# Patient Record
Sex: MALE | Race: OTHER | Employment: UNEMPLOYED | URBAN - METROPOLITAN AREA
[De-identification: names, ages, dates, MRNs, and addresses within clinical notes are randomized per-mention and may not be internally consistent; named-entity substitution may affect disease eponyms.]

---

## 2018-06-12 ENCOUNTER — HOSPITAL ENCOUNTER (INPATIENT)
Age: 43
LOS: 4 days | Discharge: HOME | DRG: 753 | End: 2018-06-16
Attending: PSYCHIATRY & NEUROLOGY | Admitting: PSYCHIATRY & NEUROLOGY
Payer: COMMERCIAL

## 2018-06-12 DIAGNOSIS — F31.4 BIPOLAR 1 DISORDER, DEPRESSED, SEVERE (HCC): Primary | ICD-10-CM

## 2018-06-12 LAB
ACETAMINOPHEN LEVEL: <5 UG/ML (ref 10–30)
ALBUMIN SERPL-MCNC: 4.1 G/DL (ref 3.9–4.9)
ALP BLD-CCNC: 62 U/L (ref 35–104)
ALT SERPL-CCNC: 24 U/L (ref 0–41)
AMPHETAMINE SCREEN, URINE: ABNORMAL
ANION GAP SERPL CALCULATED.3IONS-SCNC: 13 MEQ/L (ref 7–13)
AST SERPL-CCNC: 23 U/L (ref 0–40)
BARBITURATE SCREEN URINE: ABNORMAL
BASOPHILS ABSOLUTE: 0.1 K/UL (ref 0–0.2)
BASOPHILS RELATIVE PERCENT: 1.2 %
BENZODIAZEPINE SCREEN, URINE: ABNORMAL
BILIRUB SERPL-MCNC: 0.6 MG/DL (ref 0–1.2)
BILIRUBIN URINE: NEGATIVE
BLOOD, URINE: NEGATIVE
BUN BLDV-MCNC: 13 MG/DL (ref 6–20)
CALCIUM SERPL-MCNC: 9.4 MG/DL (ref 8.6–10.2)
CANNABINOID SCREEN URINE: ABNORMAL
CHLORIDE BLD-SCNC: 96 MEQ/L (ref 98–107)
CLARITY: CLEAR
CO2: 24 MEQ/L (ref 22–29)
COCAINE METABOLITE SCREEN URINE: POSITIVE
COLOR: YELLOW
CREAT SERPL-MCNC: 0.85 MG/DL (ref 0.7–1.2)
EOSINOPHILS ABSOLUTE: 0.1 K/UL (ref 0–0.7)
EOSINOPHILS RELATIVE PERCENT: 1.3 %
ETHANOL PERCENT: 0.1 G/DL
ETHANOL: 111 MG/DL (ref 0–0.08)
GFR AFRICAN AMERICAN: >60
GFR NON-AFRICAN AMERICAN: >60
GLOBULIN: 2.7 G/DL (ref 2.3–3.5)
GLUCOSE BLD-MCNC: 119 MG/DL (ref 74–109)
GLUCOSE URINE: NEGATIVE MG/DL
HCT VFR BLD CALC: 40.7 % (ref 42–52)
HEMOGLOBIN: 10.8 G/DL (ref 14–18)
KETONES, URINE: NEGATIVE MG/DL
LEUKOCYTE ESTERASE, URINE: NEGATIVE
LYMPHOCYTES ABSOLUTE: 2 K/UL (ref 1–4.8)
LYMPHOCYTES RELATIVE PERCENT: 29.8 %
Lab: ABNORMAL
MCH RBC QN AUTO: 23.3 PG (ref 27–31.3)
MCHC RBC AUTO-ENTMCNC: 26.7 % (ref 33–37)
MCV RBC AUTO: 87.6 FL (ref 80–100)
MONOCYTES ABSOLUTE: 0.5 K/UL (ref 0.2–0.8)
MONOCYTES RELATIVE PERCENT: 7.4 %
NEUTROPHILS ABSOLUTE: 4.2 K/UL (ref 1.4–6.5)
NEUTROPHILS RELATIVE PERCENT: 60.3 %
NITRITE, URINE: NEGATIVE
OPIATE SCREEN URINE: ABNORMAL
PDW BLD-RTO: 13.1 % (ref 11.5–14.5)
PH UA: 6.5 (ref 5–9)
PHENCYCLIDINE SCREEN URINE: ABNORMAL
PLATELET # BLD: 372 K/UL (ref 130–400)
POTASSIUM SERPL-SCNC: 3.8 MEQ/L (ref 3.5–5.1)
PROTEIN UA: NEGATIVE MG/DL
RBC # BLD: 4.65 M/UL (ref 4.7–6.1)
SALICYLATE, SERUM: <0.3 MG/DL (ref 15–30)
SLIDE REVIEW: ABNORMAL
SODIUM BLD-SCNC: 133 MEQ/L (ref 132–144)
SPECIFIC GRAVITY UA: 1 (ref 1–1.03)
TOTAL CK: 187 U/L (ref 0–190)
TOTAL PROTEIN: 6.8 G/DL (ref 6.4–8.1)
TSH SERPL DL<=0.05 MIU/L-ACNC: 3.25 UIU/ML (ref 0.27–4.2)
URINE REFLEX TO CULTURE: NORMAL
UROBILINOGEN, URINE: 0.2 E.U./DL
WBC # BLD: 6.9 K/UL (ref 4.8–10.8)

## 2018-06-12 PROCEDURE — G0480 DRUG TEST DEF 1-7 CLASSES: HCPCS

## 2018-06-12 PROCEDURE — 84443 ASSAY THYROID STIM HORMONE: CPT

## 2018-06-12 PROCEDURE — 85025 COMPLETE CBC W/AUTO DIFF WBC: CPT

## 2018-06-12 PROCEDURE — 1240000000 HC EMOTIONAL WELLNESS R&B

## 2018-06-12 PROCEDURE — 81003 URINALYSIS AUTO W/O SCOPE: CPT

## 2018-06-12 PROCEDURE — 36415 COLL VENOUS BLD VENIPUNCTURE: CPT

## 2018-06-12 PROCEDURE — 6370000000 HC RX 637 (ALT 250 FOR IP): Performed by: CLINICAL NURSE SPECIALIST

## 2018-06-12 PROCEDURE — 80307 DRUG TEST PRSMV CHEM ANLYZR: CPT

## 2018-06-12 PROCEDURE — 99285 EMERGENCY DEPT VISIT HI MDM: CPT

## 2018-06-12 PROCEDURE — 80053 COMPREHEN METABOLIC PANEL: CPT

## 2018-06-12 PROCEDURE — 82550 ASSAY OF CK (CPK): CPT

## 2018-06-12 RX ORDER — MAGNESIUM HYDROXIDE/ALUMINUM HYDROXICE/SIMETHICONE 120; 1200; 1200 MG/30ML; MG/30ML; MG/30ML
30 SUSPENSION ORAL PRN
Status: DISCONTINUED | OUTPATIENT
Start: 2018-06-12 | End: 2018-06-16 | Stop reason: HOSPADM

## 2018-06-12 RX ORDER — HYDROXYZINE PAMOATE 50 MG/1
50 CAPSULE ORAL EVERY 6 HOURS PRN
Status: DISCONTINUED | OUTPATIENT
Start: 2018-06-12 | End: 2018-06-16 | Stop reason: HOSPADM

## 2018-06-12 RX ORDER — ACETAMINOPHEN 325 MG/1
650 TABLET ORAL EVERY 4 HOURS PRN
Status: DISCONTINUED | OUTPATIENT
Start: 2018-06-12 | End: 2018-06-14

## 2018-06-12 RX ORDER — CLONAZEPAM 1 MG/1
1 TABLET ORAL 2 TIMES DAILY PRN
Status: ON HOLD | COMMUNITY
End: 2018-06-16 | Stop reason: HOSPADM

## 2018-06-12 RX ORDER — TRAZODONE HYDROCHLORIDE 50 MG/1
50 TABLET ORAL NIGHTLY PRN
Status: DISCONTINUED | OUTPATIENT
Start: 2018-06-12 | End: 2018-06-14

## 2018-06-12 RX ORDER — MELOXICAM 15 MG/1
15 TABLET ORAL DAILY
Status: ON HOLD | COMMUNITY
End: 2018-06-14 | Stop reason: HOSPADM

## 2018-06-12 RX ORDER — QUETIAPINE FUMARATE 200 MG/1
200 TABLET, FILM COATED ORAL NIGHTLY
COMMUNITY
End: 2018-06-12 | Stop reason: ALTCHOICE

## 2018-06-12 RX ORDER — OMEPRAZOLE 20 MG/1
20 CAPSULE, DELAYED RELEASE ORAL DAILY
COMMUNITY

## 2018-06-12 RX ORDER — ESCITALOPRAM OXALATE 20 MG/1
20 TABLET ORAL DAILY
Status: DISCONTINUED | OUTPATIENT
Start: 2018-06-12 | End: 2018-06-16 | Stop reason: HOSPADM

## 2018-06-12 RX ORDER — TIZANIDINE 4 MG/1
4 TABLET ORAL EVERY 8 HOURS PRN
Status: ON HOLD | COMMUNITY
Start: 2018-04-30 | End: 2018-06-14 | Stop reason: HOSPADM

## 2018-06-12 RX ORDER — SIMVASTATIN 20 MG
TABLET ORAL NIGHTLY
COMMUNITY

## 2018-06-12 RX ORDER — HALOPERIDOL 5 MG/ML
5 INJECTION INTRAMUSCULAR EVERY 4 HOURS PRN
Status: DISCONTINUED | OUTPATIENT
Start: 2018-06-12 | End: 2018-06-16 | Stop reason: HOSPADM

## 2018-06-12 RX ORDER — ESCITALOPRAM OXALATE 20 MG/1
20 TABLET ORAL DAILY
COMMUNITY

## 2018-06-12 RX ORDER — BENZTROPINE MESYLATE 1 MG/ML
2 INJECTION INTRAMUSCULAR; INTRAVENOUS 2 TIMES DAILY PRN
Status: DISCONTINUED | OUTPATIENT
Start: 2018-06-12 | End: 2018-06-16 | Stop reason: HOSPADM

## 2018-06-12 RX ADMIN — LURASIDONE HYDROCHLORIDE 20 MG: 20 TABLET, FILM COATED ORAL at 17:49

## 2018-06-12 RX ADMIN — ESCITALOPRAM OXALATE 20 MG: 20 TABLET ORAL at 21:39

## 2018-06-12 RX ADMIN — TRAZODONE HYDROCHLORIDE 50 MG: 50 TABLET ORAL at 21:39

## 2018-06-12 ASSESSMENT — ENCOUNTER SYMPTOMS
COUGH: 0
RHINORRHEA: 0
BLOOD IN STOOL: 0
SHORTNESS OF BREATH: 0
DIARRHEA: 0
NAUSEA: 0
ABDOMINAL PAIN: 0
COLOR CHANGE: 0
VOMITING: 0
SORE THROAT: 0

## 2018-06-12 ASSESSMENT — SLEEP AND FATIGUE QUESTIONNAIRES
DIFFICULTY FALLING ASLEEP: YES
DIFFICULTY ARISING: YES
AVERAGE NUMBER OF SLEEP HOURS: 4
DIFFICULTY STAYING ASLEEP: YES
RESTFUL SLEEP: NO
DO YOU HAVE DIFFICULTY SLEEPING: YES
DO YOU USE A SLEEP AID: NO

## 2018-06-12 ASSESSMENT — PATIENT HEALTH QUESTIONNAIRE - PHQ9
SUM OF ALL RESPONSES TO PHQ QUESTIONS 1-9: 24
SUM OF ALL RESPONSES TO PHQ QUESTIONS 1-9: 27

## 2018-06-13 PROCEDURE — 93005 ELECTROCARDIOGRAM TRACING: CPT

## 2018-06-13 PROCEDURE — 1240000000 HC EMOTIONAL WELLNESS R&B

## 2018-06-13 PROCEDURE — 6370000000 HC RX 637 (ALT 250 FOR IP): Performed by: CLINICAL NURSE SPECIALIST

## 2018-06-13 PROCEDURE — 93010 ELECTROCARDIOGRAM REPORT: CPT | Performed by: INTERNAL MEDICINE

## 2018-06-13 PROCEDURE — 99223 1ST HOSP IP/OBS HIGH 75: CPT | Performed by: PSYCHIATRY & NEUROLOGY

## 2018-06-13 RX ADMIN — TRAZODONE HYDROCHLORIDE 50 MG: 50 TABLET ORAL at 22:00

## 2018-06-13 RX ADMIN — ESCITALOPRAM OXALATE 20 MG: 20 TABLET ORAL at 09:42

## 2018-06-13 RX ADMIN — LURASIDONE HYDROCHLORIDE 20 MG: 20 TABLET, FILM COATED ORAL at 09:42

## 2018-06-13 ASSESSMENT — LIFESTYLE VARIABLES: HISTORY_ALCOHOL_USE: NO

## 2018-06-13 ASSESSMENT — ENCOUNTER SYMPTOMS: SHORTNESS OF BREATH: 0

## 2018-06-14 PROBLEM — F14.10 COCAINE ABUSE (HCC): Status: ACTIVE | Noted: 2018-06-14

## 2018-06-14 PROBLEM — F10.14 ALCOHOL ABUSE WITH ALCOHOL-INDUCED MOOD DISORDER (HCC): Status: ACTIVE | Noted: 2018-06-14

## 2018-06-14 PROBLEM — M79.10 MUSCULAR PAIN: Status: ACTIVE | Noted: 2018-06-14

## 2018-06-14 PROBLEM — M47.812 CERVICAL SPONDYLOSIS WITHOUT MYELOPATHY: Status: ACTIVE | Noted: 2018-06-14

## 2018-06-14 PROBLEM — M51.36 DEGENERATION, INTERVERTEBRAL DISC, LUMBAR: Status: ACTIVE | Noted: 2018-06-14

## 2018-06-14 PROBLEM — G89.4 CHRONIC PAIN ASSOCIATED WITH SIGNIFICANT PSYCHOSOCIAL DYSFUNCTION: Status: ACTIVE | Noted: 2018-06-14

## 2018-06-14 PROCEDURE — 6370000000 HC RX 637 (ALT 250 FOR IP): Performed by: PSYCHIATRY & NEUROLOGY

## 2018-06-14 PROCEDURE — 1240000000 HC EMOTIONAL WELLNESS R&B

## 2018-06-14 PROCEDURE — 6370000000 HC RX 637 (ALT 250 FOR IP): Performed by: ANESTHESIOLOGY

## 2018-06-14 PROCEDURE — 99232 SBSQ HOSP IP/OBS MODERATE 35: CPT | Performed by: PSYCHIATRY & NEUROLOGY

## 2018-06-14 PROCEDURE — 6370000000 HC RX 637 (ALT 250 FOR IP): Performed by: CLINICAL NURSE SPECIALIST

## 2018-06-14 RX ORDER — METHOCARBAMOL 500 MG/1
500 TABLET, FILM COATED ORAL EVERY 6 HOURS PRN
Qty: 45 TABLET | Refills: 0 | Status: SHIPPED | OUTPATIENT
Start: 2018-06-14 | End: 2018-06-29

## 2018-06-14 RX ORDER — LIDOCAINE 50 MG/G
3 PATCH TOPICAL DAILY
Status: DISCONTINUED | OUTPATIENT
Start: 2018-06-14 | End: 2018-06-16 | Stop reason: HOSPADM

## 2018-06-14 RX ORDER — LIDOCAINE 50 MG/G
3 PATCH TOPICAL DAILY
Qty: 60 PATCH | Refills: 0 | Status: SHIPPED | OUTPATIENT
Start: 2018-06-14

## 2018-06-14 RX ORDER — ACETAMINOPHEN 325 MG/1
650 TABLET ORAL EVERY 4 HOURS PRN
Status: DISCONTINUED | OUTPATIENT
Start: 2018-06-14 | End: 2018-06-16 | Stop reason: HOSPADM

## 2018-06-14 RX ORDER — TRAZODONE HYDROCHLORIDE 100 MG/1
100 TABLET ORAL NIGHTLY PRN
Status: DISCONTINUED | OUTPATIENT
Start: 2018-06-14 | End: 2018-06-16 | Stop reason: HOSPADM

## 2018-06-14 RX ORDER — MELOXICAM 7.5 MG/1
7.5 TABLET ORAL 2 TIMES DAILY WITH MEALS
Qty: 60 TABLET | Refills: 0 | Status: SHIPPED | OUTPATIENT
Start: 2018-06-14

## 2018-06-14 RX ORDER — METHOCARBAMOL 500 MG/1
500 TABLET, FILM COATED ORAL EVERY 6 HOURS PRN
Status: DISCONTINUED | OUTPATIENT
Start: 2018-06-14 | End: 2018-06-16 | Stop reason: HOSPADM

## 2018-06-14 RX ORDER — MELOXICAM 7.5 MG/1
7.5 TABLET ORAL 2 TIMES DAILY WITH MEALS
Status: DISCONTINUED | OUTPATIENT
Start: 2018-06-14 | End: 2018-06-16 | Stop reason: HOSPADM

## 2018-06-14 RX ORDER — ACETAMINOPHEN 325 MG/1
650 TABLET ORAL EVERY 4 HOURS PRN
Qty: 60 TABLET | Refills: 3 | Status: SHIPPED | OUTPATIENT
Start: 2018-06-14

## 2018-06-14 RX ADMIN — MELOXICAM 7.5 MG: 7.5 TABLET ORAL at 19:23

## 2018-06-14 RX ADMIN — LURASIDONE HYDROCHLORIDE 40 MG: 40 TABLET, FILM COATED ORAL at 09:40

## 2018-06-14 RX ADMIN — TRAZODONE HYDROCHLORIDE 100 MG: 100 TABLET ORAL at 22:20

## 2018-06-14 RX ADMIN — ESCITALOPRAM OXALATE 20 MG: 20 TABLET ORAL at 09:40

## 2018-06-14 ASSESSMENT — PAIN SCALES - GENERAL: PAINLEVEL_OUTOF10: 7

## 2018-06-15 LAB
EKG ATRIAL RATE: 101 BPM
EKG ATRIAL RATE: 57 BPM
EKG P AXIS: 17 DEGREES
EKG P AXIS: 36 DEGREES
EKG P-R INTERVAL: 130 MS
EKG P-R INTERVAL: 144 MS
EKG Q-T INTERVAL: 354 MS
EKG Q-T INTERVAL: 438 MS
EKG QRS DURATION: 94 MS
EKG QRS DURATION: 98 MS
EKG QTC CALCULATION (BAZETT): 426 MS
EKG QTC CALCULATION (BAZETT): 459 MS
EKG R AXIS: -2 DEGREES
EKG R AXIS: -6 DEGREES
EKG T AXIS: 14 DEGREES
EKG T AXIS: 4 DEGREES
EKG VENTRICULAR RATE: 101 BPM
EKG VENTRICULAR RATE: 57 BPM

## 2018-06-15 PROCEDURE — 99231 SBSQ HOSP IP/OBS SF/LOW 25: CPT | Performed by: PSYCHIATRY & NEUROLOGY

## 2018-06-15 PROCEDURE — 6370000000 HC RX 637 (ALT 250 FOR IP): Performed by: PSYCHIATRY & NEUROLOGY

## 2018-06-15 PROCEDURE — 6370000000 HC RX 637 (ALT 250 FOR IP): Performed by: ANESTHESIOLOGY

## 2018-06-15 PROCEDURE — 1240000000 HC EMOTIONAL WELLNESS R&B

## 2018-06-15 PROCEDURE — 6370000000 HC RX 637 (ALT 250 FOR IP): Performed by: CLINICAL NURSE SPECIALIST

## 2018-06-15 PROCEDURE — 90833 PSYTX W PT W E/M 30 MIN: CPT | Performed by: PSYCHIATRY & NEUROLOGY

## 2018-06-15 RX ADMIN — ESCITALOPRAM OXALATE 20 MG: 20 TABLET ORAL at 10:18

## 2018-06-15 RX ADMIN — LURASIDONE HYDROCHLORIDE 40 MG: 40 TABLET, FILM COATED ORAL at 10:18

## 2018-06-15 RX ADMIN — METHOCARBAMOL 500 MG: 500 TABLET ORAL at 10:18

## 2018-06-15 RX ADMIN — MELOXICAM 7.5 MG: 7.5 TABLET ORAL at 10:17

## 2018-06-15 RX ADMIN — TRAZODONE HYDROCHLORIDE 100 MG: 100 TABLET ORAL at 21:20

## 2018-06-15 RX ADMIN — METHOCARBAMOL 500 MG: 500 TABLET ORAL at 21:20

## 2018-06-15 RX ADMIN — MELOXICAM 7.5 MG: 7.5 TABLET ORAL at 17:19

## 2018-06-15 ASSESSMENT — PAIN SCALES - GENERAL
PAINLEVEL_OUTOF10: 4
PAINLEVEL_OUTOF10: 6

## 2018-06-16 VITALS
WEIGHT: 193 LBS | HEIGHT: 67 IN | DIASTOLIC BLOOD PRESSURE: 73 MMHG | RESPIRATION RATE: 20 BRPM | BODY MASS INDEX: 30.29 KG/M2 | SYSTOLIC BLOOD PRESSURE: 110 MMHG | HEART RATE: 103 BPM | OXYGEN SATURATION: 97 % | TEMPERATURE: 97 F

## 2018-06-16 PROCEDURE — 6370000000 HC RX 637 (ALT 250 FOR IP): Performed by: ANESTHESIOLOGY

## 2018-06-16 PROCEDURE — 6370000000 HC RX 637 (ALT 250 FOR IP): Performed by: CLINICAL NURSE SPECIALIST

## 2018-06-16 PROCEDURE — 6370000000 HC RX 637 (ALT 250 FOR IP): Performed by: PSYCHIATRY & NEUROLOGY

## 2018-06-16 RX ORDER — TRAZODONE HYDROCHLORIDE 100 MG/1
100 TABLET ORAL NIGHTLY PRN
Qty: 15 TABLET | Refills: 2 | Status: SHIPPED | OUTPATIENT
Start: 2018-06-16

## 2018-06-16 RX ADMIN — LURASIDONE HYDROCHLORIDE 40 MG: 40 TABLET, FILM COATED ORAL at 08:47

## 2018-06-16 RX ADMIN — ESCITALOPRAM OXALATE 20 MG: 20 TABLET ORAL at 08:47

## 2018-06-16 RX ADMIN — MELOXICAM 7.5 MG: 7.5 TABLET ORAL at 08:47

## 2018-06-16 ASSESSMENT — PAIN SCALES - GENERAL: PAINLEVEL_OUTOF10: 8

## 2018-06-19 PROCEDURE — 93010 ELECTROCARDIOGRAM REPORT: CPT | Performed by: INTERNAL MEDICINE

## 2018-07-25 ENCOUNTER — HOSPITAL ENCOUNTER (EMERGENCY)
Age: 43
Discharge: HOME | End: 2018-07-25
Attending: EMERGENCY MEDICINE
Payer: COMMERCIAL

## 2018-07-25 VITALS
SYSTOLIC BLOOD PRESSURE: 110 MMHG | OXYGEN SATURATION: 93 % | TEMPERATURE: 98.2 F | HEART RATE: 82 BPM | RESPIRATION RATE: 16 BRPM | DIASTOLIC BLOOD PRESSURE: 82 MMHG

## 2018-07-25 DIAGNOSIS — G89.29 ACUTE EXACERBATION OF CHRONIC LOW BACK PAIN: Primary | ICD-10-CM

## 2018-07-25 DIAGNOSIS — M54.50 ACUTE EXACERBATION OF CHRONIC LOW BACK PAIN: Primary | ICD-10-CM

## 2018-07-25 PROCEDURE — 6360000002 HC RX W HCPCS: Performed by: EMERGENCY MEDICINE

## 2018-07-25 PROCEDURE — 6360000002 HC RX W HCPCS: Performed by: NURSE PRACTITIONER

## 2018-07-25 PROCEDURE — 96372 THER/PROPH/DIAG INJ SC/IM: CPT

## 2018-07-25 PROCEDURE — 99283 EMERGENCY DEPT VISIT LOW MDM: CPT

## 2018-07-25 RX ORDER — FENTANYL CITRATE 50 UG/ML
50 INJECTION, SOLUTION INTRAMUSCULAR; INTRAVENOUS ONCE
Status: COMPLETED | OUTPATIENT
Start: 2018-07-25 | End: 2018-07-25

## 2018-07-25 RX ORDER — OXYCODONE HYDROCHLORIDE AND ACETAMINOPHEN 5; 325 MG/1; MG/1
1 TABLET ORAL EVERY 6 HOURS PRN
Qty: 12 TABLET | Refills: 0 | Status: SHIPPED | OUTPATIENT
Start: 2018-07-25 | End: 2018-07-28

## 2018-07-25 RX ORDER — KETOROLAC TROMETHAMINE 30 MG/ML
60 INJECTION, SOLUTION INTRAMUSCULAR; INTRAVENOUS ONCE
Status: COMPLETED | OUTPATIENT
Start: 2018-07-25 | End: 2018-07-25

## 2018-07-25 RX ORDER — ORPHENADRINE CITRATE 30 MG/ML
60 INJECTION INTRAMUSCULAR; INTRAVENOUS ONCE
Status: COMPLETED | OUTPATIENT
Start: 2018-07-25 | End: 2018-07-25

## 2018-07-25 RX ADMIN — FENTANYL CITRATE 50 MCG: 50 INJECTION, SOLUTION INTRAMUSCULAR; INTRAVENOUS at 20:02

## 2018-07-25 RX ADMIN — ORPHENADRINE CITRATE 60 MG: 30 INJECTION INTRAMUSCULAR; INTRAVENOUS at 18:40

## 2018-07-25 RX ADMIN — FENTANYL CITRATE 50 MCG: 50 INJECTION, SOLUTION INTRAMUSCULAR; INTRAVENOUS at 20:48

## 2018-07-25 RX ADMIN — KETOROLAC TROMETHAMINE 60 MG: 30 INJECTION, SOLUTION INTRAMUSCULAR at 18:39

## 2018-07-25 ASSESSMENT — PAIN DESCRIPTION - DESCRIPTORS
DESCRIPTORS: CRUSHING

## 2018-07-25 ASSESSMENT — PAIN DESCRIPTION - PAIN TYPE
TYPE: ACUTE PAIN
TYPE: ACUTE PAIN
TYPE: CHRONIC PAIN;ACUTE PAIN
TYPE: ACUTE PAIN

## 2018-07-25 ASSESSMENT — PAIN DESCRIPTION - ORIENTATION
ORIENTATION: LOWER

## 2018-07-25 ASSESSMENT — PAIN DESCRIPTION - FREQUENCY
FREQUENCY: CONTINUOUS

## 2018-07-25 ASSESSMENT — ENCOUNTER SYMPTOMS
ABDOMINAL PAIN: 0
COLOR CHANGE: 0
VOMITING: 0
FACIAL SWELLING: 0
EYE DISCHARGE: 0
BACK PAIN: 1
SHORTNESS OF BREATH: 0
RHINORRHEA: 0

## 2018-07-25 ASSESSMENT — PAIN SCALES - GENERAL
PAINLEVEL_OUTOF10: 8
PAINLEVEL_OUTOF10: 8
PAINLEVEL_OUTOF10: 10
PAINLEVEL_OUTOF10: 10
PAINLEVEL_OUTOF10: 7
PAINLEVEL_OUTOF10: 10
PAINLEVEL_OUTOF10: 10

## 2018-07-25 ASSESSMENT — PAIN DESCRIPTION - LOCATION
LOCATION: BACK

## 2018-07-25 ASSESSMENT — PAIN DESCRIPTION - PROGRESSION
CLINICAL_PROGRESSION: GRADUALLY IMPROVING
CLINICAL_PROGRESSION: GRADUALLY IMPROVING
CLINICAL_PROGRESSION: NOT CHANGED

## 2018-07-25 NOTE — ED NOTES
Bed: 18  Expected date:   Expected time:   Means of arrival:   Comments:  Back pain      Gualberto Loja RN  07/25/18 0640

## 2018-07-26 NOTE — ED NOTES
Patient requesting pain medication RX to go home with. Dr. Kinga Dueks didn't write any RX for patient. Adjondi NP at bedside.       Shawn Velasquez RN  07/25/18 4234

## 2023-10-10 ENCOUNTER — ANESTHESIA EVENT (OUTPATIENT)
Dept: OPERATING ROOM | Facility: HOSPITAL | Age: 48
End: 2023-10-10
Payer: COMMERCIAL

## 2023-10-10 ENCOUNTER — APPOINTMENT (OUTPATIENT)
Dept: RADIOLOGY | Facility: HOSPITAL | Age: 48
End: 2023-10-10
Payer: COMMERCIAL

## 2023-10-10 ENCOUNTER — APPOINTMENT (OUTPATIENT)
Dept: CARDIOLOGY | Facility: HOSPITAL | Age: 48
End: 2023-10-10
Payer: COMMERCIAL

## 2023-10-10 ENCOUNTER — ANESTHESIA (OUTPATIENT)
Dept: OPERATING ROOM | Facility: HOSPITAL | Age: 48
End: 2023-10-10
Payer: COMMERCIAL

## 2023-10-10 ENCOUNTER — HOSPITAL ENCOUNTER (INPATIENT)
Facility: HOSPITAL | Age: 48
LOS: 22 days | Discharge: NURSING, REHAB OR LONG-TERM CARE | End: 2023-11-02
Attending: SURGERY | Admitting: INTERNAL MEDICINE
Payer: COMMERCIAL

## 2023-10-10 DIAGNOSIS — K35.30 ACUTE APPENDICITIS WITH LOCALIZED PERITONITIS, UNSPECIFIED WHETHER ABSCESS PRESENT, UNSPECIFIED WHETHER GANGRENE PRESENT, UNSPECIFIED WHETHER PERFORATION PRESENT: ICD-10-CM

## 2023-10-10 DIAGNOSIS — K56.609 SMALL BOWEL OBSTRUCTION (MULTI): ICD-10-CM

## 2023-10-10 DIAGNOSIS — Z79.4 TYPE 2 DIABETES MELLITUS WITHOUT COMPLICATION, WITH LONG-TERM CURRENT USE OF INSULIN (MULTI): ICD-10-CM

## 2023-10-10 DIAGNOSIS — F33.9 EPISODE OF RECURRENT MAJOR DEPRESSIVE DISORDER, UNSPECIFIED DEPRESSION EPISODE SEVERITY (CMS-HCC): ICD-10-CM

## 2023-10-10 DIAGNOSIS — N40.1 BENIGN PROSTATIC HYPERPLASIA WITH LOWER URINARY TRACT SYMPTOMS, SYMPTOM DETAILS UNSPECIFIED: ICD-10-CM

## 2023-10-10 DIAGNOSIS — K35.30 ACUTE APPENDICITIS WITH LOCALIZED PERITONITIS, WITHOUT PERFORATION, ABSCESS, OR GANGRENE: ICD-10-CM

## 2023-10-10 DIAGNOSIS — K35.80 ACUTE APPENDICITIS, UNSPECIFIED ACUTE APPENDICITIS TYPE: Primary | ICD-10-CM

## 2023-10-10 DIAGNOSIS — E11.9 TYPE 2 DIABETES MELLITUS WITHOUT COMPLICATION, WITH LONG-TERM CURRENT USE OF INSULIN (MULTI): ICD-10-CM

## 2023-10-10 PROBLEM — E78.5 HYPERLIPIDEMIA: Status: ACTIVE | Noted: 2023-10-10

## 2023-10-10 PROBLEM — F32.A DEPRESSION: Status: ACTIVE | Noted: 2023-10-10

## 2023-10-10 PROBLEM — K37 APPENDICITIS: Status: ACTIVE | Noted: 2023-10-10

## 2023-10-10 PROBLEM — F41.9 ANXIETY: Status: ACTIVE | Noted: 2023-10-10

## 2023-10-10 LAB
ALBUMIN SERPL BCP-MCNC: 4.2 G/DL (ref 3.4–5)
ALP SERPL-CCNC: 69 U/L (ref 33–120)
ALT SERPL W P-5'-P-CCNC: 17 U/L (ref 7–52)
ANION GAP SERPL CALC-SCNC: 13 MMOL/L (ref 10–20)
AST SERPL W P-5'-P-CCNC: 12 U/L (ref 9–39)
BASOPHILS # BLD AUTO: 0.08 X10*3/UL (ref 0–0.1)
BASOPHILS NFR BLD AUTO: 0.5 %
BILIRUB SERPL-MCNC: 0.3 MG/DL (ref 0–1.2)
BUN SERPL-MCNC: 14 MG/DL (ref 6–23)
CALCIUM SERPL-MCNC: 9.4 MG/DL (ref 8.6–10.3)
CHLORIDE SERPL-SCNC: 103 MMOL/L (ref 98–107)
CO2 SERPL-SCNC: 24 MMOL/L (ref 21–32)
CREAT SERPL-MCNC: 0.94 MG/DL (ref 0.5–1.3)
EOSINOPHIL # BLD AUTO: 0.72 X10*3/UL (ref 0–0.7)
EOSINOPHIL NFR BLD AUTO: 4.8 %
ERYTHROCYTE [DISTWIDTH] IN BLOOD BY AUTOMATED COUNT: 12.2 % (ref 11.5–14.5)
GFR SERPL CREATININE-BSD FRML MDRD: 75 ML/MIN/1.73M*2
GLUCOSE BLD MANUAL STRIP-MCNC: 153 MG/DL (ref 74–99)
GLUCOSE SERPL-MCNC: 174 MG/DL (ref 74–99)
HCT VFR BLD AUTO: 40.7 % (ref 36–52)
HGB BLD-MCNC: 13.9 G/DL (ref 12–17.5)
IMM GRANULOCYTES # BLD AUTO: 0.04 X10*3/UL (ref 0–0.7)
IMM GRANULOCYTES NFR BLD AUTO: 0.3 % (ref 0–0.9)
LACTATE SERPL-SCNC: 1.4 MMOL/L (ref 0.4–2)
LIPASE SERPL-CCNC: 15 U/L (ref 9–82)
LYMPHOCYTES # BLD AUTO: 2.02 X10*3/UL (ref 1.2–4.8)
LYMPHOCYTES NFR BLD AUTO: 13.4 %
MCH RBC QN AUTO: 29.1 PG (ref 26–34)
MCHC RBC AUTO-ENTMCNC: 34.2 G/DL (ref 32–36)
MCV RBC AUTO: 85 FL (ref 80–100)
MONOCYTES # BLD AUTO: 0.81 X10*3/UL (ref 0.1–1)
MONOCYTES NFR BLD AUTO: 5.4 %
NEUTROPHILS # BLD AUTO: 11.35 X10*3/UL (ref 1.2–7.7)
NEUTROPHILS NFR BLD AUTO: 75.6 %
NRBC BLD-RTO: 0 /100 WBCS (ref 0–0)
PLATELET # BLD AUTO: 375 X10*3/UL (ref 150–450)
PMV BLD AUTO: 10.1 FL (ref 7.5–11.5)
POTASSIUM SERPL-SCNC: 3.6 MMOL/L (ref 3.5–5.3)
PROT SERPL-MCNC: 7.3 G/DL (ref 6.4–8.2)
RBC # BLD AUTO: 4.77 X10*6/UL (ref 4–5.9)
SARS-COV-2 RNA RESP QL NAA+PROBE: NOT DETECTED
SODIUM SERPL-SCNC: 136 MMOL/L (ref 136–145)
WBC # BLD AUTO: 15 X10*3/UL (ref 4.4–11.3)

## 2023-10-10 PROCEDURE — 2500000001 HC RX 250 WO HCPCS SELF ADMINISTERED DRUGS (ALT 637 FOR MEDICARE OP): Performed by: SURGERY

## 2023-10-10 PROCEDURE — 74177 CT ABD & PELVIS W/CONTRAST: CPT | Mod: FOREIGN READ | Performed by: RADIOLOGY

## 2023-10-10 PROCEDURE — 88304 TISSUE EXAM BY PATHOLOGIST: CPT | Mod: TC | Performed by: SURGERY

## 2023-10-10 PROCEDURE — 83690 ASSAY OF LIPASE: CPT | Performed by: PHYSICIAN ASSISTANT

## 2023-10-10 PROCEDURE — 96365 THER/PROPH/DIAG IV INF INIT: CPT | Mod: 59

## 2023-10-10 PROCEDURE — 99285 EMERGENCY DEPT VISIT HI MDM: CPT

## 2023-10-10 PROCEDURE — 2500000004 HC RX 250 GENERAL PHARMACY W/ HCPCS (ALT 636 FOR OP/ED): Performed by: STUDENT IN AN ORGANIZED HEALTH CARE EDUCATION/TRAINING PROGRAM

## 2023-10-10 PROCEDURE — 2500000005 HC RX 250 GENERAL PHARMACY W/O HCPCS: Performed by: STUDENT IN AN ORGANIZED HEALTH CARE EDUCATION/TRAINING PROGRAM

## 2023-10-10 PROCEDURE — 93005 ELECTROCARDIOGRAM TRACING: CPT

## 2023-10-10 PROCEDURE — 2500000005 HC RX 250 GENERAL PHARMACY W/O HCPCS: Performed by: NURSE ANESTHETIST, CERTIFIED REGISTERED

## 2023-10-10 PROCEDURE — 2500000001 HC RX 250 WO HCPCS SELF ADMINISTERED DRUGS (ALT 637 FOR MEDICARE OP): Performed by: STUDENT IN AN ORGANIZED HEALTH CARE EDUCATION/TRAINING PROGRAM

## 2023-10-10 PROCEDURE — 2500000004 HC RX 250 GENERAL PHARMACY W/ HCPCS (ALT 636 FOR OP/ED): Performed by: SURGERY

## 2023-10-10 PROCEDURE — 2720000007 HC OR 272 NO HCPCS: Performed by: SURGERY

## 2023-10-10 PROCEDURE — 36415 COLL VENOUS BLD VENIPUNCTURE: CPT | Performed by: PHYSICIAN ASSISTANT

## 2023-10-10 PROCEDURE — 74177 CT ABD & PELVIS W/CONTRAST: CPT | Mod: FR

## 2023-10-10 PROCEDURE — 96366 THER/PROPH/DIAG IV INF ADDON: CPT

## 2023-10-10 PROCEDURE — 96372 THER/PROPH/DIAG INJ SC/IM: CPT | Performed by: SURGERY

## 2023-10-10 PROCEDURE — 2550000001 HC RX 255 CONTRASTS: Performed by: PHYSICIAN ASSISTANT

## 2023-10-10 PROCEDURE — 99221 1ST HOSP IP/OBS SF/LOW 40: CPT | Performed by: SURGERY

## 2023-10-10 PROCEDURE — 2580000001 HC RX 258 IV SOLUTIONS: Performed by: PHYSICIAN ASSISTANT

## 2023-10-10 PROCEDURE — 3700000002 HC GENERAL ANESTHESIA TIME - EACH INCREMENTAL 1 MINUTE: Performed by: SURGERY

## 2023-10-10 PROCEDURE — 96367 TX/PROPH/DG ADDL SEQ IV INF: CPT

## 2023-10-10 PROCEDURE — 96375 TX/PRO/DX INJ NEW DRUG ADDON: CPT

## 2023-10-10 PROCEDURE — 96376 TX/PRO/DX INJ SAME DRUG ADON: CPT

## 2023-10-10 PROCEDURE — 2500000004 HC RX 250 GENERAL PHARMACY W/ HCPCS (ALT 636 FOR OP/ED): Performed by: NURSE ANESTHETIST, CERTIFIED REGISTERED

## 2023-10-10 PROCEDURE — 80053 COMPREHEN METABOLIC PANEL: CPT | Performed by: PHYSICIAN ASSISTANT

## 2023-10-10 PROCEDURE — 83605 ASSAY OF LACTIC ACID: CPT | Performed by: PHYSICIAN ASSISTANT

## 2023-10-10 PROCEDURE — 2780000003 HC OR 278 NO HCPCS: Performed by: SURGERY

## 2023-10-10 PROCEDURE — 2580000001 HC RX 258 IV SOLUTIONS: Performed by: SURGERY

## 2023-10-10 PROCEDURE — 7100000002 HC RECOVERY ROOM TIME - EACH INCREMENTAL 1 MINUTE: Performed by: SURGERY

## 2023-10-10 PROCEDURE — 0WQF0ZZ REPAIR ABDOMINAL WALL, OPEN APPROACH: ICD-10-PCS | Performed by: SURGERY

## 2023-10-10 PROCEDURE — 7100000001 HC RECOVERY ROOM TIME - INITIAL BASE CHARGE: Performed by: SURGERY

## 2023-10-10 PROCEDURE — 2500000004 HC RX 250 GENERAL PHARMACY W/ HCPCS (ALT 636 FOR OP/ED): Performed by: PHYSICIAN ASSISTANT

## 2023-10-10 PROCEDURE — 85025 COMPLETE CBC W/AUTO DIFF WBC: CPT | Performed by: PHYSICIAN ASSISTANT

## 2023-10-10 PROCEDURE — 3600000009 HC OR TIME - EACH INCREMENTAL 1 MINUTE - PROCEDURE LEVEL FOUR: Performed by: SURGERY

## 2023-10-10 PROCEDURE — 96361 HYDRATE IV INFUSION ADD-ON: CPT

## 2023-10-10 PROCEDURE — P9040 RBC LEUKOREDUCED IRRADIATED: HCPCS

## 2023-10-10 PROCEDURE — 44970 LAPAROSCOPY APPENDECTOMY: CPT | Performed by: SURGERY

## 2023-10-10 PROCEDURE — 96372 THER/PROPH/DIAG INJ SC/IM: CPT | Mod: 59

## 2023-10-10 PROCEDURE — 82947 ASSAY GLUCOSE BLOOD QUANT: CPT

## 2023-10-10 PROCEDURE — G0378 HOSPITAL OBSERVATION PER HR: HCPCS

## 2023-10-10 PROCEDURE — 3700000001 HC GENERAL ANESTHESIA TIME - INITIAL BASE CHARGE: Performed by: SURGERY

## 2023-10-10 PROCEDURE — 87635 SARS-COV-2 COVID-19 AMP PRB: CPT | Performed by: PHYSICIAN ASSISTANT

## 2023-10-10 PROCEDURE — 3600000004 HC OR TIME - INITIAL BASE CHARGE - PROCEDURE LEVEL FOUR: Performed by: SURGERY

## 2023-10-10 PROCEDURE — 88304 TISSUE EXAM BY PATHOLOGIST: CPT | Performed by: PATHOLOGY

## 2023-10-10 PROCEDURE — 2580000001 HC RX 258 IV SOLUTIONS: Performed by: NURSE ANESTHETIST, CERTIFIED REGISTERED

## 2023-10-10 PROCEDURE — A4217 STERILE WATER/SALINE, 500 ML: HCPCS | Performed by: SURGERY

## 2023-10-10 PROCEDURE — 2500000005 HC RX 250 GENERAL PHARMACY W/O HCPCS: Performed by: SURGERY

## 2023-10-10 PROCEDURE — 0DTJ4ZZ RESECTION OF APPENDIX, PERCUTANEOUS ENDOSCOPIC APPROACH: ICD-10-PCS | Performed by: SURGERY

## 2023-10-10 RX ORDER — MIDAZOLAM HYDROCHLORIDE 1 MG/ML
INJECTION, SOLUTION INTRAMUSCULAR; INTRAVENOUS AS NEEDED
Status: DISCONTINUED | OUTPATIENT
Start: 2023-10-10 | End: 2023-10-10

## 2023-10-10 RX ORDER — LURASIDONE HYDROCHLORIDE 40 MG/1
80 TABLET, FILM COATED ORAL DAILY
Status: DISCONTINUED | OUTPATIENT
Start: 2023-10-11 | End: 2023-10-27

## 2023-10-10 RX ORDER — MORPHINE SULFATE 4 MG/ML
4 INJECTION, SOLUTION INTRAMUSCULAR; INTRAVENOUS ONCE
Status: COMPLETED | OUTPATIENT
Start: 2023-10-10 | End: 2023-10-10

## 2023-10-10 RX ORDER — OXYCODONE HYDROCHLORIDE 5 MG/1
5 TABLET ORAL EVERY 6 HOURS PRN
Qty: 15 TABLET | Refills: 0 | Status: SHIPPED | OUTPATIENT
Start: 2023-10-10 | End: 2023-10-10 | Stop reason: HOSPADM

## 2023-10-10 RX ORDER — ROCURONIUM BROMIDE 10 MG/ML
INJECTION, SOLUTION INTRAVENOUS AS NEEDED
Status: DISCONTINUED | OUTPATIENT
Start: 2023-10-10 | End: 2023-10-10

## 2023-10-10 RX ORDER — ALBUTEROL SULFATE 0.83 MG/ML
2.5 SOLUTION RESPIRATORY (INHALATION) ONCE AS NEEDED
Status: DISCONTINUED | OUTPATIENT
Start: 2023-10-10 | End: 2023-10-14 | Stop reason: HOSPADM

## 2023-10-10 RX ORDER — VENLAFAXINE HYDROCHLORIDE 150 MG/1
150 CAPSULE, EXTENDED RELEASE ORAL DAILY
Status: DISCONTINUED | OUTPATIENT
Start: 2023-10-11 | End: 2023-11-02 | Stop reason: HOSPADM

## 2023-10-10 RX ORDER — ALBUTEROL SULFATE 0.83 MG/ML
2.5 SOLUTION RESPIRATORY (INHALATION) ONCE AS NEEDED
Status: DISCONTINUED | OUTPATIENT
Start: 2023-10-10 | End: 2023-10-10 | Stop reason: HOSPADM

## 2023-10-10 RX ORDER — OMEPRAZOLE 20 MG/1
20 CAPSULE, DELAYED RELEASE ORAL
COMMUNITY

## 2023-10-10 RX ORDER — SODIUM CHLORIDE, SODIUM LACTATE, POTASSIUM CHLORIDE, CALCIUM CHLORIDE 600; 310; 30; 20 MG/100ML; MG/100ML; MG/100ML; MG/100ML
100 INJECTION, SOLUTION INTRAVENOUS CONTINUOUS
Status: DISCONTINUED | OUTPATIENT
Start: 2023-10-10 | End: 2023-10-10 | Stop reason: HOSPADM

## 2023-10-10 RX ORDER — SIMVASTATIN 40 MG/1
40 TABLET, FILM COATED ORAL NIGHTLY
COMMUNITY

## 2023-10-10 RX ORDER — ONDANSETRON HYDROCHLORIDE 2 MG/ML
4 INJECTION, SOLUTION INTRAVENOUS ONCE
Status: COMPLETED | OUTPATIENT
Start: 2023-10-10 | End: 2023-10-10

## 2023-10-10 RX ORDER — DOCUSATE SODIUM 100 MG/1
100 CAPSULE, LIQUID FILLED ORAL 2 TIMES DAILY
Status: DISCONTINUED | OUTPATIENT
Start: 2023-10-10 | End: 2023-10-28

## 2023-10-10 RX ORDER — PROPOFOL 10 MG/ML
INJECTION, EMULSION INTRAVENOUS AS NEEDED
Status: DISCONTINUED | OUTPATIENT
Start: 2023-10-10 | End: 2023-10-10

## 2023-10-10 RX ORDER — INSULIN GLARGINE 100 [IU]/ML
30 INJECTION, SOLUTION SUBCUTANEOUS NIGHTLY
COMMUNITY
End: 2023-11-02 | Stop reason: HOSPADM

## 2023-10-10 RX ORDER — MEPERIDINE HYDROCHLORIDE 25 MG/ML
12.5 INJECTION INTRAMUSCULAR; INTRAVENOUS; SUBCUTANEOUS EVERY 10 MIN PRN
Status: DISCONTINUED | OUTPATIENT
Start: 2023-10-10 | End: 2023-10-14 | Stop reason: HOSPADM

## 2023-10-10 RX ORDER — HYDRALAZINE HYDROCHLORIDE 20 MG/ML
5 INJECTION INTRAMUSCULAR; INTRAVENOUS EVERY 30 MIN PRN
Status: DISCONTINUED | OUTPATIENT
Start: 2023-10-10 | End: 2023-10-14 | Stop reason: HOSPADM

## 2023-10-10 RX ORDER — HYDRALAZINE HYDROCHLORIDE 20 MG/ML
5 INJECTION INTRAMUSCULAR; INTRAVENOUS EVERY 30 MIN PRN
Status: DISCONTINUED | OUTPATIENT
Start: 2023-10-10 | End: 2023-10-10 | Stop reason: HOSPADM

## 2023-10-10 RX ORDER — SODIUM CHLORIDE, SODIUM LACTATE, POTASSIUM CHLORIDE, CALCIUM CHLORIDE 600; 310; 30; 20 MG/100ML; MG/100ML; MG/100ML; MG/100ML
100 INJECTION, SOLUTION INTRAVENOUS CONTINUOUS
Status: DISCONTINUED | OUTPATIENT
Start: 2023-10-10 | End: 2023-10-12

## 2023-10-10 RX ORDER — OXYCODONE HYDROCHLORIDE 5 MG/1
5 TABLET ORAL EVERY 4 HOURS PRN
Status: DISCONTINUED | OUTPATIENT
Start: 2023-10-10 | End: 2023-10-14 | Stop reason: HOSPADM

## 2023-10-10 RX ORDER — BUSPIRONE HYDROCHLORIDE 5 MG/1
15 TABLET ORAL 2 TIMES DAILY
Status: DISCONTINUED | OUTPATIENT
Start: 2023-10-10 | End: 2023-11-02 | Stop reason: HOSPADM

## 2023-10-10 RX ORDER — LABETALOL HYDROCHLORIDE 5 MG/ML
5 INJECTION, SOLUTION INTRAVENOUS ONCE AS NEEDED
Status: DISCONTINUED | OUTPATIENT
Start: 2023-10-10 | End: 2023-10-10 | Stop reason: HOSPADM

## 2023-10-10 RX ORDER — SIMVASTATIN 40 MG/1
40 TABLET, FILM COATED ORAL NIGHTLY
Status: DISCONTINUED | OUTPATIENT
Start: 2023-10-10 | End: 2023-11-02 | Stop reason: HOSPADM

## 2023-10-10 RX ORDER — METOPROLOL TARTRATE 1 MG/ML
2.5 INJECTION, SOLUTION INTRAVENOUS ONCE
Status: COMPLETED | OUTPATIENT
Start: 2023-10-10 | End: 2023-10-10

## 2023-10-10 RX ORDER — DEXTROSE 50 % IN WATER (D50W) INTRAVENOUS SYRINGE
25
Status: DISCONTINUED | OUTPATIENT
Start: 2023-10-10 | End: 2023-11-02 | Stop reason: HOSPADM

## 2023-10-10 RX ORDER — SODIUM CHLORIDE 0.9 G/100ML
IRRIGANT IRRIGATION AS NEEDED
Status: DISCONTINUED | OUTPATIENT
Start: 2023-10-10 | End: 2023-10-10 | Stop reason: HOSPADM

## 2023-10-10 RX ORDER — FENTANYL CITRATE 50 UG/ML
INJECTION, SOLUTION INTRAMUSCULAR; INTRAVENOUS AS NEEDED
Status: DISCONTINUED | OUTPATIENT
Start: 2023-10-10 | End: 2023-10-10

## 2023-10-10 RX ORDER — OXYCODONE HYDROCHLORIDE 5 MG/1
10 TABLET ORAL EVERY 4 HOURS PRN
Status: DISCONTINUED | OUTPATIENT
Start: 2023-10-10 | End: 2023-10-14 | Stop reason: HOSPADM

## 2023-10-10 RX ORDER — MEPERIDINE HYDROCHLORIDE 25 MG/ML
12.5 INJECTION INTRAMUSCULAR; INTRAVENOUS; SUBCUTANEOUS EVERY 10 MIN PRN
Status: DISCONTINUED | OUTPATIENT
Start: 2023-10-10 | End: 2023-10-10 | Stop reason: HOSPADM

## 2023-10-10 RX ORDER — OXYCODONE HYDROCHLORIDE 5 MG/1
10 TABLET ORAL EVERY 4 HOURS PRN
Status: DISCONTINUED | OUTPATIENT
Start: 2023-10-10 | End: 2023-10-10 | Stop reason: HOSPADM

## 2023-10-10 RX ORDER — VENLAFAXINE HYDROCHLORIDE 150 MG/1
150 CAPSULE, EXTENDED RELEASE ORAL DAILY
COMMUNITY
End: 2023-11-02 | Stop reason: HOSPADM

## 2023-10-10 RX ORDER — ONDANSETRON HYDROCHLORIDE 2 MG/ML
INJECTION, SOLUTION INTRAVENOUS AS NEEDED
Status: DISCONTINUED | OUTPATIENT
Start: 2023-10-10 | End: 2023-10-10

## 2023-10-10 RX ORDER — OXYCODONE HYDROCHLORIDE 5 MG/1
5 TABLET ORAL EVERY 6 HOURS PRN
Status: DISCONTINUED | OUTPATIENT
Start: 2023-10-10 | End: 2023-10-15

## 2023-10-10 RX ORDER — TIZANIDINE HYDROCHLORIDE 4 MG/1
4 CAPSULE, GELATIN COATED ORAL 2 TIMES DAILY PRN
COMMUNITY
End: 2024-03-20 | Stop reason: HOSPADM

## 2023-10-10 RX ORDER — OXYCODONE HYDROCHLORIDE 5 MG/1
5 TABLET ORAL EVERY 4 HOURS PRN
Status: DISCONTINUED | OUTPATIENT
Start: 2023-10-10 | End: 2023-10-10 | Stop reason: HOSPADM

## 2023-10-10 RX ORDER — LURASIDONE HYDROCHLORIDE 80 MG/1
80 TABLET, FILM COATED ORAL DAILY
COMMUNITY
End: 2023-11-02 | Stop reason: HOSPADM

## 2023-10-10 RX ORDER — TRAZODONE HYDROCHLORIDE 150 MG/1
300 TABLET ORAL NIGHTLY
Status: DISCONTINUED | OUTPATIENT
Start: 2023-10-10 | End: 2023-10-30

## 2023-10-10 RX ORDER — ONDANSETRON HYDROCHLORIDE 2 MG/ML
4 INJECTION, SOLUTION INTRAVENOUS ONCE AS NEEDED
Status: DISCONTINUED | OUTPATIENT
Start: 2023-10-10 | End: 2023-10-10 | Stop reason: HOSPADM

## 2023-10-10 RX ORDER — TRAZODONE HYDROCHLORIDE 300 MG/1
300 TABLET ORAL NIGHTLY
COMMUNITY
End: 2023-11-02 | Stop reason: HOSPADM

## 2023-10-10 RX ORDER — HEPARIN SODIUM 5000 [USP'U]/ML
5000 INJECTION, SOLUTION INTRAVENOUS; SUBCUTANEOUS ONCE
Status: COMPLETED | OUTPATIENT
Start: 2023-10-10 | End: 2023-10-10

## 2023-10-10 RX ORDER — PANTOPRAZOLE SODIUM 40 MG/1
40 TABLET, DELAYED RELEASE ORAL
Status: DISCONTINUED | OUTPATIENT
Start: 2023-10-11 | End: 2023-10-30

## 2023-10-10 RX ORDER — MORPHINE SULFATE 4 MG/ML
4 INJECTION, SOLUTION INTRAMUSCULAR; INTRAVENOUS ONCE
Status: COMPLETED | OUTPATIENT
Start: 2023-10-10 | End: 2023-10-12

## 2023-10-10 RX ORDER — ONDANSETRON HYDROCHLORIDE 2 MG/ML
4 INJECTION, SOLUTION INTRAVENOUS ONCE AS NEEDED
Status: COMPLETED | OUTPATIENT
Start: 2023-10-10 | End: 2023-10-12

## 2023-10-10 RX ORDER — ALBUTEROL SULFATE 2.5 MG/.5ML
SOLUTION RESPIRATORY (INHALATION)
Status: DISPENSED
Start: 2023-10-10 | End: 2023-10-11

## 2023-10-10 RX ORDER — INSULIN LISPRO 100 [IU]/ML
8 INJECTION, SOLUTION INTRAVENOUS; SUBCUTANEOUS
COMMUNITY
End: 2023-11-02 | Stop reason: HOSPADM

## 2023-10-10 RX ORDER — FENTANYL CITRATE 50 UG/ML
25 INJECTION, SOLUTION INTRAMUSCULAR; INTRAVENOUS EVERY 5 MIN PRN
Status: DISCONTINUED | OUTPATIENT
Start: 2023-10-10 | End: 2023-10-14 | Stop reason: HOSPADM

## 2023-10-10 RX ORDER — LIDOCAINE HYDROCHLORIDE 20 MG/ML
INJECTION, SOLUTION INFILTRATION; PERINEURAL AS NEEDED
Status: DISCONTINUED | OUTPATIENT
Start: 2023-10-10 | End: 2023-10-10

## 2023-10-10 RX ORDER — BUSPIRONE HYDROCHLORIDE 30 MG/1
TABLET ORAL 2 TIMES DAILY
COMMUNITY
End: 2023-11-02 | Stop reason: HOSPADM

## 2023-10-10 RX ORDER — LABETALOL HYDROCHLORIDE 5 MG/ML
5 INJECTION, SOLUTION INTRAVENOUS ONCE AS NEEDED
Status: COMPLETED | OUTPATIENT
Start: 2023-10-10 | End: 2023-10-10

## 2023-10-10 RX ORDER — IPRATROPIUM BROMIDE AND ALBUTEROL SULFATE 2.5; .5 MG/3ML; MG/3ML
SOLUTION RESPIRATORY (INHALATION)
Status: DISPENSED
Start: 2023-10-10 | End: 2023-10-11

## 2023-10-10 RX ORDER — ACETAMINOPHEN 325 MG/1
650 TABLET ORAL EVERY 4 HOURS PRN
Status: DISCONTINUED | OUTPATIENT
Start: 2023-10-10 | End: 2023-10-15

## 2023-10-10 RX ORDER — DIPHENHYDRAMINE HYDROCHLORIDE 50 MG/ML
12.5 INJECTION INTRAMUSCULAR; INTRAVENOUS ONCE AS NEEDED
Status: DISCONTINUED | OUTPATIENT
Start: 2023-10-10 | End: 2023-10-14 | Stop reason: HOSPADM

## 2023-10-10 RX ORDER — FENTANYL CITRATE 50 UG/ML
25 INJECTION, SOLUTION INTRAMUSCULAR; INTRAVENOUS EVERY 5 MIN PRN
Status: DISCONTINUED | OUTPATIENT
Start: 2023-10-10 | End: 2023-10-10 | Stop reason: HOSPADM

## 2023-10-10 RX ORDER — DEXTROSE MONOHYDRATE 100 MG/ML
0.3 INJECTION, SOLUTION INTRAVENOUS ONCE AS NEEDED
Status: DISCONTINUED | OUTPATIENT
Start: 2023-10-10 | End: 2023-11-02 | Stop reason: HOSPADM

## 2023-10-10 RX ORDER — BUPIVACAINE HCL/EPINEPHRINE 0.25-.0005
VIAL (ML) INJECTION AS NEEDED
Status: DISCONTINUED | OUTPATIENT
Start: 2023-10-10 | End: 2023-10-10 | Stop reason: HOSPADM

## 2023-10-10 RX ORDER — IBUPROFEN 400 MG/1
400 TABLET ORAL EVERY 8 HOURS PRN
Status: DISCONTINUED | OUTPATIENT
Start: 2023-10-10 | End: 2023-10-11

## 2023-10-10 RX ORDER — SODIUM CHLORIDE, SODIUM LACTATE, POTASSIUM CHLORIDE, CALCIUM CHLORIDE 600; 310; 30; 20 MG/100ML; MG/100ML; MG/100ML; MG/100ML
100 INJECTION, SOLUTION INTRAVENOUS CONTINUOUS
Status: CANCELLED | OUTPATIENT
Start: 2023-10-10

## 2023-10-10 RX ORDER — QUETIAPINE FUMARATE 300 MG/1
300 TABLET, FILM COATED ORAL 2 TIMES DAILY
COMMUNITY
End: 2023-11-02 | Stop reason: HOSPADM

## 2023-10-10 RX ORDER — DIPHENHYDRAMINE HYDROCHLORIDE 50 MG/ML
12.5 INJECTION INTRAMUSCULAR; INTRAVENOUS ONCE AS NEEDED
Status: DISCONTINUED | OUTPATIENT
Start: 2023-10-10 | End: 2023-10-10 | Stop reason: HOSPADM

## 2023-10-10 RX ORDER — TIZANIDINE 4 MG/1
4 TABLET ORAL EVERY 8 HOURS PRN
Status: DISCONTINUED | OUTPATIENT
Start: 2023-10-10 | End: 2023-10-15

## 2023-10-10 RX ORDER — QUETIAPINE FUMARATE 300 MG/1
300 TABLET, FILM COATED ORAL 2 TIMES DAILY
Status: DISCONTINUED | OUTPATIENT
Start: 2023-10-10 | End: 2023-10-26

## 2023-10-10 RX ORDER — NALOXONE HYDROCHLORIDE 1 MG/ML
0.2 INJECTION INTRAMUSCULAR; INTRAVENOUS; SUBCUTANEOUS EVERY 5 MIN PRN
Status: DISCONTINUED | OUTPATIENT
Start: 2023-10-10 | End: 2023-10-30

## 2023-10-10 RX ADMIN — ONDANSETRON 4 MG: 2 INJECTION INTRAMUSCULAR; INTRAVENOUS at 14:55

## 2023-10-10 RX ADMIN — OXYCODONE HYDROCHLORIDE 5 MG: 5 TABLET ORAL at 19:04

## 2023-10-10 RX ADMIN — SODIUM CHLORIDE, POTASSIUM CHLORIDE, SODIUM LACTATE AND CALCIUM CHLORIDE 100 ML/HR: 600; 310; 30; 20 INJECTION, SOLUTION INTRAVENOUS at 20:26

## 2023-10-10 RX ADMIN — HEPARIN SODIUM 5000 UNITS: 5000 INJECTION INTRAVENOUS; SUBCUTANEOUS at 14:28

## 2023-10-10 RX ADMIN — FENTANYL CITRATE 50 MCG: 50 INJECTION, SOLUTION INTRAMUSCULAR; INTRAVENOUS at 15:56

## 2023-10-10 RX ADMIN — TRAZODONE HYDROCHLORIDE 300 MG: 150 TABLET ORAL at 22:37

## 2023-10-10 RX ADMIN — FENTANYL CITRATE 50 MCG: 50 INJECTION, SOLUTION INTRAMUSCULAR; INTRAVENOUS at 16:08

## 2023-10-10 RX ADMIN — BUSPIRONE HYDROCHLORIDE 15 MG: 5 TABLET ORAL at 22:37

## 2023-10-10 RX ADMIN — ROCURONIUM BROMIDE 60 MG: 10 SOLUTION INTRAVENOUS at 14:42

## 2023-10-10 RX ADMIN — FENTANYL CITRATE 50 MCG: 50 INJECTION, SOLUTION INTRAMUSCULAR; INTRAVENOUS at 14:42

## 2023-10-10 RX ADMIN — DOCUSATE SODIUM 100 MG: 100 CAPSULE, LIQUID FILLED ORAL at 22:36

## 2023-10-10 RX ADMIN — MIDAZOLAM 2 MG: 1 INJECTION INTRAMUSCULAR; INTRAVENOUS at 14:38

## 2023-10-10 RX ADMIN — QUETIAPINE FUMARATE 300 MG: 100 TABLET, FILM COATED ORAL at 22:38

## 2023-10-10 RX ADMIN — PIPERACILLIN SODIUM AND TAZOBACTAM SODIUM 3.38 G: 3; .375 INJECTION, SOLUTION INTRAVENOUS at 13:30

## 2023-10-10 RX ADMIN — DEXAMETHASONE SODIUM PHOSPHATE 4 MG: 4 INJECTION, SOLUTION INTRAMUSCULAR; INTRAVENOUS at 14:55

## 2023-10-10 RX ADMIN — HYDROMORPHONE HYDROCHLORIDE 0.5 MG: 1 INJECTION, SOLUTION INTRAMUSCULAR; INTRAVENOUS; SUBCUTANEOUS at 18:36

## 2023-10-10 RX ADMIN — ROCURONIUM BROMIDE 10 MG: 10 SOLUTION INTRAVENOUS at 15:07

## 2023-10-10 RX ADMIN — HYDROMORPHONE HYDROCHLORIDE 0.5 MG: 1 INJECTION, SOLUTION INTRAMUSCULAR; INTRAVENOUS; SUBCUTANEOUS at 18:31

## 2023-10-10 RX ADMIN — IOHEXOL 75 ML: 350 INJECTION, SOLUTION INTRAVENOUS at 11:07

## 2023-10-10 RX ADMIN — PROPOFOL 200 MG: 10 INJECTION, EMULSION INTRAVENOUS at 14:42

## 2023-10-10 RX ADMIN — ROCURONIUM BROMIDE 30 MG: 10 SOLUTION INTRAVENOUS at 15:22

## 2023-10-10 RX ADMIN — MORPHINE SULFATE 4 MG: 4 INJECTION, SOLUTION INTRAMUSCULAR; INTRAVENOUS at 12:05

## 2023-10-10 RX ADMIN — FENTANYL CITRATE 50 MCG: 50 INJECTION, SOLUTION INTRAMUSCULAR; INTRAVENOUS at 15:02

## 2023-10-10 RX ADMIN — SIMVASTATIN 40 MG: 40 TABLET, FILM COATED ORAL at 22:37

## 2023-10-10 RX ADMIN — HYDROMORPHONE HYDROCHLORIDE 0.5 MG: 1 INJECTION, SOLUTION INTRAMUSCULAR; INTRAVENOUS; SUBCUTANEOUS at 17:34

## 2023-10-10 RX ADMIN — MORPHINE SULFATE 4 MG: 4 INJECTION, SOLUTION INTRAMUSCULAR; INTRAVENOUS at 09:44

## 2023-10-10 RX ADMIN — SODIUM CHLORIDE, SODIUM LACTATE, POTASSIUM CHLORIDE, AND CALCIUM CHLORIDE: .6; .31; .03; .02 INJECTION, SOLUTION INTRAVENOUS at 16:45

## 2023-10-10 RX ADMIN — METOPROLOL TARTRATE 2.5 MG: 5 INJECTION INTRAVENOUS at 19:29

## 2023-10-10 RX ADMIN — ONDANSETRON 4 MG: 2 INJECTION INTRAMUSCULAR; INTRAVENOUS at 09:44

## 2023-10-10 RX ADMIN — HYDROMORPHONE HYDROCHLORIDE 0.4 MG: 1 INJECTION, SOLUTION INTRAMUSCULAR; INTRAVENOUS; SUBCUTANEOUS at 22:26

## 2023-10-10 RX ADMIN — ROCURONIUM BROMIDE 10 MG: 10 SOLUTION INTRAVENOUS at 16:27

## 2023-10-10 RX ADMIN — HYDROMORPHONE HYDROCHLORIDE 0.5 MG: 1 INJECTION, SOLUTION INTRAMUSCULAR; INTRAVENOUS; SUBCUTANEOUS at 18:43

## 2023-10-10 RX ADMIN — SODIUM CHLORIDE, POTASSIUM CHLORIDE, SODIUM LACTATE AND CALCIUM CHLORIDE 1000 ML: 600; 310; 30; 20 INJECTION, SOLUTION INTRAVENOUS at 09:44

## 2023-10-10 RX ADMIN — HYDROMORPHONE HYDROCHLORIDE 0.5 MG: 1 INJECTION, SOLUTION INTRAMUSCULAR; INTRAVENOUS; SUBCUTANEOUS at 18:24

## 2023-10-10 RX ADMIN — HYDROMORPHONE HYDROCHLORIDE 0.4 MG: 1 INJECTION, SOLUTION INTRAMUSCULAR; INTRAVENOUS; SUBCUTANEOUS at 20:26

## 2023-10-10 RX ADMIN — LABETALOL HYDROCHLORIDE 5 MG: 5 INJECTION, SOLUTION INTRAVENOUS at 18:51

## 2023-10-10 RX ADMIN — SODIUM CHLORIDE, SODIUM LACTATE, POTASSIUM CHLORIDE, AND CALCIUM CHLORIDE: .6; .31; .03; .02 INJECTION, SOLUTION INTRAVENOUS at 14:38

## 2023-10-10 RX ADMIN — FENTANYL CITRATE 50 MCG: 50 INJECTION, SOLUTION INTRAMUSCULAR; INTRAVENOUS at 15:22

## 2023-10-10 RX ADMIN — ROCURONIUM BROMIDE 10 MG: 10 SOLUTION INTRAVENOUS at 16:57

## 2023-10-10 RX ADMIN — LIDOCAINE HYDROCHLORIDE 4 ML: 20 INJECTION, SOLUTION INFILTRATION; PERINEURAL at 14:42

## 2023-10-10 RX ADMIN — SUGAMMADEX 200 MG: 100 INJECTION, SOLUTION INTRAVENOUS at 17:48

## 2023-10-10 SDOH — SOCIAL STABILITY: SOCIAL INSECURITY: DO YOU FEEL ANYONE HAS EXPLOITED OR TAKEN ADVANTAGE OF YOU FINANCIALLY OR OF YOUR PERSONAL PROPERTY?: NO

## 2023-10-10 SDOH — SOCIAL STABILITY: SOCIAL INSECURITY: HAVE YOU HAD THOUGHTS OF HARMING ANYONE ELSE?: NO

## 2023-10-10 SDOH — SOCIAL STABILITY: SOCIAL INSECURITY: ARE YOU OR HAVE YOU BEEN THREATENED OR ABUSED PHYSICALLY, EMOTIONALLY, OR SEXUALLY BY ANYONE?: NO

## 2023-10-10 SDOH — SOCIAL STABILITY: SOCIAL INSECURITY: HAS ANYONE EVER THREATENED TO HURT YOUR FAMILY OR YOUR PETS?: NO

## 2023-10-10 SDOH — SOCIAL STABILITY: SOCIAL INSECURITY: ARE THERE ANY APPARENT SIGNS OF INJURIES/BEHAVIORS THAT COULD BE RELATED TO ABUSE/NEGLECT?: NO

## 2023-10-10 SDOH — HEALTH STABILITY: MENTAL HEALTH: CURRENT SMOKER: 0

## 2023-10-10 SDOH — SOCIAL STABILITY: SOCIAL INSECURITY: DO YOU FEEL UNSAFE GOING BACK TO THE PLACE WHERE YOU ARE LIVING?: NO

## 2023-10-10 SDOH — SOCIAL STABILITY: SOCIAL INSECURITY: WERE YOU ABLE TO COMPLETE ALL THE BEHAVIORAL HEALTH SCREENINGS?: YES

## 2023-10-10 SDOH — SOCIAL STABILITY: SOCIAL INSECURITY: DOES ANYONE TRY TO KEEP YOU FROM HAVING/CONTACTING OTHER FRIENDS OR DOING THINGS OUTSIDE YOUR HOME?: NO

## 2023-10-10 SDOH — SOCIAL STABILITY: SOCIAL INSECURITY: ABUSE: ADULT

## 2023-10-10 ASSESSMENT — ACTIVITIES OF DAILY LIVING (ADL)
ADEQUATE_TO_COMPLETE_ADL: YES
PATIENT'S MEMORY ADEQUATE TO SAFELY COMPLETE DAILY ACTIVITIES?: YES
HEARING - RIGHT EAR: FUNCTIONAL
FEEDING YOURSELF: INDEPENDENT
JUDGMENT_ADEQUATE_SAFELY_COMPLETE_DAILY_ACTIVITIES: YES
BATHING: INDEPENDENT
GROOMING: INDEPENDENT
LACK_OF_TRANSPORTATION: NO
DRESSING YOURSELF: INDEPENDENT
TOILETING: INDEPENDENT
WALKS IN HOME: INDEPENDENT
HEARING - LEFT EAR: FUNCTIONAL

## 2023-10-10 ASSESSMENT — PAIN SCALES - GENERAL
PAINLEVEL_OUTOF10: 10 - WORST POSSIBLE PAIN
PAINLEVEL_OUTOF10: 8
PAINLEVEL_OUTOF10: 6
PAINLEVEL_OUTOF10: 8
PAINLEVEL_OUTOF10: 8
PAINLEVEL_OUTOF10: 10 - WORST POSSIBLE PAIN
PAINLEVEL_OUTOF10: 7
PAINLEVEL_OUTOF10: 9
PAINLEVEL_OUTOF10: 8
PAINLEVEL_OUTOF10: 8
PAINLEVEL_OUTOF10: 9
PAIN_LEVEL: 0

## 2023-10-10 ASSESSMENT — PAIN SCALES - PAIN ASSESSMENT IN ADVANCED DEMENTIA (PAINAD)
BODYLANGUAGE: RELAXED
BREATHING: NORMAL
CONSOLABILITY: NO NEED TO CONSOLE
BREATHING: NORMAL
TOTALSCORE: 0
FACIALEXPRESSION: SMILING OR INEXPRESSIVE

## 2023-10-10 ASSESSMENT — LIFESTYLE VARIABLES
PRESCIPTION_ABUSE_PAST_12_MONTHS: NO
SUBSTANCE_ABUSE_PAST_12_MONTHS: NO
SKIP TO QUESTIONS 9-10: 1
HOW OFTEN DO YOU HAVE 6 OR MORE DRINKS ON ONE OCCASION: NEVER
HAVE PEOPLE ANNOYED YOU BY CRITICIZING YOUR DRINKING: NO
HAVE YOU EVER FELT YOU SHOULD CUT DOWN ON YOUR DRINKING: NO
EVER HAD A DRINK FIRST THING IN THE MORNING TO STEADY YOUR NERVES TO GET RID OF A HANGOVER: NO
EVER FELT BAD OR GUILTY ABOUT YOUR DRINKING: NO
AUDIT-C TOTAL SCORE: 0
HOW OFTEN DO YOU HAVE A DRINK CONTAINING ALCOHOL: NEVER
HOW MANY STANDARD DRINKS CONTAINING ALCOHOL DO YOU HAVE ON A TYPICAL DAY: PATIENT DOES NOT DRINK
AUDIT-C TOTAL SCORE: 0

## 2023-10-10 ASSESSMENT — COGNITIVE AND FUNCTIONAL STATUS - GENERAL
PATIENT BASELINE BEDBOUND: NO
DAILY ACTIVITIY SCORE: 24
MOBILITY SCORE: 24

## 2023-10-10 ASSESSMENT — PAIN DESCRIPTION - LOCATION: LOCATION: ABDOMEN

## 2023-10-10 ASSESSMENT — COLUMBIA-SUICIDE SEVERITY RATING SCALE - C-SSRS
2. HAVE YOU ACTUALLY HAD ANY THOUGHTS OF KILLING YOURSELF?: NO
6. HAVE YOU EVER DONE ANYTHING, STARTED TO DO ANYTHING, OR PREPARED TO DO ANYTHING TO END YOUR LIFE?: NO
1. IN THE PAST MONTH, HAVE YOU WISHED YOU WERE DEAD OR WISHED YOU COULD GO TO SLEEP AND NOT WAKE UP?: NO

## 2023-10-10 ASSESSMENT — PAIN - FUNCTIONAL ASSESSMENT
PAIN_FUNCTIONAL_ASSESSMENT: 0-10

## 2023-10-10 ASSESSMENT — PAIN SCALES - WONG BAKER: WONGBAKER_NUMERICALRESPONSE: HURTS LITTLE MORE

## 2023-10-10 ASSESSMENT — PATIENT HEALTH QUESTIONNAIRE - PHQ9
SUM OF ALL RESPONSES TO PHQ9 QUESTIONS 1 & 2: 0
2. FEELING DOWN, DEPRESSED OR HOPELESS: NOT AT ALL
1. LITTLE INTEREST OR PLEASURE IN DOING THINGS: NOT AT ALL

## 2023-10-10 ASSESSMENT — PAIN DESCRIPTION - DESCRIPTORS: DESCRIPTORS: SHARP

## 2023-10-10 NOTE — ED PROVIDER NOTES
"HPI   Chief Complaint   Patient presents with    Abdominal Pain     \"Here for stomach pain that has been going on for 3-4 hours. \"        A 48-year-old male patient comes in the emergency department today with complaints of abdominal pain that started yesterday.  He describes it as an extremely sharp pain with associated nausea vomiting diarrhea.  Rates pain at 9 out of 10 on the pain scale this present time.  Denies any prior abdominal surgeries, sick contacts, fevers or chills that he is aware of.  Patient states he feels very uncomfortable and hardly got any sleep yesterday evening.  Patient denies any drug allergies or medical conditions.  For this purpose he comes in the emergency department today for further evaluation.                          Rayne Coma Scale Score: 15                  Patient History   Past Medical History:   Diagnosis Date    Retention of urine, unspecified     Inability to urinate     Past Surgical History:   Procedure Laterality Date    OTHER SURGICAL HISTORY  03/27/2014    Hand Excision Of Tendon Cyst     No family history on file.  Social History     Tobacco Use    Smoking status: Never    Smokeless tobacco: Never   Vaping Use    Vaping Use: Never used   Substance Use Topics    Alcohol use: Never    Drug use: Never       Physical Exam   ED Triage Vitals [10/10/23 0831]   Temp Heart Rate Resp BP   36.5 °C (97.7 °F) 106 20 (!) 155/104      SpO2 Temp Source Heart Rate Source Patient Position   -- Tympanic Monitor Sitting      BP Location FiO2 (%)     Right arm --       Physical Exam    ED Course & MDM   Diagnoses as of 10/10/23 1418   Acute appendicitis, unspecified acute appendicitis type       Medical Decision Making  A 48-year-old male patient comes in the emergency department today with complaints of abdominal pain that started yesterday.  He describes it as an extremely sharp pain with associated nausea vomiting diarrhea.  Rates pain at 9 out of 10 on the pain scale this present " time.  Denies any prior abdominal surgeries, sick contacts, fevers or chills that he is aware of.  Patient states he feels very uncomfortable and hardly got any sleep yesterday evening.  Patient denies any drug allergies or medical conditions.  For this purpose he comes in the emergency department today for further evaluation.    CT of the abdomen pelvis ordered as well as laboratory studies.  Rule out acute intra-abdominal surgical need including appendicitis, pancreatitis, colitis, diverticulitis as well as leukocytosis, acute renal injury, electrolyte abnormality.  Patient given IV morphine, IV Zofran, IV saline.    Patient requesting more pain medications IV morphine ordered.    Patient's lipase is 15 with a normal CMP.  Patient's lactate is 1.4 with a CBC white count of 15 and a neutrophil count of 11.35.  Patient CT study findings are consistent with acute appendicitis.  The case was discussed with surgeon who agrees admit the patient under his service.  Requesting to start IV Zosyn      Labs Reviewed   CBC WITH AUTO DIFFERENTIAL - Abnormal       Result Value    WBC 15.0 (*)     nRBC 0.0      RBC 4.77      Hemoglobin 13.9      Hematocrit 40.7      MCV 85      MCH 29.1      MCHC 34.2      RDW 12.2      Platelets 375      MPV 10.1      Neutrophils % 75.6      Immature Granulocytes %, Automated 0.3      Lymphocytes % 13.4      Monocytes % 5.4      Eosinophils % 4.8      Basophils % 0.5      Neutrophils Absolute 11.35 (*)     Immature Granulocytes Absolute, Automated 0.04      Lymphocytes Absolute 2.02      Monocytes Absolute 0.81      Eosinophils Absolute 0.72 (*)     Basophils Absolute 0.08     COMPREHENSIVE METABOLIC PANEL - Abnormal    Glucose 174 (*)     Sodium 136      Potassium 3.6      Chloride 103      Bicarbonate 24      Anion Gap 13      Urea Nitrogen 14      Creatinine 0.94      eGFR 75      Calcium 9.4      Albumin 4.2      Alkaline Phosphatase 69      Total Protein 7.3      AST 12      Bilirubin,  Total 0.3      ALT 17     LIPASE - Normal    Lipase 15      Narrative:     Venipuncture immediately after or during the administration of Metamizole may lead to falsely low results. Testing should be performed immediately prior to Metamizole dosing.   LACTATE - Normal    Lactate 1.4      Narrative:     Venipuncture immediately after or during the administration of Metamizole may lead to falsely low results. Testing should be performed immediately  prior to Metamizole dosing.   SARS-COV-2 PCR, SYMPTOMATIC - Normal    Coronavirus 2019, PCR Not Detected      Narrative:     This assay has received FDA Emergency Use Authorization (EUA) and is only authorized for the duration of time that circumstances exist to justify the authorization of the emergency use of in vitro diagnostic tests for the detection of SARS-CoV-2 virus and/or diagnosis of COVID-19 infection under section 564(b)(1) of the Act, 21 U.S.C. 360bbb-3(b)(1). This assay is an in vitro diagnostic nucleic acid amplification test for the qualitative detection of SARS-CoV-2 from nasopharyngeal specimens and has been validated for use at [facility]. Negative results do not preclude COVID-19 infections and should not be used as the sole basis for diagnosis, treatment, or other management decisions.     URINALYSIS WITH REFLEX MICROSCOPIC AND CULTURE    Narrative:     The following orders were created for panel order Urinalysis with Reflex Microscopic and Culture.  Procedure                               Abnormality         Status                     ---------                               -----------         ------                     Urinalysis with Reflex M...[[id number]]                                                 Extra Urine Gray Tube[[id number]]                                                         Please view results for these tests on the individual orders.   URINALYSIS WITH REFLEX MICROSCOPIC AND CULTURE   EXTRA URINE GRAY TUBE         CT abdomen pelvis w IV contrast   Final Result   1. Findings compatible with acute uncomplicated appendicitis. No   extraluminal gas or abscess. Recommend surgical consultation.   2. Mild hepatic steatosis.   3. Colonic diverticulosis without findings of diverticulitis.   Findings discussed with and acknowledged by Ayden Ramires at 11:53 AM   Signed by Faustino Hebert MD            Procedure  ECG 12 lead    Performed by: Ayden Ramires PA-C  Authorized by: Ayden Ramires PA-C    Interpretation:     Interpretation: normal      Details:  My EKG interpretation  Rate:     ECG rate:  81    ECG rate assessment: normal    Rhythm:     Rhythm: sinus rhythm    ST segments:     ST segments:  Normal  T waves:     T waves: normal         Ayden Ramires PA-C  10/10/23 1316       Ayden Ramires PA-C  10/10/23 1412

## 2023-10-10 NOTE — OP NOTE
APPENDECTOMY LAPAROSCOPY (R) Operative Note     Date: 10/10/2023  OR Location: [facility]    Name: Victorino Lara, : 1975, Age: 48 y.o., MRN: [id number], Sex: adult    Diagnosis  Pre-op Diagnosis     * Acute appendicitis with localized peritonitis, unspecified whether abscess present, unspecified whether gangrene present, unspecified whether perforation present [K35.30] Post-op Diagnosis     * Acute appendicitis with localized peritonitis, unspecified whether abscess present, unspecified whether gangrene present, unspecified whether perforation present [K35.30]     Procedures  APPENDECTOMY LAPAROSCOPY  46987 - [place] LAPAROSCOPIC APPENDECTOMY      Surgeons      * Norman Calloway - Primary    Resident/Fellow/Other Assistant:  No surgical staff documented.    Procedure Summary  Anesthesia: General  ASA: II  Anesthesia Staff: Anesthesiologist: Ed Javed DO  CRNA: NIKITA Mcgill-CRNA  Estimated Blood Loss: 50mL  Intra-op Medications:   Medication Name Total Dose   heparin (porcine) injection 5,000 Units 5,000 Units              Anesthesia Record               Intraprocedure I/O Totals          Intake    LR 1000.00 mL    Total Intake 1000 mL          Specimen:   ID Type Source Tests Collected by Time   1 : APPENDIX TISSUE Tissue APPENDIX SURGICAL PATHOLOGY EXAM Norman Calloway MD 10/10/2023 1652        Staff:   Circulator: Shauna Harmon RN; Linsey Burch RN  Relief Scrub: Claudia Magana  Scrub Person: Veronica Cotter         Drains and/or Catheters: * None in log *    Tourniquet Times:         Implants:     Findings: 2cm umbilical hernia, repaired primarily, non-perforated appendicitis, removed in its entirety    Indications: Victorino Lara is an 48 y.o. adult who is having surgery for Acute appendicitis with localized peritonitis, unspecified whether abscess present, unspecified whether gangrene present, unspecified whether perforation present [K35.30].   In brief, this is an otherwise healthy gentleman who  presented with a one day history of RLQ pain.  Patient presented to [place] ED afebrile, VSS, WBC15K.  CT scan demonstrated acute appendicitis.  The patient was explained the risks/benefits/alternatives and he consented to the procedure above.  Because he had an umbilical hernia, it was determined to fix that at the same time.     The patient was seen in the preoperative area. The risks, benefits, complications, treatment options, non-operative alternatives, expected recovery and outcomes were discussed with the patient. The possibilities of reaction to medication, pulmonary aspiration, injury to surrounding structures, bleeding, recurrent infection, the need for additional procedures, failure to diagnose a condition, and creating a complication requiring transfusion or operation were discussed with the patient. The patient concurred with the proposed plan, giving informed consent.  The site of surgery was properly noted/marked if necessary per policy. The patient has been actively warmed in preoperative area. Preoperative antibiotics have been ordered and given within 1 hours of incision. Venous thrombosis prophylaxis have been ordered including bilateral sequential compression devices and chemical prophylaxis    Procedure Details:       After informed consent was obtained, the patient was brought into the operating room and placed in supine position.  Huddle and time-out were performed.  General anesthesia was obtained without difficulty. The left arm was tucked and padded.  The hair on the abdomen was clipped. The abdomen was prepped and draped in a normal sterile fashion using ChloraPrep.  He got .  A stab incision was made in the supraumbilical region.  A 2cm umbical hernia was identified and carefully taken down with a combination of sharp and blunt dissection.  The peritoneum was entered and a Laura trochar was placed in the abdomen. Pneumoperitoneum was obtained with CO2 at 15 mmHg.  A 5 mm trocar was placed  in the LLQ.  There was no evidence of iatrogenic injury.  Due to pelvic adhesions, a second 5mm port was placed in the LUQ.  The patient was placed in the Trendelenburg position with the right side up. Adhesions were carefully taken down sharply in the pelvis and in the RLQ.  The appendix was distended, inflamed, edematous with very large appendicolith. Adhesions were taken down sharply initially, however bleeding was encountered with the mesoappendix, so a ligasure was used to control it. A window was created in the mesoappendix and then a 60mm blue load endoscopic stapler was used to transect the appendix.  The mesoappendix was next transected using a 60mm blue load on an endoscopic stappler.  The appendix was placed in an endocatch bag.  The bowel staple line oozed, so some 5mm staples were placed on it for hemostasis.  The abdomen was copiously irrigated and 5mm trochars were removed under direct visualization.  The fascia was closed with several 0-vicryl sutures due to the size of the defect and the fascia being tenuos.  Skin was reapproximated with 4-0 Monocryl in a subcuticular fashion.  LiquiBand was placed. The instruments, sponge and needle counts were correct.  The patient was extubated.  The patient tolerated the procedure well.     Complications:  None; patient tolerated the procedure well.    Disposition: PACU - hemodynamically stable.  Condition: stable         Additional Details: None    Attending Attestation: I was present and scrubbed for the entire procedure.    Norman Calloway  Phone Number: [phone]

## 2023-10-10 NOTE — BRIEF OP NOTE
Date: 10/10/2023  OR Location: [facility]    Name: Victorino Lara : 1975, Age: 48 y.o., MRN: [id number], Sex: adult    Diagnosis  Pre-op Diagnosis     * Acute appendicitis with localized peritonitis, unspecified whether abscess present, unspecified whether gangrene present, unspecified whether perforation present [K35.30] Post-op Diagnosis     * Acute appendicitis with localized peritonitis, unspecified whether abscess present, unspecified whether gangrene present, unspecified whether perforation present [K35.30]     Procedures  APPENDECTOMY LAPAROSCOPY  60057 - MS LAPAROSCOPIC APPENDECTOMY      Surgeons      * Norman Calloway - Primary    Resident/Fellow/Other Assistant:  No surgical staff documented.    Procedure Summary  Anesthesia: General  ASA: II  Anesthesia Staff: Anesthesiologist: Ed Javed DO  CRNA: NIKITA Mcgill-CRNA  Estimated Blood Loss: 50mL  Intra-op Medications:   Medication Name Total Dose   heparin (porcine) injection 5,000 Units 5,000 Units              Anesthesia Record               Intraprocedure I/O Totals          Intake    LR 1000.00 mL    Total Intake 1000 mL          Specimen:   ID Type Source Tests Collected by Time   1 : APPENDIX TISSUE Tissue APPENDIX SURGICAL PATHOLOGY EXAM Norman Calloway MD 10/10/2023 1652        Staff:   Circulator: Shauna Harmon RN; Linsey Burch RN  Relief Scrub: Claudia Magana  Scrub Person: Veronica Cotter          Findings: 2cm umbilical hernia, repaired primarily, non-perforated appendix removed in its entirety    Complications:  None; patient tolerated the procedure well.     Disposition: PACU - hemodynamically stable.  Condition: stable  Specimens Collected:   ID Type Source Tests Collected by Time   1 : APPENDIX TISSUE Tissue APPENDIX SURGICAL PATHOLOGY EXAM Norman Calloway MD 10/10/2023 1652     Attending Attestation: I was present and scrubbed for the entire procedure.    Norman Calloway  Phone Number: [phone]

## 2023-10-10 NOTE — ANESTHESIA PROCEDURE NOTES
Airway  Date/Time: 10/10/2023 2:44 PM  Urgency: elective    Airway not difficult    Staffing  Performed: CRNA   Authorized by: Ed Javed DO    Performed by: NIKITA Mcgill-KALANI  Patient location during procedure: OR    Indications and Patient Condition  Indications for airway management: anesthesia  Spontaneous Ventilation: absent  Sedation level: deep  Preoxygenated: yes  Patient position: sniffing  Mask difficulty assessment: 1 - vent by mask  Planned trial extubation    Final Airway Details  Final airway type: endotracheal airway      Successful airway: ETT  Cuffed: yes   Successful intubation technique: direct laryngoscopy  Facilitating devices/methods: intubating stylet  Endotracheal tube insertion site: oral  Blade size: #3  ETT size (mm): 7.5  Cormack-Lehane Classification: grade I - full view of glottis  Placement verified by: chest auscultation, capnometry and palpation of cuff   Measured from: lips  Number of attempts at approach: 1  Number of other approaches attempted: 0

## 2023-10-10 NOTE — CONSULTS
"Reason For Consult  Appendicitis    History Of Present Illness  Victorino Lara is a 48 y.o. adult for whom I am consulted for appendicitis.  In brief this is an otherwise healthy gentleman with no significant past medical history who complains of a 1 day history of worsening right lower quadrant pain.  Complains of nausea and vomiting.  Denies obstipation, denies fever.  Presented to the emergency room afebrile, vitals are stable, white cell count was 15,000.  CT abdomen pelvis demonstrated acute, nonperforated appendicitis.  General surgery was consulted for further work-up and management.     Past Medical History  He has a past medical history of Retention of urine, unspecified.    Surgical History  He has a past surgical history that includes Other surgical history (03/27/2014).  No abdominal surgeries     Social History  He reports that he has never smoked. He has never used smokeless tobacco. He reports that he does not drink alcohol and does not use drugs.    Family History  No family history on file.     Allergies  Patient has no known allergies.    Review of Systems  See  HPI     Physical Exam  Gen: In pain but non toxic, Aax3  Heart: RRR  Lungs: CTAB  Abd; Soft, ND, TTP in RLQ with focal peritonitis, small 1-2cm umbilical hernia     Last Recorded Vitals  Blood pressure (!) 179/92, pulse 90, temperature 36.5 °C (97.7 °F), temperature source Tympanic, resp. rate 18, height 1.702 m (5' 7\"), weight 94.5 kg (208 lb 5.4 oz), SpO2 99 %.    Relevant Results  Component      Latest Ref Rn 10/10/2023   WBC      4.4 - 11.3 x10*3/uL 15.0 (H)    nRBC      0.0 - 0.0 /100 WBCs 0.0    RBC      4.00 - 5.90 x10*6/uL 4.77    HEMOGLOBIN      12.0 - 17.5 g/dL 13.9    HEMATOCRIT      36.0 - 52.0 % 40.7    MCV      80 - 100 fL 85    MCH      26.0 - 34.0 pg 29.1    MCHC      32.0 - 36.0 g/dL 34.2    RED CELL DISTRIBUTION WIDTH      11.5 - 14.5 % 12.2    Platelets      150 - 450 x10*3/uL 375    MEAN PLATELET VOLUME      7.5 - 11.5 fL " 10.1    Neutrophils %      40.0 - 80.0 % 75.6    Immature Granulocytes %, Automated      0.0 - 0.9 % 0.3    Lymphocytes %      13.0 - 44.0 % 13.4    Monocytes %      2.0 - 10.0 % 5.4    Eosinophils %      0.0 - 6.0 % 4.8    Basophils %      0.0 - 2.0 % 0.5    Neutrophils Absolute      1.20 - 7.70 x10*3/uL 11.35 (H)    Immature Granulocytes Absolute, Automated      0.00 - 0.70 x10*3/uL 0.04    Lymphocytes Absolute      1.20 - 4.80 x10*3/uL 2.02    Monocytes Absolute      0.10 - 1.00 x10*3/uL 0.81    Eosinophils Absolute      0.00 - 0.70 x10*3/uL 0.72 (H)    Basophils Absolute      0.00 - 0.10 x10*3/uL 0.08    GLUCOSE      74 - 99 mg/dL 174 (H)    SODIUM      136 - 145 mmol/L 136    POTASSIUM      3.5 - 5.3 mmol/L 3.6    CHLORIDE      98 - 107 mmol/L 103    Bicarbonate      21 - 32 mmol/L 24    Anion Gap      10 - 20 mmol/L 13    Blood Urea Nitrogen      6 - 23 mg/dL 14    Creatinine      0.50 - 1.30 mg/dL 0.94    EGFR      >60 mL/min/1.73m*2 75    Calcium      8.6 - 10.3 mg/dL 9.4    Albumin      3.4 - 5.0 g/dL 4.2    Alkaline Phosphatase      33 - 120 U/L 69    Total Protein      6.4 - 8.2 g/dL 7.3    AST      9 - 39 U/L 12    Bilirubin Total      0.0 - 1.2 mg/dL 0.3    ALT      7 - 52 U/L 17    LIPASE      9 - 82 U/L 15    Lactate      0.4 - 2.0 mmol/L 1.4       Legend:  (H) High       CT abd/pe - BOWEL:  Appendix is fluid-filled and dilated measuring up to 1.6 cm with  multiple intraluminal appendicoliths and mild periappendiceal  inflammatory change. No extraluminal gas or abscess. Colonic  diverticulosis without findings of diverticulitis    Labs and imaging personally reviewed by myself  Assessment/Plan     48-year-old male who is otherwise healthy presents with a 1 day history of acute appendicitis.  Patient risk, benefits, alternatives and agreed to a laparoscopic appendectomy.  This patient has an umbilical hernia and one of the port sites needs to go through the umbilicus, I may repair that at the same  time.  The patient is amenable to this.  N.p.o.  IV fluids  IV antibiotics  Plan for laparoscopic appendectomy with possible umbilical herniorrhaphy    I spent 35 minutes in the professional and overall care of this patient.      Norman Calloway MD  10/10/23

## 2023-10-10 NOTE — ANESTHESIA POSTPROCEDURE EVALUATION
Patient: Victorino Lara    Procedure Summary       Date: 10/10/23 Room / Location: [facility] OR 12 / [facility]    Anesthesia Start: 1438 Anesthesia Stop: 1802    Procedure: APPENDECTOMY LAPAROSCOPY (Right) Diagnosis:       Acute appendicitis with localized peritonitis, unspecified whether abscess present, unspecified whether gangrene present, unspecified whether perforation present      (Acute appendicitis with localized peritonitis, unspecified whether abscess present, unspecified whether gangrene present, unspecified whether perforation present [K35.30])    Surgeons: Norman Calloway MD Responsible Provider: Ed Javed DO    Anesthesia Type: general ASA Status: 2 - Emergent            Anesthesia Type: general    Vitals Value Taken Time   /102 10/10/23 1801   Temp 36.2 10/10/23 1803   Pulse 116 10/10/23 1801   Resp 31 10/10/23 1801   SpO2 91 % 10/10/23 1801   Vitals shown include unvalidated device data.    Anesthesia Post Evaluation    Patient location during evaluation: PACU  Patient participation: complete - patient participated  Level of consciousness: sleepy but conscious  Pain score: 0  Pain management: adequate  There was medical reason for not using a multimodal analgesia pain management approach.Airway patency: patent  Two or more strategies used to mitigate risk of obstructive sleep apnea  Cardiovascular status: hypertensive  Respiratory status: acceptable, face mask and spontaneous ventilation  Hydration status: euvolemic        No notable events documented.

## 2023-10-10 NOTE — ANESTHESIA PREPROCEDURE EVALUATION
Victorino Lara is a 48 y.o. adult here for:    APPENDECTOMY LAPAROSCOPY  With Norman Calloway MD  Pre-Op Diagnosis Codes:     * Acute appendicitis with localized peritonitis, unspecified whether abscess present, unspecified whether gangrene present, unspecified whether perforation present [K35.30]    Relevant Problems   Cardiovascular  Hx Syncope, echo performed in 2020: EF 60%, no valvular abnormalities   (+) Hyperlipidemia      Endocrine   (+) Diabetes mellitus, type 2 (CMS/HCC)      Neuro/Psych   (+) Anxiety   (+) Depression       Lab Results   Component Value Date    HGB 13.9 10/10/2023    HCT 40.7 10/10/2023    WBC 15.0 (H) 10/10/2023     10/10/2023     10/10/2023    K 3.6 10/10/2023     10/10/2023    CREATININE 0.94 10/10/2023    BUN 14 10/10/2023       Social History     Tobacco Use   Smoking Status Never   Smokeless Tobacco Never       No Known Allergies    No current outpatient medications    Past Surgical History:   Procedure Laterality Date    OTHER SURGICAL HISTORY  03/27/2014    Hand Excision Of Tendon Cyst       No family history on file.    NPO Details:  No data recorded    Physical Exam    Anesthesia Plan    ASA 2 - emergent     general   (ETT)  The patient is not a current smoker.  Patient was not previously instructed to abstain from smoking on day of procedure.  Patient did not smoke on day of procedure.    intravenous induction   Postoperative administration of opioids is intended.  Trial extubation is planned.  Anesthetic plan and risks discussed with patient.    Plan discussed with CRNA.

## 2023-10-11 ENCOUNTER — APPOINTMENT (OUTPATIENT)
Dept: RADIOLOGY | Facility: HOSPITAL | Age: 48
End: 2023-10-11
Payer: COMMERCIAL

## 2023-10-11 ENCOUNTER — APPOINTMENT (OUTPATIENT)
Dept: CARDIOLOGY | Facility: HOSPITAL | Age: 48
End: 2023-10-11
Payer: COMMERCIAL

## 2023-10-11 PROBLEM — K35.80 ACUTE APPENDICITIS, UNSPECIFIED ACUTE APPENDICITIS TYPE: Status: ACTIVE | Noted: 2023-10-11

## 2023-10-11 LAB
ANION GAP SERPL CALC-SCNC: 13 MMOL/L (ref 10–20)
ATRIAL RATE: 136 BPM
BUN SERPL-MCNC: 12 MG/DL (ref 6–23)
CALCIUM SERPL-MCNC: 8.7 MG/DL (ref 8.6–10.3)
CHLORIDE SERPL-SCNC: 101 MMOL/L (ref 98–107)
CO2 SERPL-SCNC: 26 MMOL/L (ref 21–32)
CREAT SERPL-MCNC: 0.99 MG/DL (ref 0.5–1.3)
ERYTHROCYTE [DISTWIDTH] IN BLOOD BY AUTOMATED COUNT: 12.6 % (ref 11.5–14.5)
GFR SERPL CREATININE-BSD FRML MDRD: 70 ML/MIN/1.73M*2
GLUCOSE BLD MANUAL STRIP-MCNC: 137 MG/DL (ref 74–99)
GLUCOSE BLD MANUAL STRIP-MCNC: 141 MG/DL (ref 74–99)
GLUCOSE BLD MANUAL STRIP-MCNC: 149 MG/DL (ref 74–99)
GLUCOSE BLD MANUAL STRIP-MCNC: 155 MG/DL (ref 74–99)
GLUCOSE SERPL-MCNC: 141 MG/DL (ref 74–99)
HCT VFR BLD AUTO: 38.8 % (ref 36–52)
HGB BLD-MCNC: 13.2 G/DL (ref 12–17.5)
HOLD SPECIMEN: NORMAL
HOLD SPECIMEN: NORMAL
LACTATE SERPL-SCNC: 1.4 MMOL/L (ref 0.4–2)
MAGNESIUM SERPL-MCNC: 1.07 MG/DL (ref 1.6–2.4)
MAGNESIUM SERPL-MCNC: 1.09 MG/DL (ref 1.6–2.4)
MCH RBC QN AUTO: 28.6 PG (ref 26–34)
MCHC RBC AUTO-ENTMCNC: 34 G/DL (ref 32–36)
MCV RBC AUTO: 84 FL (ref 80–100)
NRBC BLD-RTO: 0 /100 WBCS (ref 0–0)
P AXIS: 13 DEGREES
P OFFSET: 198 MS
P ONSET: 151 MS
PHOSPHATE SERPL-MCNC: 3.2 MG/DL (ref 2.5–4.9)
PLATELET # BLD AUTO: 355 X10*3/UL (ref 150–450)
PMV BLD AUTO: 10.3 FL (ref 7.5–11.5)
POTASSIUM SERPL-SCNC: 3.4 MMOL/L (ref 3.5–5.3)
PR INTERVAL: 122 MS
Q ONSET: 212 MS
QRS COUNT: 22 BEATS
QRS DURATION: 92 MS
QT INTERVAL: 292 MS
QTC CALCULATION(BAZETT): 439 MS
QTC FREDERICIA: 383 MS
R AXIS: -1 DEGREES
RBC # BLD AUTO: 4.61 X10*6/UL (ref 4–5.9)
SODIUM SERPL-SCNC: 137 MMOL/L (ref 136–145)
T AXIS: 6 DEGREES
T OFFSET: 358 MS
VENTRICULAR RATE: 136 BPM
WBC # BLD AUTO: 14.7 X10*3/UL (ref 4.4–11.3)

## 2023-10-11 PROCEDURE — 2500000001 HC RX 250 WO HCPCS SELF ADMINISTERED DRUGS (ALT 637 FOR MEDICARE OP): Performed by: STUDENT IN AN ORGANIZED HEALTH CARE EDUCATION/TRAINING PROGRAM

## 2023-10-11 PROCEDURE — 2500000001 HC RX 250 WO HCPCS SELF ADMINISTERED DRUGS (ALT 637 FOR MEDICARE OP): Performed by: SURGERY

## 2023-10-11 PROCEDURE — 84100 ASSAY OF PHOSPHORUS: CPT | Performed by: SURGERY

## 2023-10-11 PROCEDURE — 2500000004 HC RX 250 GENERAL PHARMACY W/ HCPCS (ALT 636 FOR OP/ED): Performed by: SURGERY

## 2023-10-11 PROCEDURE — 36415 COLL VENOUS BLD VENIPUNCTURE: CPT | Performed by: SURGERY

## 2023-10-11 PROCEDURE — 96372 THER/PROPH/DIAG INJ SC/IM: CPT

## 2023-10-11 PROCEDURE — 80048 BASIC METABOLIC PNL TOTAL CA: CPT | Performed by: SURGERY

## 2023-10-11 PROCEDURE — 93005 ELECTROCARDIOGRAM TRACING: CPT

## 2023-10-11 PROCEDURE — 83735 ASSAY OF MAGNESIUM: CPT | Performed by: SURGERY

## 2023-10-11 PROCEDURE — 82947 ASSAY GLUCOSE BLOOD QUANT: CPT

## 2023-10-11 PROCEDURE — 2580000001 HC RX 258 IV SOLUTIONS: Performed by: SURGERY

## 2023-10-11 PROCEDURE — 71045 X-RAY EXAM CHEST 1 VIEW: CPT

## 2023-10-11 PROCEDURE — 2500000004 HC RX 250 GENERAL PHARMACY W/ HCPCS (ALT 636 FOR OP/ED): Performed by: STUDENT IN AN ORGANIZED HEALTH CARE EDUCATION/TRAINING PROGRAM

## 2023-10-11 PROCEDURE — 2500000004 HC RX 250 GENERAL PHARMACY W/ HCPCS (ALT 636 FOR OP/ED)

## 2023-10-11 PROCEDURE — 71045 X-RAY EXAM CHEST 1 VIEW: CPT | Performed by: RADIOLOGY

## 2023-10-11 PROCEDURE — 83735 ASSAY OF MAGNESIUM: CPT

## 2023-10-11 PROCEDURE — 85027 COMPLETE CBC AUTOMATED: CPT | Performed by: SURGERY

## 2023-10-11 PROCEDURE — 93010 ELECTROCARDIOGRAM REPORT: CPT | Performed by: INTERNAL MEDICINE

## 2023-10-11 PROCEDURE — 99024 POSTOP FOLLOW-UP VISIT: CPT | Performed by: SURGERY

## 2023-10-11 PROCEDURE — 1200000002 HC GENERAL ROOM WITH TELEMETRY DAILY

## 2023-10-11 PROCEDURE — 36415 COLL VENOUS BLD VENIPUNCTURE: CPT

## 2023-10-11 PROCEDURE — 83605 ASSAY OF LACTIC ACID: CPT

## 2023-10-11 PROCEDURE — 2580000001 HC RX 258 IV SOLUTIONS

## 2023-10-11 RX ORDER — ENOXAPARIN SODIUM 100 MG/ML
40 INJECTION SUBCUTANEOUS DAILY
Status: DISCONTINUED | OUTPATIENT
Start: 2023-10-11 | End: 2023-10-15

## 2023-10-11 RX ORDER — POTASSIUM CHLORIDE 20 MEQ/1
40 TABLET, EXTENDED RELEASE ORAL ONCE
Status: COMPLETED | OUTPATIENT
Start: 2023-10-11 | End: 2023-10-11

## 2023-10-11 RX ORDER — MAGNESIUM SULFATE HEPTAHYDRATE 40 MG/ML
2 INJECTION, SOLUTION INTRAVENOUS ONCE
Status: COMPLETED | OUTPATIENT
Start: 2023-10-11 | End: 2023-10-11

## 2023-10-11 RX ORDER — KETOROLAC TROMETHAMINE 30 MG/ML
15 INJECTION, SOLUTION INTRAMUSCULAR; INTRAVENOUS EVERY 6 HOURS
Status: COMPLETED | OUTPATIENT
Start: 2023-10-11 | End: 2023-10-14

## 2023-10-11 RX ORDER — KETOROLAC TROMETHAMINE 30 MG/ML
15 INJECTION, SOLUTION INTRAMUSCULAR; INTRAVENOUS EVERY 6 HOURS
Status: DISCONTINUED | OUTPATIENT
Start: 2023-10-11 | End: 2023-10-11

## 2023-10-11 RX ORDER — KETOROLAC TROMETHAMINE 15 MG/ML
30 INJECTION, SOLUTION INTRAMUSCULAR; INTRAVENOUS EVERY 6 HOURS PRN
Status: DISCONTINUED | OUTPATIENT
Start: 2023-10-11 | End: 2023-10-11

## 2023-10-11 RX ORDER — CALCIUM CARBONATE 200(500)MG
1000 TABLET,CHEWABLE ORAL EVERY 4 HOURS PRN
Status: DISCONTINUED | OUTPATIENT
Start: 2023-10-11 | End: 2023-10-15

## 2023-10-11 RX ADMIN — OXYCODONE HYDROCHLORIDE 10 MG: 5 TABLET ORAL at 08:45

## 2023-10-11 RX ADMIN — SODIUM CHLORIDE, POTASSIUM CHLORIDE, SODIUM LACTATE AND CALCIUM CHLORIDE 500 ML: 600; 310; 30; 20 INJECTION, SOLUTION INTRAVENOUS at 13:18

## 2023-10-11 RX ADMIN — MAGNESIUM SULFATE HEPTAHYDRATE 2 G: 40 INJECTION, SOLUTION INTRAVENOUS at 12:18

## 2023-10-11 RX ADMIN — KETOROLAC TROMETHAMINE 15 MG: 30 INJECTION, SOLUTION INTRAMUSCULAR; INTRAVENOUS at 22:11

## 2023-10-11 RX ADMIN — KETOROLAC TROMETHAMINE 15 MG: 30 INJECTION, SOLUTION INTRAMUSCULAR; INTRAVENOUS at 11:12

## 2023-10-11 RX ADMIN — OXYCODONE HYDROCHLORIDE 10 MG: 5 TABLET ORAL at 00:15

## 2023-10-11 RX ADMIN — DOCUSATE SODIUM 100 MG: 100 CAPSULE, LIQUID FILLED ORAL at 08:17

## 2023-10-11 RX ADMIN — MAGNESIUM SULFATE HEPTAHYDRATE 2 G: 40 INJECTION, SOLUTION INTRAVENOUS at 15:21

## 2023-10-11 RX ADMIN — ENOXAPARIN SODIUM 40 MG: 40 INJECTION SUBCUTANEOUS at 12:27

## 2023-10-11 RX ADMIN — SODIUM CHLORIDE, POTASSIUM CHLORIDE, SODIUM LACTATE AND CALCIUM CHLORIDE 1000 ML: 600; 310; 30; 20 INJECTION, SOLUTION INTRAVENOUS at 18:15

## 2023-10-11 RX ADMIN — TRAZODONE HYDROCHLORIDE 300 MG: 150 TABLET ORAL at 22:09

## 2023-10-11 RX ADMIN — HYDROMORPHONE HYDROCHLORIDE 0.5 MG: 1 INJECTION, SOLUTION INTRAMUSCULAR; INTRAVENOUS; SUBCUTANEOUS at 07:54

## 2023-10-11 RX ADMIN — SIMVASTATIN 40 MG: 40 TABLET, FILM COATED ORAL at 22:15

## 2023-10-11 RX ADMIN — DOCUSATE SODIUM 100 MG: 100 CAPSULE, LIQUID FILLED ORAL at 22:09

## 2023-10-11 RX ADMIN — OXYCODONE HYDROCHLORIDE 10 MG: 5 TABLET ORAL at 20:26

## 2023-10-11 RX ADMIN — KETOROLAC TROMETHAMINE 15 MG: 30 INJECTION, SOLUTION INTRAMUSCULAR; INTRAVENOUS at 16:53

## 2023-10-11 RX ADMIN — QUETIAPINE FUMARATE 300 MG: 100 TABLET, FILM COATED ORAL at 08:17

## 2023-10-11 RX ADMIN — POTASSIUM CHLORIDE 40 MEQ: 1500 TABLET, EXTENDED RELEASE ORAL at 11:12

## 2023-10-11 RX ADMIN — HYDROMORPHONE HYDROCHLORIDE 0.5 MG: 1 INJECTION, SOLUTION INTRAMUSCULAR; INTRAVENOUS; SUBCUTANEOUS at 08:18

## 2023-10-11 RX ADMIN — PANTOPRAZOLE SODIUM 40 MG: 40 TABLET, DELAYED RELEASE ORAL at 06:43

## 2023-10-11 RX ADMIN — SODIUM CHLORIDE, POTASSIUM CHLORIDE, SODIUM LACTATE AND CALCIUM CHLORIDE 100 ML/HR: 600; 310; 30; 20 INJECTION, SOLUTION INTRAVENOUS at 05:53

## 2023-10-11 RX ADMIN — ANTACID TABLETS 1000 MG: 500 TABLET, CHEWABLE ORAL at 23:32

## 2023-10-11 RX ADMIN — BUSPIRONE HYDROCHLORIDE 15 MG: 5 TABLET ORAL at 22:09

## 2023-10-11 RX ADMIN — OXYCODONE HYDROCHLORIDE 10 MG: 5 TABLET ORAL at 04:17

## 2023-10-11 RX ADMIN — LURASIDONE HYDROCHLORIDE 80 MG: 40 TABLET, FILM COATED ORAL at 08:51

## 2023-10-11 RX ADMIN — Medication 2 L/MIN: at 08:30

## 2023-10-11 RX ADMIN — BUSPIRONE HYDROCHLORIDE 15 MG: 5 TABLET ORAL at 08:17

## 2023-10-11 RX ADMIN — VENLAFAXINE HYDROCHLORIDE 150 MG: 150 CAPSULE, EXTENDED RELEASE ORAL at 08:17

## 2023-10-11 RX ADMIN — SODIUM CHLORIDE, POTASSIUM CHLORIDE, SODIUM LACTATE AND CALCIUM CHLORIDE 500 ML: 600; 310; 30; 20 INJECTION, SOLUTION INTRAVENOUS at 12:18

## 2023-10-11 RX ADMIN — SODIUM CHLORIDE, POTASSIUM CHLORIDE, SODIUM LACTATE AND CALCIUM CHLORIDE 100 ML/HR: 600; 310; 30; 20 INJECTION, SOLUTION INTRAVENOUS at 20:27

## 2023-10-11 RX ADMIN — QUETIAPINE FUMARATE 300 MG: 100 TABLET, FILM COATED ORAL at 22:10

## 2023-10-11 RX ADMIN — HYDROMORPHONE HYDROCHLORIDE 0.4 MG: 1 INJECTION, SOLUTION INTRAMUSCULAR; INTRAVENOUS; SUBCUTANEOUS at 03:11

## 2023-10-11 ASSESSMENT — COGNITIVE AND FUNCTIONAL STATUS - GENERAL
STANDING UP FROM CHAIR USING ARMS: A LITTLE
MOBILITY SCORE: 17
MOBILITY SCORE: 24
CLIMB 3 TO 5 STEPS WITH RAILING: A LITTLE
TURNING FROM BACK TO SIDE WHILE IN FLAT BAD: A LOT
MOVING TO AND FROM BED TO CHAIR: A LITTLE
WALKING IN HOSPITAL ROOM: A LITTLE
MOVING FROM LYING ON BACK TO SITTING ON SIDE OF FLAT BED WITH BEDRAILS: A LITTLE
DAILY ACTIVITIY SCORE: 24

## 2023-10-11 ASSESSMENT — PAIN SCALES - GENERAL
PAINLEVEL_OUTOF10: 7
PAINLEVEL_OUTOF10: 8
PAINLEVEL_OUTOF10: 7
PAINLEVEL_OUTOF10: 8

## 2023-10-11 ASSESSMENT — PAIN - FUNCTIONAL ASSESSMENT
PAIN_FUNCTIONAL_ASSESSMENT: 0-10

## 2023-10-11 ASSESSMENT — PAIN DESCRIPTION - DESCRIPTORS
DESCRIPTORS: ACHING
DESCRIPTORS: SORE

## 2023-10-11 NOTE — NURSING NOTE
Dr Mcadams was notified of patient's medication record completed as patient was very anxious upon arrival to the medical surgical unit as he had not received his psychiatric medications in 24 hours due to his acute illness.  Dr Mcadams also aware of patient's heart rate in the 130s.  Dr Mcadams aware that vital signs generated a SIRS alert.  Dr Mcadams also aware that patient is IDDM.

## 2023-10-11 NOTE — CARE PLAN
The patient's goals for the shift include Pain management    The clinical goals for the shift include Pain management and IV fluids.    Over the shift, the patient did not make progress toward the following goals. Barriers to progression include first post op day. Recommendations to address these barriers include frequent reinforcement of floor routine and review of doctor's orders.

## 2023-10-11 NOTE — PROGRESS NOTES
"Victorino Lara is a 48 y.o. adult on day 0 of admission presenting with Acute appendicitis with localized peritonitis.     Subjective   Patient resting in bed.  Complains of abdominal pain.  Pain increases with movement and is tender to palpation.  Encouraged to take prescribed pain medication as needed.  Encouraged to ambulate today.  No acute events overnight.       Objective     Physical Exam  HENT:      Head: Normocephalic and atraumatic.      Mouth/Throat:      Mouth: Mucous membranes are dry.      Pharynx: Oropharynx is clear.   Eyes:      Extraocular Movements: Extraocular movements intact.      Pupils: Pupils are equal, round, and reactive to light.   Cardiovascular:      Rate and Rhythm: Normal rate and regular rhythm.      Pulses: Normal pulses.      Heart sounds: S1 normal and S2 normal.   Pulmonary:      Effort: Pulmonary effort is normal.      Breath sounds: Normal breath sounds.   Abdominal:      General: Abdomen is flat. Bowel sounds are normal. There is distension.      Palpations: Abdomen is soft.      Tenderness: There is generalized abdominal tenderness. There is no guarding.   Musculoskeletal:         General: No swelling or tenderness. Normal range of motion.      Cervical back: Normal range of motion.   Skin:     General: Skin is warm and dry.      Capillary Refill: Capillary refill takes less than 2 seconds.   Neurological:      General: No focal deficit present.      Mental Status: He is alert and oriented to person, place, and time.   Psychiatric:         Attention and Perception: Attention normal.         Mood and Affect: Mood normal.         Speech: Speech normal.         Behavior: Behavior normal. Behavior is cooperative.       Last Recorded Vitals  Blood pressure 104/65, pulse (!) 132, temperature 36.5 °C (97.7 °F), resp. rate 18, height 1.702 m (5' 7\"), weight 94.5 kg (208 lb 5.4 oz), SpO2 94 %.  Intake/Output last 3 Shifts:  I/O last 3 completed shifts:  In: 4043.3 (42.8 mL/kg) " [I.V.:3043.3 (32.2 mL/kg); IV Piggyback:1000]  Out: 1250 (13.2 mL/kg) [Urine:1200 (0.4 mL/kg/hr); Blood:50]  Weight: 94.5 kg     Relevant Results  Lab Results   Component Value Date    WBC 14.7 (H) 10/11/2023    HGB 13.2 10/11/2023    HCT 38.8 10/11/2023    MCV 84 10/11/2023     10/11/2023     Lab Results   Component Value Date    GLUCOSE 141 (H) 10/11/2023    CALCIUM 8.7 10/11/2023     10/11/2023    K 3.4 (L) 10/11/2023    CO2 26 10/11/2023     10/11/2023    BUN 12 10/11/2023    CREATININE 0.99 10/11/2023     Assessment/Plan   Subjective  Patient resting in bed.  Complains of abdominal pain that is worse with movement and tender to palpation.  He has been tachycardic since arriving to the floor after surgery.  In EKG was ordered and shows sinus tachycardia with a rate of 136.  After reviewing previous EKGs, this is similar to previous.  Patient is receiving IV fluids, white blood cell count has decreased, and has no fever.    Impression  POD1 appendectomy and hernia repair    PLAN  Pain control  Nausea control  IV fluids  Regular diet    Further recommendations per Dr. Calloway    Principal Problem:    Acute appendicitis with localized peritonitis  Active Problems:    Anxiety    Depression    Hyperlipidemia    Diabetes mellitus, type 2 (CMS/HCC)    Appendicitis, acute    Appendicitis        Makayla Orellana, APRN-CNP    Agree with note above except where noted below.    S: Had significant pain overnight and was significantly tachycardic up to 140s.  Afebrile with stable blood pressure.  White cell count was slightly lower to 14,000 with hemoglobin stable.    O:    Gen: In pain but non-toxic, Aax3  Abd: Soft, distended, TTP, trochar sites c/d/I    A/P: Postoperative day 1 from hernia repair and appendectomy.  Patient is having significantly more pain and is more tachycardic than would normally be expected.  Discussed with patient, he is on a significant amount of psych meds which was not disclosed  during his initial presentation.  -Home psych meds  -Will start toradol in addition to current pain regimen  -ADAT  -SSI  -SCDs, ISS, lovenox  -If persistently tachycardic after pain control and resumption of meds, will proceed with further workup    Norman Calloway MD  10/11/23

## 2023-10-12 ENCOUNTER — APPOINTMENT (OUTPATIENT)
Dept: RADIOLOGY | Facility: HOSPITAL | Age: 48
End: 2023-10-12
Payer: COMMERCIAL

## 2023-10-12 LAB
ANION GAP SERPL CALC-SCNC: 11 MMOL/L (ref 10–20)
BUN SERPL-MCNC: 17 MG/DL (ref 6–23)
CALCIUM SERPL-MCNC: 9 MG/DL (ref 8.6–10.3)
CHLORIDE SERPL-SCNC: 97 MMOL/L (ref 98–107)
CO2 SERPL-SCNC: 29 MMOL/L (ref 21–32)
CREAT SERPL-MCNC: 1.01 MG/DL (ref 0.5–1.3)
ERYTHROCYTE [DISTWIDTH] IN BLOOD BY AUTOMATED COUNT: 13.1 % (ref 11.5–14.5)
GFR SERPL CREATININE-BSD FRML MDRD: 68 ML/MIN/1.73M*2
GLUCOSE BLD MANUAL STRIP-MCNC: 144 MG/DL (ref 74–99)
GLUCOSE BLD MANUAL STRIP-MCNC: 155 MG/DL (ref 74–99)
GLUCOSE BLD MANUAL STRIP-MCNC: 155 MG/DL (ref 74–99)
GLUCOSE BLD MANUAL STRIP-MCNC: 158 MG/DL (ref 74–99)
GLUCOSE SERPL-MCNC: 175 MG/DL (ref 74–99)
HCT VFR BLD AUTO: 35.3 % (ref 36–52)
HGB BLD-MCNC: 11.9 G/DL (ref 12–17.5)
MAGNESIUM SERPL-MCNC: 2.24 MG/DL (ref 1.6–2.4)
MCH RBC QN AUTO: 29.2 PG (ref 26–34)
MCHC RBC AUTO-ENTMCNC: 33.7 G/DL (ref 32–36)
MCV RBC AUTO: 87 FL (ref 80–100)
NRBC BLD-RTO: 0 /100 WBCS (ref 0–0)
PLATELET # BLD AUTO: 300 X10*3/UL (ref 150–450)
PMV BLD AUTO: 10.7 FL (ref 7.5–11.5)
POTASSIUM SERPL-SCNC: 3.6 MMOL/L (ref 3.5–5.3)
RBC # BLD AUTO: 4.07 X10*6/UL (ref 4–5.9)
SODIUM SERPL-SCNC: 133 MMOL/L (ref 136–145)
WBC # BLD AUTO: 15.8 X10*3/UL (ref 4.4–11.3)

## 2023-10-12 PROCEDURE — 96372 THER/PROPH/DIAG INJ SC/IM: CPT

## 2023-10-12 PROCEDURE — 2580000001 HC RX 258 IV SOLUTIONS: Performed by: SURGERY

## 2023-10-12 PROCEDURE — 74018 RADEX ABDOMEN 1 VIEW: CPT | Performed by: RADIOLOGY

## 2023-10-12 PROCEDURE — 71275 CT ANGIOGRAPHY CHEST: CPT | Performed by: RADIOLOGY

## 2023-10-12 PROCEDURE — 2500000001 HC RX 250 WO HCPCS SELF ADMINISTERED DRUGS (ALT 637 FOR MEDICARE OP): Performed by: SURGERY

## 2023-10-12 PROCEDURE — 36415 COLL VENOUS BLD VENIPUNCTURE: CPT | Performed by: SURGERY

## 2023-10-12 PROCEDURE — 2500000004 HC RX 250 GENERAL PHARMACY W/ HCPCS (ALT 636 FOR OP/ED)

## 2023-10-12 PROCEDURE — 74018 RADEX ABDOMEN 1 VIEW: CPT

## 2023-10-12 PROCEDURE — 85027 COMPLETE CBC AUTOMATED: CPT | Performed by: SURGERY

## 2023-10-12 PROCEDURE — 82947 ASSAY GLUCOSE BLOOD QUANT: CPT

## 2023-10-12 PROCEDURE — 2500000004 HC RX 250 GENERAL PHARMACY W/ HCPCS (ALT 636 FOR OP/ED): Performed by: SURGERY

## 2023-10-12 PROCEDURE — 99024 POSTOP FOLLOW-UP VISIT: CPT | Performed by: SURGERY

## 2023-10-12 PROCEDURE — 2550000001 HC RX 255 CONTRASTS: Performed by: SURGERY

## 2023-10-12 PROCEDURE — 2500000001 HC RX 250 WO HCPCS SELF ADMINISTERED DRUGS (ALT 637 FOR MEDICARE OP): Performed by: STUDENT IN AN ORGANIZED HEALTH CARE EDUCATION/TRAINING PROGRAM

## 2023-10-12 PROCEDURE — 51701 INSERT BLADDER CATHETER: CPT

## 2023-10-12 PROCEDURE — 71275 CT ANGIOGRAPHY CHEST: CPT

## 2023-10-12 PROCEDURE — 2500000004 HC RX 250 GENERAL PHARMACY W/ HCPCS (ALT 636 FOR OP/ED): Performed by: PHYSICIAN ASSISTANT

## 2023-10-12 PROCEDURE — 83735 ASSAY OF MAGNESIUM: CPT | Performed by: SURGERY

## 2023-10-12 PROCEDURE — 1200000002 HC GENERAL ROOM WITH TELEMETRY DAILY

## 2023-10-12 PROCEDURE — 2500000004 HC RX 250 GENERAL PHARMACY W/ HCPCS (ALT 636 FOR OP/ED): Performed by: STUDENT IN AN ORGANIZED HEALTH CARE EDUCATION/TRAINING PROGRAM

## 2023-10-12 PROCEDURE — 80048 BASIC METABOLIC PNL TOTAL CA: CPT | Performed by: SURGERY

## 2023-10-12 PROCEDURE — 51702 INSERT TEMP BLADDER CATH: CPT

## 2023-10-12 RX ORDER — POTASSIUM CHLORIDE 14.9 MG/ML
20 INJECTION INTRAVENOUS
Status: COMPLETED | OUTPATIENT
Start: 2023-10-12 | End: 2023-10-12

## 2023-10-12 RX ORDER — SODIUM CHLORIDE, SODIUM LACTATE, POTASSIUM CHLORIDE, CALCIUM CHLORIDE 600; 310; 30; 20 MG/100ML; MG/100ML; MG/100ML; MG/100ML
125 INJECTION, SOLUTION INTRAVENOUS CONTINUOUS
Status: DISCONTINUED | OUTPATIENT
Start: 2023-10-12 | End: 2023-10-14

## 2023-10-12 RX ADMIN — HYDROMORPHONE HYDROCHLORIDE 0.4 MG: 1 INJECTION, SOLUTION INTRAMUSCULAR; INTRAVENOUS; SUBCUTANEOUS at 00:37

## 2023-10-12 RX ADMIN — ONDANSETRON 4 MG: 2 INJECTION INTRAMUSCULAR; INTRAVENOUS at 14:03

## 2023-10-12 RX ADMIN — OXYCODONE HYDROCHLORIDE 10 MG: 5 TABLET ORAL at 02:46

## 2023-10-12 RX ADMIN — DOCUSATE SODIUM 100 MG: 100 CAPSULE, LIQUID FILLED ORAL at 08:53

## 2023-10-12 RX ADMIN — HYDROMORPHONE HYDROCHLORIDE 0.4 MG: 1 INJECTION, SOLUTION INTRAMUSCULAR; INTRAVENOUS; SUBCUTANEOUS at 17:06

## 2023-10-12 RX ADMIN — POTASSIUM CHLORIDE 20 MEQ: 14.9 INJECTION, SOLUTION INTRAVENOUS at 14:03

## 2023-10-12 RX ADMIN — KETOROLAC TROMETHAMINE 15 MG: 30 INJECTION, SOLUTION INTRAMUSCULAR; INTRAVENOUS at 03:53

## 2023-10-12 RX ADMIN — POTASSIUM CHLORIDE 20 MEQ: 14.9 INJECTION, SOLUTION INTRAVENOUS at 15:14

## 2023-10-12 RX ADMIN — KETOROLAC TROMETHAMINE 15 MG: 30 INJECTION, SOLUTION INTRAMUSCULAR; INTRAVENOUS at 16:08

## 2023-10-12 RX ADMIN — IOHEXOL 75 ML: 350 INJECTION, SOLUTION INTRAVENOUS at 04:57

## 2023-10-12 RX ADMIN — LURASIDONE HYDROCHLORIDE 80 MG: 40 TABLET, FILM COATED ORAL at 09:00

## 2023-10-12 RX ADMIN — BUSPIRONE HYDROCHLORIDE 15 MG: 5 TABLET ORAL at 08:53

## 2023-10-12 RX ADMIN — BUSPIRONE HYDROCHLORIDE 15 MG: 5 TABLET ORAL at 21:09

## 2023-10-12 RX ADMIN — KETOROLAC TROMETHAMINE 15 MG: 30 INJECTION, SOLUTION INTRAMUSCULAR; INTRAVENOUS at 10:18

## 2023-10-12 RX ADMIN — MORPHINE SULFATE 4 MG: 4 INJECTION, SOLUTION INTRAMUSCULAR; INTRAVENOUS at 14:03

## 2023-10-12 RX ADMIN — SODIUM CHLORIDE, POTASSIUM CHLORIDE, SODIUM LACTATE AND CALCIUM CHLORIDE 100 ML/HR: 600; 310; 30; 20 INJECTION, SOLUTION INTRAVENOUS at 06:40

## 2023-10-12 RX ADMIN — SODIUM CHLORIDE, POTASSIUM CHLORIDE, SODIUM LACTATE AND CALCIUM CHLORIDE 1000 ML: 600; 310; 30; 20 INJECTION, SOLUTION INTRAVENOUS at 17:33

## 2023-10-12 RX ADMIN — VENLAFAXINE HYDROCHLORIDE 150 MG: 150 CAPSULE, EXTENDED RELEASE ORAL at 08:53

## 2023-10-12 RX ADMIN — DOCUSATE SODIUM 100 MG: 100 CAPSULE, LIQUID FILLED ORAL at 21:10

## 2023-10-12 RX ADMIN — KETOROLAC TROMETHAMINE 15 MG: 30 INJECTION, SOLUTION INTRAMUSCULAR; INTRAVENOUS at 22:37

## 2023-10-12 RX ADMIN — HYDROMORPHONE HYDROCHLORIDE 0.4 MG: 1 INJECTION, SOLUTION INTRAMUSCULAR; INTRAVENOUS; SUBCUTANEOUS at 21:03

## 2023-10-12 RX ADMIN — QUETIAPINE FUMARATE 300 MG: 100 TABLET, FILM COATED ORAL at 08:53

## 2023-10-12 RX ADMIN — ENOXAPARIN SODIUM 40 MG: 40 INJECTION SUBCUTANEOUS at 08:54

## 2023-10-12 RX ADMIN — HYDROMORPHONE HYDROCHLORIDE 0.4 MG: 1 INJECTION, SOLUTION INTRAMUSCULAR; INTRAVENOUS; SUBCUTANEOUS at 09:04

## 2023-10-12 RX ADMIN — SODIUM CHLORIDE, POTASSIUM CHLORIDE, SODIUM LACTATE AND CALCIUM CHLORIDE 100 ML/HR: 600; 310; 30; 20 INJECTION, SOLUTION INTRAVENOUS at 21:23

## 2023-10-12 RX ADMIN — SIMVASTATIN 40 MG: 40 TABLET, FILM COATED ORAL at 21:09

## 2023-10-12 RX ADMIN — PANTOPRAZOLE SODIUM 40 MG: 40 TABLET, DELAYED RELEASE ORAL at 06:40

## 2023-10-12 RX ADMIN — TRAZODONE HYDROCHLORIDE 300 MG: 150 TABLET ORAL at 21:10

## 2023-10-12 RX ADMIN — SODIUM CHLORIDE, POTASSIUM CHLORIDE, SODIUM LACTATE AND CALCIUM CHLORIDE 1000 ML: 600; 310; 30; 20 INJECTION, SOLUTION INTRAVENOUS at 15:13

## 2023-10-12 RX ADMIN — QUETIAPINE FUMARATE 300 MG: 100 TABLET, FILM COATED ORAL at 21:10

## 2023-10-12 ASSESSMENT — COGNITIVE AND FUNCTIONAL STATUS - GENERAL
MOVING TO AND FROM BED TO CHAIR: A LOT
WALKING IN HOSPITAL ROOM: A LOT
DRESSING REGULAR LOWER BODY CLOTHING: A LOT
MOBILITY SCORE: 12
TOILETING: A LITTLE
DAILY ACTIVITIY SCORE: 18
TURNING FROM BACK TO SIDE WHILE IN FLAT BAD: A LOT
DRESSING REGULAR UPPER BODY CLOTHING: A LITTLE
STANDING UP FROM CHAIR USING ARMS: A LOT
PERSONAL GROOMING: A LITTLE
HELP NEEDED FOR BATHING: A LITTLE
MOVING FROM LYING ON BACK TO SITTING ON SIDE OF FLAT BED WITH BEDRAILS: A LITTLE
CLIMB 3 TO 5 STEPS WITH RAILING: TOTAL

## 2023-10-12 ASSESSMENT — PAIN SCALES - GENERAL
PAINLEVEL_OUTOF10: 7
PAINLEVEL_OUTOF10: 3
PAINLEVEL_OUTOF10: 6
PAINLEVEL_OUTOF10: 6
PAINLEVEL_OUTOF10: 5 - MODERATE PAIN
PAINLEVEL_OUTOF10: 8
PAINLEVEL_OUTOF10: 9
PAINLEVEL_OUTOF10: 8
PAINLEVEL_OUTOF10: 9
PAINLEVEL_OUTOF10: 4

## 2023-10-12 ASSESSMENT — PAIN - FUNCTIONAL ASSESSMENT: PAIN_FUNCTIONAL_ASSESSMENT: 0-10

## 2023-10-12 ASSESSMENT — PAIN DESCRIPTION - DESCRIPTORS
DESCRIPTORS: ACHING;PRESSURE
DESCRIPTORS: SHARP

## 2023-10-12 NOTE — NURSING NOTE
Dr. Matias aware of patient unable to void and bladder scan of @ 400cc  order to straight cath x1 Dr. Matias also made aware of increase abd distention ,  hr 120-130's, and spo2 90-92% on 5 liters orders received.

## 2023-10-12 NOTE — PROGRESS NOTES
"Victorino Lara is a 48 y.o. adult on day 1 of admission presenting with Acute appendicitis with localized peritonitis.    Subjective   Patient continued to be tachycardic overnight in the 120s, hypoxemic but blood pressure was stable.  Due to this, CT PE was ordered which was negative for any embolus but did show some dilated loops of bowel without evidence of any pneumoperitoneum.  This morning patient states that he subjectively feels better although he has not passed gas.  WBC 15.7, which is up from yesterday of 14K.       Objective     Physical Exam  Gen:  Appears better resting in bed  Abd; Soft, distended, approrpiately TTP, trochar sites c/d/i  Last Recorded Vitals  Blood pressure 125/73, pulse (!) 127, temperature 36.1 °C (97 °F), resp. rate 20, height 1.702 m (5' 7\"), weight 94.5 kg (208 lb 5.4 oz), SpO2 93 %.  Intake/Output last 3 Shifts:  I/O last 3 completed shifts:  In: 5062 (53.6 mL/kg) [I.V.:3562 (37.7 mL/kg); IV Piggyback:1500]  Out: 1150 (12.2 mL/kg) [Urine:1150 (0.3 mL/kg/hr)]  Weight: 94.5 kg     Relevant Results           This patient currently has cardiac telemetry ordered; if you would like to modify or discontinue the telemetry order, click here to go to the orders activity to modify/discontinue the order.    Component      Latest Ref Rng 10/12/2023   WBC      4.4 - 11.3 x10*3/uL 15.8 (H)    nRBC      0.0 - 0.0 /100 WBCs 0.0    RBC      4.00 - 5.90 x10*6/uL 4.07    HEMOGLOBIN      12.0 - 17.5 g/dL 11.9 (L)    HEMATOCRIT      36.0 - 52.0 % 35.3 (L)    MCV      80 - 100 fL 87    MCH      26.0 - 34.0 pg 29.2    MCHC      32.0 - 36.0 g/dL 33.7    RED CELL DISTRIBUTION WIDTH      11.5 - 14.5 % 13.1    Platelets      150 - 450 x10*3/uL 300    MEAN PLATELET VOLUME      7.5 - 11.5 fL 10.7    GLUCOSE      74 - 99 mg/dL 175 (H)    SODIUM      136 - 145 mmol/L 133 (L)    POTASSIUM      3.5 - 5.3 mmol/L 3.6    CHLORIDE      98 - 107 mmol/L 97 (L)    Bicarbonate      21 - 32 mmol/L 29    Anion Gap      10 - " 20 mmol/L 11    Blood Urea Nitrogen      6 - 23 mg/dL 17    Creatinine      0.50 - 1.30 mg/dL 1.01    EGFR      >60 mL/min/1.73m*2 68    Calcium      8.6 - 10.3 mg/dL 9.0    MAGNESIUM      1.60 - 2.40 mg/dL 2.24    POCT Glucose      74 - 99 mg/dL 144 (H)       Legend:  (H) High  (L) Low    CT PE - IMPRESSION:  SMALL BOWEL DILATION WITH MULTIPLE SMALL BOWEL AIR-FLUID LEVELS IN  THE UPPER ABDOMEN, ALL NEW FROM CT TWO DAYS AGO      NO ACUTE DISEASE IN THE CHEST, ONLY SUBSTANTIAL BILATERAL LOWER LOBE  DEPENDENT ATELECTASIS WHICH IS NEW FROM CT TWO DAYS PRIOR      NO AIRSPACE CONSOLIDATION OR GROUND-GLASS AIRSPACE DISEASE      NO EDEMA/FAILURE      NO ACUTE PULMONARY EMBOLISM THROUGH THE SEGMENTAL BRANCH LEVEL      NO AORTIC DISSECTION OR OTHER ACUTE THORACIC AORTIC FINDINGS      NO PLEURAL OR PERICARDIAL EFFUSION      NO PNEUMOTHORAX         Assessment/Plan   Principal Problem:    Acute appendicitis with localized peritonitis  Active Problems:    Anxiety    Depression    Hyperlipidemia    Diabetes mellitus, type 2 (CMS/HCC)    Appendicitis, acute    Appendicitis    Acute appendicitis, unspecified acute appendicitis type    48-year-old male postop day 2 from laparoscopic appendectomy.  Had postoperative tachycardia of unclear etiology although this appears to be slowly improving.  Also has some hypoxemia which again is also appears to be improving.  Does not have a PE, although he may be developing an ileus.  We will make n.p.o. except for meds  IV fluids  Had some urinary retention last night, if he continues to retain we will replace the [place]  IV pain meds including Toradol  SCDs, incentive spirometer, Lovenox  Continue inpatient for now.       I spent 30 minutes in the professional and overall care of this patient.      Norman Calloway MD    10/12/23

## 2023-10-12 NOTE — CARE PLAN
The patient's goals for the shift include Pain management    The clinical goals for the shift include Pain management and IV fluids.    Over the shift, the patient did not make progress toward the following goals. Barriers to progression include ***. Recommendations to address these barriers include ***.

## 2023-10-12 NOTE — CONSULTS
Consults    Reason For Consult      History Of Present Illness  Victorino Lara is a 48 y.o. adult presenting with Abdominal Pain.  History Of Present Illness  Victorino Lara is a 48 y.o. adult for whom I am consulted for Hypoxia/Dyspnea/Atelectasis and other Pulmonary issues.  In brief this is an otherwise healthy gentleman with no significant past medical history who complains of a 1 day history of worsening right lower quadrant pain.  Complains of nausea and vomiting.  Denies obstipation, denies fever.  Presented to the emergency room afebrile, vitals are stable, white cell count was 15,000.  CT abdomen pelvis demonstrated acute, nonperforated appendicitis.  General surgery was consulted and patient went on to have laparoscopic appendectomy on 10/12/2023.  Last night patient became significantly hypoxic requiring nasal cannula.  Also had some abdominal distention at that point.  Went on to have a CT scan of the chest with PE protocol which showed no evidence of PE but did show significant bibasal atelectasis.  Patient denied any chest pain but has abdominal distention.  He is felt to have ileus by surgical service.  I was asked to evaluate and follow from a pulmonary perspective.  Patient denies any asthma or COPD.  Does have smoking history at least half pack a day for many years.  He is a rather vague historian.     Past Medical History  He has a past medical history of Retention of urine, unspecified.    Surgical History  He has a past surgical history that includes Other surgical history (03/27/2014).     Social History   He reports that he does smoke few cigarettes a day.     Family History  No family history on file.     Allergies  Patient has no known allergies.    Review of Systems  As in [facility] [employer]  [facility] [place]     Physical Exam  Gen: In pain but non toxic, Aax3  Heart: RRR  Lungs: CTAB  Abd; Soft, ND, TTP in RLQ with focal peritonitis, small 1-2cm umbilical hernia       Last Recorded Vitals  /73   Pulse (!) 127   Temp  36.1 °C (97 °F)   Resp 22   Wt 94.5 kg (208 lb 5.4 oz)   SpO2 94%     Relevant Results  Component      Latest Ref Rng 10/10/2023   WBC      4.4 - 11.3 x10*3/uL 15.0 (H)    nRBC      0.0 - 0.0 /100 WBCs 0.0    RBC      4.00 - 5.90 x10*6/uL 4.77    HEMOGLOBIN      12.0 - 17.5 g/dL 13.9    HEMATOCRIT      36.0 - 52.0 % 40.7    MCV      80 - 100 fL 85    MCH      26.0 - 34.0 pg 29.1    MCHC      32.0 - 36.0 g/dL 34.2    RED CELL DISTRIBUTION WIDTH      11.5 - 14.5 % 12.2    Platelets      150 - 450 x10*3/uL 375    MEAN PLATELET VOLUME      7.5 - 11.5 fL 10.1    Neutrophils %      40.0 - 80.0 % 75.6    Immature Granulocytes %, Automated      0.0 - 0.9 % 0.3    Lymphocytes %      13.0 - 44.0 % 13.4    Monocytes %      2.0 - 10.0 % 5.4    Eosinophils %      0.0 - 6.0 % 4.8    Basophils %      0.0 - 2.0 % 0.5    Neutrophils Absolute      1.20 - 7.70 x10*3/uL 11.35 (H)    Immature Granulocytes Absolute, Automated      0.00 - 0.70 x10*3/uL 0.04    Lymphocytes Absolute      1.20 - 4.80 x10*3/uL 2.02    Monocytes Absolute      0.10 - 1.00 x10*3/uL 0.81    Eosinophils Absolute      0.00 - 0.70 x10*3/uL 0.72 (H)    Basophils Absolute      0.00 - 0.10 x10*3/uL 0.08    GLUCOSE      74 - 99 mg/dL 174 (H)    SODIUM      136 - 145 mmol/L 136    POTASSIUM      3.5 - 5.3 mmol/L 3.6    CHLORIDE      98 - 107 mmol/L 103    Bicarbonate      21 - 32 mmol/L 24    Anion Gap      10 - 20 mmol/L 13    Blood Urea Nitrogen      6 - 23 mg/dL 14    Creatinine      0.50 - 1.30 mg/dL 0.94    EGFR      >60 mL/min/1.73m*2 75    Calcium      8.6 - 10.3 mg/dL 9.4    Albumin      3.4 - 5.0 g/dL 4.2    Alkaline Phosphatase      33 - 120 U/L 69    Total Protein      6.4 - 8.2 g/dL 7.3    AST      9 - 39 U/L 12    Bilirubin Total      0.0 - 1.2 mg/dL 0.3    ALT      7 - 52 U/L 17    LIPASE      9 - 82 U/L 15    Lactate      0.4 - 2.0 mmol/L 1.4       Legend:  (H) High        CT abd/pe - BOWEL:  Appendix is fluid-filled and dilated measuring up to 1.6 cm  with  multiple intraluminal appendicoliths and mild periappendiceal  inflammatory change. No extraluminal gas or abscess. Colonic  diverticulosis without findings of diverticulitis     Labs and imaging personally reviewed by myself   Assessment/Plan     48-year-old male who is otherwise healthy presents with a 1 day history of acute appendicitis.  Patient went on to have laparoscopic appendectomy.  Postop he developed ileus and also developed hypoxia which I feel is from atelectasis.  I also feel patient likely has COPD based on his smoking history.  At this time he will be continued on bronchodilator nebs, and advised to do coughing and deep breathing and incentive spirometry.  He will be started on DVT prophylaxis if not already done so.  He was also counseled to quit smoking cigarettes.  I have given him ATS pamphlets smoking cessation, COPD and inhaler use.  I shall continue to monitor this patient and I thank you for the referral.      Mann Rciks MD

## 2023-10-12 NOTE — CARE PLAN
The patient's goals for the shift include Pain management    The clinical goals for the shift include Pain management and IV fluids.    Over the shift, the patient did not make progress toward the following goals pain control and symptom management .Barriers to progression include post op bowel complications, ng tube placement and low urine output/hylton placement. Recommendations to address these barriers include bowel rest, ng, ivf, pain control.

## 2023-10-13 LAB
ANION GAP SERPL CALC-SCNC: 12 MMOL/L (ref 10–20)
BUN SERPL-MCNC: 26 MG/DL (ref 6–23)
CALCIUM SERPL-MCNC: 9.5 MG/DL (ref 8.6–10.3)
CHLORIDE SERPL-SCNC: 91 MMOL/L (ref 98–107)
CO2 SERPL-SCNC: 38 MMOL/L (ref 21–32)
CREAT SERPL-MCNC: 1.16 MG/DL (ref 0.5–1.3)
ERYTHROCYTE [DISTWIDTH] IN BLOOD BY AUTOMATED COUNT: 13 % (ref 11.5–14.5)
GFR SERPL CREATININE-BSD FRML MDRD: 58 ML/MIN/1.73M*2
GLUCOSE BLD MANUAL STRIP-MCNC: 142 MG/DL (ref 74–99)
GLUCOSE BLD MANUAL STRIP-MCNC: 143 MG/DL (ref 74–99)
GLUCOSE BLD MANUAL STRIP-MCNC: 159 MG/DL (ref 74–99)
GLUCOSE BLD MANUAL STRIP-MCNC: 173 MG/DL (ref 74–99)
GLUCOSE SERPL-MCNC: 151 MG/DL (ref 74–99)
HCT VFR BLD AUTO: 33.9 % (ref 36–52)
HGB BLD-MCNC: 11.3 G/DL (ref 12–17.5)
HOLD SPECIMEN: NORMAL
MAGNESIUM SERPL-MCNC: 2.18 MG/DL (ref 1.6–2.4)
MCH RBC QN AUTO: 28.6 PG (ref 26–34)
MCHC RBC AUTO-ENTMCNC: 33.3 G/DL (ref 32–36)
MCV RBC AUTO: 86 FL (ref 80–100)
NRBC BLD-RTO: 0 /100 WBCS (ref 0–0)
PLATELET # BLD AUTO: 351 X10*3/UL (ref 150–450)
PMV BLD AUTO: 10.5 FL (ref 7.5–11.5)
POTASSIUM SERPL-SCNC: 3.4 MMOL/L (ref 3.5–5.3)
RBC # BLD AUTO: 3.95 X10*6/UL (ref 4–5.9)
SODIUM SERPL-SCNC: 138 MMOL/L (ref 136–145)
WBC # BLD AUTO: 4.8 X10*3/UL (ref 4.4–11.3)

## 2023-10-13 PROCEDURE — 82947 ASSAY GLUCOSE BLOOD QUANT: CPT

## 2023-10-13 PROCEDURE — 83735 ASSAY OF MAGNESIUM: CPT | Performed by: SURGERY

## 2023-10-13 PROCEDURE — 80048 BASIC METABOLIC PNL TOTAL CA: CPT | Performed by: SURGERY

## 2023-10-13 PROCEDURE — 2500000001 HC RX 250 WO HCPCS SELF ADMINISTERED DRUGS (ALT 637 FOR MEDICARE OP): Performed by: SURGERY

## 2023-10-13 PROCEDURE — 36415 COLL VENOUS BLD VENIPUNCTURE: CPT | Performed by: SURGERY

## 2023-10-13 PROCEDURE — 96372 THER/PROPH/DIAG INJ SC/IM: CPT

## 2023-10-13 PROCEDURE — 2500000004 HC RX 250 GENERAL PHARMACY W/ HCPCS (ALT 636 FOR OP/ED): Performed by: SURGERY

## 2023-10-13 PROCEDURE — 85027 COMPLETE CBC AUTOMATED: CPT | Performed by: SURGERY

## 2023-10-13 PROCEDURE — 2580000001 HC RX 258 IV SOLUTIONS: Performed by: SURGERY

## 2023-10-13 PROCEDURE — 2500000004 HC RX 250 GENERAL PHARMACY W/ HCPCS (ALT 636 FOR OP/ED)

## 2023-10-13 PROCEDURE — 1200000002 HC GENERAL ROOM WITH TELEMETRY DAILY

## 2023-10-13 PROCEDURE — 99024 POSTOP FOLLOW-UP VISIT: CPT | Performed by: SURGERY

## 2023-10-13 RX ORDER — POTASSIUM CHLORIDE 14.9 MG/ML
20 INJECTION INTRAVENOUS
Status: COMPLETED | OUTPATIENT
Start: 2023-10-13 | End: 2023-10-13

## 2023-10-13 RX ADMIN — POTASSIUM CHLORIDE 20 MEQ: 14.9 INJECTION, SOLUTION INTRAVENOUS at 08:22

## 2023-10-13 RX ADMIN — SODIUM CHLORIDE, POTASSIUM CHLORIDE, SODIUM LACTATE AND CALCIUM CHLORIDE 1000 ML: 600; 310; 30; 20 INJECTION, SOLUTION INTRAVENOUS at 08:23

## 2023-10-13 RX ADMIN — HYDROMORPHONE HYDROCHLORIDE 0.4 MG: 1 INJECTION, SOLUTION INTRAMUSCULAR; INTRAVENOUS; SUBCUTANEOUS at 09:36

## 2023-10-13 RX ADMIN — KETOROLAC TROMETHAMINE 15 MG: 30 INJECTION, SOLUTION INTRAMUSCULAR; INTRAVENOUS at 04:24

## 2023-10-13 RX ADMIN — KETOROLAC TROMETHAMINE 15 MG: 30 INJECTION, SOLUTION INTRAMUSCULAR; INTRAVENOUS at 21:47

## 2023-10-13 RX ADMIN — SIMVASTATIN 40 MG: 40 TABLET, FILM COATED ORAL at 20:28

## 2023-10-13 RX ADMIN — HYDROMORPHONE HYDROCHLORIDE 0.4 MG: 1 INJECTION, SOLUTION INTRAMUSCULAR; INTRAVENOUS; SUBCUTANEOUS at 12:48

## 2023-10-13 RX ADMIN — TRAZODONE HYDROCHLORIDE 300 MG: 150 TABLET ORAL at 20:28

## 2023-10-13 RX ADMIN — POTASSIUM CHLORIDE 20 MEQ: 14.9 INJECTION, SOLUTION INTRAVENOUS at 10:37

## 2023-10-13 RX ADMIN — QUETIAPINE FUMARATE 300 MG: 100 TABLET, FILM COATED ORAL at 08:23

## 2023-10-13 RX ADMIN — BUSPIRONE HYDROCHLORIDE 15 MG: 5 TABLET ORAL at 08:23

## 2023-10-13 RX ADMIN — SODIUM CHLORIDE, POTASSIUM CHLORIDE, SODIUM LACTATE AND CALCIUM CHLORIDE 1000 ML: 600; 310; 30; 20 INJECTION, SOLUTION INTRAVENOUS at 19:46

## 2023-10-13 RX ADMIN — ENOXAPARIN SODIUM 40 MG: 40 INJECTION SUBCUTANEOUS at 08:23

## 2023-10-13 RX ADMIN — SODIUM CHLORIDE, POTASSIUM CHLORIDE, SODIUM LACTATE AND CALCIUM CHLORIDE 100 ML/HR: 600; 310; 30; 20 INJECTION, SOLUTION INTRAVENOUS at 10:37

## 2023-10-13 RX ADMIN — KETOROLAC TROMETHAMINE 15 MG: 30 INJECTION, SOLUTION INTRAMUSCULAR; INTRAVENOUS at 16:30

## 2023-10-13 RX ADMIN — DOCUSATE SODIUM 100 MG: 100 CAPSULE, LIQUID FILLED ORAL at 20:28

## 2023-10-13 RX ADMIN — KETOROLAC TROMETHAMINE 15 MG: 30 INJECTION, SOLUTION INTRAMUSCULAR; INTRAVENOUS at 09:32

## 2023-10-13 RX ADMIN — QUETIAPINE FUMARATE 300 MG: 100 TABLET, FILM COATED ORAL at 20:28

## 2023-10-13 RX ADMIN — SODIUM CHLORIDE, POTASSIUM CHLORIDE, SODIUM LACTATE AND CALCIUM CHLORIDE 100 ML/HR: 600; 310; 30; 20 INJECTION, SOLUTION INTRAVENOUS at 05:38

## 2023-10-13 RX ADMIN — BUSPIRONE HYDROCHLORIDE 15 MG: 5 TABLET ORAL at 20:28

## 2023-10-13 RX ADMIN — HYDROMORPHONE HYDROCHLORIDE 0.4 MG: 1 INJECTION, SOLUTION INTRAMUSCULAR; INTRAVENOUS; SUBCUTANEOUS at 20:29

## 2023-10-13 ASSESSMENT — COGNITIVE AND FUNCTIONAL STATUS - GENERAL
WALKING IN HOSPITAL ROOM: A LOT
STANDING UP FROM CHAIR USING ARMS: A LOT
TOILETING: A LITTLE
MOVING FROM LYING ON BACK TO SITTING ON SIDE OF FLAT BED WITH BEDRAILS: A LITTLE
MOVING TO AND FROM BED TO CHAIR: A LOT
CLIMB 3 TO 5 STEPS WITH RAILING: TOTAL
PERSONAL GROOMING: A LITTLE
DAILY ACTIVITIY SCORE: 18
MOBILITY SCORE: 12
TURNING FROM BACK TO SIDE WHILE IN FLAT BAD: A LOT
HELP NEEDED FOR BATHING: A LITTLE
DRESSING REGULAR LOWER BODY CLOTHING: A LOT
DRESSING REGULAR UPPER BODY CLOTHING: A LITTLE

## 2023-10-13 ASSESSMENT — PAIN SCALES - GENERAL
PAINLEVEL_OUTOF10: 8
PAINLEVEL_OUTOF10: 9
PAINLEVEL_OUTOF10: 3
PAINLEVEL_OUTOF10: 9
PAINLEVEL_OUTOF10: 5 - MODERATE PAIN

## 2023-10-13 NOTE — CARE PLAN
The patient's goals for the shift include Pain management    The clinical goals for the shift include pain management    Over the shift, the patient did not make progress toward the following goals. Barriers to progression include post op complications. Recommendations to address these barriers include continue NG, IVF, pain control.

## 2023-10-13 NOTE — NURSING NOTE
Upon receiving patient this shift, patient pulled NG out while sleeping. Dr Matias on call and notified. New NG placed 14F at 52cm. Xray ordered to verify placement.

## 2023-10-13 NOTE — CARE PLAN
The patient's goals for the shift include Pain management    The clinical goals for the shift include pain management    Over the shift, the patient did not make progress toward the following goals.     Problem: Pain  Goal: My pain/discomfort is manageable  Outcome: Not Progressing

## 2023-10-13 NOTE — PROGRESS NOTES
History Of Present Illness  Victorino Lara is a 48 y.o. adult presenting with Abdominal Pain.  History Of Present Illness  Victorino Lara is a 48 y.o. adult for whom I am consulted for Hypoxia/Dyspnea/Atelectasis and other Pulmonary issues.  In brief this is an otherwise healthy gentleman with no significant past medical history who complains of a 1 day history of worsening right lower quadrant pain.  Complains of nausea and vomiting.  Denies obstipation, denies fever.  Presented to the emergency room afebrile, vitals are stable, white cell count was 15,000.  CT abdomen pelvis demonstrated acute, nonperforated appendicitis.  General surgery was consulted and patient went on to have laparoscopic appendectomy on 10/12/2023.  Last night patient became significantly hypoxic requiring nasal cannula.  Also had some abdominal distention at that point.  Went on to have a CT scan of the chest with PE protocol which showed no evidence of PE but did show significant bibasal atelectasis.  Patient denied any chest pain but has abdominal distention.  He is felt to have ileus by surgical service.  I was asked to evaluate and follow from a pulmonary perspective.  Patient denies any asthma or COPD.  Does have smoking history at least half pack a day for many years.  He is a rather vague historian.  Recent events have been noted.  Patient now has ileus and has a NG tube inserted to suction.  This morning he has had over 1800 mL drained from the stomach.  He is feeling slightly better sitting up by the bedside.  His O2 requirements have gone up to 5 L nasal cannula.  He denies any chest pain or shortness of breath.  Past Medical History  He has a past medical history of Retention of urine, unspecified.     Surgical History  He has a past surgical history that includes Other surgical history (03/27/2014).     Social History   He reports that he does smoke few cigarettes a day.      Family History  Family History   No family history on file.         Allergies  Patient has no known allergies.     Review of Systems  As in \[facility]\""     Physical Exam  Gen: In pain but non toxic, Aax3  Heart: RRR  Lungs: CTAB  Abd; Soft, ND, TTP in RLQ with focal peritonitis, small 1-2cm umbilical hernia        Last Recorded Vitals  /73   Pulse (!) 127   Temp 36.1 °C (97 °F)   Resp 22   Wt 94.5 kg (208 lb 5.4 oz)   SpO2 94%      Relevant Results  lactated Ringer's, 100 mL/hr, Last Rate: 100 mL/hr (10/13/23 1037)     .rtnslab  Component      Latest Ref Rng 10/10/2023   WBC      4.4 - 11.3 x10*3/uL 15.0 (H)    nRBC      0.0 - 0.0 /100 WBCs 0.0    RBC      4.00 - 5.90 x10*6/uL 4.77    HEMOGLOBIN      12.0 - 17.5 g/dL 13.9    HEMATOCRIT      36.0 - 52.0 % 40.7    MCV      80 - 100 fL 85    MCH      26.0 - 34.0 pg 29.1    MCHC      32.0 - 36.0 g/dL 34.2    RED CELL DISTRIBUTION WIDTH      11.5 - 14.5 % 12.2    Platelets      150 - 450 x10*3/uL 375    MEAN PLATELET VOLUME      7.5 - 11.5 fL 10.1    Neutrophils %      40.0 - 80.0 % 75.6    Immature Granulocytes %, Automated      0.0 - 0.9 % 0.3    Lymphocytes %      13.0 - 44.0 % 13.4    Monocytes %      2.0 - 10.0 % 5.4    Eosinophils %      0.0 - 6.0 % 4.8    Basophils %      0.0 - 2.0 % 0.5    Neutrophils Absolute      1.20 - 7.70 x10*3/uL 11.35 (H)    Immature Granulocytes Absolute, Automated      0.00 - 0.70 x10*3/uL 0.04    Lymphocytes Absolute      1.20 - 4.80 x10*3/uL 2.02    Monocytes Absolute      0.10 - 1.00 x10*3/uL 0.81    Eosinophils Absolute      0.00 - 0.70 x10*3/uL 0.72 (H)    Basophils Absolute      0.00 - 0.10 x10*3/uL 0.08    GLUCOSE      74 - 99 mg/dL 174 (H)    SODIUM      136 - 145 mmol/L 136    POTASSIUM      3.5 - 5.3 mmol/L 3.6    CHLORIDE      98 - 107 mmol/L 103    Bicarbonate      21 - 32 mmol/L 24    Anion Gap      10 - 20 mmol/L 13    Blood Urea Nitrogen      6 - 23 mg/dL 14    Creatinine      0.50 - 1.30 mg/dL 0.94    EGFR      >60 mL/min/1.73m*2 75    Calcium      8.6 - 10.3 mg/dL 9.4     Albumin      3.4 - 5.0 g/dL 4.2    Alkaline Phosphatase      33 - 120 U/L 69    Total Protein      6.4 - 8.2 g/dL 7.3    AST      9 - 39 U/L 12    Bilirubin Total      0.0 - 1.2 mg/dL 0.3    ALT      7 - 52 U/L 17    LIPASE      9 - 82 U/L 15    Lactate      0.4 - 2.0 mmol/L 1.4       Legend:  (H) High        CT abd/pe - BOWEL:  Appendix is fluid-filled and dilated measuring up to 1.6 cm with  multiple intraluminal appendicoliths and mild periappendiceal  inflammatory change. No extraluminal gas or abscess. Colonic  diverticulosis without findings of diverticulitis     Labs and imaging personally reviewed by myself      Assessment/Plan   48-year-old male who is otherwise healthy presents with a 1 day history of acute appendicitis.  Patient went on to have laparoscopic appendectomy.  Postop he developed ileus and also developed hypoxia which I feel is from atelectasis.  I also feel patient likely has COPD based on his smoking history.   At this time patient has had significant ileus requiring NG tube placement with suctioning.  His hypoxia is mainly due to postop abdominal distention with significant atelectasis at lung bases with probable underlying COPD.  Encouraged ambulation when safe to do so.  Advised to do coughing, deep breathing and use incentive spirometry.

## 2023-10-13 NOTE — PROGRESS NOTES
"Victorino Lara is a 48 y.o. adult on day 2 of admission presenting with Acute appendicitis with localized peritonitis.    Subjective   Patient had NG tube placed yesterday with 2.8 L of of bilious material returning.  Received 1 L fluid bolus overnight.  Heart rate is down to the low 120s and high 1 teens.  White cell count has normalized.  Patient feels better.  No flatus yet.       Objective     Physical Exam  Gen: NAD, Aax3  Abd; Soft, less distended, appropriately TTP, trochar sites c/d/i  Last Recorded Vitals  Blood pressure 112/75, pulse (!) 124, temperature 36.4 °C (97.5 °F), temperature source Temporal, resp. rate 20, height 1.702 m (5' 7\"), weight 94.5 kg (208 lb 5.4 oz), SpO2 93 %.  Intake/Output last 3 Shifts:  I/O last 3 completed shifts:  In: 6152 (65.1 mL/kg) [I.V.:2952 (31.2 mL/kg); IV Piggyback:3200]  Out: 4925 (52.1 mL/kg) [Urine:925 (0.3 mL/kg/hr); Emesis/NG output:2800; Other:1200]  Weight: 94.5 kg     Relevant Results           This patient currently has cardiac telemetry ordered; if you would like to modify or discontinue the telemetry order, click here to go to the orders activity to modify/discontinue the order.    Component      Latest Ref Rng 10/13/2023   WBC      4.4 - 11.3 x10*3/uL 4.8    nRBC      0.0 - 0.0 /100 WBCs 0.0    RBC      4.00 - 5.90 x10*6/uL 3.95 (L)    HEMOGLOBIN      12.0 - 17.5 g/dL 11.3 (L)    HEMATOCRIT      36.0 - 52.0 % 33.9 (L)    MCV      80 - 100 fL 86    MCH      26.0 - 34.0 pg 28.6    MCHC      32.0 - 36.0 g/dL 33.3    RED CELL DISTRIBUTION WIDTH      11.5 - 14.5 % 13.0    Platelets      150 - 450 x10*3/uL 351    MEAN PLATELET VOLUME      7.5 - 11.5 fL 10.5    GLUCOSE      74 - 99 mg/dL 151 (H)    SODIUM      136 - 145 mmol/L 138    POTASSIUM      3.5 - 5.3 mmol/L 3.4 (L)    CHLORIDE      98 - 107 mmol/L 91 (L)    Bicarbonate      21 - 32 mmol/L 38 (H)    Anion Gap      10 - 20 mmol/L 12    Blood Urea Nitrogen      6 - 23 mg/dL 26 (H)    Creatinine      0.50 - 1.30 " mg/dL 1.16    EGFR      >60 mL/min/1.73m*2 58 (L)    Calcium      8.6 - 10.3 mg/dL 9.5    MAGNESIUM      1.60 - 2.40 mg/dL 2.18    POCT Glucose      74 - 99 mg/dL 143 (H)       Legend:  (L) Low  (H) High    KUB - gas in small bowel and colon concerning for ileus         Assessment/Plan   Principal Problem:    Acute appendicitis with localized peritonitis  Active Problems:    Anxiety    Depression    Hyperlipidemia    Diabetes mellitus, type 2 (CMS/HCC)    Appendicitis, acute    Appendicitis    Acute appendicitis, unspecified acute appendicitis type    POD3 from lap appy, now with ileus  -Cont. NGT  -Replace electrolytes  -NPO, IVF, may need more fluid boluses  -ISS, SCDs, lovenox  -OOB  -Cont. IP for now       I spent 30 minutes in the professional and overall care of this patient.      Norman Calloway MD  10/13/23

## 2023-10-14 ENCOUNTER — APPOINTMENT (OUTPATIENT)
Dept: RADIOLOGY | Facility: HOSPITAL | Age: 48
End: 2023-10-14
Payer: COMMERCIAL

## 2023-10-14 ENCOUNTER — PREP FOR PROCEDURE (OUTPATIENT)
Dept: SURGERY | Facility: HOSPITAL | Age: 48
End: 2023-10-14

## 2023-10-14 ENCOUNTER — ANESTHESIA (OUTPATIENT)
Dept: OPERATING ROOM | Facility: HOSPITAL | Age: 48
End: 2023-10-14
Payer: COMMERCIAL

## 2023-10-14 ENCOUNTER — ANESTHESIA EVENT (OUTPATIENT)
Dept: OPERATING ROOM | Facility: HOSPITAL | Age: 48
End: 2023-10-14
Payer: COMMERCIAL

## 2023-10-14 PROBLEM — K56.609 SMALL BOWEL OBSTRUCTION (MULTI): Status: ACTIVE | Noted: 2023-10-10

## 2023-10-14 LAB
ANION GAP SERPL CALC-SCNC: 11 MMOL/L (ref 10–20)
BASOPHILS # BLD MANUAL: 0.05 X10*3/UL (ref 0–0.1)
BASOPHILS # BLD MANUAL: 0.06 X10*3/UL (ref 0–0.1)
BASOPHILS NFR BLD MANUAL: 1 %
BASOPHILS NFR BLD MANUAL: 1 %
BUN SERPL-MCNC: 35 MG/DL (ref 6–23)
CALCIUM SERPL-MCNC: 9.1 MG/DL (ref 8.6–10.3)
CHLORIDE SERPL-SCNC: 89 MMOL/L (ref 98–107)
CO2 SERPL-SCNC: 43 MMOL/L (ref 21–32)
CREAT SERPL-MCNC: 1.4 MG/DL (ref 0.5–1.3)
EOSINOPHIL # BLD MANUAL: 0.15 X10*3/UL (ref 0–0.7)
EOSINOPHIL # BLD MANUAL: 0.32 X10*3/UL (ref 0–0.7)
EOSINOPHIL NFR BLD MANUAL: 3 %
EOSINOPHIL NFR BLD MANUAL: 5 %
ERYTHROCYTE [DISTWIDTH] IN BLOOD BY AUTOMATED COUNT: 13.2 % (ref 11.5–14.5)
ERYTHROCYTE [DISTWIDTH] IN BLOOD BY AUTOMATED COUNT: 13.5 % (ref 11.5–14.5)
GFR SERPL CREATININE-BSD FRML MDRD: 46 ML/MIN/1.73M*2
GLUCOSE BLD MANUAL STRIP-MCNC: 118 MG/DL (ref 74–99)
GLUCOSE BLD MANUAL STRIP-MCNC: 142 MG/DL (ref 74–99)
GLUCOSE BLD MANUAL STRIP-MCNC: 172 MG/DL (ref 74–99)
GLUCOSE SERPL-MCNC: 136 MG/DL (ref 74–99)
HCT VFR BLD AUTO: 32 % (ref 36–52)
HCT VFR BLD AUTO: 34.4 % (ref 36–52)
HGB BLD-MCNC: 10.6 G/DL (ref 12–17.5)
HGB BLD-MCNC: 12 G/DL (ref 12–17.5)
HOLD SPECIMEN: NORMAL
IMM GRANULOCYTES # BLD AUTO: 0.13 X10*3/UL (ref 0–0.7)
IMM GRANULOCYTES # BLD AUTO: 0.3 X10*3/UL (ref 0–0.7)
IMM GRANULOCYTES NFR BLD AUTO: 2.1 % (ref 0–0.9)
IMM GRANULOCYTES NFR BLD AUTO: 5.9 % (ref 0–0.9)
LACTATE SERPL-SCNC: 1 MMOL/L (ref 0.4–2)
LYMPHOCYTES # BLD MANUAL: 1.28 X10*3/UL (ref 1.2–4.8)
LYMPHOCYTES # BLD MANUAL: 1.51 X10*3/UL (ref 1.2–4.8)
LYMPHOCYTES NFR BLD MANUAL: 24 %
LYMPHOCYTES NFR BLD MANUAL: 25 %
MAGNESIUM SERPL-MCNC: 2.02 MG/DL (ref 1.6–2.4)
MAGNESIUM SERPL-MCNC: 2.19 MG/DL (ref 1.6–2.4)
MCH RBC QN AUTO: 28.7 PG (ref 26–34)
MCH RBC QN AUTO: 30.5 PG (ref 26–34)
MCHC RBC AUTO-ENTMCNC: 33.1 G/DL (ref 32–36)
MCHC RBC AUTO-ENTMCNC: 34.9 G/DL (ref 32–36)
MCV RBC AUTO: 87 FL (ref 80–100)
MCV RBC AUTO: 87 FL (ref 80–100)
MONOCYTES # BLD MANUAL: 0.2 X10*3/UL (ref 0.1–1)
MONOCYTES # BLD MANUAL: 0.63 X10*3/UL (ref 0.1–1)
MONOCYTES NFR BLD MANUAL: 10 %
MONOCYTES NFR BLD MANUAL: 4 %
NEUTROPHILS # BLD MANUAL: 3.32 X10*3/UL (ref 1.2–7.7)
NEUTROPHILS # BLD MANUAL: 3.78 X10*3/UL (ref 1.2–7.7)
NEUTS BAND # BLD MANUAL: 0.77 X10*3/UL (ref 0–0.7)
NEUTS BAND # BLD MANUAL: 0.88 X10*3/UL (ref 0–0.7)
NEUTS BAND NFR BLD MANUAL: 14 %
NEUTS BAND NFR BLD MANUAL: 15 %
NEUTS SEG # BLD MANUAL: 2.55 X10*3/UL (ref 1.2–7)
NEUTS SEG # BLD MANUAL: 2.9 X10*3/UL (ref 1.2–7)
NEUTS SEG NFR BLD MANUAL: 46 %
NEUTS SEG NFR BLD MANUAL: 50 %
NRBC BLD-RTO: 0 /100 WBCS (ref 0–0)
NRBC BLD-RTO: 0 /100 WBCS (ref 0–0)
PHOSPHATE SERPL-MCNC: 4.4 MG/DL (ref 2.5–4.9)
PLATELET # BLD AUTO: 356 X10*3/UL (ref 150–450)
PLATELET # BLD AUTO: 424 X10*3/UL (ref 150–450)
PLATELET CLUMP BLD QL SMEAR: PRESENT
PMV BLD AUTO: 10.1 FL (ref 7.5–11.5)
PMV BLD AUTO: 10.8 FL (ref 7.5–11.5)
POTASSIUM SERPL-SCNC: 3 MMOL/L (ref 3.5–5.3)
RBC # BLD AUTO: 3.69 X10*6/UL (ref 4–5.9)
RBC # BLD AUTO: 3.94 X10*6/UL (ref 4–5.9)
RBC MORPH BLD: ABNORMAL
RBC MORPH BLD: ABNORMAL
SODIUM SERPL-SCNC: 140 MMOL/L (ref 136–145)
TOTAL CELLS COUNTED BLD: 100
TOTAL CELLS COUNTED BLD: 100
VARIANT LYMPHS # BLD MANUAL: 0.1 X10*3/UL (ref 0–0.5)
VARIANT LYMPHS NFR BLD: 2 %
WBC # BLD AUTO: 5.1 X10*3/UL (ref 4.4–11.3)
WBC # BLD AUTO: 6.3 X10*3/UL (ref 4.4–11.3)

## 2023-10-14 PROCEDURE — 2500000002 HC RX 250 W HCPCS SELF ADMINISTERED DRUGS (ALT 637 FOR MEDICARE OP, ALT 636 FOR OP/ED): Performed by: SURGERY

## 2023-10-14 PROCEDURE — 36415 COLL VENOUS BLD VENIPUNCTURE: CPT | Performed by: SURGERY

## 2023-10-14 PROCEDURE — 84132 ASSAY OF SERUM POTASSIUM: CPT | Performed by: HOSPITALIST

## 2023-10-14 PROCEDURE — 2020000001 HC ICU ROOM DAILY

## 2023-10-14 PROCEDURE — 49000 EXPLORATION OF ABDOMEN: CPT | Performed by: SURGERY

## 2023-10-14 PROCEDURE — 2500000004 HC RX 250 GENERAL PHARMACY W/ HCPCS (ALT 636 FOR OP/ED): Performed by: SURGERY

## 2023-10-14 PROCEDURE — 3700000002 HC GENERAL ANESTHESIA TIME - EACH INCREMENTAL 1 MINUTE: Performed by: SURGERY

## 2023-10-14 PROCEDURE — 82947 ASSAY GLUCOSE BLOOD QUANT: CPT

## 2023-10-14 PROCEDURE — 2500000001 HC RX 250 WO HCPCS SELF ADMINISTERED DRUGS (ALT 637 FOR MEDICARE OP): Performed by: SURGERY

## 2023-10-14 PROCEDURE — 83735 ASSAY OF MAGNESIUM: CPT | Performed by: SURGERY

## 2023-10-14 PROCEDURE — 74176 CT ABD & PELVIS W/O CONTRAST: CPT | Performed by: RADIOLOGY

## 2023-10-14 PROCEDURE — 0JQ80ZZ REPAIR ABDOMEN SUBCUTANEOUS TISSUE AND FASCIA, OPEN APPROACH: ICD-10-PCS | Performed by: SURGERY

## 2023-10-14 PROCEDURE — 85027 COMPLETE CBC AUTOMATED: CPT | Performed by: HOSPITALIST

## 2023-10-14 PROCEDURE — 84100 ASSAY OF PHOSPHORUS: CPT | Performed by: HOSPITALIST

## 2023-10-14 PROCEDURE — 2500000004 HC RX 250 GENERAL PHARMACY W/ HCPCS (ALT 636 FOR OP/ED): Performed by: HOSPITALIST

## 2023-10-14 PROCEDURE — C9113 INJ PANTOPRAZOLE SODIUM, VIA: HCPCS | Performed by: SURGERY

## 2023-10-14 PROCEDURE — 85007 BL SMEAR W/DIFF WBC COUNT: CPT | Performed by: SURGERY

## 2023-10-14 PROCEDURE — 80048 BASIC METABOLIC PNL TOTAL CA: CPT | Performed by: SURGERY

## 2023-10-14 PROCEDURE — 3600000003 HC OR TIME - INITIAL BASE CHARGE - PROCEDURE LEVEL THREE: Performed by: SURGERY

## 2023-10-14 PROCEDURE — 2580000001 HC RX 258 IV SOLUTIONS: Performed by: STUDENT IN AN ORGANIZED HEALTH CARE EDUCATION/TRAINING PROGRAM

## 2023-10-14 PROCEDURE — 3600000008 HC OR TIME - EACH INCREMENTAL 1 MINUTE - PROCEDURE LEVEL THREE: Performed by: SURGERY

## 2023-10-14 PROCEDURE — 2500000004 HC RX 250 GENERAL PHARMACY W/ HCPCS (ALT 636 FOR OP/ED): Performed by: STUDENT IN AN ORGANIZED HEALTH CARE EDUCATION/TRAINING PROGRAM

## 2023-10-14 PROCEDURE — 99291 CRITICAL CARE FIRST HOUR: CPT | Performed by: HOSPITALIST

## 2023-10-14 PROCEDURE — 74176 CT ABD & PELVIS W/O CONTRAST: CPT

## 2023-10-14 PROCEDURE — 94002 VENT MGMT INPAT INIT DAY: CPT

## 2023-10-14 PROCEDURE — 85007 BL SMEAR W/DIFF WBC COUNT: CPT | Performed by: HOSPITALIST

## 2023-10-14 PROCEDURE — 74018 RADEX ABDOMEN 1 VIEW: CPT

## 2023-10-14 PROCEDURE — 96372 THER/PROPH/DIAG INJ SC/IM: CPT

## 2023-10-14 PROCEDURE — 84075 ASSAY ALKALINE PHOSPHATASE: CPT | Performed by: HOSPITALIST

## 2023-10-14 PROCEDURE — 3700000001 HC GENERAL ANESTHESIA TIME - INITIAL BASE CHARGE: Performed by: SURGERY

## 2023-10-14 PROCEDURE — 85027 COMPLETE CBC AUTOMATED: CPT | Performed by: SURGERY

## 2023-10-14 PROCEDURE — 2500000005 HC RX 250 GENERAL PHARMACY W/O HCPCS: Performed by: STUDENT IN AN ORGANIZED HEALTH CARE EDUCATION/TRAINING PROGRAM

## 2023-10-14 PROCEDURE — 0WQF0ZZ REPAIR ABDOMINAL WALL, OPEN APPROACH: ICD-10-PCS | Performed by: SURGERY

## 2023-10-14 PROCEDURE — 2500000004 HC RX 250 GENERAL PHARMACY W/ HCPCS (ALT 636 FOR OP/ED)

## 2023-10-14 PROCEDURE — 1504F PT HAS RESP INSUFFICIENCY: CPT | Performed by: HOSPITALIST

## 2023-10-14 PROCEDURE — 82803 BLOOD GASES ANY COMBINATION: CPT

## 2023-10-14 PROCEDURE — 96372 THER/PROPH/DIAG INJ SC/IM: CPT | Performed by: SURGERY

## 2023-10-14 PROCEDURE — 7100000011 HC EXTENDED STAY RECOVERY HOURLY - NURSING UNIT

## 2023-10-14 PROCEDURE — 49000 EXPLORATION OF ABDOMEN: CPT | Performed by: STUDENT IN AN ORGANIZED HEALTH CARE EDUCATION/TRAINING PROGRAM

## 2023-10-14 PROCEDURE — 74018 RADEX ABDOMEN 1 VIEW: CPT | Performed by: RADIOLOGY

## 2023-10-14 PROCEDURE — A4217 STERILE WATER/SALINE, 500 ML: HCPCS | Performed by: SURGERY

## 2023-10-14 PROCEDURE — 5A1955Z RESPIRATORY VENTILATION, GREATER THAN 96 CONSECUTIVE HOURS: ICD-10-PCS | Performed by: HOSPITALIST

## 2023-10-14 PROCEDURE — 2720000007 HC OR 272 NO HCPCS: Performed by: SURGERY

## 2023-10-14 PROCEDURE — 83605 ASSAY OF LACTIC ACID: CPT | Performed by: SURGERY

## 2023-10-14 PROCEDURE — 2580000001 HC RX 258 IV SOLUTIONS: Performed by: SURGERY

## 2023-10-14 PROCEDURE — 0DNB0ZZ RELEASE ILEUM, OPEN APPROACH: ICD-10-PCS | Performed by: SURGERY

## 2023-10-14 RX ORDER — DIPHENHYDRAMINE HYDROCHLORIDE 50 MG/ML
12.5 INJECTION INTRAMUSCULAR; INTRAVENOUS ONCE AS NEEDED
Status: DISCONTINUED | OUTPATIENT
Start: 2023-10-14 | End: 2023-10-14

## 2023-10-14 RX ORDER — SODIUM CHLORIDE, SODIUM LACTATE, POTASSIUM CHLORIDE, CALCIUM CHLORIDE 600; 310; 30; 20 MG/100ML; MG/100ML; MG/100ML; MG/100ML
100 INJECTION, SOLUTION INTRAVENOUS CONTINUOUS
Status: DISCONTINUED | OUTPATIENT
Start: 2023-10-14 | End: 2023-10-14

## 2023-10-14 RX ORDER — PROPOFOL 10 MG/ML
5-50 INJECTION, EMULSION INTRAVENOUS CONTINUOUS
Status: DISCONTINUED | OUTPATIENT
Start: 2023-10-14 | End: 2023-10-26

## 2023-10-14 RX ORDER — INSULIN LISPRO 100 [IU]/ML
0-10 INJECTION, SOLUTION INTRAVENOUS; SUBCUTANEOUS EVERY 4 HOURS
Status: DISCONTINUED | OUTPATIENT
Start: 2023-10-14 | End: 2023-10-30

## 2023-10-14 RX ORDER — ROCURONIUM BROMIDE 10 MG/ML
INJECTION, SOLUTION INTRAVENOUS AS NEEDED
Status: DISCONTINUED | OUTPATIENT
Start: 2023-10-14 | End: 2023-10-14

## 2023-10-14 RX ORDER — PROPOFOL 10 MG/ML
INJECTION, EMULSION INTRAVENOUS CONTINUOUS PRN
Status: DISCONTINUED | OUTPATIENT
Start: 2023-10-14 | End: 2023-10-14

## 2023-10-14 RX ORDER — PANTOPRAZOLE SODIUM 40 MG/10ML
40 INJECTION, POWDER, LYOPHILIZED, FOR SOLUTION INTRAVENOUS DAILY
Status: DISCONTINUED | OUTPATIENT
Start: 2023-10-14 | End: 2023-10-30

## 2023-10-14 RX ORDER — MIDAZOLAM HYDROCHLORIDE 1 MG/ML
INJECTION, SOLUTION INTRAMUSCULAR; INTRAVENOUS AS NEEDED
Status: DISCONTINUED | OUTPATIENT
Start: 2023-10-14 | End: 2023-10-14

## 2023-10-14 RX ORDER — LIDOCAINE HYDROCHLORIDE 10 MG/ML
0.1 INJECTION INFILTRATION; PERINEURAL ONCE
Status: DISCONTINUED | OUTPATIENT
Start: 2023-10-14 | End: 2023-10-15

## 2023-10-14 RX ORDER — ONDANSETRON HYDROCHLORIDE 2 MG/ML
INJECTION, SOLUTION INTRAVENOUS AS NEEDED
Status: DISCONTINUED | OUTPATIENT
Start: 2023-10-14 | End: 2023-10-14

## 2023-10-14 RX ORDER — SUCCINYLCHOLINE CHLORIDE 100 MG/5ML
SYRINGE (ML) INTRAVENOUS AS NEEDED
Status: DISCONTINUED | OUTPATIENT
Start: 2023-10-14 | End: 2023-10-14

## 2023-10-14 RX ORDER — POTASSIUM CHLORIDE 14.9 MG/ML
20 INJECTION INTRAVENOUS
Status: COMPLETED | OUTPATIENT
Start: 2023-10-14 | End: 2023-10-15

## 2023-10-14 RX ORDER — POTASSIUM CHLORIDE 14.9 MG/ML
20 INJECTION INTRAVENOUS
Status: COMPLETED | OUTPATIENT
Start: 2023-10-14 | End: 2023-10-14

## 2023-10-14 RX ORDER — SODIUM CHLORIDE 9 MG/ML
125 INJECTION, SOLUTION INTRAVENOUS CONTINUOUS
Status: DISCONTINUED | OUTPATIENT
Start: 2023-10-14 | End: 2023-10-15

## 2023-10-14 RX ORDER — LABETALOL HYDROCHLORIDE 5 MG/ML
INJECTION, SOLUTION INTRAVENOUS AS NEEDED
Status: DISCONTINUED | OUTPATIENT
Start: 2023-10-14 | End: 2023-10-14

## 2023-10-14 RX ORDER — FENTANYL CITRATE 50 UG/ML
INJECTION, SOLUTION INTRAMUSCULAR; INTRAVENOUS AS NEEDED
Status: DISCONTINUED | OUTPATIENT
Start: 2023-10-14 | End: 2023-10-14

## 2023-10-14 RX ORDER — LABETALOL HYDROCHLORIDE 5 MG/ML
5 INJECTION, SOLUTION INTRAVENOUS ONCE AS NEEDED
Status: DISCONTINUED | OUTPATIENT
Start: 2023-10-14 | End: 2023-10-14

## 2023-10-14 RX ORDER — PHENYLEPHRINE HCL IN 0.9% NACL 0.4MG/10ML
SYRINGE (ML) INTRAVENOUS AS NEEDED
Status: DISCONTINUED | OUTPATIENT
Start: 2023-10-14 | End: 2023-10-14

## 2023-10-14 RX ORDER — FENTANYL CITRATE 50 UG/ML
50 INJECTION, SOLUTION INTRAMUSCULAR; INTRAVENOUS EVERY 5 MIN PRN
Status: DISCONTINUED | OUTPATIENT
Start: 2023-10-14 | End: 2023-10-14

## 2023-10-14 RX ORDER — SODIUM CHLORIDE 0.9 G/100ML
IRRIGANT IRRIGATION AS NEEDED
Status: DISCONTINUED | OUTPATIENT
Start: 2023-10-14 | End: 2023-10-14 | Stop reason: HOSPADM

## 2023-10-14 RX ORDER — PROPOFOL 10 MG/ML
INJECTION, EMULSION INTRAVENOUS AS NEEDED
Status: DISCONTINUED | OUTPATIENT
Start: 2023-10-14 | End: 2023-10-14

## 2023-10-14 RX ORDER — LIDOCAINE HYDROCHLORIDE 20 MG/ML
INJECTION, SOLUTION INFILTRATION; PERINEURAL AS NEEDED
Status: DISCONTINUED | OUTPATIENT
Start: 2023-10-14 | End: 2023-10-14

## 2023-10-14 RX ADMIN — FENTANYL CITRATE 100 MCG: 50 INJECTION, SOLUTION INTRAMUSCULAR; INTRAVENOUS at 16:50

## 2023-10-14 RX ADMIN — POTASSIUM CHLORIDE 20 MEQ: 14.9 INJECTION, SOLUTION INTRAVENOUS at 10:18

## 2023-10-14 RX ADMIN — Medication 100 MCG: at 17:09

## 2023-10-14 RX ADMIN — BUSPIRONE HYDROCHLORIDE 15 MG: 5 TABLET ORAL at 08:42

## 2023-10-14 RX ADMIN — FENTANYL CITRATE 50 MCG: 50 INJECTION, SOLUTION INTRAMUSCULAR; INTRAVENOUS at 17:25

## 2023-10-14 RX ADMIN — SUGAMMADEX 200 MG: 100 INJECTION, SOLUTION INTRAVENOUS at 18:25

## 2023-10-14 RX ADMIN — PROPOFOL 70 MCG/KG/MIN: 10 INJECTION, EMULSION INTRAVENOUS at 19:29

## 2023-10-14 RX ADMIN — SODIUM CHLORIDE, POTASSIUM CHLORIDE, SODIUM LACTATE AND CALCIUM CHLORIDE 125 ML/HR: 600; 310; 30; 20 INJECTION, SOLUTION INTRAVENOUS at 10:55

## 2023-10-14 RX ADMIN — ENOXAPARIN SODIUM 40 MG: 40 INJECTION SUBCUTANEOUS at 08:42

## 2023-10-14 RX ADMIN — ROCURONIUM BROMIDE 60 MG: 10 SOLUTION INTRAVENOUS at 17:00

## 2023-10-14 RX ADMIN — PIPERACILLIN SODIUM AND TAZOBACTAM SODIUM 3.38 G: 3; .375 INJECTION, SOLUTION INTRAVENOUS at 16:57

## 2023-10-14 RX ADMIN — SODIUM CHLORIDE 1000 ML: 9 INJECTION, SOLUTION INTRAVENOUS at 08:41

## 2023-10-14 RX ADMIN — ONDANSETRON 4 MG: 2 INJECTION INTRAMUSCULAR; INTRAVENOUS at 17:36

## 2023-10-14 RX ADMIN — FENTANYL CITRATE 50 MCG: 50 INJECTION, SOLUTION INTRAMUSCULAR; INTRAVENOUS at 17:30

## 2023-10-14 RX ADMIN — VENLAFAXINE HYDROCHLORIDE 150 MG: 150 CAPSULE, EXTENDED RELEASE ORAL at 08:42

## 2023-10-14 RX ADMIN — QUETIAPINE FUMARATE 300 MG: 100 TABLET, FILM COATED ORAL at 08:42

## 2023-10-14 RX ADMIN — KETOROLAC TROMETHAMINE 15 MG: 30 INJECTION, SOLUTION INTRAMUSCULAR; INTRAVENOUS at 03:54

## 2023-10-14 RX ADMIN — POTASSIUM CHLORIDE 20 MEQ: 14.9 INJECTION, SOLUTION INTRAVENOUS at 08:42

## 2023-10-14 RX ADMIN — DEXAMETHASONE SODIUM PHOSPHATE 8 MG: 4 INJECTION, SOLUTION INTRAMUSCULAR; INTRAVENOUS at 17:26

## 2023-10-14 RX ADMIN — LIDOCAINE HYDROCHLORIDE 60 MG: 20 INJECTION, SOLUTION INFILTRATION; PERINEURAL at 16:50

## 2023-10-14 RX ADMIN — Medication 100 MCG: at 17:01

## 2023-10-14 RX ADMIN — HYDROMORPHONE HYDROCHLORIDE 0.4 MG: 1 INJECTION, SOLUTION INTRAMUSCULAR; INTRAVENOUS; SUBCUTANEOUS at 02:44

## 2023-10-14 RX ADMIN — Medication: at 21:20

## 2023-10-14 RX ADMIN — INSULIN HUMAN 1 UNITS: 100 INJECTION, SOLUTION PARENTERAL at 12:24

## 2023-10-14 RX ADMIN — LABETALOL HYDROCHLORIDE 10 MG: 5 INJECTION, SOLUTION INTRAVENOUS at 18:21

## 2023-10-14 RX ADMIN — ROCURONIUM BROMIDE 30 MG: 10 SOLUTION INTRAVENOUS at 19:03

## 2023-10-14 RX ADMIN — HYDROMORPHONE HYDROCHLORIDE 0.4 MG: 1 INJECTION, SOLUTION INTRAMUSCULAR; INTRAVENOUS; SUBCUTANEOUS at 10:55

## 2023-10-14 RX ADMIN — FENTANYL CITRATE 50 MCG: 50 INJECTION, SOLUTION INTRAMUSCULAR; INTRAVENOUS at 17:36

## 2023-10-14 RX ADMIN — Medication 100 MG: at 16:50

## 2023-10-14 RX ADMIN — ACETAMINOPHEN 650 MG: 325 TABLET ORAL at 15:48

## 2023-10-14 RX ADMIN — SODIUM CHLORIDE: 9 INJECTION, SOLUTION INTRAVENOUS at 16:47

## 2023-10-14 RX ADMIN — PROPOFOL 70 MCG/KG/MIN: 10 INJECTION, EMULSION INTRAVENOUS at 23:59

## 2023-10-14 RX ADMIN — DOCUSATE SODIUM 100 MG: 100 CAPSULE, LIQUID FILLED ORAL at 08:42

## 2023-10-14 RX ADMIN — SODIUM CHLORIDE 125 ML/HR: 9 INJECTION, SOLUTION INTRAVENOUS at 21:45

## 2023-10-14 RX ADMIN — MIDAZOLAM 2 MG: 1 INJECTION INTRAMUSCULAR; INTRAVENOUS at 16:50

## 2023-10-14 RX ADMIN — Medication 100 MCG: at 17:04

## 2023-10-14 RX ADMIN — PANTOPRAZOLE SODIUM 40 MG: 40 INJECTION, POWDER, FOR SOLUTION INTRAVENOUS at 21:29

## 2023-10-14 RX ADMIN — SODIUM CHLORIDE, POTASSIUM CHLORIDE, SODIUM LACTATE AND CALCIUM CHLORIDE 1000 ML: 600; 310; 30; 20 INJECTION, SOLUTION INTRAVENOUS at 15:53

## 2023-10-14 RX ADMIN — PROPOFOL 200 MG: 10 INJECTION, EMULSION INTRAVENOUS at 16:50

## 2023-10-14 RX ADMIN — SODIUM CHLORIDE, POTASSIUM CHLORIDE, SODIUM LACTATE AND CALCIUM CHLORIDE 125 ML/HR: 600; 310; 30; 20 INJECTION, SOLUTION INTRAVENOUS at 03:54

## 2023-10-14 RX ADMIN — PROPOFOL 70 MCG/KG/MIN: 10 INJECTION, EMULSION INTRAVENOUS at 20:10

## 2023-10-14 RX ADMIN — ROCURONIUM BROMIDE 40 MG: 10 SOLUTION INTRAVENOUS at 18:43

## 2023-10-14 ASSESSMENT — PAIN SCALES - GENERAL
PAINLEVEL_OUTOF10: 8
PAINLEVEL_OUTOF10: 4
PAINLEVEL_OUTOF10: 0 - NO PAIN
PAINLEVEL_OUTOF10: 8

## 2023-10-14 ASSESSMENT — PAIN DESCRIPTION - DESCRIPTORS: DESCRIPTORS: ACHING;DISCOMFORT;SHARP;PRESSURE

## 2023-10-14 ASSESSMENT — COGNITIVE AND FUNCTIONAL STATUS - GENERAL
HELP NEEDED FOR BATHING: A LITTLE
DRESSING REGULAR UPPER BODY CLOTHING: A LITTLE
MOVING FROM LYING ON BACK TO SITTING ON SIDE OF FLAT BED WITH BEDRAILS: A LITTLE
TURNING FROM BACK TO SIDE WHILE IN FLAT BAD: A LOT
MOVING TO AND FROM BED TO CHAIR: A LITTLE
PERSONAL GROOMING: A LITTLE
DRESSING REGULAR LOWER BODY CLOTHING: A LOT
MOBILITY SCORE: 15
CLIMB 3 TO 5 STEPS WITH RAILING: A LOT
STANDING UP FROM CHAIR USING ARMS: A LITTLE
TOILETING: A LITTLE
DAILY ACTIVITIY SCORE: 18
WALKING IN HOSPITAL ROOM: A LOT

## 2023-10-14 ASSESSMENT — ACTIVITIES OF DAILY LIVING (ADL)
LACK_OF_TRANSPORTATION: NO
LACK_OF_TRANSPORTATION: NO

## 2023-10-14 ASSESSMENT — PAIN - FUNCTIONAL ASSESSMENT
PAIN_FUNCTIONAL_ASSESSMENT: 0-10
PAIN_FUNCTIONAL_ASSESSMENT: 0-10

## 2023-10-14 ASSESSMENT — PAIN SCALES - WONG BAKER: WONGBAKER_NUMERICALRESPONSE: NO HURT

## 2023-10-14 NOTE — CARE PLAN
The patient's goals for the shift include Pain management    The clinical goals for the shift include pain management      Problem: Pain  Goal: My pain/discomfort is manageable  Outcome: Progressing     Problem: Discharge Barriers  Goal: My discharge needs are met  Outcome: Progressing     Problem: Respiratory  Goal: Clear secretions with interventions this shift  Outcome: Progressing  Goal: Minimize anxiety/maximize coping throughout shift  Outcome: Progressing  Goal: Minimal/no exertional discomfort or dyspnea this shift  Outcome: Progressing  Goal: No signs of respiratory distress (eg. Use of accessory muscles. Peds grunting)  Outcome: Progressing  Goal: Patent airway maintained this shift  Outcome: Progressing  Goal: Tolerate mechanical ventilation evidenced by VS/agitation level this shift  Outcome: Progressing  Goal: Tolerate pulmonary toileting this shift  Outcome: Progressing  Goal: Verbalize decreased shortness of breath this shift  Outcome: Progressing  Goal: Wean oxygen to maintain O2 saturation per order/standard this shift  Outcome: Progressing  Goal: Increase self care and/or family involvement in next 24 hours  Outcome: Progressing

## 2023-10-14 NOTE — NURSING NOTE
Upon assessment of patient, patient is ashen in color, distended abdomen, painful and tender abdomen with absent bowel sounds. Patient has NG to LIWS with black/green output. Patient is tachycardic in the 120-130's. Secure chat out to Dr Mcadams who is on call for this patient with update on patient presentation. Orders received for IV fluids, labs, and abd xray scheduled for 10/14/23 at 0800.

## 2023-10-14 NOTE — BRIEF OP NOTE
Date: 10/10/2023 - 10/14/2023  OR Location: [facility]    Name: Victorino Lara : 1975, Age: 48 y.o., MRN: [id number], Sex: adult    Diagnosis  Pre-op Diagnosis     * Small bowel obstruction (CMS/HCC) [K56.609] Post-op Diagnosis     * Small bowel obstruction (CMS/HCC) [K56.609]     Procedures  Exploration Laparotomy,Lysis of Adhesions, Repair of Incisional Hernia  06825 - [place] EXPLORATORY LAPAROTOMY CELIOTOMY W/WO BIOPSY SPX      Surgeons      * Norman Calloway - Primary    Resident/Fellow/Other Assistant:  No surgical staff documented.    Procedure Summary  Anesthesia: General  ASA: III  Anesthesia Staff: Anesthesiologist: Finn Bosch DO  Estimated Blood Loss: Minimal  Intra-op Medications:   Medication Name Total Dose   sodium chloride 0.9 % irrigation solution 3,000 mL   insulin regular (HumuLIN R) injection 0-5 Units Cannot be calculated   lactated Ringer's bolus 1,000 mL Cannot be calculated   piperacillin-tazobactam-dextrose (Zosyn) IV 3.375 g 3.375 g              Anesthesia Record               Intraprocedure I/O Totals       None           Specimen: No specimens collected     Staff:   Circulator: Tracy Jose RN  Scrub Person: Sandra Aburto          Findings: Recurrence of umbilial hernia, bowel distended but completely viable, serosanguonous abdominal fluid, single interloop adhesion about 10cm from terminal ileum which was lysted, hernia repaired primarily, the patient had significant retching at end of procedure with dehiscence so this was closed again and the patient was transferred to ICU for intubation overnight    Complications:  None; patient tolerated the procedure well.     Disposition: ICU - intubated and stable  Condition: stable  Specimens Collected: No specimens collected  Attending Attestation: I was present and scrubbed for the entire procedure.    Norman Calloway  Phone Number: [phone]

## 2023-10-14 NOTE — OP NOTE
Exploration Laparotomy,Lysis of Adhesions, Repair of Incisional Hernia Operative Note     Date: 10/10/2023 - 10/14/2023  OR Location: [facility]    Name: Victorino Lara : 1975, Age: 48 y.o., MRN: [id number], Sex: adult    Diagnosis  Pre-op Diagnosis     * Small bowel obstruction (CMS/HCC) [K56.609] Post-op Diagnosis     * Small bowel obstruction (CMS/HCC) [K56.609]     Procedures  Exploration Laparotomy,Lysis of Adhesions, Repair of Incisional Hernia  74092 - [place] EXPLORATORY LAPAROTOMY CELIOTOMY W/WO BIOPSY SPX      Surgeons      * Norman Calloway - Primary    Resident/Fellow/Other Assistant:  No surgical staff documented.    Procedure Summary  Anesthesia: General  ASA: III  Anesthesia Staff: Anesthesiologist: Finn Bosch DO  Estimated Blood Loss: Minimal  Intra-op Medications:   Medication Name Total Dose   sodium chloride 0.9 % irrigation solution 3,000 mL   insulin regular (HumuLIN R) injection 0-5 Units Cannot be calculated   lactated Ringer's bolus 1,000 mL Cannot be calculated   piperacillin-tazobactam-dextrose (Zosyn) IV 3.375 g 3.375 g              Anesthesia Record               Intraprocedure I/O Totals       None           Specimen: No specimens collected     Staff:   Circulator: Tracy Jose RN  Scrub Person: Sandra Aburto         Drains and/or Catheters:   NG/OG/Feeding Tube Nasogastric 14 Fr Left nostril (Active)   Tube Status Unclamped;Low intermittent suction 10/14/23 1030   Placement Verification Measurements 10/14/23 1030   Gastric Aspirate Other (Comment) 10/14/23 1030   Distal Tube Measurement 52 cm 10/14/23 1030   Site Assessment Clean;Dry;Intact 10/14/23 1030   Drainage Appearance Brown;Green 10/14/23 1030   NG/OG Interventions Irrigated 10/14/23 1030   Irrigant Tap water 10/14/23 1030   Response To Intervention No resistance met 10/14/23 1030   Tube Securement Taped to cheek;Anchored to nostril center with adhesive device 10/14/23 1030   Output (mL) 1000 mL 10/14/23 1030        Urethral Catheter Straight-tip 16 Fr. (Active)   Site Assessment Clean;Skin intact 10/14/23 0605   Collection Container Standard drainage bag 10/12/23 2347   Securement Method Securing device (Describe) 10/12/23 2347   Reason for Continuing Urinary Catheterization acute urinary retention 10/14/23 1218   Output (mL) 450 mL 10/14/23 0253   $ Urethral Catheter Charge Indwelling cath 10/12/23 2347       Tourniquet Times:         Implants:     Findings: Recurrence of umbilial hernia, bowel distended but completely viable, serosanguonous abdominal fluid, single interloop adhesion about 10cm from terminal ileum which was lysted, hernia repaired primarily     Indications: Victorino Lara is an 48 y.o. adult who is having surgery for Small bowel obstruction (CMS/[facility]) [K56.609].  In brief this is a gentleman who presented to [facility] on the 10th with a acute appendicitis.  He underwent a laparoscopic appendectomy with umbilical hernia repair.  Postoperatively he had been tachycardic which was treated with IV fluids with some hypoxemia.  Was initially believed that this was due to a postoperative ileus.  Today, while his white cell count had normalized along with a normal lactate and improving tachycardia, his KUB demonstrated a concern of a obstruction rather than a persistent ileus.  CT scan demonstrated recurrence of the hernia with concern of an early postoperative obstruction.  Because of his short timing from his surgery and recurrence of the hernia, it was determined that the best course of action was to return to the operating room for an exploratory laparotomy.  The patient was explained the risk on benefits, alternatives and he consented to the procedure above.    The patient was seen in the preoperative area. The risks, benefits, complications, treatment options, non-operative alternatives, expected recovery and outcomes were discussed with the patient. The possibilities of reaction to medication, pulmonary  aspiration, injury to surrounding structures, bleeding, recurrent infection, the need for additional procedures, failure to diagnose a condition, and creating a complication requiring transfusion or operation were discussed with the patient. The patient concurred with the proposed plan, giving informed consent.  The site of surgery was properly noted/marked if necessary per policy. The patient has been actively warmed in preoperative area. Preoperative antibiotics have been ordered and given within 1 hours of incision. Venous thrombosis prophylaxis have been ordered including bilateral sequential compression devices and chemical prophylaxis    Procedure Details:   She was brought back to the operating room placed in supine position.  Proctor catheter was already in place.  An NG tube was already in place.  Sequential compression devices were placed and patient working condition.  Patient went general anesthesia.  The patient was prepped and draped in usual sterile fashion.  Antibiotics were given within 1 hour of incision.  Timeout was performed again correct patient, procedure, laterality.    A periumbilical incision was made.  This was carefully done to avoid entering the hernia.  It was carried down to the fascia.  The hernia was entered.  The bowel was immediately seen, however it all appeared viable and none of it appeared compromise.  The bowel was easily reduced into the abdomen, indicating this was not incarcerated.  Incision was extended both cephalad and caudad.  It was made to incorporate all of the area of the hernia.  The abdomen was immediately explored.  The small bowel was eviscerated.  The bowel was run from ligament of Treitz to the terminal ileum.  While the proximal bowel was dilated, it was all viable.  This was confirmed with a Doppler.  There was no clear transition point, however there was a intraloop adhesion about 10 cm proximal to TI which if there was a obstruction, this would have been the  only source.  That interloop adhesion was lysed.  The bowel contents were then milked proximal.  The bowel was run again 1 more time.  The abdomen was then copiously irrigated.  Again no signs of any [place] or contamination were found.  The bowel was reduced.  The fascia was then fully exposed around the incision.  The fascia was closed in a running fashion using #1 nonlooped PDS.  Skin subcutaneous tissue was reapproximated using 3-0 Vicryl and skin was closed using staples.    As the patient was being awoken to be extubated he had copious emesis.  Because of concern of inability to maintain airway, the patient was reanesthetized.  During the episode of emesis, a large gush of fluid came from the abdominal wall.  Due to concern of administration due to his retching, the patient was reprepped and draped in usual sterile fashion.  The midline was then reopened.  Reexploration, it appeared that the middle third of the fascia of the stitches had pulled through.  There was no bowel visible, just omentum that was placed for closure of the fascia.  The fascia was palpated and there was nothing stuck beneath it.  The fascia defect was then closed using interrupted #1 PDS.  She confirmed that the patient's had no more defects.  The subcutaneous was closed using 3-0 Vicryl PDS and the skin was again stable.  Nylon dressing was placed on top.  Due to concern of inability to protect the airway and so the patient could wake up in a more controlled fashion, was determined that the patient would be best left intubated overnight.  The patient was transferred to the intensive care in stable condition.  The count was correct.  The patient overall tolerated the procedure well.  Complications:  None; patient tolerated the procedure well.    Disposition: ICU - intubated and hemodynamically stable.  Condition: stable         Additional Details: None    Attending Attestation: I was present and scrubbed for the entire  procedure.    Norman Calloway  Phone Number: [phone]

## 2023-10-14 NOTE — NURSING NOTE
Patient extensively educated on NPO diet and importance of NPO diet. Patient repeatedly asking for water and becomes agitated when reminded of NPO status. Mouth swabs and mouth moisturizer given to patient.

## 2023-10-14 NOTE — ANESTHESIA PREPROCEDURE EVALUATION
Patient: Victorino Lara    Procedure Information       Date/Time: 10/14/23 1630    Procedure: Exploration Laparotomy - possible bowel resection    Location: [facility] / [facility]    Surgeons: Norman Calloway MD            Relevant Problems   Anesthesia (within normal limits)      Cardiovascular  Hx Syncope, echo performed in 2020: EF 60%, no valvular abnormalities   (+) Hyperlipidemia      Endocrine   (+) Diabetes mellitus, type 2 (CMS/HCC)      Neuro/Psych   (+) Anxiety   (+) Depression       Clinical information reviewed:   Tobacco  Allergies  Meds   Med Hx  Surg Hx   Fam Hx  Soc Hx        NPO Detail:  No data recorded     Physical Exam    Airway  Mallampati: II     Cardiovascular   Rhythm: regular  Rate: normal     Dental    Pulmonary - normal exam     Abdominal - normal exam             Anesthesia Plan    ASA 3 - emergent     general     intravenous induction   Postoperative administration of opioids is intended.  Anesthetic plan and risks discussed with patient.

## 2023-10-14 NOTE — ANESTHESIA PROCEDURE NOTES
Airway  Date/Time: 10/14/2023 4:57 PM  Urgency: elective    Airway not difficult    Staffing  Performed: attending   Authorized by: Finn Bosch DO    Performed by: Finn Bosch DO  Patient location during procedure: OR    Indications and Patient Condition  Indications for airway management: anesthesia and airway protection  Sedation level: deep  Preoxygenated: yes  Patient position: reverse Trendelenburg      Final Airway Details  Final airway type: endotracheal airway      Successful airway: ETT  Cuffed: yes   Successful intubation technique: direct laryngoscopy  Blade: Miles  Blade size: #4  ETT size (mm): 7.5  Cormack-Lehane Classification: grade I - full view of glottis  Measured from: lips  ETT to lips (cm): 23  Number of attempts at approach: 1

## 2023-10-14 NOTE — PROGRESS NOTES
"Victorino Lara is a 48 y.o. adult on day 3 of admission presenting with Acute appendicitis with localized peritonitis.    Subjective   Patient had NG tube placed with ongoing significant bilious material returning.  Continuing fluid boluses as needed for third space losses.  Heart rate is trending down ,white cell count has normalized, lactate normal.  Significant electrolyte imbalances, correcting as needed.  Patient feels poorly.  Complains of abdominal distention and tightness, upper abdominal discomfort.  No overt abdominal pain with movement in bed.  Persistent abdominal distention despite adequate NG tube decompression over the past 24 hours.  No flatus yet.       Objective     Physical Exam  Gen: NAD, appears uncomfortable but nontoxic Aax3  No respiratory distress.  Heart rate regular  Abd; Soft, markedly distended, appropriately TTP without peritoneal signs, trochar sites c/d/I  Extremities warm and well-perfused without edema  Last Recorded Vitals  Blood pressure 127/89, pulse (!) 113, temperature 36.6 °C (97.9 °F), resp. rate 16, height 1.702 m (5' 7\"), weight 94.5 kg (208 lb 5.4 oz), SpO2 93 %.  Intake/Output last 3 Shifts:  I/O last 3 completed shifts:  In: 5285 (55.9 mL/kg) [I.V.:2085 (22.1 mL/kg); IV Piggyback:3200]  Out: 09913 (111.4 mL/kg) [Urine:925 (0.3 mL/kg/hr); Emesis/NG output:8400; Other:1200]  Weight: 94.5 kg     Relevant Results    CT abdomen pelvis wo IV contrast    Result Date: 10/14/2023  Interpreted By:  Mana Price, STUDY: CT ABDOMEN PELVIS WO IV CONTRAST;  10/14/2023 1:25 pm   INDICATION: Signs/Symptoms:SBO postop appendectomy.   COMPARISON: 10/10/2023   ACCESSION NUMBER(S): [id number]   ORDERING CLINICIAN: DRU WYNN   TECHNIQUE: CT of the abdomen and pelvis was performed. No oral, no intravenous contrast.   FINDINGS: LOWER CHEST: There are bibasilar infiltrates.   ABDOMEN:   LIVER: There is no hepatic mass.   BILE DUCTS: There is no intrahepatic, common hepatic or common bile " ductal dilatation.   GALLBLADDER: Contracted but otherwise unremarkable.   PANCREAS: The pancreas is unremarkable.   SPLEEN: The spleen is unremarkable. There is no splenic mass or splenomegaly.   ADRENAL GLANDS: The adrenal glands are unremarkable.   KIDNEYS AND URETERS: The kidneys demonstrate no mass.  There is no intrarenal calculus or hydronephrosis. There is a Proctor catheter insufflated in a nondistended urinary bladder.   BOWEL: There are surgical clips in the right lower quadrant from appendectomy in the interval compared to 10/10/2023. There is haziness in the mesentery in the right lower quadrant from recent surgery. There is a small crescent of non walled-off ascitic fluid in the right lower quadrant. There is a small amount of ascites in the pelvis non walled-off. There is a nasogastric tube with the tip in the mid stomach. There is no gastric distention. There are markedly dilated loops of small bowel. There is collapsed distal small bowel and collapsed colon. This could represent a postoperative ileus but consider small-bowel obstruction. There are sigmoid diverticula with no diverticulitis.   VESSELS: The abdominal and pelvic vessels are unremarkable.   PERITONEUM/RETROPERITONEUM/LYMPH NODES: There is no retroperitoneal or pelvic adenopathy.  There is no ascites.   ABDOMINAL WALL: There is a periumbilical hernia with an area of opening measuring 5.8 cm in transverse dimension. There is herniation of a segment of nondilated non thickened small bowel. There is induration around the umbilicus from recent surgery. There is a small hematoma in the subcutaneous fat with hyperdense and hypodense fluid. This measures 3.0 x 1.8 cm. There is a dot of air which could be iatrogenic from prior laparoscopic surgery rather than abscess and infection..   BONE AND SOFT TISSUE: There is no acute osseous finding.   There is no soft tissue abnormality.       1. Interval appendectomy. Haziness right lower quadrant with a  small crescent of fluid non walled-off. 2. Markedly dilated loops of small bowel with nondilated collapsed distal small bowel and collapsed colon. This is likely a postoperative ileus but still consider small-bowel obstruction. Proctor catheter with the tip in the mid stomach. 3. Periumbilical hernia with a segment of nondilated non thickened small bowel. There is induration and a small amount of fluid in the fat around the umbilicus from recent surgery. There is a small hematoma in the periumbilical fat. There is a dot of air which could be iatrogenic from recent surgery rather than abscess and infection. 4. Proctor catheter insufflated in a nondistended urinary bladder. 5. Bibasilar pneumonia.   MACRO: None   Signed by: Mana Price 10/14/2023 1:34 PM Dictation workstation:   [id number]    XR abdomen 1 view    Result Date: 10/14/2023  Interpreted By:  Mana Price, STUDY: XR ABDOMEN 1 VIEW;  10/14/2023 8:15 am. 3 views.   INDICATION: Signs/Symptoms:Ileus.   COMPARISON: 10/12/2023   ACCESSION NUMBER(S): [id number]   ORDERING CLINICIAN: DRU WYNN   FINDINGS: There is a nasogastric tube with the tip in the proximal stomach. There is gastric decompression. There is progressive marked small bowel dilatation with loops of bowel dilated up to 6.6 cm. There is gas and stool in nondilated colon. Findings are consistent with a small-bowel obstruction.   There are no pathologic calcifications.   Visualized lungs are clear.   Osseous structures demonstrate no acute bony changes.         1. Nasogastric tube with the tip in the proximal stomach with gastric decompression. 2. Progressive marked small bowel dilatation consistent with small-bowel obstruction.     MACRO: None   Signed by: Mana Price 10/14/2023 8:55 AM Dictation workstation:   [id number]    XR abdomen 1 view    Result Date: 10/13/2023  Interpreted By:  Juan Graves, STUDY: XR ABDOMEN 1 VIEW;  10/12/2023 8:28 pm   INDICATION: Signs/Symptoms:verify NG  placement.   COMPARISON: 10/12/2023   ACCESSION NUMBER(S): [id number]   ORDERING CLINICIAN: VAN BRYAN   FINDINGS: Nasogastric tube tip terminates in the left upper abdomen at level of the stomach. There is cardiomegaly and persistent band of atelectasis or infiltrate in the left midlung. Gaseous distended bowel in the partially imaged abdomen.       Nasogastric tube tip terminates in the left upper abdomen at level of proximal stomach.   Cardiomegaly and stable band of atelectasis or infiltrate in the left mid lung.   Gaseous distended bowel in imaged upper abdomen; continue progress short-term progress is recommended for further evaluation.   MACRO: None   Signed by: Juan Graves 10/13/2023 1:36 AM Dictation workstation:   [id number]    XR abdomen 1 view    Result Date: 10/12/2023  Interpreted By:  Domingo Marks, STUDY: XR ABDOMEN 1 VIEW;  10/12/2023 6:55 pm   INDICATION: Signs/Symptoms:NG tube placement.   COMPARISON: 10/12/2023 at 4:46 p.m.   ACCESSION NUMBER(S): [id number]   ORDERING CLINICIAN: MEAGHAN CHARLTON   FINDINGS: Interval placement of a NG tube with its tip over the gastric body.   Dilatation of multiple small bowel loops as well as multiple colonic loops throughout the abdomen again seen. Bibasilar airspace disease present       1. NG tube tip projects over the gastric body 2. Redemonstration of bowel loop dilatation suggestive of underlying ileus versus obstruction.   MACRO: None   Signed by: Domingo Marks 10/12/2023 7:12 PM Dictation workstation:   [id number]    XR abdomen 1 view    Result Date: 10/12/2023  Interpreted By:  Mana Price, STUDY: XR ABDOMEN 1 VIEW;  10/12/2023 4:51 pm. 2 views.   INDICATION: Signs/Symptoms:r/o ileus.   COMPARISON: CT abdomen and pelvis 10/10/2023   ACCESSION NUMBER(S): [id number]   ORDERING CLINICIAN: MEAGHAN CHARLTON   FINDINGS: There is moderate small bowel and colonic distention, likely an ileus. There are surgical clips in the right lower quadrant  from recent appendectomy.   There are no pathologic calcifications.   Visualized lungs are clear.   Osseous structures demonstrate no acute bony changes.         Moderate small bowel and colonic distention, likely a postoperative ileus. Surgical clips right lower quadrant from recent appendectomy.   MACRO: None   Signed by: Mana Price 10/12/2023 4:56 PM Dictation workstation:   [id number]    CT angio chest for pulmonary embolism    Result Date: 10/12/2023  Interpreted By:  Ranjeet Gil, STUDY: CT ANGIO CHEST FOR PULMONARY EMBOLISM;  10/12/2023 4:56 am   INDICATION: Signs/Symptoms:Rule out pe.   COMPARISON: Portable chest 11 October 2023; CT abdomen and pelvis with contrast 10 October 2023   ACCESSION NUMBER(S): [id number]   ORDERING CLINICIAN: VAN BRYAN   TECHNIQUE: Pulmonary arterial phase CT chest after the uneventful administration of 75 mL IV contrast (Omnipaque 350).   Three dimensional maximum intensity projection (3-D MIPs) image/s were created on a separate dedicated workstation, reviewed and saved   FINDINGS: CARDIOVASCULAR:   Acute pulmonary embolism: Negative through the  segmental (third order) branch level. Subsegmental and more distal branches not well enough opacified to evaluate. Acute right heart strain:  Negative. No CT evidence of acute right heart strain Cardiac thrombus:  Negative; no obvious right heart or other cardiac thrombus is seen Pulmonary arteries ectasia:  Negative   Heart size:  Within normal limits Pericardial effusion:  Trace   Thoracic aortic aneurysm:  Negative Aortic dissection:  Negative   Heart failure change:  Negative.  No sign of interstitial or alveolar edema. Other:  n/a   NONVASCULAR MEDIASTINUM: Esophagus:  Grossly normal by CT Mediastinal Mass:  Negative Hiatal hernia:  None Other:  n/a   LYMPH NODES: No thoracic adenopathy   LUNGS / AIRSPACES / AIRWAYS:   LARGE AIRWAYS Filling defect: Negative Wall thickening: Negative Bronchiectasis: Negative Other: N/A    AIRSPACES Fibrosis: Negative Emphysema: Negative Consolidation: Negative Ground glass airspace disease: Negative Edema: Negative Nodule / Mass: Negative Other: Dependent atelectasis in both lower lobes, new from two days ago. Additional bands of atelectasis in the left upper lobe also new   PLEURA: Effusion:  Both sides negative Pneumothorax:  Both sides negative Other:  n/a   CHEST WALL: Symmetric mild nonspecific bilateral gynecomastia   SKELETON: No acute or contributory abnormality   UPPER ABDOMEN: Fluid-filled, dilated small bowel loops with air-fluid levels new from CT abdomen and pelvis two days prior       SMALL BOWEL DILATION WITH MULTIPLE SMALL BOWEL AIR-FLUID LEVELS IN THE UPPER ABDOMEN, ALL NEW FROM CT TWO DAYS AGO   NO ACUTE DISEASE IN THE CHEST, ONLY SUBSTANTIAL BILATERAL LOWER LOBE DEPENDENT ATELECTASIS WHICH IS NEW FROM CT TWO DAYS PRIOR   NO AIRSPACE CONSOLIDATION OR GROUND-GLASS AIRSPACE DISEASE   NO EDEMA/FAILURE   NO ACUTE PULMONARY EMBOLISM THROUGH THE SEGMENTAL BRANCH LEVEL   NO AORTIC DISSECTION OR OTHER ACUTE THORACIC AORTIC FINDINGS   NO PLEURAL OR PERICARDIAL EFFUSION   NO PNEUMOTHORAX   MACRO: None   Signed by: Ranjeet Gil 10/12/2023 6:54 AM Dictation workstation:   [id number]    XR chest 1 view    Result Date: 10/11/2023  Interpreted By:  Chapo Cary, STUDY: XR CHEST 1 VIEW   INDICATION: Signs/Symptoms:hypoxemia.   COMPARISON: July 14, 2022   ACCESSION NUMBER(S): [id number]   ORDERING CLINICIAN: MEAGHAN CHARLTON   FINDINGS: No consolidation, effusion, edema, or pneumothorax. Low lung volumes with bibasilar atelectasis.       Low lung volumes with bibasilar atelectasis. No discrete consolidation seen.   Signed by: Chapo Cary 10/11/2023 6:18 PM Dictation workstation:   [id number]    ECG 12 lead    Result Date: 10/11/2023  Sinus tachycardia Inferior infarct (cited on or before 11-OCT-2023) Abnormal ECG When compared with ECG of 10-OCT-2023 13:57, Previous ECG has undetermined rhythm,  needs review T wave inversion now evident in Anterior leads Confirmed by Carlo Jose ([id number]) on 10/11/2023 4:50:57 PM    CT abdomen pelvis w IV contrast    Result Date: 10/10/2023  STUDY: CT Abdomen and Pelvis with IV Contrast; 10/10/2023 10:52 AM. INDICATION: Generalized abdominal pain. COMPARISON: CT AP 5/29/2022. ACCESSION NUMBER(S): [id number] ORDERING CLINICIAN: SHAI ERWIN TECHNIQUE: CT of the abdomen and pelvis was performed.  Contiguous axial images were obtained at 3 mm slice thickness through the abdomen and pelvis. Coronal and sagittal reconstructions at 3 mm slice thickness were performed.  Omnipaque 350 75 mL was administered intravenously.  FINDINGS: LOWER CHEST: No cardiomegaly.  No pericardial effusion.  Lung bases are clear.  ABDOMEN:  LIVER: No hepatomegaly.  Smooth surface contour.  Mild fatty infiltration of the liver.  BILE DUCTS: No intrahepatic or extrahepatic biliary ductal dilatation.  GALLBLADDER: The gallbladder is present without gallstones. STOMACH: No abnormalities identified.  PANCREAS: No masses or ductal dilatation.  SPLEEN: No splenomegaly or focal splenic lesion.  ADRENAL GLANDS: No thickening or nodules.  KIDNEYS AND URETERS: Kidneys are normal in size and location.  No renal or ureteral calculi.  PELVIS:  BLADDER: No abnormalities identified.  REPRODUCTIVE ORGANS: No abnormalities identified.  BOWEL: Appendix is fluid-filled and dilated measuring up to 1.6 cm with multiple intraluminal appendicoliths and mild periappendiceal inflammatory change. No extraluminal gas or abscess. Colonic diverticulosis without findings of diverticulitis  VESSELS: No abnormalities identified.  Abdominal aorta is normal in caliber.  PERITONEUM/RETROPERITONEUM/LYMPH NODES: No free fluid.  No pneumoperitoneum. No lymphadenopathy.  ABDOMINAL WALL: No abnormalities identified. SOFT TISSUES: No abnormalities identified.  BONES: No acute fracture or aggressive osseous lesion.    1. Findings  compatible with acute uncomplicated appendicitis. No extraluminal gas or abscess. Recommend surgical consultation. 2. Mild hepatic steatosis. 3. Colonic diverticulosis without findings of diverticulitis. Findings discussed with and acknowledged by Ayden Ramires at 11:53 AM Signed by Faustino Hebert MD unbelievable         This patient currently has cardiac telemetry ordered; if you would like to modify or discontinue the telemetry order, click here to go to the orders activity to modify/discontinue the order.    Component      Latest Ref Rng 10/13/2023   WBC      4.4 - 11.3 x10*3/uL 4.8    nRBC      0.0 - 0.0 /100 WBCs 0.0    RBC      4.00 - 5.90 x10*6/uL 3.95 (L)    HEMOGLOBIN      12.0 - 17.5 g/dL 11.3 (L)    HEMATOCRIT      36.0 - 52.0 % 33.9 (L)    MCV      80 - 100 fL 86    MCH      26.0 - 34.0 pg 28.6    MCHC      32.0 - 36.0 g/dL 33.3    RED CELL DISTRIBUTION WIDTH      11.5 - 14.5 % 13.0    Platelets      150 - 450 x10*3/uL 351    MEAN PLATELET VOLUME      7.5 - 11.5 fL 10.5    GLUCOSE      74 - 99 mg/dL 151 (H)    SODIUM      136 - 145 mmol/L 138    POTASSIUM      3.5 - 5.3 mmol/L 3.4 (L)    CHLORIDE      98 - 107 mmol/L 91 (L)    Bicarbonate      21 - 32 mmol/L 38 (H)    Anion Gap      10 - 20 mmol/L 12    Blood Urea Nitrogen      6 - 23 mg/dL 26 (H)    Creatinine      0.50 - 1.30 mg/dL 1.16    EGFR      >60 mL/min/1.73m*2 58 (L)    Calcium      8.6 - 10.3 mg/dL 9.5    MAGNESIUM      1.60 - 2.40 mg/dL 2.18    POCT Glucose      74 - 99 mg/dL 143 (H)       Legend:  (L) Low  (H) High    KUB - gas in small bowel and colon concerning for small bowel obstruction         Assessment/Plan   Principal Problem:    Acute appendicitis with localized peritonitis  Active Problems:    Anxiety    Depression    Hyperlipidemia    Diabetes mellitus, type 2 (CMS/HCC)    Appendicitis, acute    Appendicitis    Acute appendicitis, unspecified acute appendicitis type    Status post lap appy, now with concern for bowel  obstruction  -Cont. NGT  -Replace electrolytes  -NPO, IVF, may need more fluid boluses to replace third space volume losses  -ISS, SCDs, lovenox  -OOB  -Cont. IP for now  Abdominal CT pending       Addendum: Abdominal CT reviewed, small bowel obstruction with suggestion of transition point at the level of umbilical hernia.  Discussed with Dr. Calloway, plan for exploratory laparotomy possible bowel resection given overall persistence of presentation.  Discussed with patient at bedside and patient's girlfriend Lisa via phone [phone] regarding indications, risk and benefits including need for additional procedures, nonresolution of symptoms, anesthesia complications.  Understands wishes to proceed.    Jennifer Mcadams MD  10/14/23

## 2023-10-15 ENCOUNTER — APPOINTMENT (OUTPATIENT)
Dept: RADIOLOGY | Facility: HOSPITAL | Age: 48
End: 2023-10-15
Payer: COMMERCIAL

## 2023-10-15 LAB
ALBUMIN SERPL BCP-MCNC: 2.8 G/DL (ref 3.4–5)
ALBUMIN SERPL BCP-MCNC: 3 G/DL (ref 3.4–5)
ALBUMIN SERPL BCP-MCNC: 3.3 G/DL (ref 3.4–5)
ALP SERPL-CCNC: 51 U/L (ref 33–120)
ALP SERPL-CCNC: 53 U/L (ref 33–120)
ALP SERPL-CCNC: 58 U/L (ref 33–120)
ALT SERPL W P-5'-P-CCNC: 11 U/L (ref 7–52)
ALT SERPL W P-5'-P-CCNC: 11 U/L (ref 7–52)
ALT SERPL W P-5'-P-CCNC: 13 U/L (ref 7–52)
ANION GAP BLDA CALCULATED.4IONS-SCNC: 10 MMO/L (ref 10–25)
ANION GAP BLDV CALCULATED.4IONS-SCNC: 11 MMOL/L (ref 10–25)
ANION GAP SERPL CALC-SCNC: 13 MMOL/L (ref 10–20)
ANION GAP SERPL CALC-SCNC: 14 MMOL/L (ref 10–20)
ANION GAP SERPL CALC-SCNC: 18 MMOL/L (ref 10–20)
AST SERPL W P-5'-P-CCNC: 15 U/L (ref 9–39)
AST SERPL W P-5'-P-CCNC: 16 U/L (ref 9–39)
AST SERPL W P-5'-P-CCNC: 17 U/L (ref 9–39)
BASE EXCESS BLDA CALC-SCNC: 6.8 MMOL/L (ref -2–3)
BASE EXCESS BLDV CALC-SCNC: 7.6 MMOL/L (ref -2–3)
BILIRUB SERPL-MCNC: 1.3 MG/DL (ref 0–1.2)
BILIRUB SERPL-MCNC: 1.7 MG/DL (ref 0–1.2)
BILIRUB SERPL-MCNC: 1.9 MG/DL (ref 0–1.2)
BODY TEMPERATURE: 37 DEGREES CELSIUS
BODY TEMPERATURE: ABNORMAL
BUN SERPL-MCNC: 29 MG/DL (ref 6–23)
BUN SERPL-MCNC: 35 MG/DL (ref 6–23)
BUN SERPL-MCNC: 44 MG/DL (ref 6–23)
CA-I BLDA-SCNC: 0.96 MMOL/L (ref 1.1–1.33)
CA-I BLDV-SCNC: 0.89 MMOL/L (ref 1.1–1.33)
CALCIUM SERPL-MCNC: 7.7 MG/DL (ref 8.6–10.3)
CALCIUM SERPL-MCNC: 8.2 MG/DL (ref 8.6–10.3)
CALCIUM SERPL-MCNC: 8.6 MG/DL (ref 8.6–10.3)
CHLORIDE BLDA-SCNC: 99 MMOL/L (ref 98–107)
CHLORIDE BLDV-SCNC: 93 MMOL/L (ref 98–107)
CHLORIDE SERPL-SCNC: 93 MMOL/L (ref 98–107)
CHLORIDE SERPL-SCNC: 94 MMOL/L (ref 98–107)
CHLORIDE SERPL-SCNC: 96 MMOL/L (ref 98–107)
CO2 SERPL-SCNC: 29 MMOL/L (ref 21–32)
CO2 SERPL-SCNC: 31 MMOL/L (ref 21–32)
CO2 SERPL-SCNC: 31 MMOL/L (ref 21–32)
CREAT SERPL-MCNC: 1.21 MG/DL (ref 0.5–1.3)
CREAT SERPL-MCNC: 1.86 MG/DL (ref 0.5–1.3)
CREAT SERPL-MCNC: 2.74 MG/DL (ref 0.5–1.3)
ERYTHROCYTE [DISTWIDTH] IN BLOOD BY AUTOMATED COUNT: 13.7 % (ref 11.5–14.5)
ERYTHROCYTE [DISTWIDTH] IN BLOOD BY AUTOMATED COUNT: 13.9 % (ref 11.5–14.5)
GFR SERPL CREATININE-BSD FRML MDRD: 21 ML/MIN/1.73M*2
GFR SERPL CREATININE-BSD FRML MDRD: 33 ML/MIN/1.73M*2
GFR SERPL CREATININE-BSD FRML MDRD: 55 ML/MIN/1.73M*2
GLUCOSE BLD MANUAL STRIP-MCNC: 130 MG/DL (ref 74–99)
GLUCOSE BLD MANUAL STRIP-MCNC: 148 MG/DL (ref 74–99)
GLUCOSE BLD MANUAL STRIP-MCNC: 148 MG/DL (ref 74–99)
GLUCOSE BLD MANUAL STRIP-MCNC: 152 MG/DL (ref 74–99)
GLUCOSE BLD MANUAL STRIP-MCNC: 156 MG/DL (ref 74–99)
GLUCOSE BLD MANUAL STRIP-MCNC: 165 MG/DL (ref 74–99)
GLUCOSE BLDA-MCNC: 166 MG/DL (ref 74–99)
GLUCOSE BLDV-MCNC: 158 MG/DL (ref 74–99)
GLUCOSE SERPL-MCNC: 141 MG/DL (ref 74–99)
GLUCOSE SERPL-MCNC: 151 MG/DL (ref 74–99)
GLUCOSE SERPL-MCNC: 157 MG/DL (ref 74–99)
HCO3 BLDA-SCNC: 31.3 MMOL/L (ref 22–26)
HCO3 BLDV-SCNC: 32 MMOL/L (ref 22–26)
HCT VFR BLD AUTO: 28.3 % (ref 36–52)
HCT VFR BLD AUTO: 35.5 % (ref 36–52)
HCT VFR BLD EST: 25 % (ref 36–52)
HCT VFR BLD EST: 57 % (ref 36–52)
HGB BLD-MCNC: 11.7 G/DL (ref 12–17.5)
HGB BLD-MCNC: 9.4 G/DL (ref 12–17.5)
HGB BLDA-MCNC: 8.2 G/DL (ref 13.5–17.5)
HGB BLDV-MCNC: 19 G/DL (ref 12–17.5)
INHALED O2 CONCENTRATION: 50 %
INHALED O2 CONCENTRATION: 60 %
LACTATE BLDA-SCNC: 1.4 MMOL/L (ref 0.4–2)
LACTATE BLDV-SCNC: 1.7 MMOL/L (ref 0.4–2)
LACTATE SERPL-SCNC: 0.9 MMOL/L (ref 0.4–2)
MAGNESIUM SERPL-MCNC: 2 MG/DL (ref 1.6–2.4)
MCH RBC QN AUTO: 28.7 PG (ref 26–34)
MCH RBC QN AUTO: 28.8 PG (ref 26–34)
MCHC RBC AUTO-ENTMCNC: 33 G/DL (ref 32–36)
MCHC RBC AUTO-ENTMCNC: 33.2 G/DL (ref 32–36)
MCV RBC AUTO: 87 FL (ref 80–100)
MCV RBC AUTO: 87 FL (ref 80–100)
NRBC BLD-RTO: 0 /100 WBCS (ref 0–0)
NRBC BLD-RTO: 0 /100 WBCS (ref 0–0)
OXYHGB MFR BLDA: 96.6 % (ref 94–98)
OXYHGB MFR BLDV: 91.7 % (ref 45–75)
PCO2 BLDA: 44 MM HG (ref 38–42)
PCO2 BLDV: 42 MM HG (ref 41–51)
PEEP CMH2O: 5 CM H2O
PH BLDA: 7.46 PH (ref 7.38–7.42)
PH BLDV: 7.49 PH (ref 7.33–7.43)
PHOSPHATE SERPL-MCNC: 4.7 MG/DL (ref 2.5–4.9)
PLATELET # BLD AUTO: 476 X10*3/UL (ref 150–450)
PLATELET # BLD AUTO: 552 X10*3/UL (ref 150–450)
PMV BLD AUTO: 10.1 FL (ref 7.5–11.5)
PMV BLD AUTO: 10.5 FL (ref 7.5–11.5)
PO2 BLDA: 109 MM HG (ref 85–95)
PO2 BLDV: 68 MM HG (ref 35–45)
POTASSIUM BLDA-SCNC: 3.4 MMOL/L (ref 3.5–5.3)
POTASSIUM BLDV-SCNC: 4.1 MMOL/L (ref 3.5–5.3)
POTASSIUM SERPL-SCNC: 3.2 MMOL/L (ref 3.5–5.3)
POTASSIUM SERPL-SCNC: 3.3 MMOL/L (ref 3.5–5.3)
POTASSIUM SERPL-SCNC: 4 MMOL/L (ref 3.5–5.3)
PROT SERPL-MCNC: 6.1 G/DL (ref 6.4–8.2)
PROT SERPL-MCNC: 6.3 G/DL (ref 6.4–8.2)
PROT SERPL-MCNC: 7 G/DL (ref 6.4–8.2)
RBC # BLD AUTO: 3.26 X10*6/UL (ref 4–5.9)
RBC # BLD AUTO: 4.08 X10*6/UL (ref 4–5.9)
SAO2 % BLDA: 99 % (ref 94–100)
SAO2 % BLDV: 94 % (ref 45–75)
SODIUM BLDA-SCNC: 137 MMOL/L (ref 136–145)
SODIUM BLDV-SCNC: 132 MMOL/L (ref 136–145)
SODIUM SERPL-SCNC: 134 MMOL/L (ref 136–145)
SODIUM SERPL-SCNC: 137 MMOL/L (ref 136–145)
SODIUM SERPL-SCNC: 138 MMOL/L (ref 136–145)
TIDAL VOLUME: 400 ML
VENTILATOR MODE: ABNORMAL
VENTILATOR RATE: 14 BPM
WBC # BLD AUTO: 10.8 X10*3/UL (ref 4.4–11.3)
WBC # BLD AUTO: 16.1 X10*3/UL (ref 4.4–11.3)

## 2023-10-15 PROCEDURE — 36556 INSERT NON-TUNNEL CV CATH: CPT | Performed by: NURSE PRACTITIONER

## 2023-10-15 PROCEDURE — 0D9670Z DRAINAGE OF STOMACH WITH DRAINAGE DEVICE, VIA NATURAL OR ARTIFICIAL OPENING: ICD-10-PCS | Performed by: SURGERY

## 2023-10-15 PROCEDURE — 2500000004 HC RX 250 GENERAL PHARMACY W/ HCPCS (ALT 636 FOR OP/ED): Mod: JZ | Performed by: HOSPITALIST

## 2023-10-15 PROCEDURE — 96372 THER/PROPH/DIAG INJ SC/IM: CPT | Performed by: SURGERY

## 2023-10-15 PROCEDURE — 83735 ASSAY OF MAGNESIUM: CPT | Performed by: NURSE PRACTITIONER

## 2023-10-15 PROCEDURE — 36620 INSERTION CATHETER ARTERY: CPT | Performed by: NURSE PRACTITIONER

## 2023-10-15 PROCEDURE — 37799 UNLISTED PX VASCULAR SURGERY: CPT | Performed by: HOSPITALIST

## 2023-10-15 PROCEDURE — 2500000005 HC RX 250 GENERAL PHARMACY W/O HCPCS

## 2023-10-15 PROCEDURE — 2580000001 HC RX 258 IV SOLUTIONS: Performed by: HOSPITALIST

## 2023-10-15 PROCEDURE — 96372 THER/PROPH/DIAG INJ SC/IM: CPT | Performed by: NURSE PRACTITIONER

## 2023-10-15 PROCEDURE — 84145 PROCALCITONIN (PCT): CPT | Mod: CMCLAB,ELYLAB | Performed by: HOSPITALIST

## 2023-10-15 PROCEDURE — 2500000004 HC RX 250 GENERAL PHARMACY W/ HCPCS (ALT 636 FOR OP/ED)

## 2023-10-15 PROCEDURE — 83605 ASSAY OF LACTIC ACID: CPT | Performed by: HOSPITALIST

## 2023-10-15 PROCEDURE — 71045 X-RAY EXAM CHEST 1 VIEW: CPT

## 2023-10-15 PROCEDURE — 71045 X-RAY EXAM CHEST 1 VIEW: CPT | Performed by: RADIOLOGY

## 2023-10-15 PROCEDURE — 84100 ASSAY OF PHOSPHORUS: CPT | Performed by: NURSE PRACTITIONER

## 2023-10-15 PROCEDURE — 87081 CULTURE SCREEN ONLY: CPT | Mod: CMCLAB,ELYLAB | Performed by: HOSPITALIST

## 2023-10-15 PROCEDURE — P9045 ALBUMIN (HUMAN), 5%, 250 ML: HCPCS | Mod: JZ | Performed by: HOSPITALIST

## 2023-10-15 PROCEDURE — 2500000004 HC RX 250 GENERAL PHARMACY W/ HCPCS (ALT 636 FOR OP/ED): Performed by: SURGERY

## 2023-10-15 PROCEDURE — 99233 SBSQ HOSP IP/OBS HIGH 50: CPT | Performed by: NURSE PRACTITIONER

## 2023-10-15 PROCEDURE — 2500000002 HC RX 250 W HCPCS SELF ADMINISTERED DRUGS (ALT 637 FOR MEDICARE OP, ALT 636 FOR OP/ED): Performed by: SURGERY

## 2023-10-15 PROCEDURE — 74018 RADEX ABDOMEN 1 VIEW: CPT

## 2023-10-15 PROCEDURE — 2500000004 HC RX 250 GENERAL PHARMACY W/ HCPCS (ALT 636 FOR OP/ED): Performed by: NURSE PRACTITIONER

## 2023-10-15 PROCEDURE — 36415 COLL VENOUS BLD VENIPUNCTURE: CPT | Performed by: HOSPITALIST

## 2023-10-15 PROCEDURE — 82947 ASSAY GLUCOSE BLOOD QUANT: CPT

## 2023-10-15 PROCEDURE — 85027 COMPLETE CBC AUTOMATED: CPT | Performed by: HOSPITALIST

## 2023-10-15 PROCEDURE — 2020000001 HC ICU ROOM DAILY

## 2023-10-15 PROCEDURE — C9113 INJ PANTOPRAZOLE SODIUM, VIA: HCPCS | Performed by: SURGERY

## 2023-10-15 PROCEDURE — 74018 RADEX ABDOMEN 1 VIEW: CPT | Performed by: RADIOLOGY

## 2023-10-15 PROCEDURE — 85027 COMPLETE CBC AUTOMATED: CPT | Performed by: NURSE PRACTITIONER

## 2023-10-15 PROCEDURE — 94003 VENT MGMT INPAT SUBQ DAY: CPT

## 2023-10-15 PROCEDURE — 84132 ASSAY OF SERUM POTASSIUM: CPT | Performed by: HOSPITALIST

## 2023-10-15 PROCEDURE — 84132 ASSAY OF SERUM POTASSIUM: CPT | Performed by: NURSE PRACTITIONER

## 2023-10-15 PROCEDURE — 02H633Z INSERTION OF INFUSION DEVICE INTO RIGHT ATRIUM, PERCUTANEOUS APPROACH: ICD-10-PCS | Performed by: NURSE PRACTITIONER

## 2023-10-15 PROCEDURE — 2580000001 HC RX 258 IV SOLUTIONS: Performed by: NURSE PRACTITIONER

## 2023-10-15 PROCEDURE — 03HY32Z INSERTION OF MONITORING DEVICE INTO UPPER ARTERY, PERCUTANEOUS APPROACH: ICD-10-PCS | Performed by: NURSE PRACTITIONER

## 2023-10-15 PROCEDURE — 2500000004 HC RX 250 GENERAL PHARMACY W/ HCPCS (ALT 636 FOR OP/ED): Performed by: HOSPITALIST

## 2023-10-15 RX ORDER — FENTANYL CITRATE 50 UG/ML
100 INJECTION, SOLUTION INTRAMUSCULAR; INTRAVENOUS ONCE
Status: COMPLETED | OUTPATIENT
Start: 2023-10-15 | End: 2023-10-15

## 2023-10-15 RX ORDER — HEPARIN SODIUM 5000 [USP'U]/ML
INJECTION, SOLUTION INTRAVENOUS; SUBCUTANEOUS
Status: DISPENSED
Start: 2023-10-15 | End: 2023-10-15

## 2023-10-15 RX ORDER — NOREPINEPHRINE BITARTRATE/D5W 8 MG/250ML
PLASTIC BAG, INJECTION (ML) INTRAVENOUS
Status: COMPLETED
Start: 2023-10-15 | End: 2023-10-15

## 2023-10-15 RX ORDER — HEPARIN SODIUM 5000 [USP'U]/ML
5000 INJECTION, SOLUTION INTRAVENOUS; SUBCUTANEOUS EVERY 8 HOURS SCHEDULED
Status: DISCONTINUED | OUTPATIENT
Start: 2023-10-15 | End: 2023-10-18 | Stop reason: ALTCHOICE

## 2023-10-15 RX ORDER — FENTANYL CITRATE 50 UG/ML
INJECTION, SOLUTION INTRAMUSCULAR; INTRAVENOUS
Status: COMPLETED
Start: 2023-10-15 | End: 2023-10-15

## 2023-10-15 RX ORDER — SODIUM CHLORIDE, SODIUM LACTATE, POTASSIUM CHLORIDE, CALCIUM CHLORIDE 600; 310; 30; 20 MG/100ML; MG/100ML; MG/100ML; MG/100ML
200 INJECTION, SOLUTION INTRAVENOUS CONTINUOUS
Status: DISCONTINUED | OUTPATIENT
Start: 2023-10-15 | End: 2023-10-16

## 2023-10-15 RX ORDER — DEXMEDETOMIDINE HYDROCHLORIDE 4 UG/ML
.1-1.5 INJECTION, SOLUTION INTRAVENOUS CONTINUOUS
Status: DISCONTINUED | OUTPATIENT
Start: 2023-10-15 | End: 2023-10-23

## 2023-10-15 RX ORDER — NOREPINEPHRINE BITARTRATE/D5W 8 MG/250ML
0-3.3 PLASTIC BAG, INJECTION (ML) INTRAVENOUS CONTINUOUS
Status: DISCONTINUED | OUTPATIENT
Start: 2023-10-15 | End: 2023-10-18

## 2023-10-15 RX ORDER — ALBUMIN HUMAN 50 G/1000ML
25 SOLUTION INTRAVENOUS ONCE
Status: COMPLETED | OUTPATIENT
Start: 2023-10-15 | End: 2023-10-15

## 2023-10-15 RX ADMIN — NOREPINEPHRINE BITARTRATE 0.04 MCG/KG/MIN: 8 INJECTION, SOLUTION INTRAVENOUS at 21:10

## 2023-10-15 RX ADMIN — PROPOFOL 70 MCG/KG/MIN: 10 INJECTION, EMULSION INTRAVENOUS at 02:19

## 2023-10-15 RX ADMIN — FENTANYL CITRATE 100 MCG: 50 INJECTION, SOLUTION INTRAMUSCULAR; INTRAVENOUS at 22:40

## 2023-10-15 RX ADMIN — CALCIUM GLUCONATE 3 G: 98 INJECTION, SOLUTION INTRAVENOUS at 18:49

## 2023-10-15 RX ADMIN — PIPERACILLIN SODIUM AND TAZOBACTAM SODIUM 3.38 G: 3; .375 INJECTION, SOLUTION INTRAVENOUS at 16:12

## 2023-10-15 RX ADMIN — DEXMEDETOMIDINE HYDROCHLORIDE 0.2 MCG/KG/HR: 4 INJECTION, SOLUTION INTRAVENOUS at 10:22

## 2023-10-15 RX ADMIN — DEXMEDETOMIDINE HYDROCHLORIDE 0.25 MCG/KG/HR: 4 INJECTION, SOLUTION INTRAVENOUS at 13:42

## 2023-10-15 RX ADMIN — PROPOFOL 50 MCG/KG/MIN: 10 INJECTION, EMULSION INTRAVENOUS at 23:39

## 2023-10-15 RX ADMIN — ALBUMIN HUMAN 25 G: 0.05 INJECTION, SOLUTION INTRAVENOUS at 21:09

## 2023-10-15 RX ADMIN — HYDROMORPHONE HYDROCHLORIDE 0.4 MG: 1 INJECTION, SOLUTION INTRAMUSCULAR; INTRAVENOUS; SUBCUTANEOUS at 07:35

## 2023-10-15 RX ADMIN — INSULIN LISPRO 2 UNITS: 100 INJECTION, SOLUTION INTRAVENOUS; SUBCUTANEOUS at 12:28

## 2023-10-15 RX ADMIN — HYDROMORPHONE HYDROCHLORIDE 0.4 MG: 1 INJECTION, SOLUTION INTRAMUSCULAR; INTRAVENOUS; SUBCUTANEOUS at 10:14

## 2023-10-15 RX ADMIN — PANTOPRAZOLE SODIUM 40 MG: 40 INJECTION, POWDER, FOR SOLUTION INTRAVENOUS at 09:48

## 2023-10-15 RX ADMIN — PROPOFOL 50 MCG/KG/MIN: 10 INJECTION, EMULSION INTRAVENOUS at 13:12

## 2023-10-15 RX ADMIN — SODIUM CHLORIDE, POTASSIUM CHLORIDE, SODIUM LACTATE AND CALCIUM CHLORIDE 100 ML/HR: 600; 310; 30; 20 INJECTION, SOLUTION INTRAVENOUS at 09:45

## 2023-10-15 RX ADMIN — SODIUM CHLORIDE, POTASSIUM CHLORIDE, SODIUM LACTATE AND CALCIUM CHLORIDE 1000 ML: 600; 310; 30; 20 INJECTION, SOLUTION INTRAVENOUS at 21:09

## 2023-10-15 RX ADMIN — HYDROMORPHONE HYDROCHLORIDE 0.4 MG: 1 INJECTION, SOLUTION INTRAMUSCULAR; INTRAVENOUS; SUBCUTANEOUS at 13:45

## 2023-10-15 RX ADMIN — INSULIN LISPRO 2 UNITS: 100 INJECTION, SOLUTION INTRAVENOUS; SUBCUTANEOUS at 03:38

## 2023-10-15 RX ADMIN — DEXMEDETOMIDINE HYDROCHLORIDE 0.39 MCG/KG/HR: 4 INJECTION, SOLUTION INTRAVENOUS at 20:17

## 2023-10-15 RX ADMIN — HYDROMORPHONE HYDROCHLORIDE 0.4 MG: 1 INJECTION, SOLUTION INTRAMUSCULAR; INTRAVENOUS; SUBCUTANEOUS at 18:07

## 2023-10-15 RX ADMIN — SODIUM CHLORIDE 10 ML: 9 INJECTION, SOLUTION INTRAVENOUS at 22:41

## 2023-10-15 RX ADMIN — POTASSIUM CHLORIDE 20 MEQ: 14.9 INJECTION, SOLUTION INTRAVENOUS at 01:53

## 2023-10-15 RX ADMIN — POTASSIUM CHLORIDE 20 MEQ: 14.9 INJECTION, SOLUTION INTRAVENOUS at 00:00

## 2023-10-15 RX ADMIN — METHYLPREDNISOLONE SODIUM SUCCINATE 125 MG: 125 INJECTION, POWDER, FOR SOLUTION INTRAMUSCULAR; INTRAVENOUS at 22:58

## 2023-10-15 RX ADMIN — PROPOFOL 50 MCG/KG/MIN: 10 INJECTION, EMULSION INTRAVENOUS at 10:04

## 2023-10-15 RX ADMIN — HEPARIN SODIUM 5000 UNITS: 5000 INJECTION INTRAVENOUS; SUBCUTANEOUS at 18:00

## 2023-10-15 RX ADMIN — SODIUM CHLORIDE 10 ML: 9 INJECTION, SOLUTION INTRAVENOUS at 14:00

## 2023-10-15 RX ADMIN — PROPOFOL 70 MCG/KG/MIN: 10 INJECTION, EMULSION INTRAVENOUS at 04:25

## 2023-10-15 RX ADMIN — INSULIN LISPRO 2 UNITS: 100 INJECTION, SOLUTION INTRAVENOUS; SUBCUTANEOUS at 00:18

## 2023-10-15 RX ADMIN — SODIUM CHLORIDE 10 ML: 9 INJECTION, SOLUTION INTRAVENOUS at 16:12

## 2023-10-15 RX ADMIN — Medication 0.04 MCG/KG/MIN: at 21:10

## 2023-10-15 RX ADMIN — PIPERACILLIN SODIUM AND TAZOBACTAM SODIUM 3.38 G: 3; .375 INJECTION, SOLUTION INTRAVENOUS at 12:19

## 2023-10-15 RX ADMIN — PROPOFOL 50 MCG/KG/MIN: 10 INJECTION, EMULSION INTRAVENOUS at 20:16

## 2023-10-15 RX ADMIN — PROPOFOL 70 MCG/KG/MIN: 10 INJECTION, EMULSION INTRAVENOUS at 06:58

## 2023-10-15 RX ADMIN — SODIUM CHLORIDE, POTASSIUM CHLORIDE, SODIUM LACTATE AND CALCIUM CHLORIDE 500 ML: 600; 310; 30; 20 INJECTION, SOLUTION INTRAVENOUS at 17:28

## 2023-10-15 RX ADMIN — HEPARIN SODIUM 5000 UNITS: 5000 INJECTION INTRAVENOUS; SUBCUTANEOUS at 10:08

## 2023-10-15 ASSESSMENT — PAIN - FUNCTIONAL ASSESSMENT
PAIN_FUNCTIONAL_ASSESSMENT: CPOT (CRITICAL CARE PAIN OBSERVATION TOOL)

## 2023-10-15 ASSESSMENT — PAIN SCALES - WONG BAKER: WONGBAKER_NUMERICALRESPONSE: NO HURT

## 2023-10-15 ASSESSMENT — PAIN SCALES - GENERAL: PAINLEVEL_OUTOF10: 0 - NO PAIN

## 2023-10-15 NOTE — PROCEDURES
Arterial Puncture/Cannulation    Date/Time: 10/15/2023 7:12 PM    Performed by: Norman M Kaniecki, APRN-CNP, DNP  Authorized by: PEPITO Figueroa DNP  Consent: Verbal consent obtained.  Risks and benefits: risks, benefits and alternatives were discussed  Consent given by: spouse  Procedure consent: procedure consent matches procedure scheduled  Relevant documents: relevant documents present and verified  Test results: test results available and properly labeled  Site marked: the operative site was marked  Patient identity confirmed: arm band  Preparation: Patient was prepped and draped in the usual sterile fashion.  Local anesthesia used: yes  Anesthesia: local infiltration    Anesthesia:  Local anesthesia used: yes  Local Anesthetic: lidocaine 2% without epinephrine    Sedation:  Patient sedated: yes  Sedation start date/time: 10/15/2023 6:10 AM  Sedation end date/time: 10/15/2023 6:19 PM  Vitals: Vital signs were monitored during sedation.    Patient tolerance: patient tolerated the procedure well with no immediate complications

## 2023-10-15 NOTE — POST-PROCEDURE NOTE
I was the surgical first assist to Dr. Lam for Victorino Lara's procedure on 10/14/23.  Procedure: EXPLORATORY LAPAROTOMY     Bhargavi Hatch PA-C

## 2023-10-15 NOTE — NURSING NOTE
Patient brought up to ICU room 09. Patient placed on monitor. OR team; nurse, crna, and surgeon present, vital signs stable, and IV access present. OR team reviewed plan of care with NP and ICU nurse. AC 16  Peep5.0 Viv232%. Propofol 70mcg/kg/min. Abdominal binder secured and midline dressing drainage outlined. OG connected to IMS.

## 2023-10-15 NOTE — PROGRESS NOTES
"Victorino Lara is a 48 y.o. adult on day 3 of admission presenting with Acute appendicitis with localized peritonitis.  Takeback for small bowel obstruction  Subjective   Intubated and sedated.  No acute events continues with high NG tube output 1.5 to 2 L per shift, thin bilious   Tachycardia improved, 100 HR systolics 100  Urine output approximately 30 cc/h  Labs reviewed, electrolyte imbalances improved      Objective     Physical Exam  Sedated  Mechanical ventilation  Abdomen soft, less distended.  Incision clean dry and intact  Extremities warm and well-perfused without edema  Last Recorded Vitals  Blood pressure 96/63, pulse 102, temperature 38.4 °C (101.1 °F), temperature source Temporal, resp. rate 21, height 1.702 m (5' 7\"), weight 93.9 kg (207 lb 0.2 oz), SpO2 95 %.  Intake/Output last 3 Shifts:  I/O last 3 completed shifts:  In: 5157.3 (54.9 mL/kg) [I.V.:1715.1 (18.3 mL/kg); IV Piggyback:3442.2]  Out: 8125 (86.5 mL/kg) [Urine:1925 (0.6 mL/kg/hr); Emesis/NG output:6200]  Weight: 93.9 kg     Relevant Results           This patient currently has cardiac telemetry ordered; if you would like to modify or discontinue the telemetry order, click here to go to the orders activity to modify/discontinue the order.    This patient has a urinary catheter    This patient is intubated    Continue supportive care.  Continue NG tube decompression, monitoring output.  Continue correcting electrolytes as needed.  Continue aggressive volume replacement secondary to third space losses.          Assessment/Plan   Principal Problem:    Acute appendicitis with localized peritonitis  Active Problems:    Anxiety    Depression    Hyperlipidemia    Diabetes mellitus, type 2 (CMS/HCC)    Appendicitis, acute    Appendicitis    Acute appendicitis, unspecified acute appendicitis type    Small bowel obstruction (CMS/HCC)           I spent  minutes in the professional and overall care of this patient.      Jennifer Mcadams MD      "

## 2023-10-15 NOTE — CARE PLAN
The patient's goals for the shift include Pain management    The clinical goals for the shift include Patient will remain free from additional injury for the remainder of the shift.    The patient remained free from additional injury for the remainder of the shift.

## 2023-10-15 NOTE — H&P
"[facility]       Date:  10/14/2023  Patient:  Victorino Lara  YOB: 1975  MRN:  [id number]   Admit Date:  10/10/2023    Chief Complaint   Patient presents with    Abdominal Pain     \"Here for stomach pain that has been going on for 3-4 hours. \"          History of Present Illness:  Victorino Lara is a 48 y.o. year old adult patient with PMH listed below including prior abdominal hernia repair, initially presented on October 10th with right lower quadrant abdominal pain.  He was found to have acute appendicitis with localized peritonitis without rupture.  He was taken to the OR for laparoscopic appendectomy.  Tissue specimen sent to lab.  Postoperatively patient was tachycardic which was treated with IV fluids and patient also had some hypoxemia.  On the 12th pulmonology was consulted for hypoxia/dyspnea/atelectasis, CT chest showed significant bibasilar atelectasis.  Patient was only requiring nasal cannula.  Yesterday on POD #3 patient developed ileus per KUB.  Supportive measures were continued including NGT, IV fluids and replacing electrolytes.  Symptoms did not improve.  CT abdomen and pelvis was done which was concerning for SBO with suggestion of transition point at the level of the umbilical hernia.  Dr. Lam was contacted by Dr. Mcadams for exploratory laparotomy with possible bowel resection.    Today patient underwent exploratory laparotomy, lysis of adhesions and repair of incisional hernia.  According to surgeon he believes it was a bad ileus however if there was an obstruction, it would have been from the hernia and not adhesions, which should not be resolved given repair of hernia recurrence.    Postoperative patient was tachycardic which was treated with IV fluids and 10 mg IV labetalol.  Patient was given 1 L of normal saline in the OR as well as 250 mcg IV push fentanyl, propofol, and rocuronium.  As patient was being extubated he had copious amounts of bilious emesis " and was retching so patient was reanesthetized and read paralyzed with rocuronium, reprepped and opened up again for reexploration.  Reexploration revealed middle third of the fascia of the stitches had pulled through, no visible bowel just omentum that was placed for closure of the fascia.  Bowel was distended but completely viable.  Tachycardia improved from the 120's-130's range to low 100's.  Patient remained hemodynamically stable without requiring vasopressor support or administration of blood products.    Interval ICU Events:  Transferred to ICU postop  on mechanical ventilator for acute postop respiratory insufficiency status post exploratory laparotomy, lysis of adhesions, repair of incisional hernia, reexploratory laparotomy with repair of dehiscence    Medical History:  Past Medical History:   Diagnosis Date    Acute appendicitis with localized peritonitis 10/10/2023    Anxiety and depression     Bipolar 1 disorder, depressed (CMS/HCC)     Diabetes mellitus (CMS/HCC)     type II, diagnosed    GERD (gastroesophageal reflux disease)     HLD (hyperlipidemia)     JOI (iron deficiency anemia)     Obesity (BMI 30-39.9)     Onychomycosis     Panic disorder     Retention of urine, unspecified     Inability to urinate    Scrotal hematoma     Umbilical hernia      Past Surgical History:   Procedure Laterality Date    APPENDECTOMY  10/10/2023    COLONOSCOPY      CYST REMOVAL  03/27/2014    Hand, excision of cyst    ELBOW SURGERY Right     ESOPHAGOGASTRODUODENOSCOPY      HERNIA REPAIR      SCROTAL SURGERY  02/2016    Hematoma drainage     Medications Prior to Admission   Medication Sig Dispense Refill Last Dose    busPIRone (Buspar) 30 mg tablet Take by mouth 2 times a day.       insulin glargine (Lantus U-100 Insulin) 100 unit/mL injection Inject 30 Units under the skin once daily at bedtime. Take as directed per insulin instructions.       insulin lispro (HumaLOG) 100 unit/mL injection Inject 0.08 mL (8 Units)  under the skin 3 times a day with meals. Take as directed per insulin instructions.       lurasidone (Latuda) 80 mg tablet Take 1 tablet (80 mg) by mouth once daily.       omeprazole (PriLOSEC) 20 mg DR capsule Take 1 capsule (20 mg) by mouth. Do not crush or chew.       QUEtiapine (SEROquel) 300 mg tablet Take 1 tablet (300 mg) by mouth 2 times a day.       simvastatin (Zocor) 40 mg tablet Take 1 tablet (40 mg) by mouth once daily at bedtime.       tiZANidine (Zanaflex) 4 mg capsule Take 1 capsule (4 mg) by mouth 2 times a day as needed for muscle spasms.       traZODone (Desyrel) 300 mg tablet Take 1 tablet (300 mg) by mouth once daily at bedtime.       venlafaxine XR (Effexor-XR) 150 mg 24 hr capsule Take 1 capsule (150 mg) by mouth once daily. Do not crush or chew.        Patient has no known allergies.  Social History     Tobacco Use    Smoking status: Some Days     Types: Cigarettes    Smokeless tobacco: Never   Vaping Use    Vaping Use: Never used   Substance Use Topics    Alcohol use: Not Currently     Comment: rare social, 2-3 beers    Drug use: Never     Family History   Problem Relation Name Age of Onset    Depression Mother         Hospital Medications:    propofol, 5-50 mcg/kg/min  sodium chloride 0.9%, 125 mL/hr          Current Facility-Administered Medications:     acetaminophen (Tylenol) tablet 650 mg, 650 mg, oral, q4h PRN, Norman Calloway MD, 650 mg at 10/14/23 1548    busPIRone (Buspar) tablet 15 mg, 15 mg, oral, BID, Norman Calloway MD, 15 mg at 10/14/23 0842    calcium carbonate (Tums) chewable tablet 1,000 mg, 1,000 mg, oral, q4h PRN, Norman Calloway MD, 1,000 mg at 10/11/23 2332    dextrose 10 % in water (D10W) infusion, 0.3 g/kg/hr, intravenous, Once PRN, Norman Calloway MD    dextrose 50 % injection 25 g, 25 g, intravenous, q15 min PRN, Norman Calloway MD    docusate sodium (Colace) capsule 100 mg, 100 mg, oral, BID, Norman Calloway MD, 100 mg at 10/14/23 0842    enoxaparin (Lovenox)  syringe 40 mg, 40 mg, subcutaneous, Daily, Norman Calloway MD, 40 mg at 10/14/23 0842    glucagon (Glucagen) injection 1 mg, 1 mg, intramuscular, q15 min PRN, Norman Calloway MD    HYDROmorphone (Dilaudid) injection 0.4 mg, 0.4 mg, intravenous, q2h PRN, Norman Calloway MD, 0.4 mg at 10/14/23 1055    insulin lispro (HumaLOG) injection 0-10 Units, 0-10 Units, subcutaneous, q4h, Norman Calloway MD    [Held by provider] insulin regular (HumuLIN R) injection 0-5 Units, 0-5 Units, subcutaneous, TID with meals, Norman Calloway MD, 1 Units at 10/14/23 1224    lidocaine (Xylocaine) 10 mg/mL (1 %) injection 1 mg, 0.1 mL, subcutaneous, Once, Finn Bosch DO    lurasidone (Latuda) tablet 80 mg, 80 mg, oral, Daily, Norman Calloway MD, 80 mg at 10/12/23 0900    naloxone (Narcan) injection 0.2 mg, 0.2 mg, intravenous, q5 min PRN, Norman Calloway MD    oxyCODONE (Roxicodone) immediate release tablet 5 mg, 5 mg, oral, q6h PRN, Norman Calloway MD    oxygen (O2) therapy, , inhalation, Continuous PRN - O2/gases, Norman Calloway MD    [Held by provider] pantoprazole (ProtoNix) EC tablet 40 mg, 40 mg, oral, Daily before breakfast, Norman Calloway MD, 40 mg at 10/12/23 0640    pantoprazole (ProtoNix) injection 40 mg, 40 mg, intravenous, Daily, Norman Calloway MD    propofol (Diprivan) infusion, 5-50 mcg/kg/min, intravenous, Continuous, Norman Calloway MD    QUEtiapine (SEROquel) tablet 300 mg, 300 mg, oral, BID, Norman Calloway MD, 300 mg at 10/14/23 0842    simvastatin (Zocor) tablet 40 mg, 40 mg, oral, Nightly, Norman Calloway MD, 40 mg at 10/13/23 2028    [DISCONTINUED] piperacillin-tazobactam-dextrose (Zosyn) IV 3.375 g, 3.375 g, intravenous, q8h **AND** sodium chloride 0.9 % bolus 10 mL, 10 mL, intravenous, q8h, Norman Calloway MD    sodium chloride 0.9% infusion, 125 mL/hr, intravenous, Continuous, Amina Ritter, APRN-CNP    tiZANidine (Zanaflex) tablet 4 mg, 4 mg, oral, q8h PRN, Norman Calloway MD     traZODone (Desyrel) tablet 300 mg, 300 mg, oral, Nightly, Norman Calloway MD, 300 mg at 10/13/23 2028    venlafaxine XR (Effexor-XR) 24 hr capsule 150 mg, 150 mg, oral, Daily, Norman Calloway MD, 150 mg at 10/14/23 0842    Review of Systems   Unable to perform ROS: Intubated        Physical Exam:    Heart Rate:  [111-113]   Temp:  [36.3 °C (97.3 °F)-36.6 °C (97.9 °F)]   Resp:  [16-20]   BP: (127-140)/(85-89)   SpO2:  [91 %-93 %]     PEEP/CPAP (cm H2O):  [5 cm H20] 5 cm H20    Physical Exam  Constitutional:       Comments: Intubated and sedated   HENT:      Head: Normocephalic.      Nose:      Comments: + NGT to LIWS     Mouth/Throat:      Mouth: Mucous membranes are dry.   Cardiovascular:      Rate and Rhythm: Regular rhythm. Tachycardia present.   Pulmonary:      Comments: Diminished, intubated on mechanical vent, no vent dysynchrony  Abdominal:      General: There is distension.      Palpations: Abdomen is soft.      Comments: No bowel sounds, post-op ex-lap with midline dressing CDI, abdominal binder   Genitourinary:     Comments: + hylton catheter with yesenia urine  Skin:     General: Skin is warm and dry.   Neurological:      Comments: Sedated on Propofol         Objective:      Intake/Output Summary (Last 24 hours) at 10/14/2023 2033  Last data filed at 10/14/2023 1635  Gross per 24 hour   Intake 3858.33 ml   Output 5250 ml   Net -1391.67 ml       Results for orders placed or performed during the hospital encounter of 10/10/23 (from the past 24 hour(s))   POCT GLUCOSE   Result Value Ref Range    POCT Glucose 142 (H) 74 - 99 mg/dL   SST TOP   Result Value Ref Range    Extra Tube Hold for add-ons.    Basic Metabolic Panel   Result Value Ref Range    Glucose 136 (H) 74 - 99 mg/dL    Sodium 140 136 - 145 mmol/L    Potassium 3.0 (L) 3.5 - 5.3 mmol/L    Chloride 89 (L) 98 - 107 mmol/L    Bicarbonate 43 (HH) 21 - 32 mmol/L    Anion Gap 11 10 - 20 mmol/L    Urea Nitrogen 35 (H) 6 - 23 mg/dL    Creatinine 1.40 (H) 0.50  - 1.30 mg/dL    eGFR 46 (L) >60 mL/min/1.73m*2    Calcium 9.1 8.6 - 10.3 mg/dL   Magnesium   Result Value Ref Range    Magnesium 2.19 1.60 - 2.40 mg/dL   CBC and Auto Differential   Result Value Ref Range    WBC 6.3 4.4 - 11.3 x10*3/uL    nRBC 0.0 0.0 - 0.0 /100 WBCs    RBC 3.69 (L) 4.00 - 5.90 x10*6/uL    Hemoglobin 10.6 (L) 12.0 - 17.5 g/dL    Hematocrit 32.0 (L) 36.0 - 52.0 %    MCV 87 80 - 100 fL    MCH 28.7 26.0 - 34.0 pg    MCHC 33.1 32.0 - 36.0 g/dL    RDW 13.2 11.5 - 14.5 %    Platelets 356 150 - 450 x10*3/uL    MPV 10.1 7.5 - 11.5 fL    Immature Granulocytes %, Automated 2.1 (H) 0.0 - 0.9 %    Immature Granulocytes Absolute, Automated 0.13 0.00 - 0.70 x10*3/uL   Lactate   Result Value Ref Range    Lactate 1.0 0.4 - 2.0 mmol/L   Manual Differential   Result Value Ref Range    Neutrophils %, Manual 46.0 40.0 - 80.0 %    Bands %, Manual 14.0 0.0 - 5.0 %    Lymphocytes %, Manual 24.0 13.0 - 44.0 %    Monocytes %, Manual 10.0 2.0 - 10.0 %    Eosinophils %, Manual 5.0 0.0 - 6.0 %    Basophils %, Manual 1.0 0.0 - 2.0 %    Seg Neutrophils Absolute, Manual 2.90 1.20 - 7.00 x10*3/uL    Bands Absolute, Manual 0.88 (H) 0.00 - 0.70 x10*3/uL    Lymphocytes Absolute, Manual 1.51 1.20 - 4.80 x10*3/uL    Monocytes Absolute, Manual 0.63 0.10 - 1.00 x10*3/uL    Eosinophils Absolute, Manual 0.32 0.00 - 0.70 x10*3/uL    Basophils Absolute, Manual 0.06 0.00 - 0.10 x10*3/uL    Total Cells Counted 100     Neutrophils Absolute, Manual 3.78 1.20 - 7.70 x10*3/uL    RBC Morphology No significant RBC morphology present    POCT GLUCOSE   Result Value Ref Range    POCT Glucose 118 (H) 74 - 99 mg/dL   POCT GLUCOSE   Result Value Ref Range    POCT Glucose 172 (H) 74 - 99 mg/dL   Blood Gas Arterial Full Panel   Result Value Ref Range    POCT pH, Arterial 7.50 (H) 7.38 - 7.42 pH    POCT pCO2, Arterial 47 (H) 38 - 42 mm Hg    POCT pO2, Arterial 86 85 - 95 mm Hg    POCT SO2, Arterial 98 94 - 100 %    POCT Oxy Hemoglobin, Arterial 95.7 94.0 -  98.0 %    POCT Hematocrit Calculated, Arterial 37.0 36.0 - 52.0 %    POCT Sodium, Arterial 137 136 - 145 mmol/L    POCT Potassium, Arterial 3.3 (L) 3.5 - 5.3 mmol/L    POCT Chloride, Arterial 95 (L) 98 - 107 mmol/L    POCT Ionized Calcium, Arterial 1.09 (L) 1.10 - 1.33 mmol/L    POCT Glucose, Arterial 164 (H) 74 - 99 mg/dL    POCT Lactate, Arterial 1.0 0.4 - 2.0 mmol/L    POCT Base Excess, Arterial 11.9 (H) -2.0 - 3.0 mmol/L    POCT HCO3 Calculated, Arterial 36.7 (H) 22.0 - 26.0 mmol/L    POCT Hemoglobin, Arterial 12.3 (L) 13.5 - 17.5 g/dL    POCT Anion Gap, Arterial 9 (L) 10 - 25 mmo/L    Patient Temperature      FiO2 50 %    Ventilator Mode A/C     Ventilator Rate 16 bpm    Tidal Volume 400 mL    Peep CHM2O 5.0 cm H2O       Recent Imaging  CT abdomen pelvis wo IV contrast   Final Result   1. Interval appendectomy. Haziness right lower quadrant with a small   crescent of fluid non walled-off.   2. Markedly dilated loops of small bowel with nondilated collapsed   distal small bowel and collapsed colon. This is likely a   postoperative ileus but still consider small-bowel obstruction. Proctor   catheter with the tip in the mid stomach.   3. Periumbilical hernia with a segment of nondilated non thickened   small bowel. There is induration and a small amount of fluid in the   fat around the umbilicus from recent surgery. There is a small   hematoma in the periumbilical fat. There is a dot of air which could   be iatrogenic from recent surgery rather than abscess and infection.   4. Proctor catheter insufflated in a nondistended urinary bladder.   5. Bibasilar pneumonia.        MACRO:   None        Signed by: Mana Price 10/14/2023 1:34 PM   Dictation workstation:   [id number]      XR abdomen 1 view   Final Result   1. Nasogastric tube with the tip in the proximal stomach with gastric   decompression.   2. Progressive marked small bowel dilatation consistent with   small-bowel obstruction.             MACRO:   None         Signed by: Mana Price 10/14/2023 8:55 AM   Dictation workstation:   [id number]      XR abdomen 1 view   Final Result   Nasogastric tube tip terminates in the left upper abdomen at level of   proximal stomach.        Cardiomegaly and stable band of atelectasis or infiltrate in the left   mid lung.        Gaseous distended bowel in imaged upper abdomen; continue progress   short-term progress is recommended for further evaluation.        MACRO:   None        Signed by: Juan Graves 10/13/2023 1:36 AM   Dictation workstation:   [id number]      XR abdomen 1 view   Final Result   1. NG tube tip projects over the gastric body   2. Redemonstration of bowel loop dilatation suggestive of underlying   ileus versus obstruction.        MACRO:   None        Signed by: Domingo Marks 10/12/2023 7:12 PM   Dictation workstation:   [id number]      XR abdomen 1 view   Final Result   Moderate small bowel and colonic distention, likely a postoperative   ileus. Surgical clips right lower quadrant from recent appendectomy.        MACRO:   None        Signed by: Mana Price 10/12/2023 4:56 PM   Dictation workstation:   [id number]      CT angio chest for pulmonary embolism   Final Result   SMALL BOWEL DILATION WITH MULTIPLE SMALL BOWEL AIR-FLUID LEVELS IN   THE UPPER ABDOMEN, ALL NEW FROM CT TWO DAYS AGO        NO ACUTE DISEASE IN THE CHEST, ONLY SUBSTANTIAL BILATERAL LOWER LOBE   DEPENDENT ATELECTASIS WHICH IS NEW FROM CT TWO DAYS PRIOR        NO AIRSPACE CONSOLIDATION OR GROUND-GLASS AIRSPACE DISEASE        NO EDEMA/FAILURE        NO ACUTE PULMONARY EMBOLISM THROUGH THE SEGMENTAL BRANCH LEVEL        NO AORTIC DISSECTION OR OTHER ACUTE THORACIC AORTIC FINDINGS        NO PLEURAL OR PERICARDIAL EFFUSION        NO PNEUMOTHORAX        MACRO:   None        Signed by: Ranjeet Gil 10/12/2023 6:54 AM   Dictation workstation:   [id number]      XR chest 1 view   Final Result   Low lung volumes with bibasilar atelectasis. No  discrete   consolidation seen.        Signed by: Chapo Cary 10/11/2023 6:18 PM   Dictation workstation:   [id number]      CT abdomen pelvis w IV contrast   Final Result   1. Findings compatible with acute uncomplicated appendicitis. No   extraluminal gas or abscess. Recommend surgical consultation.   2. Mild hepatic steatosis.   3. Colonic diverticulosis without findings of diverticulitis.   Findings discussed with and acknowledged by Ayden Ramires at 11:53 AM   Signed by Faustino Hebert MD            No echocardiogram results found for the past 14 days    Assessment/Plan:    I am currently managing this critically ill patient for:     #Acute appendicitis with localized peritonitis without perforation or gangrene  -s/p appendectomy 10/10/23, has been on Zosyn    #Post-op ileus versus SBO  #Dehiscence of internal incision  -s/p ex-lap with lysis of adhesions and incisional hernia repair, followed by reexploratory laparotomy for postop internal dehiscence due to retching    #Post-operative respiratory insufficiency  -2/2 copious bilious emesis while trying to extubate, was never actually extubated however had to be reanesthetized and reparalyzed with rocuronium, on mechanical ventilator     #DYLAN  -Present on admission, secondary to hypovolemia most likely, improving    #DMII   -recently diagnosed    #Bipolar 1 disorder,depression  #Anxiety & Depression  #Panic disorder        Daily Plan:  Except transferred to ICU on continuous cardiac monitor  Continue mechanical ventilation with goal to slowly extubate tomorrow morning  Morning KUB and chest x-ray  Stat CBC with differential, BMP and magnesium  Monitor replace electrolytes, keep testing greater than 4 magnesium greater than 2  Bowel rest  NG to LIWS  Hold oral Protonix and switch to IV until able to have oral intake  Accu-Cheks every 4 hours while n.p.o. with SSI, hypoglycemia protocol  Transfuse PRBCs for hemoglobin less than 7 or with symptomatic anemia  Strict  intake and output every hour  Hourly vital signs while in ICU  No prophylactic anticoagulation until okay with surgery  Goal be to transfer back to [facility] tomorrow if patient is stable after extubation and remains HDS  Pain control  Okay to sedate with propofol  Maintain MAP > 65 mmHg for adequate perfusion  Titrate FiO2 to maintain SPO2 greater than 92%  Monitor for infection given possible aspiration of bilious contents  Discontinue LR and switch to normal saline at rate of 125/h  Will initiate fentanyl if patient requires more sedation  Okay to paralyze if needed to prevent patient from retching causing recurrence of surgical dehiscence  ABG with morning labs  Monitor acid-base balance, check lactate  Monitor renal function  Resume psych meds when appropriate      DVT Prophylaxis: SCDs  GI Prophylaxis: Protonix  Bowel Regimen: Bowel rest  Diet: N.p.o.  CVC: None  Annie: None  Proctor: Present  Restraints: Soft wrist x2 due to mechanical ventilation  Code Status: Full code  Dispo: Transfer back to [facility] tomorrow if stable after extubation      Critical Care Time:  45    Amina Ritter, APRN-CNP

## 2023-10-15 NOTE — PROCEDURES
Central Line    Date/Time: 10/15/2023 7:06 PM    Performed by: Norman M Kaniecki, APRN-CNP, DNP  Authorized by: PEPITO Figueroa DNP    Consent:     Consent obtained:  Verbal    Consent given by:  Spouse    Risks, benefits, and alternatives were discussed: yes      Risks discussed:  Arterial puncture, infection, incorrect placement, nerve damage, pneumothorax and bleeding    Alternatives discussed:  Delayed treatment and observation  Universal protocol:     Procedure explained and questions answered to patient or proxy's satisfaction: yes      Relevant documents present and verified: yes      Test results available: yes      Imaging studies available: yes      Required blood products, implants, devices, and special equipment available: yes      Site/side marked: yes      Immediately prior to procedure, a time out was called: yes      Patient identity confirmed:  Arm band  Pre-procedure details:     Indication(s): central venous access and hemodynamic monitoring      Hand hygiene: Hand hygiene performed prior to insertion      Sterile barrier technique: All elements of maximal sterile technique followed      Skin preparation:  Chlorhexidine  Sedation:     Sedation type:  Moderate sedation  Anesthesia:     Anesthesia method:  Local infiltration    Local anesthetic:  Lidocaine 1% w/o epi  Procedure details:     Location:  L internal jugular    Patient position:  Reverse Trendelenburg    Procedural supplies:  Triple lumen    Catheter size:  8.5 Fr    Landmarks identified: yes      Ultrasound guidance: yes      Ultrasound guidance timing: real time      Sterile ultrasound techniques: Sterile gel and sterile probe covers were used      Number of attempts:  1    Successful placement: yes    Post-procedure details:     Post-procedure:  Dressing applied and line sutured    Assessment:  Blood return through all ports and free fluid flow    Procedure completion:  Tolerated

## 2023-10-15 NOTE — PROGRESS NOTES
"Victorino Lara is a 48 y.o. adult on day 3 of admission presenting with Acute appendicitis with localized peritonitis.   Now in ICU after Lap w EUGENIA. On [facility] Vent now,     Subjective   See plan / summary below           Objective   Physical Exam  Constitutional:       Comments: Sedated, moves all extremities   HENT:      Head: Normocephalic.      Mouth/Throat:      Mouth: Mucous membranes are moist.   Eyes:      Pupils: Pupils are equal, round, and reactive to light.   Cardiovascular:      Rate and Rhythm: Normal rate and regular rhythm.      Pulses: Normal pulses.   Pulmonary:      Comments: Intubated, right lung clear, left diminished  Abdominal:      Comments: Binder on, soft, no bowel sounds noted but protruding     Skin:     General: Skin is warm.   Neurological:      General: No focal deficit present.     Last Recorded Vitals  Blood pressure 101/60, pulse (!) 112, temperature 36.6 °C (97.9 °F), temperature source Temporal, resp. rate 24, height 1.702 m (5' 7\"), weight 93.9 kg (207 lb 0.2 oz), SpO2 96 %.  Intake/Output last 3 Shifts:  I/O last 3 completed shifts:  In: 5157.3 (54.9 mL/kg) [I.V.:1715.1 (18.3 mL/kg); IV Piggyback:3442.2]  Out: 8125 (86.5 mL/kg) [Urine:1925 (0.6 mL/kg/hr); Emesis/NG output:6200]  Weight: 93.9 kg      Relevant Results        Scheduled medications  busPIRone, 15 mg, oral, BID  docusate sodium, 100 mg, oral, BID  enoxaparin, 40 mg, subcutaneous, Daily  insulin lispro, 0-10 Units, subcutaneous, q4h  [Held by provider] insulin regular, 0-5 Units, subcutaneous, TID with meals  lidocaine, 0.1 mL, subcutaneous, Once  lurasidone, 80 mg, oral, Daily  [Held by provider] pantoprazole, 40 mg, oral, Daily before breakfast  pantoprazole, 40 mg, intravenous, Daily  QUEtiapine, 300 mg, oral, BID  simvastatin, 40 mg, oral, Nightly  sodium chloride, 10 mL, intravenous, q8h  traZODone, 300 mg, oral, Nightly  venlafaxine XR, 150 mg, oral, Daily        Continuous medications  propofol, 5-50 mcg/kg/min, Last " Rate: 70 mcg/kg/min (10/15/23 0658)  sodium chloride 0.9%, 125 mL/hr, Last Rate: 125 mL/hr (10/15/23 0820)        PRN medications  PRN medications: acetaminophen, calcium carbonate, dextrose 10 % in water (D10W), dextrose, glucagon, HYDROmorphone, naloxone, oxyCODONE, oxygen, tiZANidine           Results for orders placed or performed during the hospital encounter of 10/10/23 (from the past 24 hour(s))   POCT GLUCOSE   Result Value Ref Range     POCT Glucose 172 (H) 74 - 99 mg/dL   Blood Gas Arterial Full Panel   Result Value Ref Range     POCT pH, Arterial 7.50 (H) 7.38 - 7.42 pH     POCT pCO2, Arterial 47 (H) 38 - 42 mm Hg     POCT pO2, Arterial 86 85 - 95 mm Hg     POCT SO2, Arterial 98 94 - 100 %     POCT Oxy Hemoglobin, Arterial 95.7 94.0 - 98.0 %     POCT Hematocrit Calculated, Arterial 37.0 36.0 - 52.0 %     POCT Sodium, Arterial 137 136 - 145 mmol/L     POCT Potassium, Arterial 3.3 (L) 3.5 - 5.3 mmol/L     POCT Chloride, Arterial 95 (L) 98 - 107 mmol/L     POCT Ionized Calcium, Arterial 1.09 (L) 1.10 - 1.33 mmol/L     POCT Glucose, Arterial 164 (H) 74 - 99 mg/dL     POCT Lactate, Arterial 1.0 0.4 - 2.0 mmol/L     POCT Base Excess, Arterial 11.9 (H) -2.0 - 3.0 mmol/L     POCT HCO3 Calculated, Arterial 36.7 (H) 22.0 - 26.0 mmol/L     POCT Hemoglobin, Arterial 12.3 (L) 13.5 - 17.5 g/dL     POCT Anion Gap, Arterial 9 (L) 10 - 25 mmo/L     Patient Temperature         FiO2 50 %     Ventilator Mode A/C       Ventilator Rate 16 bpm     Tidal Volume 400 mL     Peep CHM2O 5.0 cm H2O   Phosphorus   Result Value Ref Range     Phosphorus 4.4 2.5 - 4.9 mg/dL   Magnesium   Result Value Ref Range     Magnesium 2.02 1.60 - 2.40 mg/dL   CBC and Auto Differential   Result Value Ref Range     WBC 5.1 4.4 - 11.3 x10*3/uL     nRBC 0.0 0.0 - 0.0 /100 WBCs     RBC 3.94 (L) 4.00 - 5.90 x10*6/uL     Hemoglobin 12.0 12.0 - 17.5 g/dL     Hematocrit 34.4 (L) 36.0 - 52.0 %     MCV 87 80 - 100 fL     MCH 30.5 26.0 - 34.0 pg     MCHC 34.9  32.0 - 36.0 g/dL     RDW 13.5 11.5 - 14.5 %     Platelets 424 150 - 450 x10*3/uL     MPV 10.8 7.5 - 11.5 fL     Immature Granulocytes %, Automated 5.9 (H) 0.0 - 0.9 %     Immature Granulocytes Absolute, Automated 0.30 0.00 - 0.70 x10*3/uL   Manual Differential   Result Value Ref Range     Neutrophils %, Manual 50.0 40.0 - 80.0 %     Bands %, Manual 15.0 0.0 - 5.0 %     Lymphocytes %, Manual 25.0 13.0 - 44.0 %     Monocytes %, Manual 4.0 2.0 - 10.0 %     Eosinophils %, Manual 3.0 0.0 - 6.0 %     Basophils %, Manual 1.0 0.0 - 2.0 %     Atypical Lymphocytes %, Manual 2.0 0.0 - 2.0 %     Seg Neutrophils Absolute, Manual 2.55 1.20 - 7.00 x10*3/uL     Bands Absolute, Manual 0.77 (H) 0.00 - 0.70 x10*3/uL     Lymphocytes Absolute, Manual 1.28 1.20 - 4.80 x10*3/uL     Monocytes Absolute, Manual 0.20 0.10 - 1.00 x10*3/uL     Eosinophils Absolute, Manual 0.15 0.00 - 0.70 x10*3/uL     Basophils Absolute, Manual 0.05 0.00 - 0.10 x10*3/uL     Atypical Lymphs Absolute, Manual 0.10 0.00 - 0.50 x10*3/uL     Total Cells Counted 100       Neutrophils Absolute, Manual 3.32 1.20 - 7.70 x10*3/uL     RBC Morphology See Below       Clumped Platelets Present     Comprehensive Metabolic Panel   Result Value Ref Range     Glucose 141 (H) 74 - 99 mg/dL     Sodium 134 (L) 136 - 145 mmol/L     Potassium 3.3 (L) 3.5 - 5.3 mmol/L     Chloride 93 (L) 98 - 107 mmol/L     Bicarbonate 31 21 - 32 mmol/L     Anion Gap 13 10 - 20 mmol/L     Urea Nitrogen 29 (H) 6 - 23 mg/dL     Creatinine 1.21 0.50 - 1.30 mg/dL     eGFR 55 (L) >60 mL/min/1.73m*2     Calcium 8.2 (L) 8.6 - 10.3 mg/dL     Albumin 3.0 (L) 3.4 - 5.0 g/dL     Alkaline Phosphatase 53 33 - 120 U/L     Total Protein 6.3 (L) 6.4 - 8.2 g/dL     AST 16 9 - 39 U/L     Bilirubin, Total 1.3 (H) 0.0 - 1.2 mg/dL     ALT 11 7 - 52 U/L   POCT GLUCOSE   Result Value Ref Range     POCT Glucose 142 (H) 74 - 99 mg/dL   Light Blue Top   Result Value Ref Range     Extra Tube Hold for add-ons.     SST TOP    Result Value Ref Range     Extra Tube Hold for add-ons.     POCT GLUCOSE   Result Value Ref Range     POCT Glucose 165 (H) 74 - 99 mg/dL   Blood Gas Venous Full Panel   Result Value Ref Range     POCT pH, Venous 7.49 (H) 7.33 - 7.43 pH     POCT pCO2, Venous 42 41 - 51 mm Hg     POCT pO2, Venous 68 (H) 35 - 45 mm Hg     POCT SO2, Venous 94 (H) 45 - 75 %     POCT Oxy Hemoglobin, Venous 91.7 (H) 45.0 - 75.0 %     POCT Hematocrit Calculated, Venous 57.0 (H) 36.0 - 52.0 %     POCT Sodium, Venous 132 (L) 136 - 145 mmol/L     POCT Potassium, Venous 4.1 3.5 - 5.3 mmol/L     POCT Chloride, Venous 93 (L) 98 - 107 mmol/L     POCT Ionized Calicum, Venous 0.89 (L) 1.10 - 1.33 mmol/L     POCT Glucose, Venous 158 (H) 74 - 99 mg/dL     POCT Lactate, Venous 1.7 0.4 - 2.0 mmol/L     POCT Base Excess, Venous 7.6 (H) -2.0 - 3.0 mmol/L     POCT HCO3 Calculated, Venous 32.0 (H) 22.0 - 26.0 mmol/L     POCT Hemoglobin, Venous 19.0 (H) 12.0 - 17.5 g/dL     POCT Anion Gap, Venous 11.0 10.0 - 25.0 mmol/L     Patient Temperature 37.0 degrees Celsius     FiO2 50 %     Ventilator Mode A/C     CBC   Result Value Ref Range     WBC 10.8 4.4 - 11.3 x10*3/uL     nRBC 0.0 0.0 - 0.0 /100 WBCs     RBC 4.08 4.00 - 5.90 x10*6/uL     Hemoglobin 11.7 (L) 12.0 - 17.5 g/dL     Hematocrit 35.5 (L) 36.0 - 52.0 %     MCV 87 80 - 100 fL     MCH 28.7 26.0 - 34.0 pg     MCHC 33.0 32.0 - 36.0 g/dL     RDW 13.7 11.5 - 14.5 %     Platelets 552 (H) 150 - 450 x10*3/uL     MPV 10.5 7.5 - 11.5 fL   Comprehensive metabolic panel   Result Value Ref Range     Glucose 151 (H) 74 - 99 mg/dL     Sodium 137 136 - 145 mmol/L     Potassium 4.0 3.5 - 5.3 mmol/L     Chloride 94 (L) 98 - 107 mmol/L     Bicarbonate 29 21 - 32 mmol/L     Anion Gap 18 10 - 20 mmol/L     Urea Nitrogen 35 (H) 6 - 23 mg/dL     Creatinine 1.86 (H) 0.50 - 1.30 mg/dL     eGFR 33 (L) >60 mL/min/1.73m*2     Calcium 8.6 8.6 - 10.3 mg/dL     Albumin 3.3 (L) 3.4 - 5.0 g/dL     Alkaline Phosphatase 58 33 - 120  U/L     Total Protein 7.0 6.4 - 8.2 g/dL     AST 15 9 - 39 U/L     Bilirubin, Total 1.7 (H) 0.0 - 1.2 mg/dL     ALT 13 7 - 52 U/L   POCT GLUCOSE   Result Value Ref Range     POCT Glucose 156 (H) 74 - 99 mg/dL   POCT GLUCOSE   Result Value Ref Range     POCT Glucose 148 (H) 74 - 99 mg/dL         CT abdomen pelvis wo IV contrast     Result Date: 10/14/2023  Interpreted By:  Mana Price, STUDY: CT ABDOMEN PELVIS WO IV CONTRAST;  10/14/2023 1:25 pm   INDICATION: Signs/Symptoms:SBO postop appendectomy.   COMPARISON: 10/10/2023   ACCESSION NUMBER(S): [id number]   ORDERING CLINICIAN: DRU WYNN   TECHNIQUE: CT of the abdomen and pelvis was performed. No oral, no intravenous contrast.   FINDINGS: LOWER CHEST: There are bibasilar infiltrates.   ABDOMEN:   LIVER: There is no hepatic mass.   BILE DUCTS: There is no intrahepatic, common hepatic or common bile ductal dilatation.   GALLBLADDER: Contracted but otherwise unremarkable.   PANCREAS: The pancreas is unremarkable.   SPLEEN: The spleen is unremarkable. There is no splenic mass or splenomegaly.   ADRENAL GLANDS: The adrenal glands are unremarkable.   KIDNEYS AND URETERS: The kidneys demonstrate no mass.  There is no intrarenal calculus or hydronephrosis. There is a Proctor catheter insufflated in a nondistended urinary bladder.   BOWEL: There are surgical clips in the right lower quadrant from appendectomy in the interval compared to 10/10/2023. There is haziness in the mesentery in the right lower quadrant from recent surgery. There is a small crescent of non walled-off ascitic fluid in the right lower quadrant. There is a small amount of ascites in the pelvis non walled-off. There is a nasogastric tube with the tip in the mid stomach. There is no gastric distention. There are markedly dilated loops of small bowel. There is collapsed distal small bowel and collapsed colon. This could represent a postoperative ileus but consider small-bowel obstruction. There are  sigmoid diverticula with no diverticulitis.   VESSELS: The abdominal and pelvic vessels are unremarkable.   PERITONEUM/RETROPERITONEUM/LYMPH NODES: There is no retroperitoneal or pelvic adenopathy.  There is no ascites.   ABDOMINAL WALL: There is a periumbilical hernia with an area of opening measuring 5.8 cm in transverse dimension. There is herniation of a segment of nondilated non thickened small bowel. There is induration around the umbilicus from recent surgery. There is a small hematoma in the subcutaneous fat with hyperdense and hypodense fluid. This measures 3.0 x 1.8 cm. There is a dot of air which could be iatrogenic from prior laparoscopic surgery rather than abscess and infection..   BONE AND SOFT TISSUE: There is no acute osseous finding.   There is no soft tissue abnormality.        1. Interval appendectomy. Haziness right lower quadrant with a small crescent of fluid non walled-off. 2. Markedly dilated loops of small bowel with nondilated collapsed distal small bowel and collapsed colon. This is likely a postoperative ileus but still consider small-bowel obstruction. Proctor catheter with the tip in the mid stomach. 3. Periumbilical hernia with a segment of nondilated non thickened small bowel. There is induration and a small amount of fluid in the fat around the umbilicus from recent surgery. There is a small hematoma in the periumbilical fat. There is a dot of air which could be iatrogenic from recent surgery rather than abscess and infection. 4. Proctor catheter insufflated in a nondistended urinary bladder. 5. Bibasilar pneumonia.   MACRO: None   Signed by: Mana Price 10/14/2023 1:34 PM Dictation workstation:   [id number]     XR abdomen 1 view     Result Date: 10/14/2023  Interpreted By:  Mana Price, STUDY: XR ABDOMEN 1 VIEW;  10/14/2023 8:15 am. 3 views.   INDICATION: Signs/Symptoms:Ileus.   COMPARISON: 10/12/2023   ACCESSION NUMBER(S): [id number]   ORDERING CLINICIAN: DRU WYNN    FINDINGS: There is a nasogastric tube with the tip in the proximal stomach. There is gastric decompression. There is progressive marked small bowel dilatation with loops of bowel dilated up to 6.6 cm. There is gas and stool in nondilated colon. Findings are consistent with a small-bowel obstruction.   There are no pathologic calcifications.   Visualized lungs are clear.   Osseous structures demonstrate no acute bony changes.          1. Nasogastric tube with the tip in the proximal stomach with gastric decompression. 2. Progressive marked small bowel dilatation consistent with small-bowel obstruction.     MACRO: None   Signed by: Mana Price 10/14/2023 8:55 AM Dictation workstation:   [id number]         ------------------------------------------------------------------------------      Victorino Lara is a 48 YM with prior abdominal hernia repair, initially presented on October 10th with right lower quadrant abdominal pain. Found to have acute appendicitis with localized peritonitis without rupture and taken to the OR for laparoscopic appendectomy.       Postoperatively patient was tachycardic which was treated with IV fluids and patient also had some hypoxemia. On the 12th pulmonology was consulted for hypoxia/dyspnea/atelectasis, CT chest showed significant bibasilar atelectasis.  Patient was only requiring nasal cannula. POD #3 patient developed ileus per KUB. Supportive measures were continued including NGT, IV fluids and replacing electrolytes. Symptoms did not improve.  CT abdomen and pelvis was done which was concerning for SBO.     10/14 patient underwent exploratory laparotomy. Findings included recurrence of umbilial hernia, bowel distended but viable, serosanguonous abdominal fluid, single interloop adhesion about 10cm from terminal ileum which was lysted, hernia repaired primarily. As patient was being extubated he had copious amounts of bilious emesis and was retching so patient was reanesthetized and  read paralyzed with rocuronium, reprepped and opened up again for reexploration.  Reexploration revealed middle third of the fascia of the stitches had pulled through, no visible bowel just omentum that was placed for closure of the fascia.  Bowel was distended but completely viable.      Transferred to ICU postop  on mechanical ventilator for acute postop respiratory insufficiency status post exploratory laparotomy, lysis of adhesions, repair of incisional hernia, reexploratory laparotomy with repair of dehiscence     10/15: large output from NGT overnight, 1400 ml.               Assessment/Plan   Principal Problem:    Acute appendicitis with localized peritonitis  Active Problems:    Anxiety    Depression    Hyperlipidemia    Diabetes mellitus, type 2 (CMS/HCC)    Appendicitis, acute    Appendicitis    Acute appendicitis, unspecified acute appendicitis type    Small bowel obstruction (CMS/HCC)        -------------------------------------------------------------------------------------------------------------------------  Neuro:   #Bipolar 1 disorder,depression  #Anxiety & Depression  #Panic disorder  -hold all oral home psych meds for now given SBO  -propofol for sedation, add precedex if needed  -PRN hydromorphone for pain  -[facility] ICU  - restraints required to prevent inadvertent extubation as patient reaches for ETT when off     Pulm:   #Post-operative respiratory insufficiency  -2/2 copious bilious emesis while trying to extubate, was never actually extubated however had to be reanesthetized and reparalyzed with rocuronium, on mechanical ventilator   -ABG today and PRN  -keep intubated today given large amount of output from NGT     CV:   -HR 110s, hemodynamic stable, not on vasopressors  -Continue to monitor     GI:   #Acute appendicitis with localized peritonitis without perforation or gangrene  #SBO  -obstruction from an interloop adhesion and NOT from the hernia,  -s/p appendectomy 10/10/23, has been on  Zosyn  -NPO  -fu KUB     #Post-op ileus versus SBO  #Dehiscence of internal incision  -s/p ex-lap with lysis of adhesions and incisional hernia repair, followed by reexploratory laparotomy for postop internal dehiscence due to retching     :   Intake/Output Summary (Last 24 hours) at 10/15/2023 0907  Last data filed at 10/15/2023 0615      Gross per 24 hour   Intake 4157.33 ml   Output 5075 ml   Net -917.67 ml   #DYLAN  -Present on admission, secondary to hypovolemia most likely, improving  -Creatinine worsening, 1.86 today, daily CMP  -Change NS to LR at 100 ml/hr  -Hourly urine output     Heme:   - H/H stable  - change lovenox to heparin while has DYLAN     Endo:   #DMII   -recently diagnosed  -q4hr accuchecks with SS coverage     ID: no leukocytosis, no fevers  - continue zosyn     Lines:   -ETT    Pulmonary Plan:- wean off Vent per ICU Sx.

## 2023-10-15 NOTE — PROGRESS NOTES
"Victorino Lara is a 48 y.o. adult on day 3 of admission presenting with Acute appendicitis with localized peritonitis.    Subjective   See plan / summary below    Objective     Physical Exam  Constitutional:       Comments: Sedated, moves all extremities   HENT:      Head: Normocephalic.      Mouth/Throat:      Mouth: Mucous membranes are moist.   Eyes:      Pupils: Pupils are equal, round, and reactive to light.   Cardiovascular:      Rate and Rhythm: Normal rate and regular rhythm.      Pulses: Normal pulses.   Pulmonary:      Comments: Intubated, right lung clear, left diminished  Abdominal:      Comments: Binder on, soft, no bowel sounds noted but protruding     Skin:     General: Skin is warm.   Neurological:      General: No focal deficit present.       Last Recorded Vitals  Blood pressure 101/60, pulse (!) 112, temperature 36.6 °C (97.9 °F), temperature source Temporal, resp. rate 24, height 1.702 m (5' 7\"), weight 93.9 kg (207 lb 0.2 oz), SpO2 96 %.  Intake/Output last 3 Shifts:  I/O last 3 completed shifts:  In: 5157.3 (54.9 mL/kg) [I.V.:1715.1 (18.3 mL/kg); IV Piggyback:3442.2]  Out: 8125 (86.5 mL/kg) [Urine:1925 (0.6 mL/kg/hr); Emesis/NG output:6200]  Weight: 93.9 kg     Relevant Results      Scheduled medications  busPIRone, 15 mg, oral, BID  docusate sodium, 100 mg, oral, BID  enoxaparin, 40 mg, subcutaneous, Daily  insulin lispro, 0-10 Units, subcutaneous, q4h  [Held by provider] insulin regular, 0-5 Units, subcutaneous, TID with meals  lidocaine, 0.1 mL, subcutaneous, Once  lurasidone, 80 mg, oral, Daily  [Held by provider] pantoprazole, 40 mg, oral, Daily before breakfast  pantoprazole, 40 mg, intravenous, Daily  QUEtiapine, 300 mg, oral, BID  simvastatin, 40 mg, oral, Nightly  sodium chloride, 10 mL, intravenous, q8h  traZODone, 300 mg, oral, Nightly  venlafaxine XR, 150 mg, oral, Daily      Continuous medications  propofol, 5-50 mcg/kg/min, Last Rate: 70 mcg/kg/min (10/15/23 0658)  sodium chloride " 0.9%, 125 mL/hr, Last Rate: 125 mL/hr (10/15/23 0820)      PRN medications  PRN medications: acetaminophen, calcium carbonate, dextrose 10 % in water (D10W), dextrose, glucagon, HYDROmorphone, naloxone, oxyCODONE, oxygen, tiZANidine    Results for orders placed or performed during the hospital encounter of 10/10/23 (from the past 24 hour(s))   POCT GLUCOSE   Result Value Ref Range    POCT Glucose 172 (H) 74 - 99 mg/dL   Blood Gas Arterial Full Panel   Result Value Ref Range    POCT pH, Arterial 7.50 (H) 7.38 - 7.42 pH    POCT pCO2, Arterial 47 (H) 38 - 42 mm Hg    POCT pO2, Arterial 86 85 - 95 mm Hg    POCT SO2, Arterial 98 94 - 100 %    POCT Oxy Hemoglobin, Arterial 95.7 94.0 - 98.0 %    POCT Hematocrit Calculated, Arterial 37.0 36.0 - 52.0 %    POCT Sodium, Arterial 137 136 - 145 mmol/L    POCT Potassium, Arterial 3.3 (L) 3.5 - 5.3 mmol/L    POCT Chloride, Arterial 95 (L) 98 - 107 mmol/L    POCT Ionized Calcium, Arterial 1.09 (L) 1.10 - 1.33 mmol/L    POCT Glucose, Arterial 164 (H) 74 - 99 mg/dL    POCT Lactate, Arterial 1.0 0.4 - 2.0 mmol/L    POCT Base Excess, Arterial 11.9 (H) -2.0 - 3.0 mmol/L    POCT HCO3 Calculated, Arterial 36.7 (H) 22.0 - 26.0 mmol/L    POCT Hemoglobin, Arterial 12.3 (L) 13.5 - 17.5 g/dL    POCT Anion Gap, Arterial 9 (L) 10 - 25 mmo/L    Patient Temperature      FiO2 50 %    Ventilator Mode A/C     Ventilator Rate 16 bpm    Tidal Volume 400 mL    Peep CHM2O 5.0 cm H2O   Phosphorus   Result Value Ref Range    Phosphorus 4.4 2.5 - 4.9 mg/dL   Magnesium   Result Value Ref Range    Magnesium 2.02 1.60 - 2.40 mg/dL   CBC and Auto Differential   Result Value Ref Range    WBC 5.1 4.4 - 11.3 x10*3/uL    nRBC 0.0 0.0 - 0.0 /100 WBCs    RBC 3.94 (L) 4.00 - 5.90 x10*6/uL    Hemoglobin 12.0 12.0 - 17.5 g/dL    Hematocrit 34.4 (L) 36.0 - 52.0 %    MCV 87 80 - 100 fL    MCH 30.5 26.0 - 34.0 pg    MCHC 34.9 32.0 - 36.0 g/dL    RDW 13.5 11.5 - 14.5 %    Platelets 424 150 - 450 x10*3/uL    MPV 10.8 7.5 -  11.5 fL    Immature Granulocytes %, Automated 5.9 (H) 0.0 - 0.9 %    Immature Granulocytes Absolute, Automated 0.30 0.00 - 0.70 x10*3/uL   Manual Differential   Result Value Ref Range    Neutrophils %, Manual 50.0 40.0 - 80.0 %    Bands %, Manual 15.0 0.0 - 5.0 %    Lymphocytes %, Manual 25.0 13.0 - 44.0 %    Monocytes %, Manual 4.0 2.0 - 10.0 %    Eosinophils %, Manual 3.0 0.0 - 6.0 %    Basophils %, Manual 1.0 0.0 - 2.0 %    Atypical Lymphocytes %, Manual 2.0 0.0 - 2.0 %    Seg Neutrophils Absolute, Manual 2.55 1.20 - 7.00 x10*3/uL    Bands Absolute, Manual 0.77 (H) 0.00 - 0.70 x10*3/uL    Lymphocytes Absolute, Manual 1.28 1.20 - 4.80 x10*3/uL    Monocytes Absolute, Manual 0.20 0.10 - 1.00 x10*3/uL    Eosinophils Absolute, Manual 0.15 0.00 - 0.70 x10*3/uL    Basophils Absolute, Manual 0.05 0.00 - 0.10 x10*3/uL    Atypical Lymphs Absolute, Manual 0.10 0.00 - 0.50 x10*3/uL    Total Cells Counted 100     Neutrophils Absolute, Manual 3.32 1.20 - 7.70 x10*3/uL    RBC Morphology See Below     Clumped Platelets Present    Comprehensive Metabolic Panel   Result Value Ref Range    Glucose 141 (H) 74 - 99 mg/dL    Sodium 134 (L) 136 - 145 mmol/L    Potassium 3.3 (L) 3.5 - 5.3 mmol/L    Chloride 93 (L) 98 - 107 mmol/L    Bicarbonate 31 21 - 32 mmol/L    Anion Gap 13 10 - 20 mmol/L    Urea Nitrogen 29 (H) 6 - 23 mg/dL    Creatinine 1.21 0.50 - 1.30 mg/dL    eGFR 55 (L) >60 mL/min/1.73m*2    Calcium 8.2 (L) 8.6 - 10.3 mg/dL    Albumin 3.0 (L) 3.4 - 5.0 g/dL    Alkaline Phosphatase 53 33 - 120 U/L    Total Protein 6.3 (L) 6.4 - 8.2 g/dL    AST 16 9 - 39 U/L    Bilirubin, Total 1.3 (H) 0.0 - 1.2 mg/dL    ALT 11 7 - 52 U/L   POCT GLUCOSE   Result Value Ref Range    POCT Glucose 142 (H) 74 - 99 mg/dL   Light Blue Top   Result Value Ref Range    Extra Tube Hold for add-ons.    SST TOP   Result Value Ref Range    Extra Tube Hold for add-ons.    POCT GLUCOSE   Result Value Ref Range    POCT Glucose 165 (H) 74 - 99 mg/dL   Blood Gas  Venous Full Panel   Result Value Ref Range    POCT pH, Venous 7.49 (H) 7.33 - 7.43 pH    POCT pCO2, Venous 42 41 - 51 mm Hg    POCT pO2, Venous 68 (H) 35 - 45 mm Hg    POCT SO2, Venous 94 (H) 45 - 75 %    POCT Oxy Hemoglobin, Venous 91.7 (H) 45.0 - 75.0 %    POCT Hematocrit Calculated, Venous 57.0 (H) 36.0 - 52.0 %    POCT Sodium, Venous 132 (L) 136 - 145 mmol/L    POCT Potassium, Venous 4.1 3.5 - 5.3 mmol/L    POCT Chloride, Venous 93 (L) 98 - 107 mmol/L    POCT Ionized Calicum, Venous 0.89 (L) 1.10 - 1.33 mmol/L    POCT Glucose, Venous 158 (H) 74 - 99 mg/dL    POCT Lactate, Venous 1.7 0.4 - 2.0 mmol/L    POCT Base Excess, Venous 7.6 (H) -2.0 - 3.0 mmol/L    POCT HCO3 Calculated, Venous 32.0 (H) 22.0 - 26.0 mmol/L    POCT Hemoglobin, Venous 19.0 (H) 12.0 - 17.5 g/dL    POCT Anion Gap, Venous 11.0 10.0 - 25.0 mmol/L    Patient Temperature 37.0 degrees Celsius    FiO2 50 %    Ventilator Mode A/C    CBC   Result Value Ref Range    WBC 10.8 4.4 - 11.3 x10*3/uL    nRBC 0.0 0.0 - 0.0 /100 WBCs    RBC 4.08 4.00 - 5.90 x10*6/uL    Hemoglobin 11.7 (L) 12.0 - 17.5 g/dL    Hematocrit 35.5 (L) 36.0 - 52.0 %    MCV 87 80 - 100 fL    MCH 28.7 26.0 - 34.0 pg    MCHC 33.0 32.0 - 36.0 g/dL    RDW 13.7 11.5 - 14.5 %    Platelets 552 (H) 150 - 450 x10*3/uL    MPV 10.5 7.5 - 11.5 fL   Comprehensive metabolic panel   Result Value Ref Range    Glucose 151 (H) 74 - 99 mg/dL    Sodium 137 136 - 145 mmol/L    Potassium 4.0 3.5 - 5.3 mmol/L    Chloride 94 (L) 98 - 107 mmol/L    Bicarbonate 29 21 - 32 mmol/L    Anion Gap 18 10 - 20 mmol/L    Urea Nitrogen 35 (H) 6 - 23 mg/dL    Creatinine 1.86 (H) 0.50 - 1.30 mg/dL    eGFR 33 (L) >60 mL/min/1.73m*2    Calcium 8.6 8.6 - 10.3 mg/dL    Albumin 3.3 (L) 3.4 - 5.0 g/dL    Alkaline Phosphatase 58 33 - 120 U/L    Total Protein 7.0 6.4 - 8.2 g/dL    AST 15 9 - 39 U/L    Bilirubin, Total 1.7 (H) 0.0 - 1.2 mg/dL    ALT 13 7 - 52 U/L   POCT GLUCOSE   Result Value Ref Range    POCT Glucose 156 (H) 74 -  99 mg/dL   POCT GLUCOSE   Result Value Ref Range    POCT Glucose 148 (H) 74 - 99 mg/dL       CT abdomen pelvis wo IV contrast    Result Date: 10/14/2023  Interpreted By:  Mana Price, STUDY: CT ABDOMEN PELVIS WO IV CONTRAST;  10/14/2023 1:25 pm   INDICATION: Signs/Symptoms:SBO postop appendectomy.   COMPARISON: 10/10/2023   ACCESSION NUMBER(S): [id number]   ORDERING CLINICIAN: DRU WYNN   TECHNIQUE: CT of the abdomen and pelvis was performed. No oral, no intravenous contrast.   FINDINGS: LOWER CHEST: There are bibasilar infiltrates.   ABDOMEN:   LIVER: There is no hepatic mass.   BILE DUCTS: There is no intrahepatic, common hepatic or common bile ductal dilatation.   GALLBLADDER: Contracted but otherwise unremarkable.   PANCREAS: The pancreas is unremarkable.   SPLEEN: The spleen is unremarkable. There is no splenic mass or splenomegaly.   ADRENAL GLANDS: The adrenal glands are unremarkable.   KIDNEYS AND URETERS: The kidneys demonstrate no mass.  There is no intrarenal calculus or hydronephrosis. There is a Proctor catheter insufflated in a nondistended urinary bladder.   BOWEL: There are surgical clips in the right lower quadrant from appendectomy in the interval compared to 10/10/2023. There is haziness in the mesentery in the right lower quadrant from recent surgery. There is a small crescent of non walled-off ascitic fluid in the right lower quadrant. There is a small amount of ascites in the pelvis non walled-off. There is a nasogastric tube with the tip in the mid stomach. There is no gastric distention. There are markedly dilated loops of small bowel. There is collapsed distal small bowel and collapsed colon. This could represent a postoperative ileus but consider small-bowel obstruction. There are sigmoid diverticula with no diverticulitis.   VESSELS: The abdominal and pelvic vessels are unremarkable.   PERITONEUM/RETROPERITONEUM/LYMPH NODES: There is no retroperitoneal or pelvic adenopathy.  There  is no ascites.   ABDOMINAL WALL: There is a periumbilical hernia with an area of opening measuring 5.8 cm in transverse dimension. There is herniation of a segment of nondilated non thickened small bowel. There is induration around the umbilicus from recent surgery. There is a small hematoma in the subcutaneous fat with hyperdense and hypodense fluid. This measures 3.0 x 1.8 cm. There is a dot of air which could be iatrogenic from prior laparoscopic surgery rather than abscess and infection..   BONE AND SOFT TISSUE: There is no acute osseous finding.   There is no soft tissue abnormality.       1. Interval appendectomy. Haziness right lower quadrant with a small crescent of fluid non walled-off. 2. Markedly dilated loops of small bowel with nondilated collapsed distal small bowel and collapsed colon. This is likely a postoperative ileus but still consider small-bowel obstruction. Proctor catheter with the tip in the mid stomach. 3. Periumbilical hernia with a segment of nondilated non thickened small bowel. There is induration and a small amount of fluid in the fat around the umbilicus from recent surgery. There is a small hematoma in the periumbilical fat. There is a dot of air which could be iatrogenic from recent surgery rather than abscess and infection. 4. Proctor catheter insufflated in a nondistended urinary bladder. 5. Bibasilar pneumonia.   MACRO: None   Signed by: Mana Price 10/14/2023 1:34 PM Dictation workstation:   [id number]    XR abdomen 1 view    Result Date: 10/14/2023  Interpreted By:  Mana Price, STUDY: XR ABDOMEN 1 VIEW;  10/14/2023 8:15 am. 3 views.   INDICATION: Signs/Symptoms:Ileus.   COMPARISON: 10/12/2023   ACCESSION NUMBER(S): [id number]   ORDERING CLINICIAN: DRU WYNN   FINDINGS: There is a nasogastric tube with the tip in the proximal stomach. There is gastric decompression. There is progressive marked small bowel dilatation with loops of bowel dilated up to 6.6 cm. There is  gas and stool in nondilated colon. Findings are consistent with a small-bowel obstruction.   There are no pathologic calcifications.   Visualized lungs are clear.   Osseous structures demonstrate no acute bony changes.         1. Nasogastric tube with the tip in the proximal stomach with gastric decompression. 2. Progressive marked small bowel dilatation consistent with small-bowel obstruction.     MACRO: None   Signed by: Mana Price 10/14/2023 8:55 AM Dictation workstation:   [id number]       ------------------------------------------------------------------------------     Victorino Lara is a 48 YM with prior abdominal hernia repair, initially presented on October 10th with right lower quadrant abdominal pain. Found to have acute appendicitis with localized peritonitis without rupture and taken to the OR for laparoscopic appendectomy.      Postoperatively patient was tachycardic which was treated with IV fluids and patient also had some hypoxemia. On the 12th pulmonology was consulted for hypoxia/dyspnea/atelectasis, CT chest showed significant bibasilar atelectasis.  Patient was only requiring nasal cannula. POD #3 patient developed ileus per KUB. Supportive measures were continued including NGT, IV fluids and replacing electrolytes. Symptoms did not improve.  CT abdomen and pelvis was done which was concerning for SBO.     10/14 patient underwent exploratory laparotomy. Findings included recurrence of umbilial hernia, bowel distended but viable, serosanguonous abdominal fluid, single interloop adhesion about 10cm from terminal ileum which was lysted, hernia repaired primarily. As patient was being extubated he had copious amounts of bilious emesis and was retching so patient was reanesthetized and read paralyzed with rocuronium, reprepped and opened up again for reexploration.  Reexploration revealed middle third of the fascia of the stitches had pulled through, no visible bowel just omentum that was placed  for closure of the fascia.  Bowel was distended but completely viable.      Transferred to ICU postop  on mechanical ventilator for acute postop respiratory insufficiency status post exploratory laparotomy, lysis of adhesions, repair of incisional hernia, reexploratory laparotomy with repair of dehiscence    10/15: large output from NGT overnight, 1400 ml.        Assessment/Plan   Principal Problem:    Acute appendicitis with localized peritonitis  Active Problems:    Anxiety    Depression    Hyperlipidemia    Diabetes mellitus, type 2 (CMS/HCC)    Appendicitis, acute    Appendicitis    Acute appendicitis, unspecified acute appendicitis type    Small bowel obstruction (CMS/HCC)      -------------------------------------------------------------------------------------------------------------------------  Neuro:   #Bipolar 1 disorder,depression  #Anxiety & Depression  #Panic disorder  -hold all oral home psych meds for now given SBO  -propofol for sedation, add precedex if needed  -PRN hydromorphone for pain  -[facility] ICU  - restraints required to prevent inadvertent extubation as patient reaches for ETT when off    Pulm:   #Post-operative respiratory insufficiency  -2/2 copious bilious emesis while trying to extubate, was never actually extubated however had to be reanesthetized and reparalyzed with rocuronium, on mechanical ventilator   -ABG today and PRN  -keep intubated today given large amount of output from NGT    CV:   -HR 110s, hemodynamic stable, not on vasopressors  -Continue to monitor    GI:   #Acute appendicitis with localized peritonitis without perforation or gangrene  #SBO  -obstruction from an interloop adhesion and NOT from the hernia,  -s/p appendectomy 10/10/23, has been on Zosyn  -NPO  -fu KUB     #Post-op ileus versus SBO  #Dehiscence of internal incision  -s/p ex-lap with lysis of adhesions and incisional hernia repair, followed by reexploratory laparotomy for postop internal dehiscence due to  retching     :   Intake/Output Summary (Last 24 hours) at 10/15/2023 0907  Last data filed at 10/15/2023 0615  Gross per 24 hour   Intake 4157.33 ml   Output 5075 ml   Net -917.67 ml   #DYLAN  -Present on admission, secondary to hypovolemia most likely, improving  -Creatinine worsening, 1.86 today, daily CMP  -Change NS to LR at 100 ml/hr  -Hourly urine output    Heme:   - H/H stable  - change lovenox to heparin while has DYLAN    Endo:   #DMII   -recently diagnosed  -q4hr accuchecks with SS coverage    ID: no leukocytosis, no fevers  - continue zosyn    Lines:   -ETT    Dispo: Remain in ICU. Discussed with Dr. Jeffery.      Monitoring in the ICU to also include standard interventions such as frequent neurochecks, frequent vital signs, continuous SpO2, I&Os, and others not mentioned above to expedite and provide clarity in this documentation.   This note was created using Dragon Dictation voice recognition. Please excuse any unrecognized errors.    I spent 55 minutes in the professional and overall care of this patient.      Norman Batista, APRN-CNP, DNP

## 2023-10-15 NOTE — ANESTHESIA POSTPROCEDURE EVALUATION
Patient: Victorino Lara    Procedure Summary       Date: 10/14/23 Room / Location: [facility] / [facility]    Anesthesia Start: 1645 Anesthesia Stop: 1950    Procedure: Exploration Laparotomy,Lysis of Adhesions, Repair of Incisional Hernia (Abdomen) Diagnosis:       Small bowel obstruction (CMS/HCC)      (Small bowel obstruction (CMS/HCC) [K56.609])    Surgeons: Norman Calloway MD Responsible Provider: Finn Bosch DO    Anesthesia Type: general ASA Status: 3 - Emergent            Anesthesia Type: general    Vitals Value Taken Time   /66 10/14/23 2000   Temp 37 10/14/23 2000   Pulse 103 10/14/23 2000   Resp 15 10/14/23 2000   SpO2 100 10/14/23 2000       Anesthesia Post Evaluation    Patient location during evaluation: ICU  Post-procedure mental status: sedated intubated.  Pain management: adequate  Cardiovascular status: acceptable  Respiratory status: ETT  Hydration status: acceptable        No notable events documented.

## 2023-10-16 ENCOUNTER — APPOINTMENT (OUTPATIENT)
Dept: CARDIOLOGY | Facility: HOSPITAL | Age: 48
End: 2023-10-16
Payer: COMMERCIAL

## 2023-10-16 ENCOUNTER — APPOINTMENT (OUTPATIENT)
Dept: RADIOLOGY | Facility: HOSPITAL | Age: 48
End: 2023-10-16
Payer: COMMERCIAL

## 2023-10-16 LAB
ALBUMIN SERPL BCP-MCNC: 3 G/DL (ref 3.4–5)
ALP SERPL-CCNC: 56 U/L (ref 33–120)
ALT SERPL W P-5'-P-CCNC: 11 U/L (ref 7–52)
ANION GAP BLDA CALCULATED.4IONS-SCNC: 4 MMO/L (ref 10–25)
ANION GAP BLDA CALCULATED.4IONS-SCNC: 7 MMO/L (ref 10–25)
ANION GAP BLDA CALCULATED.4IONS-SCNC: 7 MMO/L (ref 10–25)
ANION GAP BLDA CALCULATED.4IONS-SCNC: 9 MMO/L (ref 10–25)
ANION GAP SERPL CALC-SCNC: 12 MMOL/L (ref 10–20)
ANION GAP SERPL CALC-SCNC: 16 MMOL/L (ref 10–20)
AST SERPL W P-5'-P-CCNC: 20 U/L (ref 9–39)
BASE EXCESS BLDA CALC-SCNC: 11.3 MMOL/L (ref -2–3)
BASE EXCESS BLDA CALC-SCNC: 11.9 MMOL/L (ref -2–3)
BASE EXCESS BLDA CALC-SCNC: 6.3 MMOL/L (ref -2–3)
BASE EXCESS BLDA CALC-SCNC: 7.3 MMOL/L (ref -2–3)
BASOPHILS # BLD MANUAL: 0 X10*3/UL (ref 0–0.1)
BASOPHILS NFR BLD MANUAL: 0 %
BILIRUB SERPL-MCNC: 2.1 MG/DL (ref 0–1.2)
BODY TEMPERATURE: ABNORMAL
BUN SERPL-MCNC: 34 MG/DL (ref 6–23)
BUN SERPL-MCNC: 40 MG/DL (ref 6–23)
CA-I BLDA-SCNC: 1 MMOL/L (ref 1.1–1.33)
CA-I BLDA-SCNC: 1.07 MMOL/L (ref 1.1–1.33)
CA-I BLDA-SCNC: 1.08 MMOL/L (ref 1.1–1.33)
CA-I BLDA-SCNC: 1.09 MMOL/L (ref 1.1–1.33)
CALCIUM SERPL-MCNC: 8.1 MG/DL (ref 8.6–10.3)
CALCIUM SERPL-MCNC: 8.2 MG/DL (ref 8.6–10.3)
CHLORIDE BLDA-SCNC: 100 MMOL/L (ref 98–107)
CHLORIDE BLDA-SCNC: 101 MMOL/L (ref 98–107)
CHLORIDE BLDA-SCNC: 102 MMOL/L (ref 98–107)
CHLORIDE BLDA-SCNC: 95 MMOL/L (ref 98–107)
CHLORIDE SERPL-SCNC: 97 MMOL/L (ref 98–107)
CHLORIDE SERPL-SCNC: 99 MMOL/L (ref 98–107)
CO2 SERPL-SCNC: 28 MMOL/L (ref 21–32)
CO2 SERPL-SCNC: 30 MMOL/L (ref 21–32)
CREAT SERPL-MCNC: 1.9 MG/DL (ref 0.5–1.3)
CREAT SERPL-MCNC: 2.2 MG/DL (ref 0.5–1.3)
DOHLE BOD BLD QL SMEAR: PRESENT
EOSINOPHIL # BLD MANUAL: 0.68 X10*3/UL (ref 0–0.7)
EOSINOPHIL NFR BLD MANUAL: 4 %
ERYTHROCYTE [DISTWIDTH] IN BLOOD BY AUTOMATED COUNT: 13.8 % (ref 11.5–14.5)
GFR SERPL CREATININE-BSD FRML MDRD: 27 ML/MIN/1.73M*2
GFR SERPL CREATININE-BSD FRML MDRD: 32 ML/MIN/1.73M*2
GIANT PLATELETS BLD QL SMEAR: ABNORMAL
GLUCOSE BLD MANUAL STRIP-MCNC: 139 MG/DL (ref 74–99)
GLUCOSE BLD MANUAL STRIP-MCNC: 146 MG/DL (ref 74–99)
GLUCOSE BLD MANUAL STRIP-MCNC: 149 MG/DL (ref 74–99)
GLUCOSE BLD MANUAL STRIP-MCNC: 203 MG/DL (ref 74–99)
GLUCOSE BLDA-MCNC: 130 MG/DL (ref 74–99)
GLUCOSE BLDA-MCNC: 164 MG/DL (ref 74–99)
GLUCOSE BLDA-MCNC: 167 MG/DL (ref 74–99)
GLUCOSE BLDA-MCNC: 206 MG/DL (ref 74–99)
GLUCOSE SERPL-MCNC: 146 MG/DL (ref 74–99)
GLUCOSE SERPL-MCNC: 181 MG/DL (ref 74–99)
HCO3 BLDA-SCNC: 30.5 MMOL/L (ref 22–26)
HCO3 BLDA-SCNC: 32 MMOL/L (ref 22–26)
HCO3 BLDA-SCNC: 35.9 MMOL/L (ref 22–26)
HCO3 BLDA-SCNC: 36.7 MMOL/L (ref 22–26)
HCT VFR BLD AUTO: 26.6 % (ref 36–52)
HCT VFR BLD EST: 23 % (ref 36–52)
HCT VFR BLD EST: 34 % (ref 36–52)
HCT VFR BLD EST: 37 % (ref 36–52)
HCT VFR BLD EST: 51 % (ref 36–52)
HGB BLD-MCNC: 8.8 G/DL (ref 12–17.5)
HGB BLDA-MCNC: 11.4 G/DL (ref 13.5–17.5)
HGB BLDA-MCNC: 12.3 G/DL (ref 13.5–17.5)
HGB BLDA-MCNC: 17.1 G/DL (ref 13.5–17.5)
HGB BLDA-MCNC: 7.5 G/DL (ref 13.5–17.5)
IMM GRANULOCYTES # BLD AUTO: 1.37 X10*3/UL (ref 0–0.7)
IMM GRANULOCYTES NFR BLD AUTO: 8 % (ref 0–0.9)
INHALED O2 CONCENTRATION: 40 %
INHALED O2 CONCENTRATION: 50 %
INHALED O2 CONCENTRATION: 50 %
INHALED O2 CONCENTRATION: 80 %
LACTATE BLDA-SCNC: 1 MMOL/L (ref 0.4–2)
LACTATE BLDA-SCNC: 1.1 MMOL/L (ref 0.4–2)
LACTATE BLDA-SCNC: 1.2 MMOL/L (ref 0.4–2)
LACTATE BLDA-SCNC: 1.3 MMOL/L (ref 0.4–2)
LYMPHOCYTES # BLD MANUAL: 1.19 X10*3/UL (ref 1.2–4.8)
LYMPHOCYTES NFR BLD MANUAL: 7 %
MAGNESIUM SERPL-MCNC: 2.05 MG/DL (ref 1.6–2.4)
MAGNESIUM SERPL-MCNC: 2.14 MG/DL (ref 1.6–2.4)
MCH RBC QN AUTO: 28.9 PG (ref 26–34)
MCHC RBC AUTO-ENTMCNC: 33.1 G/DL (ref 32–36)
MCV RBC AUTO: 87 FL (ref 80–100)
MONOCYTES # BLD MANUAL: 0.34 X10*3/UL (ref 0.1–1)
MONOCYTES NFR BLD MANUAL: 2 %
MRSA DNA SPEC QL NAA+PROBE: NOT DETECTED
NEUTROPHILS # BLD MANUAL: 14.62 X10*3/UL (ref 1.2–7.7)
NEUTS BAND # BLD MANUAL: 5.1 X10*3/UL (ref 0–0.7)
NEUTS BAND NFR BLD MANUAL: 30 %
NEUTS SEG # BLD MANUAL: 9.52 X10*3/UL (ref 1.2–7)
NEUTS SEG NFR BLD MANUAL: 56 %
NEUTS VAC BLD QL SMEAR: PRESENT
NRBC BLD-RTO: 0 /100 WBCS (ref 0–0)
OVALOCYTES BLD QL SMEAR: ABNORMAL
OXYHGB MFR BLDA: 93 % (ref 94–98)
OXYHGB MFR BLDA: 94.9 % (ref 94–98)
OXYHGB MFR BLDA: 95.3 % (ref 94–98)
OXYHGB MFR BLDA: 95.7 % (ref 94–98)
PCO2 BLDA: 41 MM HG (ref 38–42)
PCO2 BLDA: 46 MM HG (ref 38–42)
PCO2 BLDA: 46 MM HG (ref 38–42)
PCO2 BLDA: 47 MM HG (ref 38–42)
PEEP CMH2O: 5 CM H2O
PH BLDA: 7.45 PH (ref 7.38–7.42)
PH BLDA: 7.48 PH (ref 7.38–7.42)
PH BLDA: 7.5 PH (ref 7.38–7.42)
PH BLDA: 7.5 PH (ref 7.38–7.42)
PHOSPHATE SERPL-MCNC: 3.8 MG/DL (ref 2.5–4.9)
PLATELET # BLD AUTO: 415 X10*3/UL (ref 150–450)
PLATELET CLUMP BLD QL SMEAR: PRESENT
PMV BLD AUTO: 10.3 FL (ref 7.5–11.5)
PO2 BLDA: 72 MM HG (ref 85–95)
PO2 BLDA: 80 MM HG (ref 85–95)
PO2 BLDA: 80 MM HG (ref 85–95)
PO2 BLDA: 86 MM HG (ref 85–95)
POLYCHROMASIA BLD QL SMEAR: ABNORMAL
POTASSIUM BLDA-SCNC: 3.3 MMOL/L (ref 3.5–5.3)
POTASSIUM BLDA-SCNC: 3.5 MMOL/L (ref 3.5–5.3)
POTASSIUM BLDA-SCNC: 3.5 MMOL/L (ref 3.5–5.3)
POTASSIUM BLDA-SCNC: 3.8 MMOL/L (ref 3.5–5.3)
POTASSIUM SERPL-SCNC: 3.4 MMOL/L (ref 3.5–5.3)
POTASSIUM SERPL-SCNC: 3.7 MMOL/L (ref 3.5–5.3)
PROCALCITONIN SERPL-MCNC: 1.68 NG/ML
PROT SERPL-MCNC: 6.1 G/DL (ref 6.4–8.2)
RBC # BLD AUTO: 3.05 X10*6/UL (ref 4–5.9)
RBC MORPH BLD: ABNORMAL
SAO2 % BLDA: 96 % (ref 94–100)
SAO2 % BLDA: 98 % (ref 94–100)
SODIUM BLDA-SCNC: 135 MMOL/L (ref 136–145)
SODIUM BLDA-SCNC: 136 MMOL/L (ref 136–145)
SODIUM BLDA-SCNC: 137 MMOL/L (ref 136–145)
SODIUM BLDA-SCNC: 137 MMOL/L (ref 136–145)
SODIUM SERPL-SCNC: 137 MMOL/L (ref 136–145)
SODIUM SERPL-SCNC: 138 MMOL/L (ref 136–145)
TIDAL VOLUME: 400 ML
TIDAL VOLUME: 400 ML
TIDAL VOLUME: 450 ML
TOTAL CELLS COUNTED BLD: 100
TOXIC GRANULES BLD QL SMEAR: PRESENT
VANCOMYCIN SERPL-MCNC: 13.2 UG/ML (ref 5–20)
VANCOMYCIN SERPL-MCNC: 20.1 UG/ML (ref 5–20)
VARIANT LYMPHS # BLD MANUAL: 0.17 X10*3/UL (ref 0–0.5)
VARIANT LYMPHS NFR BLD: 1 %
VENTILATOR MODE: ABNORMAL
VENTILATOR MODE: ABNORMAL
VENTILATOR RATE: 14 BPM
VENTILATOR RATE: 16 BPM
VENTILATOR RATE: 16 BPM
WBC # BLD AUTO: 17 X10*3/UL (ref 4.4–11.3)

## 2023-10-16 PROCEDURE — 84132 ASSAY OF SERUM POTASSIUM: CPT | Performed by: NURSE PRACTITIONER

## 2023-10-16 PROCEDURE — 85027 COMPLETE CBC AUTOMATED: CPT | Performed by: HOSPITALIST

## 2023-10-16 PROCEDURE — 96372 THER/PROPH/DIAG INJ SC/IM: CPT | Performed by: NURSE PRACTITIONER

## 2023-10-16 PROCEDURE — 2580000001 HC RX 258 IV SOLUTIONS: Performed by: INTERNAL MEDICINE

## 2023-10-16 PROCEDURE — 84132 ASSAY OF SERUM POTASSIUM: CPT | Performed by: HOSPITALIST

## 2023-10-16 PROCEDURE — 80202 ASSAY OF VANCOMYCIN: CPT | Performed by: HOSPITALIST

## 2023-10-16 PROCEDURE — 94003 VENT MGMT INPAT SUBQ DAY: CPT

## 2023-10-16 PROCEDURE — 87186 SC STD MICRODIL/AGAR DIL: CPT | Mod: ELYLAB | Performed by: HOSPITALIST

## 2023-10-16 PROCEDURE — 93010 ELECTROCARDIOGRAM REPORT: CPT | Performed by: INTERNAL MEDICINE

## 2023-10-16 PROCEDURE — 37799 UNLISTED PX VASCULAR SURGERY: CPT | Performed by: HOSPITALIST

## 2023-10-16 PROCEDURE — C9113 INJ PANTOPRAZOLE SODIUM, VIA: HCPCS | Performed by: SURGERY

## 2023-10-16 PROCEDURE — 71045 X-RAY EXAM CHEST 1 VIEW: CPT | Performed by: RADIOLOGY

## 2023-10-16 PROCEDURE — 2500000004 HC RX 250 GENERAL PHARMACY W/ HCPCS (ALT 636 FOR OP/ED): Performed by: HOSPITALIST

## 2023-10-16 PROCEDURE — 2500000005 HC RX 250 GENERAL PHARMACY W/O HCPCS: Performed by: HOSPITALIST

## 2023-10-16 PROCEDURE — 83735 ASSAY OF MAGNESIUM: CPT | Performed by: HOSPITALIST

## 2023-10-16 PROCEDURE — 84100 ASSAY OF PHOSPHORUS: CPT | Performed by: HOSPITALIST

## 2023-10-16 PROCEDURE — 87186 SC STD MICRODIL/AGAR DIL: CPT | Mod: CMCLAB,ELYLAB | Performed by: HOSPITALIST

## 2023-10-16 PROCEDURE — 2500000004 HC RX 250 GENERAL PHARMACY W/ HCPCS (ALT 636 FOR OP/ED): Performed by: NURSE PRACTITIONER

## 2023-10-16 PROCEDURE — 2500000004 HC RX 250 GENERAL PHARMACY W/ HCPCS (ALT 636 FOR OP/ED): Performed by: SURGERY

## 2023-10-16 PROCEDURE — 74018 RADEX ABDOMEN 1 VIEW: CPT | Performed by: RADIOLOGY

## 2023-10-16 PROCEDURE — 2500000005 HC RX 250 GENERAL PHARMACY W/O HCPCS: Performed by: SURGERY

## 2023-10-16 PROCEDURE — 85007 BL SMEAR W/DIFF WBC COUNT: CPT | Performed by: HOSPITALIST

## 2023-10-16 PROCEDURE — 82947 ASSAY GLUCOSE BLOOD QUANT: CPT

## 2023-10-16 PROCEDURE — 99291 CRITICAL CARE FIRST HOUR: CPT | Performed by: SURGERY

## 2023-10-16 PROCEDURE — 71045 X-RAY EXAM CHEST 1 VIEW: CPT

## 2023-10-16 PROCEDURE — 3E0336Z INTRODUCTION OF NUTRITIONAL SUBSTANCE INTO PERIPHERAL VEIN, PERCUTANEOUS APPROACH: ICD-10-PCS | Performed by: SURGERY

## 2023-10-16 PROCEDURE — 2500000004 HC RX 250 GENERAL PHARMACY W/ HCPCS (ALT 636 FOR OP/ED)

## 2023-10-16 PROCEDURE — 83735 ASSAY OF MAGNESIUM: CPT | Performed by: NURSE PRACTITIONER

## 2023-10-16 PROCEDURE — 87641 MR-STAPH DNA AMP PROBE: CPT | Performed by: INTERNAL MEDICINE

## 2023-10-16 PROCEDURE — 93005 ELECTROCARDIOGRAM TRACING: CPT

## 2023-10-16 PROCEDURE — 74018 RADEX ABDOMEN 1 VIEW: CPT

## 2023-10-16 PROCEDURE — 2020000001 HC ICU ROOM DAILY

## 2023-10-16 RX ORDER — HALOPERIDOL 5 MG/ML
5 INJECTION INTRAMUSCULAR EVERY 6 HOURS
Status: DISCONTINUED | OUTPATIENT
Start: 2023-10-16 | End: 2023-10-17

## 2023-10-16 RX ORDER — VANCOMYCIN HYDROCHLORIDE 1 G/200ML
1 INJECTION, SOLUTION INTRAVENOUS EVERY 24 HOURS
Status: DISCONTINUED | OUTPATIENT
Start: 2023-10-17 | End: 2023-10-16 | Stop reason: ALTCHOICE

## 2023-10-16 RX ORDER — MIDAZOLAM HYDROCHLORIDE 1 MG/ML
INJECTION, SOLUTION INTRAMUSCULAR; INTRAVENOUS
Status: COMPLETED
Start: 2023-10-16 | End: 2023-10-16

## 2023-10-16 RX ORDER — IPRATROPIUM BROMIDE AND ALBUTEROL SULFATE 2.5; .5 MG/3ML; MG/3ML
3 SOLUTION RESPIRATORY (INHALATION) EVERY 4 HOURS PRN
Status: DISCONTINUED | OUTPATIENT
Start: 2023-10-16 | End: 2023-11-02 | Stop reason: HOSPADM

## 2023-10-16 RX ORDER — HALOPERIDOL 5 MG/ML
5 INJECTION INTRAMUSCULAR EVERY 6 HOURS
Status: DISCONTINUED | OUTPATIENT
Start: 2023-10-16 | End: 2023-10-16

## 2023-10-16 RX ORDER — HALOPERIDOL 5 MG/ML
INJECTION INTRAMUSCULAR
Status: COMPLETED
Start: 2023-10-16 | End: 2023-10-16

## 2023-10-16 RX ORDER — POTASSIUM CHLORIDE 29.8 MG/ML
40 INJECTION INTRAVENOUS ONCE
Status: COMPLETED | OUTPATIENT
Start: 2023-10-16 | End: 2023-10-16

## 2023-10-16 RX ORDER — MIDAZOLAM HYDROCHLORIDE 1 MG/ML
5 INJECTION, SOLUTION INTRAMUSCULAR; INTRAVENOUS ONCE
Status: COMPLETED | OUTPATIENT
Start: 2023-10-16 | End: 2023-10-16

## 2023-10-16 RX ORDER — HALOPERIDOL 5 MG/ML
5 INJECTION INTRAMUSCULAR ONCE
Status: DISCONTINUED | OUTPATIENT
Start: 2023-10-16 | End: 2023-10-17

## 2023-10-16 RX ADMIN — HALOPERIDOL LACTATE 5 MG: 5 INJECTION, SOLUTION INTRAMUSCULAR at 20:43

## 2023-10-16 RX ADMIN — HEPARIN SODIUM 5000 UNITS: 5000 INJECTION INTRAVENOUS; SUBCUTANEOUS at 10:19

## 2023-10-16 RX ADMIN — DEXMEDETOMIDINE HYDROCHLORIDE 0.95 MCG/KG/HR: 4 INJECTION, SOLUTION INTRAVENOUS at 02:58

## 2023-10-16 RX ADMIN — HEPARIN SODIUM 5000 UNITS: 5000 INJECTION INTRAVENOUS; SUBCUTANEOUS at 18:17

## 2023-10-16 RX ADMIN — DEXMEDETOMIDINE HYDROCHLORIDE 1.5 MCG/KG/HR: 4 INJECTION, SOLUTION INTRAVENOUS at 20:10

## 2023-10-16 RX ADMIN — HALOPERIDOL LACTATE 5 MG: 5 INJECTION, SOLUTION INTRAMUSCULAR at 16:03

## 2023-10-16 RX ADMIN — KETAMINE HYDROCHLORIDE 0.4 MG/KG/HR: 100 INJECTION, SOLUTION, CONCENTRATE INTRAMUSCULAR; INTRAVENOUS at 20:11

## 2023-10-16 RX ADMIN — DEXMEDETOMIDINE HYDROCHLORIDE 1.5 MCG/KG/HR: 4 INJECTION, SOLUTION INTRAVENOUS at 14:24

## 2023-10-16 RX ADMIN — HEPARIN SODIUM 5000 UNITS: 5000 INJECTION INTRAVENOUS; SUBCUTANEOUS at 03:04

## 2023-10-16 RX ADMIN — CALCIUM CHLORIDE 0.5 G: 100 INJECTION, SOLUTION INTRAVENOUS at 08:21

## 2023-10-16 RX ADMIN — MIDAZOLAM HYDROCHLORIDE 5 MG: 1 INJECTION, SOLUTION INTRAMUSCULAR; INTRAVENOUS at 00:56

## 2023-10-16 RX ADMIN — PROPOFOL 40 MCG/KG/MIN: 10 INJECTION, EMULSION INTRAVENOUS at 06:58

## 2023-10-16 RX ADMIN — ASCORBIC ACID, VITAMIN A PALMITATE, CHOLECALCIFEROL, THIAMINE HYDROCHLORIDE, RIBOFLAVIN-5 PHOSPHATE SODIUM, PYRIDOXINE HYDROCHLORIDE, NIACINAMIDE, DEXPANTHENOL, ALPHA-TOCOPHEROL ACETATE, VITAMIN K1, FOLIC ACID, BIOTIN, CYANOCOBALAMIN: 200; 3300; 200; 6; 3.6; 6; 40; 15; 10; 150; 600; 60; 5 INJECTION, SOLUTION INTRAVENOUS at 20:18

## 2023-10-16 RX ADMIN — PROPOFOL 50 MCG/KG/MIN: 10 INJECTION, EMULSION INTRAVENOUS at 04:28

## 2023-10-16 RX ADMIN — POTASSIUM CHLORIDE 40 MEQ: 400 INJECTION, SOLUTION INTRAVENOUS at 06:57

## 2023-10-16 RX ADMIN — KETAMINE HYDROCHLORIDE 0.58 MG/KG/HR: 100 INJECTION, SOLUTION, CONCENTRATE INTRAMUSCULAR; INTRAVENOUS at 08:26

## 2023-10-16 RX ADMIN — DEXMEDETOMIDINE HYDROCHLORIDE 1.5 MCG/KG/HR: 4 INJECTION, SOLUTION INTRAVENOUS at 16:10

## 2023-10-16 RX ADMIN — DEXMEDETOMIDINE HYDROCHLORIDE 1.5 MCG/KG/HR: 4 INJECTION, SOLUTION INTRAVENOUS at 22:13

## 2023-10-16 RX ADMIN — PANTOPRAZOLE SODIUM 40 MG: 40 INJECTION, POWDER, FOR SOLUTION INTRAVENOUS at 08:28

## 2023-10-16 RX ADMIN — SODIUM CHLORIDE, POTASSIUM CHLORIDE, SODIUM LACTATE AND CALCIUM CHLORIDE 200 ML/HR: 600; 310; 30; 20 INJECTION, SOLUTION INTRAVENOUS at 04:36

## 2023-10-16 RX ADMIN — INSULIN LISPRO 4 UNITS: 100 INJECTION, SOLUTION INTRAVENOUS; SUBCUTANEOUS at 04:39

## 2023-10-16 RX ADMIN — KETAMINE HYDROCHLORIDE 0.05 MG/KG/HR: 100 INJECTION, SOLUTION, CONCENTRATE INTRAMUSCULAR; INTRAVENOUS at 00:49

## 2023-10-16 RX ADMIN — DEXMEDETOMIDINE HYDROCHLORIDE 1.41 MCG/KG/HR: 4 INJECTION, SOLUTION INTRAVENOUS at 08:24

## 2023-10-16 RX ADMIN — VANCOMYCIN HYDROCHLORIDE 1500 MG: 1.5 INJECTION, POWDER, LYOPHILIZED, FOR SOLUTION INTRAVENOUS at 03:03

## 2023-10-16 RX ADMIN — PIPERACILLIN SODIUM AND TAZOBACTAM SODIUM 3.38 G: 3; .375 INJECTION, SOLUTION INTRAVENOUS at 16:03

## 2023-10-16 RX ADMIN — DEXMEDETOMIDINE HYDROCHLORIDE 1.5 MCG/KG/HR: 4 INJECTION, SOLUTION INTRAVENOUS at 10:18

## 2023-10-16 RX ADMIN — HALOPERIDOL LACTATE 5 MG: 5 INJECTION, SOLUTION INTRAMUSCULAR at 10:14

## 2023-10-16 RX ADMIN — SODIUM CHLORIDE 10 ML: 9 INJECTION, SOLUTION INTRAVENOUS at 18:18

## 2023-10-16 RX ADMIN — HYDROMORPHONE HYDROCHLORIDE 0.4 MG: 1 INJECTION, SOLUTION INTRAMUSCULAR; INTRAVENOUS; SUBCUTANEOUS at 21:44

## 2023-10-16 RX ADMIN — PIPERACILLIN SODIUM AND TAZOBACTAM SODIUM 3.38 G: 3; .375 INJECTION, SOLUTION INTRAVENOUS at 01:10

## 2023-10-16 RX ADMIN — SODIUM CHLORIDE 10 ML: 9 INJECTION, SOLUTION INTRAVENOUS at 08:28

## 2023-10-16 RX ADMIN — SODIUM CHLORIDE 10 ML: 9 INJECTION, SOLUTION INTRAVENOUS at 01:10

## 2023-10-16 RX ADMIN — INSULIN LISPRO 2 UNITS: 100 INJECTION, SOLUTION INTRAVENOUS; SUBCUTANEOUS at 23:39

## 2023-10-16 RX ADMIN — PROPOFOL 50 MCG/KG/MIN: 10 INJECTION, EMULSION INTRAVENOUS at 18:17

## 2023-10-16 RX ADMIN — POTASSIUM CHLORIDE 40 MEQ: 400 INJECTION, SOLUTION INTRAVENOUS at 16:07

## 2023-10-16 RX ADMIN — PROPOFOL 50 MCG/KG/MIN: 10 INJECTION, EMULSION INTRAVENOUS at 14:25

## 2023-10-16 RX ADMIN — PROPOFOL 50 MCG/KG/MIN: 10 INJECTION, EMULSION INTRAVENOUS at 12:19

## 2023-10-16 RX ADMIN — PIPERACILLIN SODIUM AND TAZOBACTAM SODIUM 3.38 G: 3; .375 INJECTION, SOLUTION INTRAVENOUS at 08:28

## 2023-10-16 RX ADMIN — MIDAZOLAM 5 MG: 1 INJECTION INTRAMUSCULAR; INTRAVENOUS at 00:56

## 2023-10-16 RX ADMIN — DEXMEDETOMIDINE HYDROCHLORIDE 1.48 MCG/KG/HR: 4 INJECTION, SOLUTION INTRAVENOUS at 05:20

## 2023-10-16 RX ADMIN — PROPOFOL 50 MCG/KG/MIN: 10 INJECTION, EMULSION INTRAVENOUS at 20:46

## 2023-10-16 ASSESSMENT — COGNITIVE AND FUNCTIONAL STATUS - GENERAL
HELP NEEDED FOR BATHING: TOTAL
EATING MEALS: TOTAL
MOVING TO AND FROM BED TO CHAIR: TOTAL
STANDING UP FROM CHAIR USING ARMS: TOTAL
DRESSING REGULAR UPPER BODY CLOTHING: TOTAL
MOVING FROM LYING ON BACK TO SITTING ON SIDE OF FLAT BED WITH BEDRAILS: TOTAL
DAILY ACTIVITIY SCORE: 6
MOBILITY SCORE: 6
TURNING FROM BACK TO SIDE WHILE IN FLAT BAD: TOTAL
CLIMB 3 TO 5 STEPS WITH RAILING: TOTAL
TOILETING: TOTAL
WALKING IN HOSPITAL ROOM: TOTAL
PERSONAL GROOMING: TOTAL
DRESSING REGULAR LOWER BODY CLOTHING: TOTAL

## 2023-10-16 ASSESSMENT — PAIN - FUNCTIONAL ASSESSMENT
PAIN_FUNCTIONAL_ASSESSMENT: CPOT (CRITICAL CARE PAIN OBSERVATION TOOL)

## 2023-10-16 NOTE — PROGRESS NOTES
"Victorino Lara is a 48 y.o. adult on day 4 of admission presenting with Acute appendicitis with localized peritonitis.    Subjective   Patient sedated/intubated in the ICU. No acute events last night.        Objective     Physical Exam  Constitutional:       Appearance: He is ill-appearing.   HENT:      Head: Normocephalic.      Nose: Nose normal.      Mouth/Throat:      Comments: Breathing tube in place  Eyes:      Conjunctiva/sclera: Conjunctivae normal.      Pupils: Pupils are equal, round, and reactive to light.   Cardiovascular:      Rate and Rhythm: Normal rate.   Pulmonary:      Breath sounds: Decreased breath sounds present.   Abdominal:      General: Bowel sounds are decreased. There is distension.   Skin:     General: Skin is warm.       Last Recorded Vitals  Blood pressure 80/57, pulse 76, temperature 36.9 °C (98.4 °F), temperature source Temporal, resp. rate 21, height 1.702 m (5' 7\"), weight 93.9 kg (207 lb 0.2 oz), SpO2 95 %.  Intake/Output last 3 Shifts:  I/O last 3 completed shifts:  In: 8459 (90.1 mL/kg) [I.V.:6920.8 (73.7 mL/kg); IV Piggyback:1538.2]  Out: 5617 (59.8 mL/kg) [Urine:2817 (0.8 mL/kg/hr); Emesis/NG output:2800]  Weight: 93.9 kg     Relevant Results  Lab Results   Component Value Date    WBC 17.0 (H) 10/16/2023    HGB 8.8 (L) 10/16/2023    HCT 26.6 (L) 10/16/2023    MCV 87 10/16/2023     10/16/2023      Lab Results   Component Value Date    GLUCOSE 181 (H) 10/16/2023    CALCIUM 8.1 (L) 10/16/2023     10/16/2023    K 3.4 (L) 10/16/2023    CO2 28 10/16/2023    CL 97 (L) 10/16/2023    BUN 40 (H) 10/16/2023    CREATININE 2.20 (H) 10/16/2023      This patient has a central line   Reason for the central line remaining today? Parenteral medication      Assessment/Plan   Subjective  Patient sedated/intubated. ABD is distended and ABD binder in place. Patient on multiple pressors. NG tube in place with moderate output.     Impression  POD 6 Appendectomy and hernia " repair    Plan  NG/NPO/IVF  Nausea Control  Pain Control  Keep K+ 4.0-4.5 and Mag above 2  Continue care per primary team    Further recommendations per Dr. Mcadams    Principal Problem:    Acute appendicitis with localized peritonitis  Active Problems:    Anxiety    Depression    Hyperlipidemia    Diabetes mellitus, type 2 (CMS/HCC)    Appendicitis, acute    Appendicitis    Acute appendicitis, unspecified acute appendicitis type    Small bowel obstruction (CMS/[facility])        Alisha Guerra, APRN-CNP

## 2023-10-16 NOTE — PROGRESS NOTES
Objective Critical Care Daily Progress        Subjective   Patient is a 48 y.o. adult admitted on 10/10/2023  8:50 AM with the following indication(s) for ICU care hypoxemic respiratory failure, aspirration pneumonitis, agitation.     Interval History: Patient became hypoxic and hypotensive overnight requiring initiation of pressors and at one point FiO2 of 80%.   Since that time, the patient acutely improved with cessation of pressors and FiO2 down to 50% and SPO2 greater than 92%.  To output has decreased and urine output has maintained good output of at least 0.5 mL/kg.  Patient still remains agitated, on ketamine, Precedex, and propofol.    Complaints: Is intubated and heavily sedated.    Scheduled Medications:   [Held by provider] busPIRone, 15 mg, oral, BID  [Held by provider] docusate sodium, 100 mg, oral, BID  haloperidol lactate, 5 mg, intramuscular, Once  haloperidol lactate, 5 mg, intravenous, q6h  heparin (porcine), 5,000 Units, subcutaneous, q8h BETHANIE  insulin lispro, 0-10 Units, subcutaneous, q4h  [Held by provider] insulin regular, 0-5 Units, subcutaneous, TID with meals  [Held by provider] lurasidone, 80 mg, oral, Daily  [Held by provider] pantoprazole, 40 mg, oral, Daily before breakfast  pantoprazole, 40 mg, intravenous, Daily  piperacillin-tazobactam, 3.375 g, intravenous, q8h   And  sodium chloride, 10 mL, intravenous, q8h  [Held by provider] QUEtiapine, 300 mg, oral, BID  [Held by provider] simvastatin, 40 mg, oral, Nightly  [Held by provider] traZODone, 300 mg, oral, Nightly  [START ON 10/17/2023] vancomycin in dextrose 5 %, 1 g, intravenous, q24h  [Held by provider] venlafaxine XR, 150 mg, oral, Daily         Continuous Medications:   dexmedeTOMIDine, 0.1-1.5 mcg/kg/hr, Last Rate: 1.5 mcg/kg/hr (10/16/23 1018)  ketamine, 0.05-2 mg/kg/hr, Last Rate: 0.58 mg/kg/hr (10/16/23 0826)  norepinephrine, 0-3.3 mcg/kg/min, Last Rate: Stopped (10/16/23 0300)  propofol, 5-50 mcg/kg/min, Last Rate: 40  mcg/kg/min (10/16/23 0658)         PRN Medications:   PRN medications: alteplase, dextrose 10 % in water (D10W), dextrose, glucagon, HYDROmorphone, HYDROmorphone, ipratropium-albuteroL, naloxone    Objective   Vitals:  Most Recent:  Vitals:    10/16/23 1100   BP:    Pulse:    Resp: 22   Temp:    SpO2:        24hr Min/Max:  Temp  Min: 36.4 °C (97.5 °F)  Max: 37.2 °C (99 °F)  Pulse  Min: 74  Max: 102  BP  Min: 80/57  Max: 96/63  Resp  Min: 10  Max: 38  SpO2  Min: 90 %  Max: 98 %    LDA:  CVC 10/15/23 Triple lumen Left Internal jugular (Active)   Placement Date/Time: 10/15/23 (c) 1906   Hand Hygiene Performed Prior to CVC Insertion: Yes  Site Prep: Chlorhexidine   All 5 Sterile Barriers Used (Gloves, Gown, Cap, Mask, Large Sterile Drape): Yes  Local Anesthetic: Injectable  Lumen Type: Triple l...   Number of days: 0       Arterial Line 10/15/23 Right Radial (Active)   Placement Date/Time: 10/15/23 1846   Earliest Known Present: 10/15/23  Orientation: Right  Location: Radial  Securement Method: Sutured;Transparent dressing  Number of Sutures Placed: 2   Number of days: 0       ETT  7.5 mm (Active)   Placement Date/Time: 10/14/23 1950   Placed by External Staff?: (c) Other (Comment)  ETT Type: ETT - single  Single Lumen Tube Size: 7.5 mm  Location: Oral   Number of days: 1       Urethral Catheter Straight-tip 16 Fr. (Active)   Placement Date/Time: 10/12/23 0245   Placed by: ESPINOZA nevarez 3  Hand Hygiene Completed: Yes  Catheter Type: Straight-tip  Tube Size (Fr.): 16 Fr.  Catheter Balloon Size: 10 mL  Urine Returned: Yes   Number of days: 4       NG/OG/Feeding Tube Nasogastric 14 Fr Left nostril (Active)   Placement Date/Time: 10/12/23 1945   Hand Hygiene Completed: Yes  Type of Tube: NG/OG Tube  Tube Length: 55 cm  Tube Type: Nasogastric  NG/OG Tube Size: 14 Fr  Tube Location: Left nostril   Number of days: 3         Vent settings:  Vent Mode: Volume control/assist control  FiO2 (%):  [40 %-80 %] 60 %  S RR:   [14-16] 14  S VT:  [400 mL-450 mL] 450 mL  PEEP/CPAP (cm H2O):  [5 cm H20] 5 cm H20  MAP (cm H2O):  [1.3-9.9] 9.4    Hemodynamic parameters for last 24 hours:       I/O:    Intake/Output Summary (Last 24 hours) at 10/16/2023 1115  Last data filed at 10/16/2023 1020  Gross per 24 hour   Intake 7310 ml   Output 3267 ml   Net 4043 ml       Physical Exam:   General appearance: Intubated, sedated  Head: Normocephalic, without obvious abnormality  Eyes:conjunctivae/corneas clear. PERRL  Neck:  supple, symmetrical, trachea midline  Lungs:clear to auscultation bilaterally  Chest wall:  no tenderness  Heart: regular rate and rhythm, S1, S2 normal, no murmur, click, rub or gallop, off pressors  Abdomen: Soft, distended but less so, dressing clean and in place, no palpable evidence of recurrence of hernia  Male genitalia:  Proctor catheter in place  Extremities: Mildly edematous  Skin: Skin color, texture, turgor normal.  No rashes or lesions    Lab/Radiology/Diagnostic Review:  Results for orders placed or performed during the hospital encounter of 10/10/23 (from the past 24 hour(s))   POCT GLUCOSE   Result Value Ref Range    POCT Glucose 152 (H) 74 - 99 mg/dL   POCT GLUCOSE   Result Value Ref Range    POCT Glucose 130 (H) 74 - 99 mg/dL   Blood Gas Arterial Full Panel   Result Value Ref Range    POCT pH, Arterial 7.50 (H) 7.38 - 7.42 pH    POCT pCO2, Arterial 46 (H) 38 - 42 mm Hg    POCT pO2, Arterial 72 (L) 85 - 95 mm Hg    POCT SO2, Arterial 96 94 - 100 %    POCT Oxy Hemoglobin, Arterial 93.0 (L) 94.0 - 98.0 %    POCT Hematocrit Calculated, Arterial 34.0 (L) 36.0 - 52.0 %    POCT Sodium, Arterial 136 136 - 145 mmol/L    POCT Potassium, Arterial 3.5 3.5 - 5.3 mmol/L    POCT Chloride, Arterial 100 98 - 107 mmol/L    POCT Ionized Calcium, Arterial 1.00 (L) 1.10 - 1.33 mmol/L    POCT Glucose, Arterial 130 (H) 74 - 99 mg/dL    POCT Lactate, Arterial 1.2 0.4 - 2.0 mmol/L    POCT Base Excess, Arterial 11.3 (H) -2.0 - 3.0 mmol/L     POCT HCO3 Calculated, Arterial 35.9 (H) 22.0 - 26.0 mmol/L    POCT Hemoglobin, Arterial 11.4 (L) 13.5 - 17.5 g/dL    POCT Anion Gap, Arterial 4 (L) 10 - 25 mmo/L    Patient Temperature      FiO2 40 %    Ventilator Mode A/C     Ventilator Rate 16 bpm    Tidal Volume 400 mL    Peep CHM2O 5.0 cm H2O   POCT GLUCOSE   Result Value Ref Range    POCT Glucose 148 (H) 74 - 99 mg/dL   CBC   Result Value Ref Range    WBC 16.1 (H) 4.4 - 11.3 x10*3/uL    nRBC 0.0 0.0 - 0.0 /100 WBCs    RBC 3.26 (L) 4.00 - 5.90 x10*6/uL    Hemoglobin 9.4 (L) 12.0 - 17.5 g/dL    Hematocrit 28.3 (L) 36.0 - 52.0 %    MCV 87 80 - 100 fL    MCH 28.8 26.0 - 34.0 pg    MCHC 33.2 32.0 - 36.0 g/dL    RDW 13.9 11.5 - 14.5 %    Platelets 476 (H) 150 - 450 x10*3/uL    MPV 10.1 7.5 - 11.5 fL   Lactate   Result Value Ref Range    Lactate 0.9 0.4 - 2.0 mmol/L   Comprehensive Metabolic Panel   Result Value Ref Range    Glucose 157 (H) 74 - 99 mg/dL    Sodium 138 136 - 145 mmol/L    Potassium 3.2 (L) 3.5 - 5.3 mmol/L    Chloride 96 (L) 98 - 107 mmol/L    Bicarbonate 31 21 - 32 mmol/L    Anion Gap 14 10 - 20 mmol/L    Urea Nitrogen 44 (H) 6 - 23 mg/dL    Creatinine 2.74 (H) 0.50 - 1.30 mg/dL    eGFR 21 (L) >60 mL/min/1.73m*2    Calcium 7.7 (L) 8.6 - 10.3 mg/dL    Albumin 2.8 (L) 3.4 - 5.0 g/dL    Alkaline Phosphatase 51 33 - 120 U/L    Total Protein 6.1 (L) 6.4 - 8.2 g/dL    AST 17 9 - 39 U/L    Bilirubin, Total 1.9 (H) 0.0 - 1.2 mg/dL    ALT 11 7 - 52 U/L   Phosphorus   Result Value Ref Range    Phosphorus 4.7 2.5 - 4.9 mg/dL   Magnesium   Result Value Ref Range    Magnesium 2.00 1.60 - 2.40 mg/dL   Blood Gas Arterial Full Panel   Result Value Ref Range    POCT pH, Arterial 7.46 (H) 7.38 - 7.42 pH    POCT pCO2, Arterial 44 (H) 38 - 42 mm Hg    POCT pO2, Arterial 109 (H) 85 - 95 mm Hg    POCT SO2, Arterial 99 94 - 100 %    POCT Oxy Hemoglobin, Arterial 96.6 94.0 - 98.0 %    POCT Hematocrit Calculated, Arterial 25.0 (L) 36.0 - 52.0 %    POCT Sodium, Arterial 137  136 - 145 mmol/L    POCT Potassium, Arterial 3.4 (L) 3.5 - 5.3 mmol/L    POCT Chloride, Arterial 99 98 - 107 mmol/L    POCT Ionized Calcium, Arterial 0.96 (L) 1.10 - 1.33 mmol/L    POCT Glucose, Arterial 166 (H) 74 - 99 mg/dL    POCT Lactate, Arterial 1.4 0.4 - 2.0 mmol/L    POCT Base Excess, Arterial 6.8 (H) -2.0 - 3.0 mmol/L    POCT HCO3 Calculated, Arterial 31.3 (H) 22.0 - 26.0 mmol/L    POCT Hemoglobin, Arterial 8.2 (L) 13.5 - 17.5 g/dL    POCT Anion Gap, Arterial 10 10 - 25 mmo/L    Patient Temperature      FiO2 60 %    Ventilator Rate 14 bpm    Tidal Volume 400 mL    Peep CHM2O 5.0 cm H2O   CBC and Auto Differential   Result Value Ref Range    WBC 17.0 (H) 4.4 - 11.3 x10*3/uL    nRBC 0.0 0.0 - 0.0 /100 WBCs    RBC 3.05 (L) 4.00 - 5.90 x10*6/uL    Hemoglobin 8.8 (L) 12.0 - 17.5 g/dL    Hematocrit 26.6 (L) 36.0 - 52.0 %    MCV 87 80 - 100 fL    MCH 28.9 26.0 - 34.0 pg    MCHC 33.1 32.0 - 36.0 g/dL    RDW 13.8 11.5 - 14.5 %    Platelets 415 150 - 450 x10*3/uL    MPV 10.3 7.5 - 11.5 fL    Immature Granulocytes %, Automated 8.0 (H) 0.0 - 0.9 %    Immature Granulocytes Absolute, Automated 1.37 (H) 0.00 - 0.70 x10*3/uL   Manual Differential   Result Value Ref Range    Neutrophils %, Manual 56.0 40.0 - 80.0 %    Bands %, Manual 30.0 0.0 - 5.0 %    Lymphocytes %, Manual 7.0 13.0 - 44.0 %    Monocytes %, Manual 2.0 2.0 - 10.0 %    Eosinophils %, Manual 4.0 0.0 - 6.0 %    Basophils %, Manual 0.0 0.0 - 2.0 %    Atypical Lymphocytes %, Manual 1.0 0.0 - 2.0 %    Seg Neutrophils Absolute, Manual 9.52 (H) 1.20 - 7.00 x10*3/uL    Bands Absolute, Manual 5.10 (H) 0.00 - 0.70 x10*3/uL    Lymphocytes Absolute, Manual 1.19 (L) 1.20 - 4.80 x10*3/uL    Monocytes Absolute, Manual 0.34 0.10 - 1.00 x10*3/uL    Eosinophils Absolute, Manual 0.68 0.00 - 0.70 x10*3/uL    Basophils Absolute, Manual 0.00 0.00 - 0.10 x10*3/uL    Atypical Lymphs Absolute, Manual 0.17 0.00 - 0.50 x10*3/uL    Total Cells Counted 100     Neutrophils Absolute,  Manual 14.62 (H) 1.20 - 7.70 x10*3/uL    RBC Morphology See Below     Polychromasia Mild     Ovalocytes Few     Dohle Bodies Present     Toxic Granulation Present     Vacuolated Neutrophils Present     Giant Platelets Few     Clumped Platelets Present    Blood Gas Arterial Full Panel   Result Value Ref Range    POCT pH, Arterial 7.45 (H) 7.38 - 7.42 pH    POCT pCO2, Arterial 46 (H) 38 - 42 mm Hg    POCT pO2, Arterial 80 (L) 85 - 95 mm Hg    POCT SO2, Arterial 96 94 - 100 %    POCT Oxy Hemoglobin, Arterial 95.3 94.0 - 98.0 %    POCT Hematocrit Calculated, Arterial 23.0 (L) 36.0 - 52.0 %    POCT Sodium, Arterial 137 136 - 145 mmol/L    POCT Potassium, Arterial 3.5 3.5 - 5.3 mmol/L    POCT Chloride, Arterial 102 98 - 107 mmol/L    POCT Ionized Calcium, Arterial 1.07 (L) 1.10 - 1.33 mmol/L    POCT Glucose, Arterial 206 (H) 74 - 99 mg/dL    POCT Lactate, Arterial 1.3 0.4 - 2.0 mmol/L    POCT Base Excess, Arterial 7.3 (H) -2.0 - 3.0 mmol/L    POCT HCO3 Calculated, Arterial 32.0 (H) 22.0 - 26.0 mmol/L    POCT Hemoglobin, Arterial 7.5 (L) 13.5 - 17.5 g/dL    POCT Anion Gap, Arterial 7 (L) 10 - 25 mmo/L    Patient Temperature      FiO2 80 %    Ventilator Rate 14 bpm    Tidal Volume 450 mL    Peep CHM2O 5.0 cm H2O   Comprehensive Metabolic Panel   Result Value Ref Range    Glucose 181 (H) 74 - 99 mg/dL    Sodium 138 136 - 145 mmol/L    Potassium 3.4 (L) 3.5 - 5.3 mmol/L    Chloride 97 (L) 98 - 107 mmol/L    Bicarbonate 28 21 - 32 mmol/L    Anion Gap 16 10 - 20 mmol/L    Urea Nitrogen 40 (H) 6 - 23 mg/dL    Creatinine 2.20 (H) 0.50 - 1.30 mg/dL    eGFR 27 (L) >60 mL/min/1.73m*2    Calcium 8.1 (L) 8.6 - 10.3 mg/dL    Albumin 3.0 (L) 3.4 - 5.0 g/dL    Alkaline Phosphatase 56 33 - 120 U/L    Total Protein 6.1 (L) 6.4 - 8.2 g/dL    AST 20 9 - 39 U/L    Bilirubin, Total 2.1 (H) 0.0 - 1.2 mg/dL    ALT 11 7 - 52 U/L   Magnesium   Result Value Ref Range    Magnesium 2.05 1.60 - 2.40 mg/dL   Phosphorus   Result Value Ref Range     Phosphorus 3.8 2.5 - 4.9 mg/dL   POCT GLUCOSE   Result Value Ref Range    POCT Glucose 203 (H) 74 - 99 mg/dL   POCT GLUCOSE   Result Value Ref Range    POCT Glucose 149 (H) 74 - 99 mg/dL   Vancomycin   Result Value Ref Range    Vancomycin 20.1 (H) 5.0 - 20.0 ug/mL     XR abdomen 1 view    Result Date: 10/16/2023  Interpreted By:  Schoenberger, Joseph, STUDY: XR ABDOMEN 1 VIEW;  10/16/2023 6:14 am   INDICATION: Signs/Symptoms:small bowel obstruction.   COMPARISON: 10/15/2023   ACCESSION NUMBER(S): [id number]   ORDERING CLINICIAN: MEAGHAN KATZ   FINDINGS: Persistent gaseous distention of small bowel loops with some gas in the rectum. Possible postoperative ileus. Greatest caliber small bowel loops is noted in the upper abdomen approximating 5.5 cm similar to a comparable 5.2 cm measurement previously. Limited evaluation of pneumoperitoneum on supine imaging, however no gross evidence of free air is noted.   Visualized lungs are clear.   Osseous structures demonstrate no acute bony changes.       1.  Possible persistent postoperative ileus. Mid small bowel obstruction not entirely excluded. Continued follow-up recommended.   MACRO: None   Signed by: Joseph Schoenberger 10/16/2023 8:57 AM Dictation workstation:   [id number]    XR chest 1 view    Result Date: 10/16/2023  Interpreted By:  Schoenberger, Joseph, STUDY: XR CHEST 1 VIEW;  10/16/2023 6:14 am   INDICATION: Signs/Symptoms:intubated.   COMPARISON: 10/15/2023   ACCESSION NUMBER(S): [id number]   ORDERING CLINICIAN: MEAGHAN KATZ   FINDINGS: Positioning of nasogastric tube, left jugular venous infusion catheter, and endotracheal tube unchanged.       CARDIOMEDIASTINAL SILHOUETTE: Cardiac silhouette remains somewhat enlarged.   LUNGS: Platelike atelectasis in the left lung base unchanged. Progressing retrocardiac opacity possibly atelectasis or airspace consolidation.   ABDOMEN: No remarkable upper abdominal findings.   BONES: No acute osseous changes.        1.  Progressing left basal opacity.       MACRO: None   Signed by: Joseph Schoenberger 10/16/2023 8:56 AM Dictation workstation:   [id number]    XR chest 1 view    Result Date: 10/16/2023  Interpreted By:  Schoenberger, Joseph, STUDY: XR CHEST 1 VIEW;  10/15/2023 5:42 am   INDICATION: Signs/Symptoms:c/f aspiration, on vent.   COMPARISON: 05/29/2022   ACCESSION NUMBER(S): [id number]   ORDERING CLINICIAN: JESSA PENA   FINDINGS: Endotracheal tube placed distal tip 3.9 cm above the harvey. Nasogastric tube placed distal tip gastric body. Stomach is distended.       CARDIOMEDIASTINAL SILHOUETTE: Cardiomediastinal silhouette is normal in size and configuration.   LUNGS: There is a new area of platelike atelectasis in the left lung base. No other new focal lung opacities. Limited hypoventilatory exam.   ABDOMEN: No remarkable upper abdominal findings.   BONES: No acute osseous changes.       1.  Satisfactory positioning of tubes and lines. 2. New platelike atelectasis in left lung base. 3. Limited exam.       MACRO: None   Signed by: Joseph Schoenberger 10/16/2023 8:42 AM Dictation workstation:   [id number]    XR abdomen 1 view    Result Date: 10/16/2023  Interpreted By:  Schoenberger, Joseph, STUDY: XR ABDOMEN 1 VIEW;  10/15/2023 5:42 am   INDICATION: Signs/Symptoms:post-op.   COMPARISON: 10/14/2023   ACCESSION NUMBER(S): [id number]   ORDERING CLINICIAN: JESSA PENA   FINDINGS: Persistent gaseous distention of multiple small bowel loops with some gas in the colon. Stomach is distended with nasogastric tube tip gastric body. Greatest caliber small bowel loops on this exam is 5.2 cm compared to 6.6 cm and there is less gas in bowel loops on this exam than the prior. Improving ileus is consideration. Limited evaluation of pneumoperitoneum on supine imaging, however no gross evidence of free air is noted.   Visualized lungs are clear.   Osseous structures demonstrate no acute bony changes.       1.  Likely  improving postoperative ileus.   MACRO: None   Signed by: Joseph Schoenberger 10/16/2023 8:40 AM Dictation workstation:   [id number]    XR chest 1 view    Result Date: 10/15/2023  Interpreted By:  Juan Graves, STUDY: XR CHEST 1 VIEW;  10/15/2023 7:20 pm   INDICATION: Signs/Symptoms:line placement.   COMPARISON: Earlier on 10/15/2023   ACCESSION NUMBER(S): [id number]   ORDERING CLINICIAN: MEAGHAN KAZT   FINDINGS: There is interval placement of a left central venous catheter with tip terminates at the level of the right atrium. Endotracheal tube tip terminates approximately 2.9 cm superior to the harvey. Nasogastric tube tip terminates in the left upper abdomen level of the stomach. The cardiac silhouette is stable in size. There is limited inspiratory lung volumes. Stable band of opacity in the left midlung and left basilar consolidative/atelectatic opacity. No pneumothorax.       Interval placement of left central venous catheter with tip terminating at the level of the right atrium.   Limited inspiratory lung volumes with stable band of opacity in the left mid lung and medial left basilar consolidative/atelectatic opacity.   MACRO: None   Signed by: Juan Graves 10/15/2023 7:40 PM Dictation workstation:   [id number]    CT abdomen pelvis wo IV contrast    Result Date: 10/14/2023  Interpreted By:  Mana Price, STUDY: CT ABDOMEN PELVIS WO IV CONTRAST;  10/14/2023 1:25 pm   INDICATION: Signs/Symptoms:SBO postop appendectomy.   COMPARISON: 10/10/2023   ACCESSION NUMBER(S): [id number]   ORDERING CLINICIAN: DRU WYNN   TECHNIQUE: CT of the abdomen and pelvis was performed. No oral, no intravenous contrast.   FINDINGS: LOWER CHEST: There are bibasilar infiltrates.   ABDOMEN:   LIVER: There is no hepatic mass.   BILE DUCTS: There is no intrahepatic, common hepatic or common bile ductal dilatation.   GALLBLADDER: Contracted but otherwise unremarkable.   PANCREAS: The pancreas is unremarkable.   SPLEEN: The  spleen is unremarkable. There is no splenic mass or splenomegaly.   ADRENAL GLANDS: The adrenal glands are unremarkable.   KIDNEYS AND URETERS: The kidneys demonstrate no mass.  There is no intrarenal calculus or hydronephrosis. There is a Proctor catheter insufflated in a nondistended urinary bladder.   BOWEL: There are surgical clips in the right lower quadrant from appendectomy in the interval compared to 10/10/2023. There is haziness in the mesentery in the right lower quadrant from recent surgery. There is a small crescent of non walled-off ascitic fluid in the right lower quadrant. There is a small amount of ascites in the pelvis non walled-off. There is a nasogastric tube with the tip in the mid stomach. There is no gastric distention. There are markedly dilated loops of small bowel. There is collapsed distal small bowel and collapsed colon. This could represent a postoperative ileus but consider small-bowel obstruction. There are sigmoid diverticula with no diverticulitis.   VESSELS: The abdominal and pelvic vessels are unremarkable.   PERITONEUM/RETROPERITONEUM/LYMPH NODES: There is no retroperitoneal or pelvic adenopathy.  There is no ascites.   ABDOMINAL WALL: There is a periumbilical hernia with an area of opening measuring 5.8 cm in transverse dimension. There is herniation of a segment of nondilated non thickened small bowel. There is induration around the umbilicus from recent surgery. There is a small hematoma in the subcutaneous fat with hyperdense and hypodense fluid. This measures 3.0 x 1.8 cm. There is a dot of air which could be iatrogenic from prior laparoscopic surgery rather than abscess and infection..   BONE AND SOFT TISSUE: There is no acute osseous finding.   There is no soft tissue abnormality.       1. Interval appendectomy. Haziness right lower quadrant with a small crescent of fluid non walled-off. 2. Markedly dilated loops of small bowel with nondilated collapsed distal small bowel and  collapsed colon. This is likely a postoperative ileus but still consider small-bowel obstruction. Proctor catheter with the tip in the mid stomach. 3. Periumbilical hernia with a segment of nondilated non thickened small bowel. There is induration and a small amount of fluid in the fat around the umbilicus from recent surgery. There is a small hematoma in the periumbilical fat. There is a dot of air which could be iatrogenic from recent surgery rather than abscess and infection. 4. Proctor catheter insufflated in a nondistended urinary bladder. 5. Bibasilar pneumonia.   MACRO: None   Signed by: Mana Price 10/14/2023 1:34 PM Dictation workstation:   [id number]    XR abdomen 1 view    Result Date: 10/14/2023  Interpreted By:  Mana Price, STUDY: XR ABDOMEN 1 VIEW;  10/14/2023 8:15 am. 3 views.   INDICATION: Signs/Symptoms:Ileus.   COMPARISON: 10/12/2023   ACCESSION NUMBER(S): [id number]   ORDERING CLINICIAN: DRU WYNN   FINDINGS: There is a nasogastric tube with the tip in the proximal stomach. There is gastric decompression. There is progressive marked small bowel dilatation with loops of bowel dilated up to 6.6 cm. There is gas and stool in nondilated colon. Findings are consistent with a small-bowel obstruction.   There are no pathologic calcifications.   Visualized lungs are clear.   Osseous structures demonstrate no acute bony changes.         1. Nasogastric tube with the tip in the proximal stomach with gastric decompression. 2. Progressive marked small bowel dilatation consistent with small-bowel obstruction.     MACRO: None   Signed by: Mana Price 10/14/2023 8:55 AM Dictation workstation:   [id number]    XR abdomen 1 view    Result Date: 10/13/2023  Interpreted By:  Juan Graves, STUDY: XR ABDOMEN 1 VIEW;  10/12/2023 8:28 pm   INDICATION: Signs/Symptoms:verify NG placement.   COMPARISON: 10/12/2023   ACCESSION NUMBER(S): [id number]   ORDERING CLINICIAN: VAN BRYAN   FINDINGS: Nasogastric  tube tip terminates in the left upper abdomen at level of the stomach. There is cardiomegaly and persistent band of atelectasis or infiltrate in the left midlung. Gaseous distended bowel in the partially imaged abdomen.       Nasogastric tube tip terminates in the left upper abdomen at level of proximal stomach.   Cardiomegaly and stable band of atelectasis or infiltrate in the left mid lung.   Gaseous distended bowel in imaged upper abdomen; continue progress short-term progress is recommended for further evaluation.   MACRO: None   Signed by: Juan Graves 10/13/2023 1:36 AM Dictation workstation:   [id number]    XR abdomen 1 view    Result Date: 10/12/2023  Interpreted By:  Domingo Marks, STUDY: XR ABDOMEN 1 VIEW;  10/12/2023 6:55 pm   INDICATION: Signs/Symptoms:NG tube placement.   COMPARISON: 10/12/2023 at 4:46 p.m.   ACCESSION NUMBER(S): [id number]   ORDERING CLINICIAN: MEAGHAN CHARLTON   FINDINGS: Interval placement of a NG tube with its tip over the gastric body.   Dilatation of multiple small bowel loops as well as multiple colonic loops throughout the abdomen again seen. Bibasilar airspace disease present       1. NG tube tip projects over the gastric body 2. Redemonstration of bowel loop dilatation suggestive of underlying ileus versus obstruction.   MACRO: None   Signed by: Domingo Marks 10/12/2023 7:12 PM Dictation workstation:   [id number]    XR abdomen 1 view    Result Date: 10/12/2023  Interpreted By:  Mana Price, STUDY: XR ABDOMEN 1 VIEW;  10/12/2023 4:51 pm. 2 views.   INDICATION: Signs/Symptoms:r/o ileus.   COMPARISON: CT abdomen and pelvis 10/10/2023   ACCESSION NUMBER(S): [id number]   ORDERING CLINICIAN: MEAGHAN CHARLTON   FINDINGS: There is moderate small bowel and colonic distention, likely an ileus. There are surgical clips in the right lower quadrant from recent appendectomy.   There are no pathologic calcifications.   Visualized lungs are clear.   Osseous structures demonstrate  no acute bony changes.         Moderate small bowel and colonic distention, likely a postoperative ileus. Surgical clips right lower quadrant from recent appendectomy.   MACRO: None   Signed by: Mana Price 10/12/2023 4:56 PM Dictation workstation:   [id number]    CT angio chest for pulmonary embolism    Result Date: 10/12/2023  Interpreted By:  Ranjeet Gil, STUDY: CT ANGIO CHEST FOR PULMONARY EMBOLISM;  10/12/2023 4:56 am   INDICATION: Signs/Symptoms:Rule out pe.   COMPARISON: Portable chest 11 October 2023; CT abdomen and pelvis with contrast 10 October 2023   ACCESSION NUMBER(S): [id number]   ORDERING CLINICIAN: VAN [facility]YAN   TECHNIQUE: Pulmonary arterial phase CT chest after the uneventful administration of 75 mL IV contrast (Omnipaque 350).   Three dimensional maximum intensity projection (3-D MIPs) image/s were created on a separate dedicated workstation, reviewed and saved   FINDINGS: CARDIOVASCULAR:   Acute pulmonary embolism: Negative through the  segmental (third order) branch level. Subsegmental and more distal branches not well enough opacified to evaluate. Acute right heart strain:  Negative. No CT evidence of acute right heart strain Cardiac thrombus:  Negative; no obvious right heart or other cardiac thrombus is seen Pulmonary arteries ectasia:  Negative   Heart size:  Within normal limits Pericardial effusion:  Trace   Thoracic aortic aneurysm:  Negative Aortic dissection:  Negative   Heart failure change:  Negative.  No sign of interstitial or alveolar edema. Other:  n/a   NONVASCULAR MEDIASTINUM: Esophagus:  Grossly normal by CT Mediastinal Mass:  Negative Hiatal hernia:  None Other:  n/a   LYMPH NODES: No thoracic adenopathy   LUNGS / AIRSPACES / AIRWAYS:   LARGE AIRWAYS Filling defect: Negative Wall thickening: Negative Bronchiectasis: Negative Other: N/A   AIRSPACES Fibrosis: Negative Emphysema: Negative Consolidation: Negative Ground glass airspace disease: Negative Edema: Negative  Nodule / Mass: Negative Other: Dependent atelectasis in both lower lobes, new from two days ago. Additional bands of atelectasis in the left upper lobe also new   PLEURA: Effusion:  Both sides negative Pneumothorax:  Both sides negative Other:  n/a   CHEST WALL: Symmetric mild nonspecific bilateral gynecomastia   SKELETON: No acute or contributory abnormality   UPPER ABDOMEN: Fluid-filled, dilated small bowel loops with air-fluid levels new from CT abdomen and pelvis two days prior       SMALL BOWEL DILATION WITH MULTIPLE SMALL BOWEL AIR-FLUID LEVELS IN THE UPPER ABDOMEN, ALL NEW FROM CT TWO DAYS AGO   NO ACUTE DISEASE IN THE CHEST, ONLY SUBSTANTIAL BILATERAL LOWER LOBE DEPENDENT ATELECTASIS WHICH IS NEW FROM CT TWO DAYS PRIOR   NO AIRSPACE CONSOLIDATION OR GROUND-GLASS AIRSPACE DISEASE   NO EDEMA/FAILURE   NO ACUTE PULMONARY EMBOLISM THROUGH THE SEGMENTAL BRANCH LEVEL   NO AORTIC DISSECTION OR OTHER ACUTE THORACIC AORTIC FINDINGS   NO PLEURAL OR PERICARDIAL EFFUSION   NO PNEUMOTHORAX   MACRO: None   Signed by: Ranjeet Gil 10/12/2023 6:54 AM Dictation workstation:   [id number]    XR chest 1 view    Result Date: 10/11/2023  Interpreted By:  Chapo Cary, STUDY: XR CHEST 1 VIEW   INDICATION: Signs/Symptoms:hypoxemia.   COMPARISON: July 14, 2022   ACCESSION NUMBER(S): [id number]   ORDERING CLINICIAN: MEAGHAN CHARLTON   FINDINGS: No consolidation, effusion, edema, or pneumothorax. Low lung volumes with bibasilar atelectasis.       Low lung volumes with bibasilar atelectasis. No discrete consolidation seen.   Signed by: Chapo Cary 10/11/2023 6:18 PM Dictation workstation:   [id number]    ECG 12 lead    Result Date: 10/11/2023  Sinus tachycardia Inferior infarct (cited on or before 11-OCT-2023) Abnormal ECG When compared with ECG of 10-OCT-2023 13:57, Previous ECG has undetermined rhythm, needs review T wave inversion now evident in Anterior leads Confirmed by Carlo Jose ([id number]) on 10/11/2023 4:50:57 PM    CT  abdomen pelvis w IV contrast    Result Date: 10/10/2023  STUDY: CT Abdomen and Pelvis with IV Contrast; 10/10/2023 10:52 AM. INDICATION: Generalized abdominal pain. COMPARISON: CT AP 5/29/2022. ACCESSION NUMBER(S): [id number] ORDERING CLINICIAN: SHAI ERWIN TECHNIQUE: CT of the abdomen and pelvis was performed.  Contiguous axial images were obtained at 3 mm slice thickness through the abdomen and pelvis. Coronal and sagittal reconstructions at 3 mm slice thickness were performed.  Omnipaque 350 75 mL was administered intravenously.  FINDINGS: LOWER CHEST: No cardiomegaly.  No pericardial effusion.  Lung bases are clear.  ABDOMEN:  LIVER: No hepatomegaly.  Smooth surface contour.  Mild fatty infiltration of the liver.  BILE DUCTS: No intrahepatic or extrahepatic biliary ductal dilatation.  GALLBLADDER: The gallbladder is present without gallstones. STOMACH: No abnormalities identified.  PANCREAS: No masses or ductal dilatation.  SPLEEN: No splenomegaly or focal splenic lesion.  ADRENAL GLANDS: No thickening or nodules.  KIDNEYS AND URETERS: Kidneys are normal in size and location.  No renal or ureteral calculi.  PELVIS:  BLADDER: No abnormalities identified.  REPRODUCTIVE ORGANS: No abnormalities identified.  BOWEL: Appendix is fluid-filled and dilated measuring up to 1.6 cm with multiple intraluminal appendicoliths and mild periappendiceal inflammatory change. No extraluminal gas or abscess. Colonic diverticulosis without findings of diverticulitis  VESSELS: No abnormalities identified.  Abdominal aorta is normal in caliber.  PERITONEUM/RETROPERITONEUM/LYMPH NODES: No free fluid.  No pneumoperitoneum. No lymphadenopathy.  ABDOMINAL WALL: No abnormalities identified. SOFT TISSUES: No abnormalities identified.  BONES: No acute fracture or aggressive osseous lesion.    1. Findings compatible with acute uncomplicated appendicitis. No extraluminal gas or abscess. Recommend surgical consultation. 2. Mild hepatic  "steatosis. 3. Colonic diverticulosis without findings of diverticulitis. Findings discussed with and acknowledged by Ayden Ramires at 11:53 AM Signed by Faustino Hebert MD      Additional Labs:  SCVO2: No results found for: \"[id number]\"                   Assessment/Plan   Principal Problem:    Acute appendicitis with localized peritonitis  Active Problems:    Anxiety    Depression    Hyperlipidemia    Diabetes mellitus, type 2 (CMS/HCC)    Appendicitis, acute    Appendicitis    Acute appendicitis, unspecified acute appendicitis type    Small bowel obstruction (CMS/HCC)    POD6 from lap appy and hernia repair, POD2 from reexploration, EUGENIA, and repair of hernia.  Patient remains intubated for what appears to be aspiration pneumonitis and severe agitation    Neuro:   #Bipolar 1 disorder,depression  #Anxiety & Depression  #Panic disorder  -hold all oral home psych meds for now given SBO, will restart when resumption of diet  -Propofol, precedex, ketamine  -Will add scheduled 5mg q6h haldol with an addition 5mg haldol prn (check qtc prior to administration), will wean ketamine and then propofol, may need precedex on during extubation  -PRN hydromorphone for pain, will use CPOT  -[facility] ICU  - restraints required to prevent inadvertent extubation as patient reaches for ETT when off, renewed restraints today     Pulm:   #Post-operative respiratory insufficiency  -2/2 aspiration pneumonitis, improving oxygentation  -ABG later today and PRN  -keep intubated today given hypoxemia and agitation     CV:   -Normocardic and off pressors  -Continue to monitor     GI:   #Acute appendicitis with localized peritonitis without perforation or gangrene  #SBO  -obstruction from an interloop adhesion and NOT from the hernia,  -s/p appendectomy 10/10/23  -NPO  -TPN for persistent lack of nutrition       :   Intake/Output Summary (Last 24 hours) at 10/15/2023 0907  Last data filed at 10/15/2023 0615        Gross per 24 hour   Intake 4157.33 ml "   Output 5075 ml   Net -917.67 ml   #DYLAN  -Present on admission, secondary to hypovolemia most likely, improving  -Creatinine worsening, 2.2, will recheck today  -Given significant positive (+4.5L) I/O and good UO with hypoxemia, will hold off on further IVFs today other than TPN and what will be with drips  -Will hold on diuresis today, would like Creatinine to stabilize first  -Hourly urine output     Heme:   - H/H stable  - heparin subcu for DYLAN     Endo:   #DMII   -recently diagnosed  -q4hr accuchecks with SS coverage, has not required significant amount of insulin so will just keep SSI, may need more once TPN is initiated     ID:   -WBC now 17K  - continue zosyn for now for empiric treatment, will DC in 72 hours unless cultures are positive     Lines:   -ETT  -Central line (needed for TPN)  -A-line  -Porctor  -all are needed due to critical nature of patient        I spent 65 minutes in the professional and overall care of this patient.        Norman Calloway MD  10/16/23

## 2023-10-16 NOTE — PROGRESS NOTES
"Victorino Lara is a 48 y.o. adult on day 3 of admission presenting with Acute appendicitis with localized peritonitis.   Now in ICU after Lap w EUGENIA. On [facility] Vent now,   Remains intubated due to Aspiration Pneumonia and Agitation.  Subjective   See plan / summary below           Objective   Physical Exam  Constitutional:       Comments: Sedated, moves all extremities   HENT:      Head: Normocephalic.      Mouth/Throat:      Mouth: Mucous membranes are moist.   Eyes:      Pupils: Pupils are equal, round, and reactive to light.   Cardiovascular:      Rate and Rhythm: Normal rate and regular rhythm.      Pulses: Normal pulses.   Pulmonary:      Comments: Intubated, right lung clear, left diminished  Abdominal:      Comments: Binder on, soft, no bowel sounds noted but protruding     Skin:     General: Skin is warm.   Neurological:      General: No focal deficit present.     Last Recorded Vitals  Blood pressure 101/60, pulse (!) 112, temperature 36.6 °C (97.9 °F), temperature source Temporal, resp. rate 24, height 1.702 m (5' 7\"), weight 93.9 kg (207 lb 0.2 oz), SpO2 96 %.  Intake/Output last 3 Shifts:  I/O last 3 completed shifts:  In: 5157.3 (54.9 mL/kg) [I.V.:1715.1 (18.3 mL/kg); IV Piggyback:3442.2]  Out: 8125 (86.5 mL/kg) [Urine:1925 (0.6 mL/kg/hr); Emesis/NG output:6200]  Weight: 93.9 kg      Relevant Results        Scheduled medications  busPIRone, 15 mg, oral, BID  docusate sodium, 100 mg, oral, BID  enoxaparin, 40 mg, subcutaneous, Daily  insulin lispro, 0-10 Units, subcutaneous, q4h  [Held by provider] insulin regular, 0-5 Units, subcutaneous, TID with meals  lidocaine, 0.1 mL, subcutaneous, Once  lurasidone, 80 mg, oral, Daily  [Held by provider] pantoprazole, 40 mg, oral, Daily before breakfast  pantoprazole, 40 mg, intravenous, Daily  QUEtiapine, 300 mg, oral, BID  simvastatin, 40 mg, oral, Nightly  sodium chloride, 10 mL, intravenous, q8h  traZODone, 300 mg, oral, Nightly  venlafaxine XR, 150 mg, oral, Daily   "      Continuous medications  propofol, 5-50 mcg/kg/min, Last Rate: 70 mcg/kg/min (10/15/23 0658)  sodium chloride 0.9%, 125 mL/hr, Last Rate: 125 mL/hr (10/15/23 0820)        PRN medications  PRN medications: acetaminophen, calcium carbonate, dextrose 10 % in water (D10W), dextrose, glucagon, HYDROmorphone, naloxone, oxyCODONE, oxygen, tiZANidine           Results for orders placed or performed during the hospital encounter of 10/10/23 (from the past 24 hour(s))   POCT GLUCOSE   Result Value Ref Range     POCT Glucose 172 (H) 74 - 99 mg/dL   Blood Gas Arterial Full Panel   Result Value Ref Range     POCT pH, Arterial 7.50 (H) 7.38 - 7.42 pH     POCT pCO2, Arterial 47 (H) 38 - 42 mm Hg     POCT pO2, Arterial 86 85 - 95 mm Hg     POCT SO2, Arterial 98 94 - 100 %     POCT Oxy Hemoglobin, Arterial 95.7 94.0 - 98.0 %     POCT Hematocrit Calculated, Arterial 37.0 36.0 - 52.0 %     POCT Sodium, Arterial 137 136 - 145 mmol/L     POCT Potassium, Arterial 3.3 (L) 3.5 - 5.3 mmol/L     POCT Chloride, Arterial 95 (L) 98 - 107 mmol/L     POCT Ionized Calcium, Arterial 1.09 (L) 1.10 - 1.33 mmol/L     POCT Glucose, Arterial 164 (H) 74 - 99 mg/dL     POCT Lactate, Arterial 1.0 0.4 - 2.0 mmol/L     POCT Base Excess, Arterial 11.9 (H) -2.0 - 3.0 mmol/L     POCT HCO3 Calculated, Arterial 36.7 (H) 22.0 - 26.0 mmol/L     POCT Hemoglobin, Arterial 12.3 (L) 13.5 - 17.5 g/dL     POCT Anion Gap, Arterial 9 (L) 10 - 25 mmo/L     Patient Temperature         FiO2 50 %     Ventilator Mode A/C       Ventilator Rate 16 bpm     Tidal Volume 400 mL     Peep CHM2O 5.0 cm H2O   Phosphorus   Result Value Ref Range     Phosphorus 4.4 2.5 - 4.9 mg/dL   Magnesium   Result Value Ref Range     Magnesium 2.02 1.60 - 2.40 mg/dL   CBC and Auto Differential   Result Value Ref Range     WBC 5.1 4.4 - 11.3 x10*3/uL     nRBC 0.0 0.0 - 0.0 /100 WBCs     RBC 3.94 (L) 4.00 - 5.90 x10*6/uL     Hemoglobin 12.0 12.0 - 17.5 g/dL     Hematocrit 34.4 (L) 36.0 - 52.0 %      MCV 87 80 - 100 fL     MCH 30.5 26.0 - 34.0 pg     MCHC 34.9 32.0 - 36.0 g/dL     RDW 13.5 11.5 - 14.5 %     Platelets 424 150 - 450 x10*3/uL     MPV 10.8 7.5 - 11.5 fL     Immature Granulocytes %, Automated 5.9 (H) 0.0 - 0.9 %     Immature Granulocytes Absolute, Automated 0.30 0.00 - 0.70 x10*3/uL   Manual Differential   Result Value Ref Range     Neutrophils %, Manual 50.0 40.0 - 80.0 %     Bands %, Manual 15.0 0.0 - 5.0 %     Lymphocytes %, Manual 25.0 13.0 - 44.0 %     Monocytes %, Manual 4.0 2.0 - 10.0 %     Eosinophils %, Manual 3.0 0.0 - 6.0 %     Basophils %, Manual 1.0 0.0 - 2.0 %     Atypical Lymphocytes %, Manual 2.0 0.0 - 2.0 %     Seg Neutrophils Absolute, Manual 2.55 1.20 - 7.00 x10*3/uL     Bands Absolute, Manual 0.77 (H) 0.00 - 0.70 x10*3/uL     Lymphocytes Absolute, Manual 1.28 1.20 - 4.80 x10*3/uL     Monocytes Absolute, Manual 0.20 0.10 - 1.00 x10*3/uL     Eosinophils Absolute, Manual 0.15 0.00 - 0.70 x10*3/uL     Basophils Absolute, Manual 0.05 0.00 - 0.10 x10*3/uL     Atypical Lymphs Absolute, Manual 0.10 0.00 - 0.50 x10*3/uL     Total Cells Counted 100       Neutrophils Absolute, Manual 3.32 1.20 - 7.70 x10*3/uL     RBC Morphology See Below       Clumped Platelets Present     Comprehensive Metabolic Panel   Result Value Ref Range     Glucose 141 (H) 74 - 99 mg/dL     Sodium 134 (L) 136 - 145 mmol/L     Potassium 3.3 (L) 3.5 - 5.3 mmol/L     Chloride 93 (L) 98 - 107 mmol/L     Bicarbonate 31 21 - 32 mmol/L     Anion Gap 13 10 - 20 mmol/L     Urea Nitrogen 29 (H) 6 - 23 mg/dL     Creatinine 1.21 0.50 - 1.30 mg/dL     eGFR 55 (L) >60 mL/min/1.73m*2     Calcium 8.2 (L) 8.6 - 10.3 mg/dL     Albumin 3.0 (L) 3.4 - 5.0 g/dL     Alkaline Phosphatase 53 33 - 120 U/L     Total Protein 6.3 (L) 6.4 - 8.2 g/dL     AST 16 9 - 39 U/L     Bilirubin, Total 1.3 (H) 0.0 - 1.2 mg/dL     ALT 11 7 - 52 U/L   POCT GLUCOSE   Result Value Ref Range     POCT Glucose 142 (H) 74 - 99 mg/dL   Light Blue Top   Result Value  Ref Range     Extra Tube Hold for add-ons.     SST TOP   Result Value Ref Range     Extra Tube Hold for add-ons.     POCT GLUCOSE   Result Value Ref Range     POCT Glucose 165 (H) 74 - 99 mg/dL   Blood Gas Venous Full Panel   Result Value Ref Range     POCT pH, Venous 7.49 (H) 7.33 - 7.43 pH     POCT pCO2, Venous 42 41 - 51 mm Hg     POCT pO2, Venous 68 (H) 35 - 45 mm Hg     POCT SO2, Venous 94 (H) 45 - 75 %     POCT Oxy Hemoglobin, Venous 91.7 (H) 45.0 - 75.0 %     POCT Hematocrit Calculated, Venous 57.0 (H) 36.0 - 52.0 %     POCT Sodium, Venous 132 (L) 136 - 145 mmol/L     POCT Potassium, Venous 4.1 3.5 - 5.3 mmol/L     POCT Chloride, Venous 93 (L) 98 - 107 mmol/L     POCT Ionized Calicum, Venous 0.89 (L) 1.10 - 1.33 mmol/L     POCT Glucose, Venous 158 (H) 74 - 99 mg/dL     POCT Lactate, Venous 1.7 0.4 - 2.0 mmol/L     POCT Base Excess, Venous 7.6 (H) -2.0 - 3.0 mmol/L     POCT HCO3 Calculated, Venous 32.0 (H) 22.0 - 26.0 mmol/L     POCT Hemoglobin, Venous 19.0 (H) 12.0 - 17.5 g/dL     POCT Anion Gap, Venous 11.0 10.0 - 25.0 mmol/L     Patient Temperature 37.0 degrees Celsius     FiO2 50 %     Ventilator Mode A/C     CBC   Result Value Ref Range     WBC 10.8 4.4 - 11.3 x10*3/uL     nRBC 0.0 0.0 - 0.0 /100 WBCs     RBC 4.08 4.00 - 5.90 x10*6/uL     Hemoglobin 11.7 (L) 12.0 - 17.5 g/dL     Hematocrit 35.5 (L) 36.0 - 52.0 %     MCV 87 80 - 100 fL     MCH 28.7 26.0 - 34.0 pg     MCHC 33.0 32.0 - 36.0 g/dL     RDW 13.7 11.5 - 14.5 %     Platelets 552 (H) 150 - 450 x10*3/uL     MPV 10.5 7.5 - 11.5 fL   Comprehensive metabolic panel   Result Value Ref Range     Glucose 151 (H) 74 - 99 mg/dL     Sodium 137 136 - 145 mmol/L     Potassium 4.0 3.5 - 5.3 mmol/L     Chloride 94 (L) 98 - 107 mmol/L     Bicarbonate 29 21 - 32 mmol/L     Anion Gap 18 10 - 20 mmol/L     Urea Nitrogen 35 (H) 6 - 23 mg/dL     Creatinine 1.86 (H) 0.50 - 1.30 mg/dL     eGFR 33 (L) >60 mL/min/1.73m*2     Calcium 8.6 8.6 - 10.3 mg/dL     Albumin 3.3 (L)  3.4 - 5.0 g/dL     Alkaline Phosphatase 58 33 - 120 U/L     Total Protein 7.0 6.4 - 8.2 g/dL     AST 15 9 - 39 U/L     Bilirubin, Total 1.7 (H) 0.0 - 1.2 mg/dL     ALT 13 7 - 52 U/L   POCT GLUCOSE   Result Value Ref Range     POCT Glucose 156 (H) 74 - 99 mg/dL   POCT GLUCOSE   Result Value Ref Range     POCT Glucose 148 (H) 74 - 99 mg/dL         CT abdomen pelvis wo IV contrast     Result Date: 10/14/2023  Interpreted By:  Mana Price, STUDY: CT ABDOMEN PELVIS WO IV CONTRAST;  10/14/2023 1:25 pm   INDICATION: Signs/Symptoms:SBO postop appendectomy.   COMPARISON: 10/10/2023   ACCESSION NUMBER(S): [id number]   ORDERING CLINICIAN: DRU WYNN   TECHNIQUE: CT of the abdomen and pelvis was performed. No oral, no intravenous contrast.   FINDINGS: LOWER CHEST: There are bibasilar infiltrates.   ABDOMEN:   LIVER: There is no hepatic mass.   BILE DUCTS: There is no intrahepatic, common hepatic or common bile ductal dilatation.   GALLBLADDER: Contracted but otherwise unremarkable.   PANCREAS: The pancreas is unremarkable.   SPLEEN: The spleen is unremarkable. There is no splenic mass or splenomegaly.   ADRENAL GLANDS: The adrenal glands are unremarkable.   KIDNEYS AND URETERS: The kidneys demonstrate no mass.  There is no intrarenal calculus or hydronephrosis. There is a Proctor catheter insufflated in a nondistended urinary bladder.   BOWEL: There are surgical clips in the right lower quadrant from appendectomy in the interval compared to 10/10/2023. There is haziness in the mesentery in the right lower quadrant from recent surgery. There is a small crescent of non walled-off ascitic fluid in the right lower quadrant. There is a small amount of ascites in the pelvis non walled-off. There is a nasogastric tube with the tip in the mid stomach. There is no gastric distention. There are markedly dilated loops of small bowel. There is collapsed distal small bowel and collapsed colon. This could represent a postoperative  ileus but consider small-bowel obstruction. There are sigmoid diverticula with no diverticulitis.   VESSELS: The abdominal and pelvic vessels are unremarkable.   PERITONEUM/RETROPERITONEUM/LYMPH NODES: There is no retroperitoneal or pelvic adenopathy.  There is no ascites.   ABDOMINAL WALL: There is a periumbilical hernia with an area of opening measuring 5.8 cm in transverse dimension. There is herniation of a segment of nondilated non thickened small bowel. There is induration around the umbilicus from recent surgery. There is a small hematoma in the subcutaneous fat with hyperdense and hypodense fluid. This measures 3.0 x 1.8 cm. There is a dot of air which could be iatrogenic from prior laparoscopic surgery rather than abscess and infection..   BONE AND SOFT TISSUE: There is no acute osseous finding.   There is no soft tissue abnormality.        1. Interval appendectomy. Haziness right lower quadrant with a small crescent of fluid non walled-off. 2. Markedly dilated loops of small bowel with nondilated collapsed distal small bowel and collapsed colon. This is likely a postoperative ileus but still consider small-bowel obstruction. Proctor catheter with the tip in the mid stomach. 3. Periumbilical hernia with a segment of nondilated non thickened small bowel. There is induration and a small amount of fluid in the fat around the umbilicus from recent surgery. There is a small hematoma in the periumbilical fat. There is a dot of air which could be iatrogenic from recent surgery rather than abscess and infection. 4. Proctor catheter insufflated in a nondistended urinary bladder. 5. Bibasilar pneumonia.   MACRO: None   Signed by: Mana Price 10/14/2023 1:34 PM Dictation workstation:   [id number]     XR abdomen 1 view     Result Date: 10/14/2023  Interpreted By:  Mana Price, STUDY: XR ABDOMEN 1 VIEW;  10/14/2023 8:15 am. 3 views.   INDICATION: Signs/Symptoms:Ileus.   COMPARISON: 10/12/2023   ACCESSION NUMBER(S):  [id number]   ORDERING CLINICIAN: DRU WYNN   FINDINGS: There is a nasogastric tube with the tip in the proximal stomach. There is gastric decompression. There is progressive marked small bowel dilatation with loops of bowel dilated up to 6.6 cm. There is gas and stool in nondilated colon. Findings are consistent with a small-bowel obstruction.   There are no pathologic calcifications.   Visualized lungs are clear.   Osseous structures demonstrate no acute bony changes.          1. Nasogastric tube with the tip in the proximal stomach with gastric decompression. 2. Progressive marked small bowel dilatation consistent with small-bowel obstruction.     MACRO: None   Signed by: Mana Price 10/14/2023 8:55 AM Dictation workstation:   [id number]         ------------------------------------------------------------------------------      Victorino Lara is a 48 YM with prior abdominal hernia repair, initially presented on October 10th with right lower quadrant abdominal pain. Found to have acute appendicitis with localized peritonitis without rupture and taken to the OR for laparoscopic appendectomy.       Postoperatively patient was tachycardic which was treated with IV fluids and patient also had some hypoxemia. On the 12th pulmonology was consulted for hypoxia/dyspnea/atelectasis, CT chest showed significant bibasilar atelectasis.  Patient was only requiring nasal cannula. POD #3 patient developed ileus per KUB. Supportive measures were continued including NGT, IV fluids and replacing electrolytes. Symptoms did not improve.  CT abdomen and pelvis was done which was concerning for SBO.     10/14 patient underwent exploratory laparotomy. Findings included recurrence of umbilial hernia, bowel distended but viable, serosanguonous abdominal fluid, single interloop adhesion about 10cm from terminal ileum which was lysted, hernia repaired primarily. As patient was being extubated he had copious amounts of bilious emesis  and was retching so patient was reanesthetized and read paralyzed with rocuronium, reprepped and opened up again for reexploration.  Reexploration revealed middle third of the fascia of the stitches had pulled through, no visible bowel just omentum that was placed for closure of the fascia.  Bowel was distended but completely viable.      Transferred to ICU postop  on mechanical ventilator for acute postop respiratory insufficiency status post exploratory laparotomy, lysis of adhesions, repair of incisional hernia, reexploratory laparotomy with repair of dehiscence     10/15: large output from NGT overnight, 1400 ml.               Assessment/Plan   Principal Problem:    Acute appendicitis with localized peritonitis  Active Problems:    Anxiety    Depression    Hyperlipidemia    Diabetes mellitus, type 2 (CMS/HCC)    Appendicitis, acute    Appendicitis    Acute appendicitis, unspecified acute appendicitis type    Small bowel obstruction (CMS/HCC)        -------------------------------------------------------------------------------------------------------------------------  Neuro:   #Bipolar 1 disorder,depression  #Anxiety & Depression  #Panic disorder  -hold all oral home psych meds for now given SBO  -propofol for sedation, add precedex if needed  -PRN hydromorphone for pain  -[facility] ICU  - restraints required to prevent inadvertent extubation as patient reaches for ETT when off     Pulm:   #Post-operative respiratory insufficiency  -2/2 copious bilious emesis while trying to extubate, was never actually extubated however had to be reanesthetized and reparalyzed with rocuronium, on mechanical ventilator   -ABG today and PRN  -keep intubated today given large amount of output from NGT     CV:   -HR 110s, hemodynamic stable, not on vasopressors  -Continue to monitor     GI:   #Acute appendicitis with localized peritonitis without perforation or gangrene  #SBO  -obstruction from an interloop adhesion and NOT from the  hernia,  -s/p appendectomy 10/10/23, has been on Zosyn  -NPO  -fu KUB     #Post-op ileus versus SBO  #Dehiscence of internal incision  -s/p ex-lap with lysis of adhesions and incisional hernia repair, followed by reexploratory laparotomy for postop internal dehiscence due to retching     :   Intake/Output Summary (Last 24 hours) at 10/15/2023 0907  Last data filed at 10/15/2023 0615      Gross per 24 hour   Intake 4157.33 ml   Output 5075 ml   Net -917.67 ml   #DYLAN  -Present on admission, secondary to hypovolemia most likely, improving  -Creatinine worsening, 1.86 today, daily CMP  -Change NS to LR at 100 ml/hr  -Hourly urine output     Heme:   - H/H stable  - change lovenox to heparin while has DYLAN     Endo:   #DMII   -recently diagnosed  -q4hr accuchecks with SS coverage     ID: no leukocytosis, no fevers  - continue zosyn     Lines:   -ETT    Pulmonary Plan:- wean off Vent per ICU Sx.

## 2023-10-16 NOTE — CONSULTS
"Nutrition Assessment Note  Assessment    Assessment:  Reason for Assessment  Reason for Assessment: TPN recommendations    History:  Food and Nutrient History  Energy Intake:  (NPO)  Food and Nutrient History: Has past medical history of anxiety, depression, bipolar disorder, HLD, recently diagnosed DM, GERD, JOI. Pt here for acute appendicitis with umbilical hernia. Found to have DYLAN. S/p appendectomy and hernia repair on 10/10. Developed post-op ileus vs SBO. S/p ex lap with lysis of adhesions, reapir of incisional hernia 10/14. On 10/14, had copious amounts of bilious emesis, taken back to surgery for reexploratory laparotomy with repair of dehiscence. NG to suction. Per flowsheets, has distended abdomen, last BM 10/10. Labs and meds reviewed. Glucose currently 181. Potassium low at 3.4. Currently intubated d/t aspiration pneumonia and agitation. No family at bedside to interview. No pressors. Propofol at 22.5 ml/hr, which provides 594 kcal. Plans for TPN per ICU intensivist. Recommendations sent to intensivist via secure chat for Clinimix 5% amino acids, 15% dextrose with goal rate of 70 ml/hr. This provides 1680 ml total volume, 1193 kcal + propofol kcal, 84g protein. Do not recommend lipids d/t receiving propofol.    Anthropometrics:  Height: 170.2 cm (5' 7.01\")  Weight: 93.9 kg (207 lb 0.2 oz)  BMI (Calculated): 32.42    Weight Change  Weight History / % Weight Change: Limited wt histories in EMR. 10/20/22: 100.7 kg; 11/30/22: 99.8 kg; 10/10/23: 94.5 kg; 10/16/23: 93.9 kg  Significant Weight Loss: No    IBW/kg (Dietitian Calculated): 67.3 kg    Energy Needs:  Calculated Energy Needs Using Equations  Height: 170.2 cm (5' 7.01\")  Minute Ventilation (L/min): 9.91 L/min  Temp: 37 °C (98.6 °F)    Estimated Energy Needs  Method for Estimating Needs: 6759-8780 kcal (IBW x 22-25 kcal/kg)    Estimated Protein Needs  Type of Protein Needed: 80-135g (IBW x 1.2-2 g/kg)    Estimated Fluid Needs  Method for Estimating " Needs: 1 ml/kcal or per MD    Nutrition Focused Physical Findings:  Subcutaneous Fat Loss  Orbital Fat Pads: Defer (d/t constraints of critical care)    Muscle Wasting  Temporalis: Defer (d/t constraints of critical care)    Edema  Edema: none    Physical Findings (Nutrition Deficiency/Toxicity)  Skin:  (incisions)    Diagnosis   Diagnosis:  Malnutrition Diagnosis  Patient has Malnutrition Diagnosis: No    Patient has Nutrition Diagnosis: Yes  Nutrition Diagnosis 1: Inadequate oral intake  Diagnosis Status (1): New  Related to (1): altered GI function and acute illness requiring intubation  As Evidenced by (1): NPO, NG to suction, initiation of TPN       Interventions/Recommendations   Interventions/Recommendations:     Food and/or Nutrient Delivery Interventions  Interventions: Parenteral nutrition/ IV fluids    Goal: MD to order TPN (Clinimix 5% amino acids, 15% dextrose) with goal rate of 70 ml/hr    Coordination of Nutrition Care by a Nutrition Professional  Collaboration and Referral of Nutrition Care: Collaboration by nutrition professional with other providers (ICU interdisciplinary rounds; La RN; secure chat sent to intensivist with TPN recommendations at MD's request)    Monitoring and Evaluation   Monitoring/Evaluation:  Food and Nutrient Related History     Enteral and Parenteral Nutrition Intake: Parenteral nutrition intake  Criteria: PN and propofol to meet >75% of estimated nutritional needs    Anthropometrics: Body Composition/Growth/Weight History  Weight: Measured weight  Criteria: maintain stable wt    Biochemical Data, Medical Tests and Procedures  Criteria: electrolytes WNL    Glucose/Endocrine Profile: Glucose, casual  Criteria: BG      Follow Up  Time Spent (min): 60 minutes  Last Date of Nutrition Visit: 10/16/23  Nutrition Follow-Up Needed?: 3-5 days  Follow up Comment: monitor for diet advancement s/p extubation

## 2023-10-16 NOTE — PROGRESS NOTES
"Vancomycin Dosing by Pharmacy- INITIAL    Victorino Lara is a 48 y.o. year old adult who Pharmacy has been consulted for vancomycin dosing for pneumonia. Based on the patient's indication and renal status this patient will be dosed based on a goal AUC of 400-600.     Renal function is currently stable.    Visit Vitals  BP 80/57   Pulse 82   Temp 36.4 °C (97.6 °F)   Resp 10        Lab Results   Component Value Date    CREATININE 2.74 (H) 10/15/2023    CREATININE 1.86 (H) 10/15/2023    CREATININE 1.21 10/14/2023    CREATININE 1.40 (H) 10/14/2023        Patient weight is 93.9kg.    No results found for: \"CULTURE\"     I/O last 3 completed shifts:  In: 5077.3 (54.1 mL/kg) [I.V.:2635.1 (28.1 mL/kg); IV Piggyback:2442.2]  Out: 6292 (67 mL/kg) [Urine:1792 (0.5 mL/kg/hr); Emesis/NG output:4500]  Weight: 93.9 kg   [unfilled]    Lab Results   Component Value Date    PATIENTTEMP  10/15/2023      Comment:      NOTE: Patient Results are Not Corrected for Temperature    PATIENTTEMP  10/15/2023      Comment:      NOTE: Patient Results are Not Corrected for Temperature    PATIENTTEMP 37.0 10/15/2023          Assessment/Plan     Patient has already been given a loading dose of 1500 mg on 10/16/23 @0303 in MICU  Will initiate vancomycin maintenance,  1000 mg every 24 hours.    This dosing regimen is predicted by [employer] to result in the following pharmacokinetic parameters:  Regimen: 1000 mg IV every 24 hours.  Start time: 15:03 on 10/16/2023  Exposure target: AUC24 (range)400-600 mg/L.hr   AUC24,ss: 484 mg/L.hr  Probability of AUC24 > 400: 71 %  Ctrough,ss: 16 mg/L  Probability of Ctrough,ss > 20: 28 %  Probability of nephrotoxicity (Lodise YOLANDE 2009): 11 %      Follow-up level will be ordered on 10/16/23 at 0800 and 1200 unless clinically indicated sooner.  Will continue to monitor renal function daily while on vancomycin and order serum creatinine at least every 48 hours if not already ordered.  Follow for continued vancomycin " needs, clinical response, and signs/symptoms of toxicity.       ALONDRA FREED, PharmD

## 2023-10-17 ENCOUNTER — APPOINTMENT (OUTPATIENT)
Dept: RADIOLOGY | Facility: HOSPITAL | Age: 48
End: 2023-10-17
Payer: COMMERCIAL

## 2023-10-17 LAB
ALBUMIN SERPL BCP-MCNC: 2.7 G/DL (ref 3.4–5)
ALP SERPL-CCNC: 67 U/L (ref 33–120)
ALT SERPL W P-5'-P-CCNC: 13 U/L (ref 7–52)
ANION GAP BLDA CALCULATED.4IONS-SCNC: 7 MMO/L (ref 10–25)
ANION GAP BLDA CALCULATED.4IONS-SCNC: 7 MMO/L (ref 10–25)
ANION GAP SERPL CALC-SCNC: 11 MMOL/L (ref 10–20)
ANION GAP SERPL CALC-SCNC: 11 MMOL/L (ref 10–20)
AST SERPL W P-5'-P-CCNC: 21 U/L (ref 9–39)
BASE EXCESS BLDA CALC-SCNC: 4.1 MMOL/L (ref -2–3)
BASE EXCESS BLDA CALC-SCNC: 6 MMOL/L (ref -2–3)
BASOPHILS # BLD MANUAL: 0 X10*3/UL (ref 0–0.1)
BASOPHILS NFR BLD MANUAL: 0 %
BILIRUB SERPL-MCNC: 1 MG/DL (ref 0–1.2)
BODY TEMPERATURE: ABNORMAL
BODY TEMPERATURE: ABNORMAL
BUN SERPL-MCNC: 24 MG/DL (ref 6–23)
BUN SERPL-MCNC: 31 MG/DL (ref 6–23)
CA-I BLDA-SCNC: 1.06 MMOL/L (ref 1.1–1.33)
CA-I BLDA-SCNC: 1.11 MMOL/L (ref 1.1–1.33)
CALCIUM SERPL-MCNC: 7.8 MG/DL (ref 8.6–10.3)
CALCIUM SERPL-MCNC: 8 MG/DL (ref 8.6–10.3)
CHLORIDE BLDA-SCNC: 106 MMOL/L (ref 98–107)
CHLORIDE BLDA-SCNC: 107 MMOL/L (ref 98–107)
CHLORIDE SERPL-SCNC: 101 MMOL/L (ref 98–107)
CHLORIDE SERPL-SCNC: 104 MMOL/L (ref 98–107)
CO2 SERPL-SCNC: 28 MMOL/L (ref 21–32)
CO2 SERPL-SCNC: 29 MMOL/L (ref 21–32)
CREAT SERPL-MCNC: 1.32 MG/DL (ref 0.5–1.3)
CREAT SERPL-MCNC: 1.63 MG/DL (ref 0.5–1.3)
CRITICAL CALL TIME: 354
CRITICAL CALLED BY: ABNORMAL
CRITICAL CALLED TO: ABNORMAL
CRITICAL READ BACK: ABNORMAL
DOHLE BOD BLD QL SMEAR: PRESENT
EOSINOPHIL # BLD MANUAL: 0.79 X10*3/UL (ref 0–0.7)
EOSINOPHIL NFR BLD MANUAL: 5 %
ERYTHROCYTE [DISTWIDTH] IN BLOOD BY AUTOMATED COUNT: 13.8 % (ref 11.5–14.5)
GFR SERPL CREATININE-BSD FRML MDRD: 39 ML/MIN/1.73M*2
GFR SERPL CREATININE-BSD FRML MDRD: 50 ML/MIN/1.73M*2
GLUCOSE BLD MANUAL STRIP-MCNC: 121 MG/DL (ref 74–99)
GLUCOSE BLD MANUAL STRIP-MCNC: 127 MG/DL (ref 74–99)
GLUCOSE BLD MANUAL STRIP-MCNC: 133 MG/DL (ref 74–99)
GLUCOSE BLD MANUAL STRIP-MCNC: 155 MG/DL (ref 74–99)
GLUCOSE BLD MANUAL STRIP-MCNC: 156 MG/DL (ref 74–99)
GLUCOSE BLD MANUAL STRIP-MCNC: 164 MG/DL (ref 74–99)
GLUCOSE BLD MANUAL STRIP-MCNC: 181 MG/DL (ref 74–99)
GLUCOSE BLDA-MCNC: 137 MG/DL (ref 74–99)
GLUCOSE BLDA-MCNC: 180 MG/DL (ref 74–99)
GLUCOSE SERPL-MCNC: 134 MG/DL (ref 74–99)
GLUCOSE SERPL-MCNC: 163 MG/DL (ref 74–99)
HCO3 BLDA-SCNC: 28.5 MMOL/L (ref 22–26)
HCO3 BLDA-SCNC: 29.5 MMOL/L (ref 22–26)
HCT VFR BLD AUTO: 24.9 % (ref 36–52)
HCT VFR BLD EST: 19 % (ref 36–52)
HCT VFR BLD EST: 32 % (ref 36–52)
HGB BLD-MCNC: 8.1 G/DL (ref 12–17.5)
HGB BLDA-MCNC: 10.5 G/DL (ref 13.5–17.5)
HGB BLDA-MCNC: 6.3 G/DL (ref 13.5–17.5)
IMM GRANULOCYTES # BLD AUTO: 2.02 X10*3/UL (ref 0–0.7)
IMM GRANULOCYTES NFR BLD AUTO: 12.8 % (ref 0–0.9)
INHALED O2 CONCENTRATION: 50 %
INHALED O2 CONCENTRATION: 55 %
LACTATE BLDA-SCNC: 1.2 MMOL/L (ref 0.4–2)
LACTATE BLDA-SCNC: 1.4 MMOL/L (ref 0.4–2)
LYMPHOCYTES # BLD MANUAL: 1.74 X10*3/UL (ref 1.2–4.8)
LYMPHOCYTES NFR BLD MANUAL: 11 %
MAGNESIUM SERPL-MCNC: 2.07 MG/DL (ref 1.6–2.4)
MAGNESIUM SERPL-MCNC: 2.22 MG/DL (ref 1.6–2.4)
MCH RBC QN AUTO: 28.6 PG (ref 26–34)
MCHC RBC AUTO-ENTMCNC: 32.5 G/DL (ref 32–36)
MCV RBC AUTO: 88 FL (ref 80–100)
METAMYELOCYTES # BLD MANUAL: 0.47 X10*3/UL
METAMYELOCYTES NFR BLD MANUAL: 3 %
MONOCYTES # BLD MANUAL: 0.32 X10*3/UL (ref 0.1–1)
MONOCYTES NFR BLD MANUAL: 2 %
MYELOCYTES # BLD MANUAL: 0.32 X10*3/UL
MYELOCYTES NFR BLD MANUAL: 2 %
NEUTROPHILS # BLD MANUAL: 12.17 X10*3/UL (ref 1.2–7.7)
NEUTS BAND # BLD MANUAL: 1.9 X10*3/UL (ref 0–0.7)
NEUTS BAND NFR BLD MANUAL: 12 %
NEUTS SEG # BLD MANUAL: 10.27 X10*3/UL (ref 1.2–7)
NEUTS SEG NFR BLD MANUAL: 65 %
NRBC BLD-RTO: 0 /100 WBCS (ref 0–0)
OVALOCYTES BLD QL SMEAR: ABNORMAL
OXYHGB MFR BLDA: 93.1 % (ref 94–98)
OXYHGB MFR BLDA: 95 % (ref 94–98)
PCO2 BLDA: 37 MM HG (ref 38–42)
PCO2 BLDA: 41 MM HG (ref 38–42)
PEEP CMH2O: 5 CM H2O
PH BLDA: 7.45 PH (ref 7.38–7.42)
PH BLDA: 7.51 PH (ref 7.38–7.42)
PHOSPHATE SERPL-MCNC: 1.9 MG/DL (ref 2.5–4.9)
PHOSPHATE SERPL-MCNC: 3.4 MG/DL (ref 2.5–4.9)
PLATELET # BLD AUTO: 428 X10*3/UL (ref 150–450)
PMV BLD AUTO: 10.5 FL (ref 7.5–11.5)
PO2 BLDA: 68 MM HG (ref 85–95)
PO2 BLDA: 69 MM HG (ref 85–95)
POLYCHROMASIA BLD QL SMEAR: ABNORMAL
POTASSIUM BLDA-SCNC: 3.3 MMOL/L (ref 3.5–5.3)
POTASSIUM BLDA-SCNC: 3.4 MMOL/L (ref 3.5–5.3)
POTASSIUM SERPL-SCNC: 3.3 MMOL/L (ref 3.5–5.3)
POTASSIUM SERPL-SCNC: 3.5 MMOL/L (ref 3.5–5.3)
PROT SERPL-MCNC: 5.7 G/DL (ref 6.4–8.2)
RBC # BLD AUTO: 2.83 X10*6/UL (ref 4–5.9)
RBC MORPH BLD: ABNORMAL
SAO2 % BLDA: 95 % (ref 94–100)
SAO2 % BLDA: 95 % (ref 94–100)
SODIUM BLDA-SCNC: 139 MMOL/L (ref 136–145)
SODIUM BLDA-SCNC: 139 MMOL/L (ref 136–145)
SODIUM SERPL-SCNC: 138 MMOL/L (ref 136–145)
SODIUM SERPL-SCNC: 139 MMOL/L (ref 136–145)
STAPHYLOCOCCUS SPEC CULT: ABNORMAL
TIDAL VOLUME: 450 ML
TOTAL CELLS COUNTED BLD: 100
TOXIC GRANULES BLD QL SMEAR: PRESENT
VENTILATOR MODE: ABNORMAL
VENTILATOR RATE: 14 BPM
WBC # BLD AUTO: 15.8 X10*3/UL (ref 4.4–11.3)

## 2023-10-17 PROCEDURE — 84100 ASSAY OF PHOSPHORUS: CPT

## 2023-10-17 PROCEDURE — 71045 X-RAY EXAM CHEST 1 VIEW: CPT

## 2023-10-17 PROCEDURE — 84132 ASSAY OF SERUM POTASSIUM: CPT | Performed by: NURSE PRACTITIONER

## 2023-10-17 PROCEDURE — 85027 COMPLETE CBC AUTOMATED: CPT

## 2023-10-17 PROCEDURE — 37799 UNLISTED PX VASCULAR SURGERY: CPT | Performed by: NURSE PRACTITIONER

## 2023-10-17 PROCEDURE — 84132 ASSAY OF SERUM POTASSIUM: CPT

## 2023-10-17 PROCEDURE — 83735 ASSAY OF MAGNESIUM: CPT

## 2023-10-17 PROCEDURE — 2500000004 HC RX 250 GENERAL PHARMACY W/ HCPCS (ALT 636 FOR OP/ED): Performed by: SURGERY

## 2023-10-17 PROCEDURE — 83735 ASSAY OF MAGNESIUM: CPT | Performed by: NURSE PRACTITIONER

## 2023-10-17 PROCEDURE — 99291 CRITICAL CARE FIRST HOUR: CPT | Performed by: SURGERY

## 2023-10-17 PROCEDURE — 85007 BL SMEAR W/DIFF WBC COUNT: CPT

## 2023-10-17 PROCEDURE — 2020000001 HC ICU ROOM DAILY

## 2023-10-17 PROCEDURE — 51702 INSERT TEMP BLADDER CATH: CPT

## 2023-10-17 PROCEDURE — 82947 ASSAY GLUCOSE BLOOD QUANT: CPT

## 2023-10-17 PROCEDURE — 37799 UNLISTED PX VASCULAR SURGERY: CPT

## 2023-10-17 PROCEDURE — 71045 X-RAY EXAM CHEST 1 VIEW: CPT | Performed by: RADIOLOGY

## 2023-10-17 PROCEDURE — 2500000004 HC RX 250 GENERAL PHARMACY W/ HCPCS (ALT 636 FOR OP/ED): Performed by: NURSE PRACTITIONER

## 2023-10-17 PROCEDURE — 2500000004 HC RX 250 GENERAL PHARMACY W/ HCPCS (ALT 636 FOR OP/ED)

## 2023-10-17 PROCEDURE — 84100 ASSAY OF PHOSPHORUS: CPT | Performed by: NURSE PRACTITIONER

## 2023-10-17 PROCEDURE — 94003 VENT MGMT INPAT SUBQ DAY: CPT

## 2023-10-17 PROCEDURE — 96372 THER/PROPH/DIAG INJ SC/IM: CPT | Performed by: NURSE PRACTITIONER

## 2023-10-17 PROCEDURE — 84132 ASSAY OF SERUM POTASSIUM: CPT | Performed by: SURGERY

## 2023-10-17 PROCEDURE — C9113 INJ PANTOPRAZOLE SODIUM, VIA: HCPCS | Performed by: SURGERY

## 2023-10-17 PROCEDURE — 2500000005 HC RX 250 GENERAL PHARMACY W/O HCPCS: Performed by: SURGERY

## 2023-10-17 RX ORDER — POTASSIUM CHLORIDE 29.8 MG/ML
40 INJECTION INTRAVENOUS ONCE
Status: COMPLETED | OUTPATIENT
Start: 2023-10-17 | End: 2023-10-17

## 2023-10-17 RX ORDER — HALOPERIDOL 5 MG/ML
5 INJECTION INTRAMUSCULAR EVERY 4 HOURS PRN
Status: DISCONTINUED | OUTPATIENT
Start: 2023-10-17 | End: 2023-10-18 | Stop reason: ALTCHOICE

## 2023-10-17 RX ORDER — FENTANYL CITRATE-0.9 % NACL/PF 10 MCG/ML
25-200 PLASTIC BAG, INJECTION (ML) INTRAVENOUS CONTINUOUS
Status: DISCONTINUED | OUTPATIENT
Start: 2023-10-17 | End: 2023-10-26

## 2023-10-17 RX ORDER — MIDAZOLAM HYDROCHLORIDE 1 MG/ML
INJECTION, SOLUTION INTRAMUSCULAR; INTRAVENOUS
Status: COMPLETED
Start: 2023-10-17 | End: 2023-10-17

## 2023-10-17 RX ORDER — MIDAZOLAM HYDROCHLORIDE 1 MG/ML
2 INJECTION, SOLUTION INTRAMUSCULAR; INTRAVENOUS ONCE
Status: COMPLETED | OUTPATIENT
Start: 2023-10-17 | End: 2023-10-17

## 2023-10-17 RX ORDER — HALOPERIDOL 5 MG/ML
10 INJECTION INTRAMUSCULAR EVERY 6 HOURS
Status: DISCONTINUED | OUTPATIENT
Start: 2023-10-17 | End: 2023-10-18 | Stop reason: ALTCHOICE

## 2023-10-17 RX ORDER — MIDAZOLAM HYDROCHLORIDE 1 MG/ML
INJECTION, SOLUTION INTRAMUSCULAR; INTRAVENOUS
Status: DISPENSED
Start: 2023-10-17 | End: 2023-10-18

## 2023-10-17 RX ADMIN — PROPOFOL 50 MCG/KG/MIN: 10 INJECTION, EMULSION INTRAVENOUS at 02:03

## 2023-10-17 RX ADMIN — PROPOFOL 50 MCG/KG/MIN: 10 INJECTION, EMULSION INTRAVENOUS at 09:30

## 2023-10-17 RX ADMIN — HALOPERIDOL LACTATE 10 MG: 5 INJECTION, SOLUTION INTRAMUSCULAR at 22:29

## 2023-10-17 RX ADMIN — MIDAZOLAM 2 MG: 1 INJECTION INTRAMUSCULAR; INTRAVENOUS at 18:53

## 2023-10-17 RX ADMIN — HALOPERIDOL LACTATE 5 MG: 5 INJECTION, SOLUTION INTRAMUSCULAR at 03:05

## 2023-10-17 RX ADMIN — PIPERACILLIN SODIUM AND TAZOBACTAM SODIUM 3.38 G: 3; .375 INJECTION, SOLUTION INTRAVENOUS at 00:04

## 2023-10-17 RX ADMIN — PROPOFOL 50 MCG/KG/MIN: 10 INJECTION, EMULSION INTRAVENOUS at 00:05

## 2023-10-17 RX ADMIN — FENTANYL CITRATE 150 MCG/HR: 0.05 INJECTION, SOLUTION INTRAMUSCULAR; INTRAVENOUS at 13:12

## 2023-10-17 RX ADMIN — MIDAZOLAM 2 MG: 1 INJECTION INTRAMUSCULAR; INTRAVENOUS at 12:28

## 2023-10-17 RX ADMIN — INSULIN LISPRO 2 UNITS: 100 INJECTION, SOLUTION INTRAVENOUS; SUBCUTANEOUS at 03:27

## 2023-10-17 RX ADMIN — MIDAZOLAM 2 MG: 1 INJECTION INTRAMUSCULAR; INTRAVENOUS at 13:04

## 2023-10-17 RX ADMIN — DEXMEDETOMIDINE HYDROCHLORIDE 1.5 MCG/KG/HR: 4 INJECTION, SOLUTION INTRAVENOUS at 03:31

## 2023-10-17 RX ADMIN — PROPOFOL 50 MCG/KG/MIN: 10 INJECTION, EMULSION INTRAVENOUS at 21:27

## 2023-10-17 RX ADMIN — DEXMEDETOMIDINE HYDROCHLORIDE 1.5 MCG/KG/HR: 4 INJECTION, SOLUTION INTRAVENOUS at 21:27

## 2023-10-17 RX ADMIN — PROPOFOL 50 MCG/KG/MIN: 10 INJECTION, EMULSION INTRAVENOUS at 16:42

## 2023-10-17 RX ADMIN — PIPERACILLIN SODIUM AND TAZOBACTAM SODIUM 3.38 G: 3; .375 INJECTION, SOLUTION INTRAVENOUS at 16:40

## 2023-10-17 RX ADMIN — POTASSIUM CHLORIDE 40 MEQ: 400 INJECTION, SOLUTION INTRAVENOUS at 15:25

## 2023-10-17 RX ADMIN — DEXMEDETOMIDINE HYDROCHLORIDE 1.5 MCG/KG/HR: 4 INJECTION, SOLUTION INTRAVENOUS at 06:22

## 2023-10-17 RX ADMIN — SODIUM CHLORIDE 10 ML: 9 INJECTION, SOLUTION INTRAVENOUS at 16:40

## 2023-10-17 RX ADMIN — HALOPERIDOL LACTATE 5 MG: 5 INJECTION, SOLUTION INTRAMUSCULAR at 20:37

## 2023-10-17 RX ADMIN — PROPOFOL 50 MCG/KG/MIN: 10 INJECTION, EMULSION INTRAVENOUS at 04:27

## 2023-10-17 RX ADMIN — FENTANYL CITRATE 150 MCG/HR: 0.05 INJECTION, SOLUTION INTRAMUSCULAR; INTRAVENOUS at 19:40

## 2023-10-17 RX ADMIN — SODIUM CHLORIDE 10 ML: 9 INJECTION, SOLUTION INTRAVENOUS at 00:04

## 2023-10-17 RX ADMIN — DEXMEDETOMIDINE HYDROCHLORIDE 1.5 MCG/KG/HR: 4 INJECTION, SOLUTION INTRAVENOUS at 00:52

## 2023-10-17 RX ADMIN — PROPOFOL 50 MCG/KG/MIN: 10 INJECTION, EMULSION INTRAVENOUS at 19:40

## 2023-10-17 RX ADMIN — HEPARIN SODIUM 5000 UNITS: 5000 INJECTION INTRAVENOUS; SUBCUTANEOUS at 01:13

## 2023-10-17 RX ADMIN — PROPOFOL 50 MCG/KG/MIN: 10 INJECTION, EMULSION INTRAVENOUS at 06:52

## 2023-10-17 RX ADMIN — FENTANYL CITRATE 25 MCG/HR: 0.05 INJECTION, SOLUTION INTRAMUSCULAR; INTRAVENOUS at 09:37

## 2023-10-17 RX ADMIN — DEXMEDETOMIDINE HYDROCHLORIDE 1.5 MCG/KG/HR: 4 INJECTION, SOLUTION INTRAVENOUS at 09:30

## 2023-10-17 RX ADMIN — PROPOFOL 50 MCG/KG/MIN: 10 INJECTION, EMULSION INTRAVENOUS at 13:05

## 2023-10-17 RX ADMIN — HALOPERIDOL LACTATE 5 MG: 5 INJECTION, SOLUTION INTRAMUSCULAR at 12:19

## 2023-10-17 RX ADMIN — POTASSIUM CHLORIDE 40 MEQ: 400 INJECTION, SOLUTION INTRAVENOUS at 06:22

## 2023-10-17 RX ADMIN — SODIUM CHLORIDE 10 ML: 9 INJECTION, SOLUTION INTRAVENOUS at 08:11

## 2023-10-17 RX ADMIN — HALOPERIDOL LACTATE 5 MG: 5 INJECTION, SOLUTION INTRAMUSCULAR at 09:04

## 2023-10-17 RX ADMIN — HYDROMORPHONE HYDROCHLORIDE 0.2 MG: 1 INJECTION, SOLUTION INTRAMUSCULAR; INTRAVENOUS; SUBCUTANEOUS at 07:55

## 2023-10-17 RX ADMIN — HEPARIN SODIUM 5000 UNITS: 5000 INJECTION INTRAVENOUS; SUBCUTANEOUS at 09:30

## 2023-10-17 RX ADMIN — PANTOPRAZOLE SODIUM 40 MG: 40 INJECTION, POWDER, FOR SOLUTION INTRAVENOUS at 08:11

## 2023-10-17 RX ADMIN — INSULIN LISPRO 2 UNITS: 100 INJECTION, SOLUTION INTRAVENOUS; SUBCUTANEOUS at 16:34

## 2023-10-17 RX ADMIN — INSULIN LISPRO 2 UNITS: 100 INJECTION, SOLUTION INTRAVENOUS; SUBCUTANEOUS at 08:13

## 2023-10-17 RX ADMIN — PIPERACILLIN SODIUM AND TAZOBACTAM SODIUM 3.38 G: 3; .375 INJECTION, SOLUTION INTRAVENOUS at 08:10

## 2023-10-17 RX ADMIN — HALOPERIDOL LACTATE 10 MG: 5 INJECTION, SOLUTION INTRAMUSCULAR at 17:09

## 2023-10-17 RX ADMIN — DEXMEDETOMIDINE HYDROCHLORIDE 1.5 MCG/KG/HR: 4 INJECTION, SOLUTION INTRAVENOUS at 18:11

## 2023-10-17 RX ADMIN — HEPARIN SODIUM 5000 UNITS: 5000 INJECTION INTRAVENOUS; SUBCUTANEOUS at 18:10

## 2023-10-17 RX ADMIN — DEXMEDETOMIDINE HYDROCHLORIDE 1.5 MCG/KG/HR: 4 INJECTION, SOLUTION INTRAVENOUS at 15:46

## 2023-10-17 RX ADMIN — DEXMEDETOMIDINE HYDROCHLORIDE 1.5 MCG/KG/HR: 4 INJECTION, SOLUTION INTRAVENOUS at 12:28

## 2023-10-17 RX ADMIN — ASCORBIC ACID, VITAMIN A PALMITATE, CHOLECALCIFEROL, THIAMINE HYDROCHLORIDE, RIBOFLAVIN-5 PHOSPHATE SODIUM, PYRIDOXINE HYDROCHLORIDE, NIACINAMIDE, DEXPANTHENOL, ALPHA-TOCOPHEROL ACETATE, VITAMIN K1, FOLIC ACID, BIOTIN, CYANOCOBALAMIN: 200; 3300; 200; 6; 3.6; 6; 40; 15; 10; 150; 600; 60; 5 INJECTION, SOLUTION INTRAVENOUS at 22:30

## 2023-10-17 ASSESSMENT — PAIN - FUNCTIONAL ASSESSMENT

## 2023-10-17 ASSESSMENT — COGNITIVE AND FUNCTIONAL STATUS - GENERAL
DAILY ACTIVITIY SCORE: 24
MOBILITY SCORE: 24

## 2023-10-17 NOTE — SIGNIFICANT EVENT
Patient agitated and restless. Pt is thrashing around in the bed. Dr. Calloway at bedside. New orders received.

## 2023-10-17 NOTE — PROGRESS NOTES
Critical Care Daily Progress        Subjective   Patient is a 48 y.o. adult admitted on 10/10/2023  8:50 AM with the following indication(s) for ICU care respiratory insufficiency secondary to aspiration pneumonitis.     Interval History: Had several episodes of hypoxemia, mostly due to agitation and fighting vent, very agitated overall and unable to wean ketamine, had some thick secretions, otherwise no acute issues.     Complaints: Intubated, sedated.     Scheduled Medications:   [Held by provider] busPIRone, 15 mg, oral, BID  [Held by provider] docusate sodium, 100 mg, oral, BID  haloperidol lactate, 10 mg, intravenous, q6h  heparin (porcine), 5,000 Units, subcutaneous, q8h BETHANIE  insulin lispro, 0-10 Units, subcutaneous, q4h  [Held by provider] insulin regular, 0-5 Units, subcutaneous, TID with meals  [Held by provider] lurasidone, 80 mg, oral, Daily  [Held by provider] pantoprazole, 40 mg, oral, Daily before breakfast  pantoprazole, 40 mg, intravenous, Daily  piperacillin-tazobactam, 3.375 g, intravenous, q8h   And  sodium chloride, 10 mL, intravenous, q8h  potassium phosphate, 21 mmol, intravenous, Once  [Held by provider] QUEtiapine, 300 mg, oral, BID  [Held by provider] simvastatin, 40 mg, oral, Nightly  [Held by provider] traZODone, 300 mg, oral, Nightly  [Held by provider] venlafaxine XR, 150 mg, oral, Daily         Continuous Medications:   Adult Clinimix TPN, 70 mL/hr, Last Rate: 70 mL/hr (10/16/23 2018)  dexmedeTOMIDine, 0.1-1.5 mcg/kg/hr, Last Rate: 1.5 mcg/kg/hr (10/17/23 1228)  fentaNYL,  mcg/hr, Last Rate: 150 mcg/hr (10/17/23 1312)  ketamine, 0.05-2 mg/kg/hr, Last Rate: 0.15 mg/kg/hr (10/17/23 1245)  norepinephrine, 0-3.3 mcg/kg/min, Last Rate: Stopped (10/16/23 0300)  propofol, 5-50 mcg/kg/min, Last Rate: 50 mcg/kg/min (10/17/23 1305)         PRN Medications:   PRN medications: alteplase, dextrose 10 % in water (D10W), dextrose, glucagon, haloperidol lactate, HYDROmorphone, HYDROmorphone,  ipratropium-albuteroL, naloxone    Objective   Vitals:  Most Recent:  Vitals:    10/17/23 1300   BP:    Pulse: 88   Resp: 22   Temp:    SpO2: 93%       24hr Min/Max:  Temp  Min: 36.1 °C (97 °F)  Max: 36.6 °C (97.9 °F)  Pulse  Min: 68  Max: 88  Resp  Min: 6  Max: 31  SpO2  Min: 88 %  Max: 98 %    LDA:  CVC 10/15/23 Triple lumen Left Internal jugular (Active)   Placement Date/Time: 10/15/23 (c) 1906   Hand Hygiene Performed Prior to CVC Insertion: Yes  Site Prep: Chlorhexidine   All 5 Sterile Barriers Used (Gloves, Gown, Cap, Mask, Large Sterile Drape): Yes  Local Anesthetic: Injectable  Lumen Type: Triple l...   Number of days: 1       Arterial Line 10/15/23 Right Radial (Active)   Placement Date/Time: 10/15/23 1846   Earliest Known Present: 10/15/23  Orientation: Right  Location: Radial  Securement Method: Sutured;Transparent dressing  Number of Sutures Placed: 2   Number of days: 1       ETT  7.5 mm (Active)   Placement Date/Time: 10/14/23 1950   Placed by External Staff?: (c) Other (Comment)  ETT Type: ETT - single  Single Lumen Tube Size: 7.5 mm  Location: Oral   Number of days: 2       Urethral Catheter Straight-tip 16 Fr. (Active)   Placement Date/Time: 10/12/23 0245   Placed by: ESPINOZA nevarez lvl 3  Hand Hygiene Completed: Yes  Catheter Type: Straight-tip  Tube Size (Fr.): 16 Fr.  Catheter Balloon Size: 10 mL  Urine Returned: Yes   Number of days: 5       NG/OG/Feeding Tube Nasogastric 14 Fr Left nostril (Active)   Placement Date/Time: 10/12/23 1945   Hand Hygiene Completed: Yes  Type of Tube: NG/OG Tube  Tube Length: 55 cm  Tube Type: Nasogastric  NG/OG Tube Size: 14 Fr  Tube Location: Left nostril   Number of days: 4         Vent settings:  Vent Mode: Volume control/assist control  FiO2 (%):  [40 %-100 %] 100 %  S RR:  [14] 14  S VT:  [450 mL] 450 mL  PEEP/CPAP (cm H2O):  [5 cm H20] 5 cm H20  MAP (cm H2O):  [8.9-17] 9.5    Hemodynamic parameters for last 24 hours:       I/O:    Intake/Output Summary  (Last 24 hours) at 10/17/2023 1320  Last data filed at 10/17/2023 1200  Gross per 24 hour   Intake 3483.02 ml   Output 2473 ml   Net 1010.02 ml       Physical Exam:   General appearance: Combative during much of the day, but when sedated no acute distress, intubated  Head: Normocephalic, without obvious abnormality  Eyes:conjunctivae/corneas clear. PERRL  Neck:  supple, symmetrical, trachea midline  Lungs:clear to auscultation bilaterally  Chest wall:  no tenderness  Heart: regular rate and rhythm, S1, S2 normal, no murmur, click, rub or gallop  Abdomen: Soft, ND, NTTP, incision c/d/i  Male genitalia:  normal  Extremities: Minimal edema  Skin: Skin color, texture, turgor normal.  No rashes or lesions  Neurologic: Moving extremities x4    Lab/Radiology/Diagnostic Review:  Results for orders placed or performed during the hospital encounter of 10/10/23 (from the past 24 hour(s))   Blood Gas Arterial Full Panel   Result Value Ref Range    POCT pH, Arterial 7.48 (H) 7.38 - 7.42 pH    POCT pCO2, Arterial 41 38 - 42 mm Hg    POCT pO2, Arterial 80 (L) 85 - 95 mm Hg    POCT SO2, Arterial 96 94 - 100 %    POCT Oxy Hemoglobin, Arterial 94.9 94.0 - 98.0 %    POCT Hematocrit Calculated, Arterial 51.0 36.0 - 52.0 %    POCT Sodium, Arterial 135 (L) 136 - 145 mmol/L    POCT Potassium, Arterial 3.8 3.5 - 5.3 mmol/L    POCT Chloride, Arterial 101 98 - 107 mmol/L    POCT Ionized Calcium, Arterial 1.08 (L) 1.10 - 1.33 mmol/L    POCT Glucose, Arterial 167 (H) 74 - 99 mg/dL    POCT Lactate, Arterial 1.1 0.4 - 2.0 mmol/L    POCT Base Excess, Arterial 6.3 (H) -2.0 - 3.0 mmol/L    POCT HCO3 Calculated, Arterial 30.5 (H) 22.0 - 26.0 mmol/L    POCT Hemoglobin, Arterial 17.1 13.5 - 17.5 g/dL    POCT Anion Gap, Arterial 7 (L) 10 - 25 mmo/L    Patient Temperature      FiO2 50 %   Basic Metabolic Panel   Result Value Ref Range    Glucose 146 (H) 74 - 99 mg/dL    Sodium 137 136 - 145 mmol/L    Potassium 3.7 3.5 - 5.3 mmol/L    Chloride 99 98 -  107 mmol/L    Bicarbonate 30 21 - 32 mmol/L    Anion Gap 12 10 - 20 mmol/L    Urea Nitrogen 34 (H) 6 - 23 mg/dL    Creatinine 1.90 (H) 0.50 - 1.30 mg/dL    eGFR 32 (L) >60 mL/min/1.73m*2    Calcium 8.2 (L) 8.6 - 10.3 mg/dL   Magnesium   Result Value Ref Range    Magnesium 2.14 1.60 - 2.40 mg/dL   Vancomycin   Result Value Ref Range    Vancomycin 13.2 5.0 - 20.0 ug/mL   POCT GLUCOSE   Result Value Ref Range    POCT Glucose 146 (H) 74 - 99 mg/dL   POCT GLUCOSE   Result Value Ref Range    POCT Glucose 133 (H) 74 - 99 mg/dL   POCT GLUCOSE   Result Value Ref Range    POCT Glucose 164 (H) 74 - 99 mg/dL   POCT GLUCOSE   Result Value Ref Range    POCT Glucose 155 (H) 74 - 99 mg/dL   CBC and Auto Differential   Result Value Ref Range    WBC 15.8 (H) 4.4 - 11.3 x10*3/uL    nRBC 0.0 0.0 - 0.0 /100 WBCs    RBC 2.83 (L) 4.00 - 5.90 x10*6/uL    Hemoglobin 8.1 (L) 12.0 - 17.5 g/dL    Hematocrit 24.9 (L) 36.0 - 52.0 %    MCV 88 80 - 100 fL    MCH 28.6 26.0 - 34.0 pg    MCHC 32.5 32.0 - 36.0 g/dL    RDW 13.8 11.5 - 14.5 %    Platelets 428 150 - 450 x10*3/uL    MPV 10.5 7.5 - 11.5 fL    Immature Granulocytes %, Automated 12.8 (H) 0.0 - 0.9 %    Immature Granulocytes Absolute, Automated 2.02 (H) 0.00 - 0.70 x10*3/uL   Comprehensive Metabolic Panel   Result Value Ref Range    Glucose 163 (H) 74 - 99 mg/dL    Sodium 138 136 - 145 mmol/L    Potassium 3.3 (L) 3.5 - 5.3 mmol/L    Chloride 101 98 - 107 mmol/L    Bicarbonate 29 21 - 32 mmol/L    Anion Gap 11 10 - 20 mmol/L    Urea Nitrogen 31 (H) 6 - 23 mg/dL    Creatinine 1.63 (H) 0.50 - 1.30 mg/dL    eGFR 39 (L) >60 mL/min/1.73m*2    Calcium 8.0 (L) 8.6 - 10.3 mg/dL    Albumin 2.7 (L) 3.4 - 5.0 g/dL    Alkaline Phosphatase 67 33 - 120 U/L    Total Protein 5.7 (L) 6.4 - 8.2 g/dL    AST 21 9 - 39 U/L    Bilirubin, Total 1.0 0.0 - 1.2 mg/dL    ALT 13 7 - 52 U/L   Magnesium   Result Value Ref Range    Magnesium 2.22 1.60 - 2.40 mg/dL   Phosphorus   Result Value Ref Range    Phosphorus 1.9 (L)  2.5 - 4.9 mg/dL   Manual Differential   Result Value Ref Range    Neutrophils %, Manual 65.0 40.0 - 80.0 %    Bands %, Manual 12.0 0.0 - 5.0 %    Lymphocytes %, Manual 11.0 13.0 - 44.0 %    Monocytes %, Manual 2.0 2.0 - 10.0 %    Eosinophils %, Manual 5.0 0.0 - 6.0 %    Basophils %, Manual 0.0 0.0 - 2.0 %    Metamyelocytes %, Manual 3.0 0.0 - 0.0 %    Myelocytes %, Manual 2.0 0.0 - 0.0 %    Seg Neutrophils Absolute, Manual 10.27 (H) 1.20 - 7.00 x10*3/uL    Bands Absolute, Manual 1.90 (H) 0.00 - 0.70 x10*3/uL    Lymphocytes Absolute, Manual 1.74 1.20 - 4.80 x10*3/uL    Monocytes Absolute, Manual 0.32 0.10 - 1.00 x10*3/uL    Eosinophils Absolute, Manual 0.79 (H) 0.00 - 0.70 x10*3/uL    Basophils Absolute, Manual 0.00 0.00 - 0.10 x10*3/uL    Metamyelocytes Absolute, Manual 0.47 0.00 - 0.00 x10*3/uL    Myelocytes Absolute, Manual 0.32 0.00 - 0.00 x10*3/uL    Total Cells Counted 100     Neutrophils Absolute, Manual 12.17 (H) 1.20 - 7.70 x10*3/uL    RBC Morphology See Below     Polychromasia Mild     Ovalocytes Few     Dohle Bodies Present     Toxic Granulation Present    Blood Gas Arterial Full Panel   Result Value Ref Range    POCT pH, Arterial 7.51 (H) 7.38 - 7.42 pH    POCT pCO2, Arterial 37 (L) 38 - 42 mm Hg    POCT pO2, Arterial 68 (L) 85 - 95 mm Hg    POCT SO2, Arterial 95 94 - 100 %    POCT Oxy Hemoglobin, Arterial 95.0 94.0 - 98.0 %    POCT Hematocrit Calculated, Arterial 19.0 (L) 36.0 - 52.0 %    POCT Sodium, Arterial 139 136 - 145 mmol/L    POCT Potassium, Arterial 3.4 (L) 3.5 - 5.3 mmol/L    POCT Chloride, Arterial 106 98 - 107 mmol/L    POCT Ionized Calcium, Arterial 1.11 1.10 - 1.33 mmol/L    POCT Glucose, Arterial 180 (H) 74 - 99 mg/dL    POCT Lactate, Arterial 1.2 0.4 - 2.0 mmol/L    POCT Base Excess, Arterial 6.0 (H) -2.0 - 3.0 mmol/L    POCT HCO3 Calculated, Arterial 29.5 (H) 22.0 - 26.0 mmol/L    POCT Hemoglobin, Arterial 6.3 (LL) 13.5 - 17.5 g/dL    POCT Anion Gap, Arterial 7 (L) 10 - 25 mmo/L     Patient Temperature      FiO2 50 %    Critical Called By DFOX RRT     Critical Called To SALVADOR LAU     Critical Call Time 354.0000     Critical Read Back Y    POCT GLUCOSE   Result Value Ref Range    POCT Glucose 181 (H) 74 - 99 mg/dL   POCT GLUCOSE   Result Value Ref Range    POCT Glucose 121 (H) 74 - 99 mg/dL   Blood Gas Arterial Full Panel   Result Value Ref Range    POCT pH, Arterial 7.45 (H) 7.38 - 7.42 pH    POCT pCO2, Arterial 41 38 - 42 mm Hg    POCT pO2, Arterial 69 (L) 85 - 95 mm Hg    POCT SO2, Arterial 95 94 - 100 %    POCT Oxy Hemoglobin, Arterial 93.1 (L) 94.0 - 98.0 %    POCT Hematocrit Calculated, Arterial 32.0 (L) 36.0 - 52.0 %    POCT Sodium, Arterial 139 136 - 145 mmol/L    POCT Potassium, Arterial 3.3 (L) 3.5 - 5.3 mmol/L    POCT Chloride, Arterial 107 98 - 107 mmol/L    POCT Ionized Calcium, Arterial 1.06 (L) 1.10 - 1.33 mmol/L    POCT Glucose, Arterial 137 (H) 74 - 99 mg/dL    POCT Lactate, Arterial 1.4 0.4 - 2.0 mmol/L    POCT Base Excess, Arterial 4.1 (H) -2.0 - 3.0 mmol/L    POCT HCO3 Calculated, Arterial 28.5 (H) 22.0 - 26.0 mmol/L    POCT Hemoglobin, Arterial 10.5 (L) 13.5 - 17.5 g/dL    POCT Anion Gap, Arterial 7 (L) 10 - 25 mmo/L    Patient Temperature      FiO2 55 %    Ventilator Mode A/C     Ventilator Rate 14 bpm    Tidal Volume 450 mL    Peep CHM2O 5.0 cm H2O     XR chest 1 view    Result Date: 10/17/2023  Interpreted By:  Ranjeet Gil, STUDY: XR CHEST 1 VIEW;  10/17/2023 5:37 am   INDICATION: Signs/Symptoms:Pneumonia evaluation.   COMPARISON: Portable chest, 16 October 2023   ACCESSION NUMBER(S): [id number]   ORDERING CLINICIAN: KRISTINA MONTALVO   TECHNIQUE: Single frontal view of the chest; Portable technique   FINDINGS:   The endotracheal tube is well positioned   Left sided central line is unchanged and well positioned   Enteric tube unchanged in position with tip projecting over left lateral gastric body   Hazy bilateral airspace disease is unchanged   The curvilinear  peripheral left mid to basilar opacity is scarring or atelectasis based on review of prior CT   No new acute process such as large pleural effusion or pneumothorax has developed       Hazy and patchy bilateral airspace disease   MACRO: None   Signed by: Ranjeet Gil 10/17/2023 8:33 AM Dictation workstation:   [id number]    XR abdomen 1 view    Result Date: 10/16/2023  Interpreted By:  Schoenberger, Joseph, STUDY: XR ABDOMEN 1 VIEW;  10/16/2023 6:14 am   INDICATION: Signs/Symptoms:small bowel obstruction.   COMPARISON: 10/15/2023   ACCESSION NUMBER(S): [id number]   ORDERING CLINICIAN: MEAGHAN KATZ   FINDINGS: Persistent gaseous distention of small bowel loops with some gas in the rectum. Possible postoperative ileus. Greatest caliber small bowel loops is noted in the upper abdomen approximating 5.5 cm similar to a comparable 5.2 cm measurement previously. Limited evaluation of pneumoperitoneum on supine imaging, however no gross evidence of free air is noted.   Visualized lungs are clear.   Osseous structures demonstrate no acute bony changes.       1.  Possible persistent postoperative ileus. Mid small bowel obstruction not entirely excluded. Continued follow-up recommended.   MACRO: None   Signed by: Joseph Schoenberger 10/16/2023 8:57 AM Dictation workstation:   [id number]    XR chest 1 view    Result Date: 10/16/2023  Interpreted By:  Schoenberger, Joseph, STUDY: XR CHEST 1 VIEW;  10/16/2023 6:14 am   INDICATION: Signs/Symptoms:intubated.   COMPARISON: 10/15/2023   ACCESSION NUMBER(S): [id number]   ORDERING CLINICIAN: MEAGHAN KATZ   FINDINGS: Positioning of nasogastric tube, left jugular venous infusion catheter, and endotracheal tube unchanged.       CARDIOMEDIASTINAL SILHOUETTE: Cardiac silhouette remains somewhat enlarged.   LUNGS: Platelike atelectasis in the left lung base unchanged. Progressing retrocardiac opacity possibly atelectasis or airspace consolidation.   ABDOMEN: No remarkable upper  abdominal findings.   BONES: No acute osseous changes.       1.  Progressing left basal opacity.       MACRO: None   Signed by: Joseph Schoenberger 10/16/2023 8:56 AM Dictation workstation:   [id number]    XR chest 1 view    Result Date: 10/16/2023  Interpreted By:  Schoenberger, Joseph, STUDY: XR CHEST 1 VIEW;  10/15/2023 5:42 am   INDICATION: Signs/Symptoms:c/f aspiration, on vent.   COMPARISON: 05/29/2022   ACCESSION NUMBER(S): [id number]   ORDERING CLINICIAN: JESSA PENA   FINDINGS: Endotracheal tube placed distal tip 3.9 cm above the harvey. Nasogastric tube placed distal tip gastric body. Stomach is distended.       CARDIOMEDIASTINAL SILHOUETTE: Cardiomediastinal silhouette is normal in size and configuration.   LUNGS: There is a new area of platelike atelectasis in the left lung base. No other new focal lung opacities. Limited hypoventilatory exam.   ABDOMEN: No remarkable upper abdominal findings.   BONES: No acute osseous changes.       1.  Satisfactory positioning of tubes and lines. 2. New platelike atelectasis in left lung base. 3. Limited exam.       MACRO: None   Signed by: Joseph Schoenberger 10/16/2023 8:42 AM Dictation workstation:   [id number]    XR abdomen 1 view    Result Date: 10/16/2023  Interpreted By:  Schoenberger, Joseph, STUDY: XR ABDOMEN 1 VIEW;  10/15/2023 5:42 am   INDICATION: Signs/Symptoms:post-op.   COMPARISON: 10/14/2023   ACCESSION NUMBER(S): [id number]   ORDERING CLINICIAN: JESSA PENA   FINDINGS: Persistent gaseous distention of multiple small bowel loops with some gas in the colon. Stomach is distended with nasogastric tube tip gastric body. Greatest caliber small bowel loops on this exam is 5.2 cm compared to 6.6 cm and there is less gas in bowel loops on this exam than the prior. Improving ileus is consideration. Limited evaluation of pneumoperitoneum on supine imaging, however no gross evidence of free air is noted.   Visualized lungs are clear.   Osseous  structures demonstrate no acute bony changes.       1.  Likely improving postoperative ileus.   MACRO: None   Signed by: Joseph Schoenberger 10/16/2023 8:40 AM Dictation workstation:   [id number]    XR chest 1 view    Result Date: 10/15/2023  Interpreted By:  Juan Graves, STUDY: XR CHEST 1 VIEW;  10/15/2023 7:20 pm   INDICATION: Signs/Symptoms:line placement.   COMPARISON: Earlier on 10/15/2023   ACCESSION NUMBER(S): [id number]   ORDERING CLINICIAN: MEAGHAN KATZ   FINDINGS: There is interval placement of a left central venous catheter with tip terminates at the level of the right atrium. Endotracheal tube tip terminates approximately 2.9 cm superior to the harvey. Nasogastric tube tip terminates in the left upper abdomen level of the stomach. The cardiac silhouette is stable in size. There is limited inspiratory lung volumes. Stable band of opacity in the left midlung and left basilar consolidative/atelectatic opacity. No pneumothorax.       Interval placement of left central venous catheter with tip terminating at the level of the right atrium.   Limited inspiratory lung volumes with stable band of opacity in the left mid lung and medial left basilar consolidative/atelectatic opacity.   MACRO: None   Signed by: Juan Graves 10/15/2023 7:40 PM Dictation workstation:   [id number]    CT abdomen pelvis wo IV contrast    Result Date: 10/14/2023  Interpreted By:  Mana Price, STUDY: CT ABDOMEN PELVIS WO IV CONTRAST;  10/14/2023 1:25 pm   INDICATION: Signs/Symptoms:SBO postop appendectomy.   COMPARISON: 10/10/2023   ACCESSION NUMBER(S): [id number]   ORDERING CLINICIAN: DRU WYNN   TECHNIQUE: CT of the abdomen and pelvis was performed. No oral, no intravenous contrast.   FINDINGS: LOWER CHEST: There are bibasilar infiltrates.   ABDOMEN:   LIVER: There is no hepatic mass.   BILE DUCTS: There is no intrahepatic, common hepatic or common bile ductal dilatation.   GALLBLADDER: Contracted but otherwise  unremarkable.   PANCREAS: The pancreas is unremarkable.   SPLEEN: The spleen is unremarkable. There is no splenic mass or splenomegaly.   ADRENAL GLANDS: The adrenal glands are unremarkable.   KIDNEYS AND URETERS: The kidneys demonstrate no mass.  There is no intrarenal calculus or hydronephrosis. There is a Proctor catheter insufflated in a nondistended urinary bladder.   BOWEL: There are surgical clips in the right lower quadrant from appendectomy in the interval compared to 10/10/2023. There is haziness in the mesentery in the right lower quadrant from recent surgery. There is a small crescent of non walled-off ascitic fluid in the right lower quadrant. There is a small amount of ascites in the pelvis non walled-off. There is a nasogastric tube with the tip in the mid stomach. There is no gastric distention. There are markedly dilated loops of small bowel. There is collapsed distal small bowel and collapsed colon. This could represent a postoperative ileus but consider small-bowel obstruction. There are sigmoid diverticula with no diverticulitis.   VESSELS: The abdominal and pelvic vessels are unremarkable.   PERITONEUM/RETROPERITONEUM/LYMPH NODES: There is no retroperitoneal or pelvic adenopathy.  There is no ascites.   ABDOMINAL WALL: There is a periumbilical hernia with an area of opening measuring 5.8 cm in transverse dimension. There is herniation of a segment of nondilated non thickened small bowel. There is induration around the umbilicus from recent surgery. There is a small hematoma in the subcutaneous fat with hyperdense and hypodense fluid. This measures 3.0 x 1.8 cm. There is a dot of air which could be iatrogenic from prior laparoscopic surgery rather than abscess and infection..   BONE AND SOFT TISSUE: There is no acute osseous finding.   There is no soft tissue abnormality.       1. Interval appendectomy. Haziness right lower quadrant with a small crescent of fluid non walled-off. 2. Markedly dilated  loops of small bowel with nondilated collapsed distal small bowel and collapsed colon. This is likely a postoperative ileus but still consider small-bowel obstruction. Proctor catheter with the tip in the mid stomach. 3. Periumbilical hernia with a segment of nondilated non thickened small bowel. There is induration and a small amount of fluid in the fat around the umbilicus from recent surgery. There is a small hematoma in the periumbilical fat. There is a dot of air which could be iatrogenic from recent surgery rather than abscess and infection. 4. Proctor catheter insufflated in a nondistended urinary bladder. 5. Bibasilar pneumonia.   MACRO: None   Signed by: Mana Price 10/14/2023 1:34 PM Dictation workstation:   [id number]    XR abdomen 1 view    Result Date: 10/14/2023  Interpreted By:  Mana Price, STUDY: XR ABDOMEN 1 VIEW;  10/14/2023 8:15 am. 3 views.   INDICATION: Signs/Symptoms:Ileus.   COMPARISON: 10/12/2023   ACCESSION NUMBER(S): [id number]   ORDERING CLINICIAN: DRU WYNN   FINDINGS: There is a nasogastric tube with the tip in the proximal stomach. There is gastric decompression. There is progressive marked small bowel dilatation with loops of bowel dilated up to 6.6 cm. There is gas and stool in nondilated colon. Findings are consistent with a small-bowel obstruction.   There are no pathologic calcifications.   Visualized lungs are clear.   Osseous structures demonstrate no acute bony changes.         1. Nasogastric tube with the tip in the proximal stomach with gastric decompression. 2. Progressive marked small bowel dilatation consistent with small-bowel obstruction.     MACRO: None   Signed by: Mana Price 10/14/2023 8:55 AM Dictation workstation:   [id number]    XR abdomen 1 view    Result Date: 10/13/2023  Interpreted By:  Juan Graves, STUDY: XR ABDOMEN 1 VIEW;  10/12/2023 8:28 pm   INDICATION: Signs/Symptoms:verify NG placement.   COMPARISON: 10/12/2023   ACCESSION NUMBER(S):  [id number]   ORDERING CLINICIAN: VAN BRYAN   FINDINGS: Nasogastric tube tip terminates in the left upper abdomen at level of the stomach. There is cardiomegaly and persistent band of atelectasis or infiltrate in the left midlung. Gaseous distended bowel in the partially imaged abdomen.       Nasogastric tube tip terminates in the left upper abdomen at level of proximal stomach.   Cardiomegaly and stable band of atelectasis or infiltrate in the left mid lung.   Gaseous distended bowel in imaged upper abdomen; continue progress short-term progress is recommended for further evaluation.   MACRO: None   Signed by: Juan Graves 10/13/2023 1:36 AM Dictation workstation:   [id number]    XR abdomen 1 view    Result Date: 10/12/2023  Interpreted By:  Domingo Marks, STUDY: XR ABDOMEN 1 VIEW;  10/12/2023 6:55 pm   INDICATION: Signs/Symptoms:NG tube placement.   COMPARISON: 10/12/2023 at 4:46 p.m.   ACCESSION NUMBER(S): [id number]   ORDERING CLINICIAN: MEAGHAN CHARLTON   FINDINGS: Interval placement of a NG tube with its tip over the gastric body.   Dilatation of multiple small bowel loops as well as multiple colonic loops throughout the abdomen again seen. Bibasilar airspace disease present       1. NG tube tip projects over the gastric body 2. Redemonstration of bowel loop dilatation suggestive of underlying ileus versus obstruction.   MACRO: None   Signed by: Domingo Marks 10/12/2023 7:12 PM Dictation workstation:   [id number]    XR abdomen 1 view    Result Date: 10/12/2023  Interpreted By:  Mana Price, STUDY: XR ABDOMEN 1 VIEW;  10/12/2023 4:51 pm. 2 views.   INDICATION: Signs/Symptoms:r/o ileus.   COMPARISON: CT abdomen and pelvis 10/10/2023   ACCESSION NUMBER(S): [id number]   ORDERING CLINICIAN: MEAGHAN CHARLTON   FINDINGS: There is moderate small bowel and colonic distention, likely an ileus. There are surgical clips in the right lower quadrant from recent appendectomy.   There are no pathologic  calcifications.   Visualized lungs are clear.   Osseous structures demonstrate no acute bony changes.         Moderate small bowel and colonic distention, likely a postoperative ileus. Surgical clips right lower quadrant from recent appendectomy.   MACRO: None   Signed by: Mana Price 10/12/2023 4:56 PM Dictation workstation:   [id number]    CT angio chest for pulmonary embolism    Result Date: 10/12/2023  Interpreted By:  Ranjeet Gil, STUDY: CT ANGIO CHEST FOR PULMONARY EMBOLISM;  10/12/2023 4:56 am   INDICATION: Signs/Symptoms:Rule out pe.   COMPARISON: Portable chest 11 October 2023; CT abdomen and pelvis with contrast 10 October 2023   ACCESSION NUMBER(S): [id number]   ORDERING CLINICIAN: VAN BRYAN   TECHNIQUE: Pulmonary arterial phase CT chest after the uneventful administration of 75 mL IV contrast (Omnipaque 350).   Three dimensional maximum intensity projection (3-D MIPs) image/s were created on a separate dedicated workstation, reviewed and saved   FINDINGS: CARDIOVASCULAR:   Acute pulmonary embolism: Negative through the  segmental (third order) branch level. Subsegmental and more distal branches not well enough opacified to evaluate. Acute right heart strain:  Negative. No CT evidence of acute right heart strain Cardiac thrombus:  Negative; no obvious right heart or other cardiac thrombus is seen Pulmonary arteries ectasia:  Negative   Heart size:  Within normal limits Pericardial effusion:  Trace   Thoracic aortic aneurysm:  Negative Aortic dissection:  Negative   Heart failure change:  Negative.  No sign of interstitial or alveolar edema. Other:  n/a   NONVASCULAR MEDIASTINUM: Esophagus:  Grossly normal by CT Mediastinal Mass:  Negative Hiatal hernia:  None Other:  n/a   LYMPH NODES: No thoracic adenopathy   LUNGS / AIRSPACES / AIRWAYS:   LARGE AIRWAYS Filling defect: Negative Wall thickening: Negative Bronchiectasis: Negative Other: N/A   AIRSPACES Fibrosis: Negative Emphysema: Negative  Consolidation: Negative Ground glass airspace disease: Negative Edema: Negative Nodule / Mass: Negative Other: Dependent atelectasis in both lower lobes, new from two days ago. Additional bands of atelectasis in the left upper lobe also new   PLEURA: Effusion:  Both sides negative Pneumothorax:  Both sides negative Other:  n/a   CHEST WALL: Symmetric mild nonspecific bilateral gynecomastia   SKELETON: No acute or contributory abnormality   UPPER ABDOMEN: Fluid-filled, dilated small bowel loops with air-fluid levels new from CT abdomen and pelvis two days prior       SMALL BOWEL DILATION WITH MULTIPLE SMALL BOWEL AIR-FLUID LEVELS IN THE UPPER ABDOMEN, ALL NEW FROM CT TWO DAYS AGO   NO ACUTE DISEASE IN THE CHEST, ONLY SUBSTANTIAL BILATERAL LOWER LOBE DEPENDENT ATELECTASIS WHICH IS NEW FROM CT TWO DAYS PRIOR   NO AIRSPACE CONSOLIDATION OR GROUND-GLASS AIRSPACE DISEASE   NO EDEMA/FAILURE   NO ACUTE PULMONARY EMBOLISM THROUGH THE SEGMENTAL BRANCH LEVEL   NO AORTIC DISSECTION OR OTHER ACUTE THORACIC AORTIC FINDINGS   NO PLEURAL OR PERICARDIAL EFFUSION   NO PNEUMOTHORAX   MACRO: None   Signed by: Ranjeet Gil 10/12/2023 6:54 AM Dictation workstation:   [id number]    XR chest 1 view    Result Date: 10/11/2023  Interpreted By:  Chapo Cary, STUDY: XR CHEST 1 VIEW   INDICATION: Signs/Symptoms:hypoxemia.   COMPARISON: July 14, 2022   ACCESSION NUMBER(S): [id number]   ORDERING CLINICIAN: MEAGHAN CHARLTON   FINDINGS: No consolidation, effusion, edema, or pneumothorax. Low lung volumes with bibasilar atelectasis.       Low lung volumes with bibasilar atelectasis. No discrete consolidation seen.   Signed by: Chapo Cayr 10/11/2023 6:18 PM Dictation workstation:   [id number]    ECG 12 lead    Result Date: 10/11/2023  Sinus tachycardia Inferior infarct (cited on or before 11-OCT-2023) Abnormal ECG When compared with ECG of 10-OCT-2023 13:57, Previous ECG has undetermined rhythm, needs review T wave inversion now evident in  Anterior leads Confirmed by Carlo Jose ([id number]) on 10/11/2023 4:50:57 PM    CT abdomen pelvis w IV contrast    Result Date: 10/10/2023  STUDY: CT Abdomen and Pelvis with IV Contrast; 10/10/2023 10:52 AM. INDICATION: Generalized abdominal pain. COMPARISON: CT AP 5/29/2022. ACCESSION NUMBER(S): [id number] ORDERING CLINICIAN: SHAI ERWIN TECHNIQUE: CT of the abdomen and pelvis was performed.  Contiguous axial images were obtained at 3 mm slice thickness through the abdomen and pelvis. Coronal and sagittal reconstructions at 3 mm slice thickness were performed.  Omnipaque 350 75 mL was administered intravenously.  FINDINGS: LOWER CHEST: No cardiomegaly.  No pericardial effusion.  Lung bases are clear.  ABDOMEN:  LIVER: No hepatomegaly.  Smooth surface contour.  Mild fatty infiltration of the liver.  BILE DUCTS: No intrahepatic or extrahepatic biliary ductal dilatation.  GALLBLADDER: The gallbladder is present without gallstones. STOMACH: No abnormalities identified.  PANCREAS: No masses or ductal dilatation.  SPLEEN: No splenomegaly or focal splenic lesion.  ADRENAL GLANDS: No thickening or nodules.  KIDNEYS AND URETERS: Kidneys are normal in size and location.  No renal or ureteral calculi.  PELVIS:  BLADDER: No abnormalities identified.  REPRODUCTIVE ORGANS: No abnormalities identified.  BOWEL: Appendix is fluid-filled and dilated measuring up to 1.6 cm with multiple intraluminal appendicoliths and mild periappendiceal inflammatory change. No extraluminal gas or abscess. Colonic diverticulosis without findings of diverticulitis  VESSELS: No abnormalities identified.  Abdominal aorta is normal in caliber.  PERITONEUM/RETROPERITONEUM/LYMPH NODES: No free fluid.  No pneumoperitoneum. No lymphadenopathy.  ABDOMINAL WALL: No abnormalities identified. SOFT TISSUES: No abnormalities identified.  BONES: No acute fracture or aggressive osseous lesion.    1. Findings compatible with acute uncomplicated appendicitis.  "No extraluminal gas or abscess. Recommend surgical consultation. 2. Mild hepatic steatosis. 3. Colonic diverticulosis without findings of diverticulitis. Findings discussed with and acknowledged by Ayden Ramires at 11:53 AM Signed by Faustino Hebert MD      Additional Labs:  SCVO2: No results found for: \"Y4QKOSOR\"      This patient has a urinary catheter   Reason for the urinary catheter remaining today? critically ill patient who need accurate urinary output measurements    This patient is intubated   Reason for patient to remain intubated today? they are unable to protect their airway and they require frequent suctioning             Assessment/Plan   Principal Problem:    Acute appendicitis with localized peritonitis  Active Problems:    Anxiety    Depression    Hyperlipidemia    Diabetes mellitus, type 2 (CMS/HCC)    Appendicitis, acute    Appendicitis    Acute appendicitis, unspecified acute appendicitis type    Small bowel obstruction (CMS/HCC)    Principal Problem:    Acute appendicitis with localized peritonitis  Active Problems:    Anxiety    Depression    Hyperlipidemia    Diabetes mellitus, type 2 (CMS/HCC)    Appendicitis, acute    Appendicitis    Acute appendicitis, unspecified acute appendicitis type    Small bowel obstruction (CMS/HCC)     POD7 from lap appy and hernia repair, POD2 from reexploration, EUGENIA, and repair of hernia.  Patient remains intubated for what appears to be aspiration pneumonitis and severe agitation     Neuro:   #Bipolar 1 disorder,depression  #Anxiety & Depression  #Panic disorder  -hold all oral home psych meds for now given SBO, will restart when resumption of diet  -Propofol, precedex, wean off ketamine, haldol increased to 10q6h  - may need a couple doses of versed for emergence reaction  - Fentanyl ggt for incisional pain  -[facility] ICU  - restraints required to prevent inadvertent extubation as patient reaches for ETT when off, renewed restraints today     Pulm:   #Post-operative " respiratory insufficiency  -2/2 aspiration pneumonitis, improving oxygentation but still having desaturation due to combination of fighting vent and possibly developing pneumonia  -keep intubated today given hypoxemia and agitation     CV:   -Normocardic and off pressors  -Continue to monitor     GI:   #Acute appendicitis with localized peritonitis without perforation or gangrene  #SBO  -obstruction from an interloop adhesion and NOT from the hernia,  -s/p appendectomy 10/10/23  -NPO  -TPN for persistent lack of nutrition        :   -Replete elecrolytes  -TPN, no need for other IVF  -FU for resolving DYLAN      Heme:   - H/H stable  - heparin subcu for DYLAN     Endo:   #DMII   -recently diagnosed  -q4hr accuchecks with SS coverage, has not required significant amount of insulin so will just keep SSI, may need more once TPN is initiated     ID:   -WBC now 15K  - continue zosyn for now for possible pneumonia, follow up cultures, plan for 7 days, narrow abx when cultures return     Lines:   -ETT  -Central line (needed for TPN)  -A-line  -Proctor  -all are needed due to critical nature of patient           I spent 40 minutes in the professional and overall care of this patient.           Norman Calloway MD  10/16/23

## 2023-10-17 NOTE — PROGRESS NOTES
"Victorino Lara is a 48 y.o. adult on day 5 of admission presenting with Acute appendicitis with localized peritonitis.    Subjective   Patient sedated/intubated in the ICU. No acute events last night.        Objective     Physical Exam  Constitutional:       Appearance: He is ill-appearing.   HENT:      Head: Normocephalic.      Nose: Nose normal.      Mouth/Throat:      Comments: Breathing tube in place  Eyes:      Conjunctiva/sclera: Conjunctivae normal.      Pupils: Pupils are equal, round, and reactive to light.   Cardiovascular:      Rate and Rhythm: Normal rate.   Pulmonary:      Breath sounds: Decreased breath sounds present.   Abdominal:      General: Bowel sounds are decreased. There is distension.      Palpations: Abdomen is soft.      Comments: Binder in place   Skin:     General: Skin is warm.       Last Recorded Vitals  Blood pressure 80/57, pulse 84, temperature 36.6 °C (97.9 °F), temperature source Temporal, resp. rate (!) 31, height 1.702 m (5' 7.01\"), weight 101 kg (221 lb 12.5 oz), SpO2 97 %.  Intake/Output last 3 Shifts:  I/O last 3 completed shifts:  In: 9873 (98.1 mL/kg) [I.V.:8134.3 (80.9 mL/kg); IV Piggyback:1059.8]  Out: 4350 (43.2 mL/kg) [Urine:3100 (0.9 mL/kg/hr); Emesis/NG output:1250]  Weight: 100.6 kg     Relevant Results  Lab Results   Component Value Date    WBC 15.8 (H) 10/17/2023    HGB 8.1 (L) 10/17/2023    HCT 24.9 (L) 10/17/2023    MCV 88 10/17/2023     10/17/2023      Lab Results   Component Value Date    GLUCOSE 163 (H) 10/17/2023    CALCIUM 8.0 (L) 10/17/2023     10/17/2023    K 3.3 (L) 10/17/2023    CO2 29 10/17/2023     10/17/2023    BUN 31 (H) 10/17/2023    CREATININE 1.63 (H) 10/17/2023      This patient has a central line   Reason for the central line remaining today? Parenteral medication      Assessment/Plan   Subjective  Patient sedated/intubated. ABD is distended and ABD binder in place. Patient on multiple pressors. NG tube in place with moderate " output.     Impression  POD 7 Appendectomy and hernia repair    Plan  NG/NPO/IVF  Nausea Control  Pain Control  Keep K+ 4.0-4.5 and Mag above 2  Continue care per primary team  Further recommendations per Dr. Mcadams    Principal Problem:    Acute appendicitis with localized peritonitis  Active Problems:    Anxiety    Depression    Hyperlipidemia    Diabetes mellitus, type 2 (CMS/HCC)    Appendicitis, acute    Appendicitis    Acute appendicitis, unspecified acute appendicitis type    Small bowel obstruction (CMS/HCC)        Alisha Guerra, APRN-CNP

## 2023-10-17 NOTE — PROGRESS NOTES
Nutrition Progress Note    Discussed during ICU interdisciplinary rounds. Continues to be intubated and sedated. Propofol currently at 28.2 ml/hr, providing 745 kcal. Currently has Clinimix 5% amino acids, 15% dextrose at 70 ml/hr running, providing 1680 ml total volume, 1193 kcal + propofol kcal, 84g protein. With current propofol dose, pt exceeding kcal by 253 kcal. Discussed with Dr. Elli MD agreeable to reduce TPN rate to 60 ml/hr. RN aware to reduce rate, MD to change order.    Clinimix 5% amino acids, 15% dextrose at 60 ml/hr provides 1440 ml total volume, 1022 kcal + propofol kcal, 72g protein. This meets >75% of estimated nutritional needs.    Estimated Energy Needs  Method for Estimating Needs: 8148-6043 kcal (IBW x 22-25 kcal/kg)     Estimated Protein Needs  Type of Protein Needed: 80-135g (IBW x 1.2-2 g/kg)     Estimated Fluid Needs  Method for Estimating Needs: 1 ml/kcal or per MD

## 2023-10-17 NOTE — SIGNIFICANT EVENT
Patient continues to be restless and agitated, thrashing around in the bed despite medications ordered. Will continue to monitor patient.

## 2023-10-17 NOTE — CONSULTS
Vancomycin Dosing by Pharmacy- Cessation of Therapy    Consult to pharmacy for vancomycin dosing has been discontinued by the prescriber, pharmacy will sign off at this time.    Please call pharmacy if there are further questions or re-enter a consult if vancomycin is resumed.     Cristy Freeman, PharmD

## 2023-10-18 ENCOUNTER — APPOINTMENT (OUTPATIENT)
Dept: RADIOLOGY | Facility: HOSPITAL | Age: 48
End: 2023-10-18
Payer: COMMERCIAL

## 2023-10-18 LAB
ALBUMIN SERPL BCP-MCNC: 2.6 G/DL (ref 3.4–5)
ALP SERPL-CCNC: 61 U/L (ref 33–120)
ALT SERPL W P-5'-P-CCNC: 16 U/L (ref 7–52)
ANION GAP BLDA CALCULATED.4IONS-SCNC: 7 MMO/L (ref 10–25)
ANION GAP SERPL CALC-SCNC: 9 MMOL/L (ref 10–20)
AST SERPL W P-5'-P-CCNC: 25 U/L (ref 9–39)
BACTERIA SPEC RESP CULT: ABNORMAL
BACTERIA SPEC RESP CULT: ABNORMAL
BASE EXCESS BLDA CALC-SCNC: 3.6 MMOL/L (ref -2–3)
BASOPHILS # BLD MANUAL: 0 X10*3/UL (ref 0–0.1)
BASOPHILS NFR BLD MANUAL: 0 %
BILIRUB SERPL-MCNC: 0.9 MG/DL (ref 0–1.2)
BODY TEMPERATURE: ABNORMAL
BUN SERPL-MCNC: 19 MG/DL (ref 6–23)
CA-I BLDA-SCNC: 1.09 MMOL/L (ref 1.1–1.33)
CALCIUM SERPL-MCNC: 7.7 MG/DL (ref 8.6–10.3)
CHLORIDE BLDA-SCNC: 107 MMOL/L (ref 98–107)
CHLORIDE SERPL-SCNC: 104 MMOL/L (ref 98–107)
CK SERPL-CCNC: 815 U/L (ref 0–325)
CO2 SERPL-SCNC: 29 MMOL/L (ref 21–32)
CREAT SERPL-MCNC: 1.18 MG/DL (ref 0.5–1.3)
DOHLE BOD BLD QL SMEAR: PRESENT
EOSINOPHIL # BLD MANUAL: 0.47 X10*3/UL (ref 0–0.7)
EOSINOPHIL NFR BLD MANUAL: 3 %
ERYTHROCYTE [DISTWIDTH] IN BLOOD BY AUTOMATED COUNT: 14.2 % (ref 11.5–14.5)
GFR SERPL CREATININE-BSD FRML MDRD: 57 ML/MIN/1.73M*2
GLUCOSE BLD MANUAL STRIP-MCNC: 138 MG/DL (ref 74–99)
GLUCOSE BLD MANUAL STRIP-MCNC: 139 MG/DL (ref 74–99)
GLUCOSE BLD MANUAL STRIP-MCNC: 150 MG/DL (ref 74–99)
GLUCOSE BLD MANUAL STRIP-MCNC: 152 MG/DL (ref 74–99)
GLUCOSE BLD MANUAL STRIP-MCNC: 157 MG/DL (ref 74–99)
GLUCOSE BLD MANUAL STRIP-MCNC: 164 MG/DL (ref 74–99)
GLUCOSE BLDA-MCNC: 172 MG/DL (ref 74–99)
GLUCOSE SERPL-MCNC: 159 MG/DL (ref 74–99)
GRAM STN SPEC: ABNORMAL
GRAM STN SPEC: ABNORMAL
HCO3 BLDA-SCNC: 28.5 MMOL/L (ref 22–26)
HCT VFR BLD AUTO: 24.3 % (ref 36–52)
HCT VFR BLD EST: 26 % (ref 36–52)
HGB BLD-MCNC: 7.9 G/DL (ref 12–17.5)
HGB BLDA-MCNC: 8.8 G/DL (ref 13.5–17.5)
IMM GRANULOCYTES # BLD AUTO: 2.21 X10*3/UL (ref 0–0.7)
IMM GRANULOCYTES NFR BLD AUTO: 14 % (ref 0–0.9)
INHALED O2 CONCENTRATION: 100 %
LACTATE BLDA-SCNC: 1.6 MMOL/L (ref 0.4–2)
LYMPHOCYTES # BLD MANUAL: 2.69 X10*3/UL (ref 1.2–4.8)
LYMPHOCYTES NFR BLD MANUAL: 17 %
MAGNESIUM SERPL-MCNC: 2.03 MG/DL (ref 1.6–2.4)
MCH RBC QN AUTO: 28.9 PG (ref 26–34)
MCHC RBC AUTO-ENTMCNC: 32.5 G/DL (ref 32–36)
MCV RBC AUTO: 89 FL (ref 80–100)
METAMYELOCYTES # BLD MANUAL: 0.32 X10*3/UL
METAMYELOCYTES NFR BLD MANUAL: 2 %
MONOCYTES # BLD MANUAL: 0.79 X10*3/UL (ref 0.1–1)
MONOCYTES NFR BLD MANUAL: 5 %
MYELOCYTES # BLD MANUAL: 0.32 X10*3/UL
MYELOCYTES NFR BLD MANUAL: 2 %
NEUTROPHILS # BLD MANUAL: 11.22 X10*3/UL (ref 1.2–7.7)
NEUTS BAND # BLD MANUAL: 1.11 X10*3/UL (ref 0–0.7)
NEUTS BAND NFR BLD MANUAL: 7 %
NEUTS SEG # BLD MANUAL: 10.11 X10*3/UL (ref 1.2–7)
NEUTS SEG NFR BLD MANUAL: 64 %
NRBC BLD-RTO: 0.1 /100 WBCS (ref 0–0)
OVALOCYTES BLD QL SMEAR: ABNORMAL
OXYHGB MFR BLDA: 97.8 % (ref 94–98)
PCO2 BLDA: 44 MM HG (ref 38–42)
PEEP CMH2O: 5 CM H2O
PH BLDA: 7.42 PH (ref 7.38–7.42)
PHOSPHATE SERPL-MCNC: 3.3 MG/DL (ref 2.5–4.9)
PLATELET # BLD AUTO: 430 X10*3/UL (ref 150–450)
PLATELET CLUMP BLD QL SMEAR: PRESENT
PMV BLD AUTO: 10.2 FL (ref 7.5–11.5)
PO2 BLDA: 102 MM HG (ref 85–95)
POTASSIUM BLDA-SCNC: 3.5 MMOL/L (ref 3.5–5.3)
POTASSIUM SERPL-SCNC: 3.4 MMOL/L (ref 3.5–5.3)
PROT SERPL-MCNC: 5.7 G/DL (ref 6.4–8.2)
RBC # BLD AUTO: 2.73 X10*6/UL (ref 4–5.9)
RBC MORPH BLD: ABNORMAL
SAO2 % BLDA: 98 % (ref 94–100)
SODIUM BLDA-SCNC: 139 MMOL/L (ref 136–145)
SODIUM SERPL-SCNC: 139 MMOL/L (ref 136–145)
TIDAL VOLUME: 450 ML
TOTAL CELLS COUNTED BLD: 100
TOXIC GRANULES BLD QL SMEAR: PRESENT
TRIGL SERPL-MCNC: 345 MG/DL (ref 0–149)
VENTILATOR RATE: 14 BPM
WBC # BLD AUTO: 15.8 X10*3/UL (ref 4.4–11.3)

## 2023-10-18 PROCEDURE — 96372 THER/PROPH/DIAG INJ SC/IM: CPT | Performed by: NURSE PRACTITIONER

## 2023-10-18 PROCEDURE — 85007 BL SMEAR W/DIFF WBC COUNT: CPT

## 2023-10-18 PROCEDURE — 96372 THER/PROPH/DIAG INJ SC/IM: CPT | Performed by: SURGERY

## 2023-10-18 PROCEDURE — S0166 INJ OLANZAPINE 2.5MG: HCPCS | Performed by: SURGERY

## 2023-10-18 PROCEDURE — 93010 ELECTROCARDIOGRAM REPORT: CPT | Performed by: INTERNAL MEDICINE

## 2023-10-18 PROCEDURE — 80053 COMPREHEN METABOLIC PANEL: CPT

## 2023-10-18 PROCEDURE — P9047 ALBUMIN (HUMAN), 25%, 50ML: HCPCS | Mod: JZ | Performed by: HOSPITALIST

## 2023-10-18 PROCEDURE — 2500000004 HC RX 250 GENERAL PHARMACY W/ HCPCS (ALT 636 FOR OP/ED): Performed by: NURSE PRACTITIONER

## 2023-10-18 PROCEDURE — 71045 X-RAY EXAM CHEST 1 VIEW: CPT

## 2023-10-18 PROCEDURE — 84478 ASSAY OF TRIGLYCERIDES: CPT | Performed by: NURSE PRACTITIONER

## 2023-10-18 PROCEDURE — 84132 ASSAY OF SERUM POTASSIUM: CPT

## 2023-10-18 PROCEDURE — 71045 X-RAY EXAM CHEST 1 VIEW: CPT | Performed by: RADIOLOGY

## 2023-10-18 PROCEDURE — 2500000004 HC RX 250 GENERAL PHARMACY W/ HCPCS (ALT 636 FOR OP/ED): Performed by: SURGERY

## 2023-10-18 PROCEDURE — 37799 UNLISTED PX VASCULAR SURGERY: CPT

## 2023-10-18 PROCEDURE — A4217 STERILE WATER/SALINE, 500 ML: HCPCS

## 2023-10-18 PROCEDURE — 2500000004 HC RX 250 GENERAL PHARMACY W/ HCPCS (ALT 636 FOR OP/ED): Mod: JZ | Performed by: HOSPITALIST

## 2023-10-18 PROCEDURE — 2500000004 HC RX 250 GENERAL PHARMACY W/ HCPCS (ALT 636 FOR OP/ED)

## 2023-10-18 PROCEDURE — 94003 VENT MGMT INPAT SUBQ DAY: CPT

## 2023-10-18 PROCEDURE — 51702 INSERT TEMP BLADDER CATH: CPT

## 2023-10-18 PROCEDURE — 2020000001 HC ICU ROOM DAILY

## 2023-10-18 PROCEDURE — 82947 ASSAY GLUCOSE BLOOD QUANT: CPT

## 2023-10-18 PROCEDURE — 85027 COMPLETE CBC AUTOMATED: CPT

## 2023-10-18 PROCEDURE — 99222 1ST HOSP IP/OBS MODERATE 55: CPT | Performed by: PSYCHIATRY & NEUROLOGY

## 2023-10-18 PROCEDURE — 84100 ASSAY OF PHOSPHORUS: CPT

## 2023-10-18 PROCEDURE — 83735 ASSAY OF MAGNESIUM: CPT

## 2023-10-18 PROCEDURE — 99291 CRITICAL CARE FIRST HOUR: CPT | Performed by: SURGERY

## 2023-10-18 PROCEDURE — 82553 CREATINE MB FRACTION: CPT | Mod: CMCLAB,ELYLAB | Performed by: NURSE PRACTITIONER

## 2023-10-18 PROCEDURE — 82550 ASSAY OF CK (CPK): CPT | Performed by: NURSE PRACTITIONER

## 2023-10-18 PROCEDURE — 2500000004 HC RX 250 GENERAL PHARMACY W/ HCPCS (ALT 636 FOR OP/ED): Performed by: HOSPITALIST

## 2023-10-18 PROCEDURE — C9113 INJ PANTOPRAZOLE SODIUM, VIA: HCPCS | Performed by: SURGERY

## 2023-10-18 RX ORDER — OLANZAPINE 10 MG/2ML
5 INJECTION, POWDER, FOR SOLUTION INTRAMUSCULAR EVERY 6 HOURS PRN
Status: DISCONTINUED | OUTPATIENT
Start: 2023-10-18 | End: 2023-10-18

## 2023-10-18 RX ORDER — POTASSIUM CHLORIDE 29.8 MG/ML
40 INJECTION INTRAVENOUS ONCE
Status: COMPLETED | OUTPATIENT
Start: 2023-10-18 | End: 2023-10-18

## 2023-10-18 RX ORDER — OLANZAPINE 10 MG/2ML
5 INJECTION, POWDER, FOR SOLUTION INTRAMUSCULAR EVERY 6 HOURS PRN
Status: DISCONTINUED | OUTPATIENT
Start: 2023-10-19 | End: 2023-10-19

## 2023-10-18 RX ORDER — OLANZAPINE 10 MG/2ML
5 INJECTION, POWDER, FOR SOLUTION INTRAMUSCULAR EVERY 6 HOURS
Status: COMPLETED | OUTPATIENT
Start: 2023-10-18 | End: 2023-10-19

## 2023-10-18 RX ORDER — ENOXAPARIN SODIUM 100 MG/ML
40 INJECTION SUBCUTANEOUS EVERY 24 HOURS
Status: DISCONTINUED | OUTPATIENT
Start: 2023-10-18 | End: 2023-11-02 | Stop reason: HOSPADM

## 2023-10-18 RX ORDER — WATER 1000 ML/1000ML
INJECTION, SOLUTION INTRAVENOUS
Status: COMPLETED
Start: 2023-10-18 | End: 2023-10-18

## 2023-10-18 RX ORDER — METOCLOPRAMIDE HYDROCHLORIDE 5 MG/ML
10 INJECTION INTRAMUSCULAR; INTRAVENOUS EVERY 6 HOURS SCHEDULED
Status: DISCONTINUED | OUTPATIENT
Start: 2023-10-18 | End: 2023-10-19

## 2023-10-18 RX ORDER — ALBUMIN HUMAN 250 G/1000ML
25 SOLUTION INTRAVENOUS ONCE
Status: COMPLETED | OUTPATIENT
Start: 2023-10-18 | End: 2023-10-19

## 2023-10-18 RX ADMIN — PROPOFOL 50 MCG/KG/MIN: 10 INJECTION, EMULSION INTRAVENOUS at 07:43

## 2023-10-18 RX ADMIN — INSULIN LISPRO 2 UNITS: 100 INJECTION, SOLUTION INTRAVENOUS; SUBCUTANEOUS at 04:32

## 2023-10-18 RX ADMIN — DEXMEDETOMIDINE HYDROCHLORIDE 1.5 MCG/KG/HR: 4 INJECTION, SOLUTION INTRAVENOUS at 03:25

## 2023-10-18 RX ADMIN — PROPOFOL 50 MCG/KG/MIN: 10 INJECTION, EMULSION INTRAVENOUS at 11:37

## 2023-10-18 RX ADMIN — PROPOFOL 50 MCG/KG/MIN: 10 INJECTION, EMULSION INTRAVENOUS at 04:31

## 2023-10-18 RX ADMIN — PROPOFOL 50 MCG/KG/MIN: 10 INJECTION, EMULSION INTRAVENOUS at 15:29

## 2023-10-18 RX ADMIN — HEPARIN SODIUM 5000 UNITS: 5000 INJECTION INTRAVENOUS; SUBCUTANEOUS at 09:41

## 2023-10-18 RX ADMIN — SODIUM CHLORIDE 10 ML: 9 INJECTION, SOLUTION INTRAVENOUS at 00:55

## 2023-10-18 RX ADMIN — DEXMEDETOMIDINE HYDROCHLORIDE 1.5 MCG/KG/HR: 4 INJECTION, SOLUTION INTRAVENOUS at 13:29

## 2023-10-18 RX ADMIN — FENTANYL CITRATE 150 MCG/HR: 0.05 INJECTION, SOLUTION INTRAMUSCULAR; INTRAVENOUS at 08:18

## 2023-10-18 RX ADMIN — SODIUM CHLORIDE 10 ML: 9 INJECTION, SOLUTION INTRAVENOUS at 16:59

## 2023-10-18 RX ADMIN — HEPARIN SODIUM 5000 UNITS: 5000 INJECTION INTRAVENOUS; SUBCUTANEOUS at 01:30

## 2023-10-18 RX ADMIN — PIPERACILLIN SODIUM AND TAZOBACTAM SODIUM 3.38 G: 3; .375 INJECTION, SOLUTION INTRAVENOUS at 00:55

## 2023-10-18 RX ADMIN — DEXMEDETOMIDINE HYDROCHLORIDE 1.5 MCG/KG/HR: 4 INJECTION, SOLUTION INTRAVENOUS at 20:18

## 2023-10-18 RX ADMIN — HALOPERIDOL LACTATE 10 MG: 5 INJECTION, SOLUTION INTRAMUSCULAR at 04:31

## 2023-10-18 RX ADMIN — WATER 2.1 ML: 1 INJECTION INTRAMUSCULAR; INTRAVENOUS; SUBCUTANEOUS at 23:17

## 2023-10-18 RX ADMIN — METOCLOPRAMIDE HYDROCHLORIDE 10 MG: 5 INJECTION INTRAMUSCULAR; INTRAVENOUS at 21:15

## 2023-10-18 RX ADMIN — HALOPERIDOL LACTATE 5 MG: 5 INJECTION, SOLUTION INTRAMUSCULAR at 05:45

## 2023-10-18 RX ADMIN — POTASSIUM CHLORIDE 40 MEQ: 29.8 INJECTION, SOLUTION INTRAVENOUS at 09:39

## 2023-10-18 RX ADMIN — DEXMEDETOMIDINE HYDROCHLORIDE 1.5 MCG/KG/HR: 4 INJECTION, SOLUTION INTRAVENOUS at 08:29

## 2023-10-18 RX ADMIN — ALBUMIN HUMAN 25 G: 0.25 SOLUTION INTRAVENOUS at 23:01

## 2023-10-18 RX ADMIN — INSULIN LISPRO 2 UNITS: 100 INJECTION, SOLUTION INTRAVENOUS; SUBCUTANEOUS at 16:48

## 2023-10-18 RX ADMIN — OLANZAPINE 5 MG: 10 INJECTION, POWDER, FOR SOLUTION INTRAMUSCULAR at 11:27

## 2023-10-18 RX ADMIN — FENTANYL CITRATE 150 MCG/HR: 0.05 INJECTION, SOLUTION INTRAMUSCULAR; INTRAVENOUS at 02:32

## 2023-10-18 RX ADMIN — FENTANYL CITRATE 150 MCG/HR: 0.05 INJECTION, SOLUTION INTRAMUSCULAR; INTRAVENOUS at 16:56

## 2023-10-18 RX ADMIN — INSULIN LISPRO 2 UNITS: 100 INJECTION, SOLUTION INTRAVENOUS; SUBCUTANEOUS at 11:27

## 2023-10-18 RX ADMIN — PANTOPRAZOLE SODIUM 40 MG: 40 INJECTION, POWDER, FOR SOLUTION INTRAVENOUS at 08:18

## 2023-10-18 RX ADMIN — PIPERACILLIN SODIUM AND TAZOBACTAM SODIUM 3.38 G: 3; .375 INJECTION, SOLUTION INTRAVENOUS at 16:58

## 2023-10-18 RX ADMIN — PROPOFOL 50 MCG/KG/MIN: 10 INJECTION, EMULSION INTRAVENOUS at 23:13

## 2023-10-18 RX ADMIN — DEXMEDETOMIDINE HYDROCHLORIDE 1 MCG/KG/HR: 4 INJECTION, SOLUTION INTRAVENOUS at 23:46

## 2023-10-18 RX ADMIN — DEXMEDETOMIDINE HYDROCHLORIDE 1.5 MCG/KG/HR: 4 INJECTION, SOLUTION INTRAVENOUS at 01:25

## 2023-10-18 RX ADMIN — PROPOFOL 50 MCG/KG/MIN: 10 INJECTION, EMULSION INTRAVENOUS at 18:24

## 2023-10-18 RX ADMIN — HYDROMORPHONE HYDROCHLORIDE 0.4 MG: 1 INJECTION, SOLUTION INTRAMUSCULAR; INTRAVENOUS; SUBCUTANEOUS at 08:45

## 2023-10-18 RX ADMIN — OLANZAPINE 5 MG: 10 INJECTION, POWDER, FOR SOLUTION INTRAMUSCULAR at 23:10

## 2023-10-18 RX ADMIN — SODIUM CHLORIDE 10 ML: 9 INJECTION, SOLUTION INTRAVENOUS at 08:18

## 2023-10-18 RX ADMIN — OLANZAPINE 5 MG: 10 INJECTION, POWDER, FOR SOLUTION INTRAMUSCULAR at 16:58

## 2023-10-18 RX ADMIN — DEXMEDETOMIDINE HYDROCHLORIDE 1.5 MCG/KG/HR: 4 INJECTION, SOLUTION INTRAVENOUS at 11:26

## 2023-10-18 RX ADMIN — DEXMEDETOMIDINE HYDROCHLORIDE 1.5 MCG/KG/HR: 4 INJECTION, SOLUTION INTRAVENOUS at 06:16

## 2023-10-18 RX ADMIN — ENOXAPARIN SODIUM 40 MG: 40 INJECTION SUBCUTANEOUS at 18:00

## 2023-10-18 RX ADMIN — DEXMEDETOMIDINE HYDROCHLORIDE 1.5 MCG/KG/HR: 4 INJECTION, SOLUTION INTRAVENOUS at 16:55

## 2023-10-18 RX ADMIN — PIPERACILLIN SODIUM AND TAZOBACTAM SODIUM 3.38 G: 3; .375 INJECTION, SOLUTION INTRAVENOUS at 08:18

## 2023-10-18 RX ADMIN — PROPOFOL 50 MCG/KG/MIN: 10 INJECTION, EMULSION INTRAVENOUS at 01:30

## 2023-10-18 RX ADMIN — HALOPERIDOL LACTATE 10 MG: 5 INJECTION, SOLUTION INTRAMUSCULAR at 09:30

## 2023-10-18 ASSESSMENT — PAIN SCALES - GENERAL: PAINLEVEL_OUTOF10: 0 - NO PAIN

## 2023-10-18 ASSESSMENT — PAIN - FUNCTIONAL ASSESSMENT
PAIN_FUNCTIONAL_ASSESSMENT: CPOT (CRITICAL CARE PAIN OBSERVATION TOOL)
PAIN_FUNCTIONAL_ASSESSMENT: 0-10
PAIN_FUNCTIONAL_ASSESSMENT: CPOT (CRITICAL CARE PAIN OBSERVATION TOOL)

## 2023-10-18 NOTE — PROGRESS NOTES
10/18/23 1651   Discharge Planning   Living Arrangements Spouse/significant other   Support Systems Spouse/significant other   Assistance Needed unable to assess   Type of Residence Private residence     Pt remains intubated and sedated.  Care transitions team will continue to follow for dc planning needs.  TBD at present time

## 2023-10-18 NOTE — PROGRESS NOTES
"Victorino Lara is a 48 y.o. adult on day 7 of admission presenting with Acute appendicitis with localized peritonitis.   Now in ICU after Lap w EUGENIA. On [facility] Vent now,   Remains intubated due to Aspiration Pneumonia and Agitation.  Subjective   See plan / summary below           Objective   Physical Exam  Constitutional:       Comments: Sedated, moves all extremities   HENT:      Head: Normocephalic.      Mouth/Throat:      Mouth: Mucous membranes are moist.   Eyes:      Pupils: Pupils are equal, round, and reactive to light.   Cardiovascular:      Rate and Rhythm: Normal rate and regular rhythm.      Pulses: Normal pulses.   Pulmonary:      Comments: Intubated, right lung clear, left diminished  Abdominal:      Comments: Binder on, soft, no bowel sounds noted but protruding     Skin:     General: Skin is warm.   Neurological:      General: No focal deficit present.     Last Recorded Vitals  Blood pressure 101/60, pulse (!) 112, temperature 36.6 °C (97.9 °F), temperature source Temporal, resp. rate 24, height 1.702 m (5' 7\"), weight 93.9 kg (207 lb 0.2 oz), SpO2 96 %.  Intake/Output last 3 Shifts:  I/O last 3 completed shifts:  In: 5157.3 (54.9 mL/kg) [I.V.:1715.1 (18.3 mL/kg); IV Piggyback:3442.2]  Out: 8125 (86.5 mL/kg) [Urine:1925 (0.6 mL/kg/hr); Emesis/NG output:6200]  Weight: 93.9 kg      Relevant Results        Scheduled medications  busPIRone, 15 mg, oral, BID  docusate sodium, 100 mg, oral, BID  enoxaparin, 40 mg, subcutaneous, Daily  insulin lispro, 0-10 Units, subcutaneous, q4h  [Held by provider] insulin regular, 0-5 Units, subcutaneous, TID with meals  lidocaine, 0.1 mL, subcutaneous, Once  lurasidone, 80 mg, oral, Daily  [Held by provider] pantoprazole, 40 mg, oral, Daily before breakfast  pantoprazole, 40 mg, intravenous, Daily  QUEtiapine, 300 mg, oral, BID  simvastatin, 40 mg, oral, Nightly  sodium chloride, 10 mL, intravenous, q8h  traZODone, 300 mg, oral, Nightly  venlafaxine XR, 150 mg, oral, Daily   "      Continuous medications  propofol, 5-50 mcg/kg/min, Last Rate: 70 mcg/kg/min (10/15/23 0658)  sodium chloride 0.9%, 125 mL/hr, Last Rate: 125 mL/hr (10/15/23 0820)        PRN medications  PRN medications: acetaminophen, calcium carbonate, dextrose 10 % in water (D10W), dextrose, glucagon, HYDROmorphone, naloxone, oxyCODONE, oxygen, tiZANidine           Results for orders placed or performed during the hospital encounter of 10/10/23 (from the past 24 hour(s))   POCT GLUCOSE   Result Value Ref Range     POCT Glucose 172 (H) 74 - 99 mg/dL   Blood Gas Arterial Full Panel   Result Value Ref Range     POCT pH, Arterial 7.50 (H) 7.38 - 7.42 pH     POCT pCO2, Arterial 47 (H) 38 - 42 mm Hg     POCT pO2, Arterial 86 85 - 95 mm Hg     POCT SO2, Arterial 98 94 - 100 %     POCT Oxy Hemoglobin, Arterial 95.7 94.0 - 98.0 %     POCT Hematocrit Calculated, Arterial 37.0 36.0 - 52.0 %     POCT Sodium, Arterial 137 136 - 145 mmol/L     POCT Potassium, Arterial 3.3 (L) 3.5 - 5.3 mmol/L     POCT Chloride, Arterial 95 (L) 98 - 107 mmol/L     POCT Ionized Calcium, Arterial 1.09 (L) 1.10 - 1.33 mmol/L     POCT Glucose, Arterial 164 (H) 74 - 99 mg/dL     POCT Lactate, Arterial 1.0 0.4 - 2.0 mmol/L     POCT Base Excess, Arterial 11.9 (H) -2.0 - 3.0 mmol/L     POCT HCO3 Calculated, Arterial 36.7 (H) 22.0 - 26.0 mmol/L     POCT Hemoglobin, Arterial 12.3 (L) 13.5 - 17.5 g/dL     POCT Anion Gap, Arterial 9 (L) 10 - 25 mmo/L     Patient Temperature         FiO2 50 %     Ventilator Mode A/C       Ventilator Rate 16 bpm     Tidal Volume 400 mL     Peep CHM2O 5.0 cm H2O   Phosphorus   Result Value Ref Range     Phosphorus 4.4 2.5 - 4.9 mg/dL   Magnesium   Result Value Ref Range     Magnesium 2.02 1.60 - 2.40 mg/dL   CBC and Auto Differential   Result Value Ref Range     WBC 5.1 4.4 - 11.3 x10*3/uL     nRBC 0.0 0.0 - 0.0 /100 WBCs     RBC 3.94 (L) 4.00 - 5.90 x10*6/uL     Hemoglobin 12.0 12.0 - 17.5 g/dL     Hematocrit 34.4 (L) 36.0 - 52.0 %      MCV 87 80 - 100 fL     MCH 30.5 26.0 - 34.0 pg     MCHC 34.9 32.0 - 36.0 g/dL     RDW 13.5 11.5 - 14.5 %     Platelets 424 150 - 450 x10*3/uL     MPV 10.8 7.5 - 11.5 fL     Immature Granulocytes %, Automated 5.9 (H) 0.0 - 0.9 %     Immature Granulocytes Absolute, Automated 0.30 0.00 - 0.70 x10*3/uL   Manual Differential   Result Value Ref Range     Neutrophils %, Manual 50.0 40.0 - 80.0 %     Bands %, Manual 15.0 0.0 - 5.0 %     Lymphocytes %, Manual 25.0 13.0 - 44.0 %     Monocytes %, Manual 4.0 2.0 - 10.0 %     Eosinophils %, Manual 3.0 0.0 - 6.0 %     Basophils %, Manual 1.0 0.0 - 2.0 %     Atypical Lymphocytes %, Manual 2.0 0.0 - 2.0 %     Seg Neutrophils Absolute, Manual 2.55 1.20 - 7.00 x10*3/uL     Bands Absolute, Manual 0.77 (H) 0.00 - 0.70 x10*3/uL     Lymphocytes Absolute, Manual 1.28 1.20 - 4.80 x10*3/uL     Monocytes Absolute, Manual 0.20 0.10 - 1.00 x10*3/uL     Eosinophils Absolute, Manual 0.15 0.00 - 0.70 x10*3/uL     Basophils Absolute, Manual 0.05 0.00 - 0.10 x10*3/uL     Atypical Lymphs Absolute, Manual 0.10 0.00 - 0.50 x10*3/uL     Total Cells Counted 100       Neutrophils Absolute, Manual 3.32 1.20 - 7.70 x10*3/uL     RBC Morphology See Below       Clumped Platelets Present     Comprehensive Metabolic Panel   Result Value Ref Range     Glucose 141 (H) 74 - 99 mg/dL     Sodium 134 (L) 136 - 145 mmol/L     Potassium 3.3 (L) 3.5 - 5.3 mmol/L     Chloride 93 (L) 98 - 107 mmol/L     Bicarbonate 31 21 - 32 mmol/L     Anion Gap 13 10 - 20 mmol/L     Urea Nitrogen 29 (H) 6 - 23 mg/dL     Creatinine 1.21 0.50 - 1.30 mg/dL     eGFR 55 (L) >60 mL/min/1.73m*2     Calcium 8.2 (L) 8.6 - 10.3 mg/dL     Albumin 3.0 (L) 3.4 - 5.0 g/dL     Alkaline Phosphatase 53 33 - 120 U/L     Total Protein 6.3 (L) 6.4 - 8.2 g/dL     AST 16 9 - 39 U/L     Bilirubin, Total 1.3 (H) 0.0 - 1.2 mg/dL     ALT 11 7 - 52 U/L   POCT GLUCOSE   Result Value Ref Range     POCT Glucose 142 (H) 74 - 99 mg/dL   Light Blue Top   Result Value  Ref Range     Extra Tube Hold for add-ons.     SST TOP   Result Value Ref Range     Extra Tube Hold for add-ons.     POCT GLUCOSE   Result Value Ref Range     POCT Glucose 165 (H) 74 - 99 mg/dL   Blood Gas Venous Full Panel   Result Value Ref Range     POCT pH, Venous 7.49 (H) 7.33 - 7.43 pH     POCT pCO2, Venous 42 41 - 51 mm Hg     POCT pO2, Venous 68 (H) 35 - 45 mm Hg     POCT SO2, Venous 94 (H) 45 - 75 %     POCT Oxy Hemoglobin, Venous 91.7 (H) 45.0 - 75.0 %     POCT Hematocrit Calculated, Venous 57.0 (H) 36.0 - 52.0 %     POCT Sodium, Venous 132 (L) 136 - 145 mmol/L     POCT Potassium, Venous 4.1 3.5 - 5.3 mmol/L     POCT Chloride, Venous 93 (L) 98 - 107 mmol/L     POCT Ionized Calicum, Venous 0.89 (L) 1.10 - 1.33 mmol/L     POCT Glucose, Venous 158 (H) 74 - 99 mg/dL     POCT Lactate, Venous 1.7 0.4 - 2.0 mmol/L     POCT Base Excess, Venous 7.6 (H) -2.0 - 3.0 mmol/L     POCT HCO3 Calculated, Venous 32.0 (H) 22.0 - 26.0 mmol/L     POCT Hemoglobin, Venous 19.0 (H) 12.0 - 17.5 g/dL     POCT Anion Gap, Venous 11.0 10.0 - 25.0 mmol/L     Patient Temperature 37.0 degrees Celsius     FiO2 50 %     Ventilator Mode A/C     CBC   Result Value Ref Range     WBC 10.8 4.4 - 11.3 x10*3/uL     nRBC 0.0 0.0 - 0.0 /100 WBCs     RBC 4.08 4.00 - 5.90 x10*6/uL     Hemoglobin 11.7 (L) 12.0 - 17.5 g/dL     Hematocrit 35.5 (L) 36.0 - 52.0 %     MCV 87 80 - 100 fL     MCH 28.7 26.0 - 34.0 pg     MCHC 33.0 32.0 - 36.0 g/dL     RDW 13.7 11.5 - 14.5 %     Platelets 552 (H) 150 - 450 x10*3/uL     MPV 10.5 7.5 - 11.5 fL   Comprehensive metabolic panel   Result Value Ref Range     Glucose 151 (H) 74 - 99 mg/dL     Sodium 137 136 - 145 mmol/L     Potassium 4.0 3.5 - 5.3 mmol/L     Chloride 94 (L) 98 - 107 mmol/L     Bicarbonate 29 21 - 32 mmol/L     Anion Gap 18 10 - 20 mmol/L     Urea Nitrogen 35 (H) 6 - 23 mg/dL     Creatinine 1.86 (H) 0.50 - 1.30 mg/dL     eGFR 33 (L) >60 mL/min/1.73m*2     Calcium 8.6 8.6 - 10.3 mg/dL     Albumin 3.3 (L)  3.4 - 5.0 g/dL     Alkaline Phosphatase 58 33 - 120 U/L     Total Protein 7.0 6.4 - 8.2 g/dL     AST 15 9 - 39 U/L     Bilirubin, Total 1.7 (H) 0.0 - 1.2 mg/dL     ALT 13 7 - 52 U/L   POCT GLUCOSE   Result Value Ref Range     POCT Glucose 156 (H) 74 - 99 mg/dL   POCT GLUCOSE   Result Value Ref Range     POCT Glucose 148 (H) 74 - 99 mg/dL         CT abdomen pelvis wo IV contrast     Result Date: 10/14/2023  Interpreted By:  Mana Price, STUDY: CT ABDOMEN PELVIS WO IV CONTRAST;  10/14/2023 1:25 pm   INDICATION: Signs/Symptoms:SBO postop appendectomy.   COMPARISON: 10/10/2023   ACCESSION NUMBER(S): [id number]   ORDERING CLINICIAN: DRU WYNN   TECHNIQUE: CT of the abdomen and pelvis was performed. No oral, no intravenous contrast.   FINDINGS: LOWER CHEST: There are bibasilar infiltrates.   ABDOMEN:   LIVER: There is no hepatic mass.   BILE DUCTS: There is no intrahepatic, common hepatic or common bile ductal dilatation.   GALLBLADDER: Contracted but otherwise unremarkable.   PANCREAS: The pancreas is unremarkable.   SPLEEN: The spleen is unremarkable. There is no splenic mass or splenomegaly.   ADRENAL GLANDS: The adrenal glands are unremarkable.   KIDNEYS AND URETERS: The kidneys demonstrate no mass.  There is no intrarenal calculus or hydronephrosis. There is a Proctor catheter insufflated in a nondistended urinary bladder.   BOWEL: There are surgical clips in the right lower quadrant from appendectomy in the interval compared to 10/10/2023. There is haziness in the mesentery in the right lower quadrant from recent surgery. There is a small crescent of non walled-off ascitic fluid in the right lower quadrant. There is a small amount of ascites in the pelvis non walled-off. There is a nasogastric tube with the tip in the mid stomach. There is no gastric distention. There are markedly dilated loops of small bowel. There is collapsed distal small bowel and collapsed colon. This could represent a postoperative  ileus but consider small-bowel obstruction. There are sigmoid diverticula with no diverticulitis.   VESSELS: The abdominal and pelvic vessels are unremarkable.   PERITONEUM/RETROPERITONEUM/LYMPH NODES: There is no retroperitoneal or pelvic adenopathy.  There is no ascites.   ABDOMINAL WALL: There is a periumbilical hernia with an area of opening measuring 5.8 cm in transverse dimension. There is herniation of a segment of nondilated non thickened small bowel. There is induration around the umbilicus from recent surgery. There is a small hematoma in the subcutaneous fat with hyperdense and hypodense fluid. This measures 3.0 x 1.8 cm. There is a dot of air which could be iatrogenic from prior laparoscopic surgery rather than abscess and infection..   BONE AND SOFT TISSUE: There is no acute osseous finding.   There is no soft tissue abnormality.        1. Interval appendectomy. Haziness right lower quadrant with a small crescent of fluid non walled-off. 2. Markedly dilated loops of small bowel with nondilated collapsed distal small bowel and collapsed colon. This is likely a postoperative ileus but still consider small-bowel obstruction. Proctor catheter with the tip in the mid stomach. 3. Periumbilical hernia with a segment of nondilated non thickened small bowel. There is induration and a small amount of fluid in the fat around the umbilicus from recent surgery. There is a small hematoma in the periumbilical fat. There is a dot of air which could be iatrogenic from recent surgery rather than abscess and infection. 4. Proctor catheter insufflated in a nondistended urinary bladder. 5. Bibasilar pneumonia.   MACRO: None   Signed by: Mana Price 10/14/2023 1:34 PM Dictation workstation:   [id number]     XR abdomen 1 view     Result Date: 10/14/2023  Interpreted By:  Mana Price, STUDY: XR ABDOMEN 1 VIEW;  10/14/2023 8:15 am. 3 views.   INDICATION: Signs/Symptoms:Ileus.   COMPARISON: 10/12/2023   ACCESSION NUMBER(S):  [id number]   ORDERING CLINICIAN: DRU WYNN   FINDINGS: There is a nasogastric tube with the tip in the proximal stomach. There is gastric decompression. There is progressive marked small bowel dilatation with loops of bowel dilated up to 6.6 cm. There is gas and stool in nondilated colon. Findings are consistent with a small-bowel obstruction.   There are no pathologic calcifications.   Visualized lungs are clear.   Osseous structures demonstrate no acute bony changes.          1. Nasogastric tube with the tip in the proximal stomach with gastric decompression. 2. Progressive marked small bowel dilatation consistent with small-bowel obstruction.     MACRO: None   Signed by: Mana Price 10/14/2023 8:55 AM Dictation workstation:   [id number]         ------------------------------------------------------------------------------      Victorino Lara is a 48 YM with prior abdominal hernia repair, initially presented on October 10th with right lower quadrant abdominal pain. Found to have acute appendicitis with localized peritonitis without rupture and taken to the OR for laparoscopic appendectomy.       Postoperatively patient was tachycardic which was treated with IV fluids and patient also had some hypoxemia. On the 12th pulmonology was consulted for hypoxia/dyspnea/atelectasis, CT chest showed significant bibasilar atelectasis.  Patient was only requiring nasal cannula. POD #3 patient developed ileus per KUB. Supportive measures were continued including NGT, IV fluids and replacing electrolytes. Symptoms did not improve.  CT abdomen and pelvis was done which was concerning for SBO.     10/14 patient underwent exploratory laparotomy. Findings included recurrence of umbilial hernia, bowel distended but viable, serosanguonous abdominal fluid, single interloop adhesion about 10cm from terminal ileum which was lysted, hernia repaired primarily. As patient was being extubated he had copious amounts of bilious emesis  and was retching so patient was reanesthetized and read paralyzed with rocuronium, reprepped and opened up again for reexploration.  Reexploration revealed middle third of the fascia of the stitches had pulled through, no visible bowel just omentum that was placed for closure of the fascia.  Bowel was distended but completely viable.      Transferred to ICU postop  on mechanical ventilator for acute postop respiratory insufficiency status post exploratory laparotomy, lysis of adhesions, repair of incisional hernia, reexploratory laparotomy with repair of dehiscence     10/15: large output from NGT overnight, 1400 ml.               Assessment/Plan   Principal Problem:    Acute appendicitis with localized peritonitis  Active Problems:    Anxiety    Depression    Hyperlipidemia    Diabetes mellitus, type 2 (CMS/HCC)    Appendicitis, acute    Appendicitis    Acute appendicitis, unspecified acute appendicitis type    Small bowel obstruction (CMS/HCC)        -------------------------------------------------------------------------------------------------------------------------  Neuro:   #Bipolar 1 disorder,depression  #Anxiety & Depression  #Panic disorder  -hold all oral home psych meds for now given SBO  -propofol for sedation, add precedex if needed  -PRN hydromorphone for pain  -[facility] ICU  - restraints required to prevent inadvertent extubation as patient reaches for ETT when off     Pulm:   #Post-operative respiratory insufficiency  -2/2 copious bilious emesis while trying to extubate, was never actually extubated however had to be reanesthetized and reparalyzed with rocuronium, on mechanical ventilator   -ABG today and PRN  -keep intubated today given large amount of output from NGT     CV:   -HR 110s, hemodynamic stable, not on vasopressors  -Continue to monitor     GI:   #Acute appendicitis with localized peritonitis without perforation or gangrene  #SBO  -obstruction from an interloop adhesion and NOT from the  hernia,  -s/p appendectomy 10/10/23, has been on Zosyn  -NPO  -fu KUB     #Post-op ileus versus SBO  #Dehiscence of internal incision  -s/p ex-lap with lysis of adhesions and incisional hernia repair, followed by reexploratory laparotomy for postop internal dehiscence due to retching     :   Intake/Output Summary (Last 24 hours) at 10/15/2023 0907  Last data filed at 10/15/2023 0615      Gross per 24 hour   Intake 4157.33 ml   Output 5075 ml   Net -917.67 ml   #DYLAN  -Present on admission, secondary to hypovolemia most likely, improving  -Creatinine worsening, 1.86 today, daily CMP  -Change NS to LR at 100 ml/hr  -Hourly urine output     Heme:   - H/H stable  - change lovenox to heparin while has DYLAN     Endo:   #DMII   -recently diagnosed  -q4hr accuchecks with SS coverage     ID: no leukocytosis, no fevers  - continue zosyn     Lines:   -ETT    Pulmonary Plan:- wean off Vent per ICU Sx.

## 2023-10-18 NOTE — PROGRESS NOTES
"Victorino Lara is a 48 y.o. adult on day 6 of admission presenting with Acute appendicitis with localized peritonitis.    Subjective   Patient sedated/intubated in the ICU. Reported by RN that overnight patient had 500 out NG. Otherwise no acute events last night.        Objective     Physical Exam  Constitutional:       Appearance: He is ill-appearing.   HENT:      Head: Normocephalic.      Nose: Nose normal.      Mouth/Throat:      Comments: Breathing tube in place  Eyes:      Conjunctiva/sclera: Conjunctivae normal.      Pupils: Pupils are equal, round, and reactive to light.   Cardiovascular:      Rate and Rhythm: Normal rate.   Pulmonary:      Breath sounds: Decreased breath sounds present.   Abdominal:      General: Bowel sounds are decreased. There is distension.      Palpations: Abdomen is soft.      Comments: Binder in place   Skin:     General: Skin is warm.       Last Recorded Vitals  Blood pressure 108/67, pulse 81, temperature 35.9 °C (96.6 °F), temperature source Temporal, resp. rate 20, height 1.702 m (5' 7.01\"), weight 98.6 kg (217 lb 6 oz), SpO2 97 %.  Intake/Output last 3 Shifts:  I/O last 3 completed shifts:  In: 5841.7 (59.2 mL/kg) [I.V.:3301.1 (33.5 mL/kg); IV Piggyback:463.8]  Out: 4633 (47 mL/kg) [Urine:3233 (0.9 mL/kg/hr); Emesis/NG output:1400]  Weight: 98.6 kg     Relevant Results  Lab Results   Component Value Date    WBC 15.8 (H) 10/18/2023    HGB 7.9 (L) 10/18/2023    HCT 24.3 (L) 10/18/2023    MCV 89 10/18/2023     10/18/2023      Lab Results   Component Value Date    GLUCOSE 159 (H) 10/18/2023    CALCIUM 7.7 (L) 10/18/2023     10/18/2023    K 3.4 (L) 10/18/2023    CO2 29 10/18/2023     10/18/2023    BUN 19 10/18/2023    CREATININE 1.18 10/18/2023      This patient has a central line   Reason for the central line remaining today? Parenteral medication      Assessment/Plan   Subjective  Patient sedated/intubated. ABD is distended and ABD binder in place. Patient on " multiple pressors. NG tube in place with moderate output.     Impression  POD 8 Appendectomy and hernia repair    Plan  NG/NPO/IVF  Nausea Control  Pain Control  Keep K+ 4.0-4.5 and Mag above 2  Continue care per primary team  Further recommendations per Dr. Mcadams    Principal Problem:    Acute appendicitis with localized peritonitis  Active Problems:    Anxiety    Depression    Hyperlipidemia    Diabetes mellitus, type 2 (CMS/HCC)    Appendicitis, acute    Appendicitis    Acute appendicitis, unspecified acute appendicitis type    Small bowel obstruction (CMS/HCC)        Alisha Guerra, APRN-CNP

## 2023-10-18 NOTE — CONSULTS
PSYCHIATRY INITIAL EVALUATION  Author Type: Nurse Practitioner     Chief Complaint  Patient is a 48-year-old male with past psych history of bipolar 1 disorder, anxiety and past medical history of acute appendicitis with localized peritonitis, type 2 diabetes, GERD, hyperlipidemia, obesity, and iron deficiency anemia.  Patient initially presented to the ED with abdominal pain.  Patient had prior abdominal hernia repair.  Patient found to have acute appendicitis with localized peritonitis without rupture.  He was taken to OR for laparoscopic appendectomy.  After surgery patient developed ileus.  Exploratory laparotomy was conducted, lysis of adhesions and repair of incisional hernia were completed.  Patient transferred to ICU postop, placed on mechanical ventilator for acute postop respiratory insufficiency.  Patient appears to have aspiration pneumonitis.    Psych consulted as patient has been exhibiting severe agitation since the surgery was completed.  Patient agitation has made it difficult to attempt to wean off ventilator.  Patient has history of being prescribed multiple antipsychotic medications including Seroquel 300 mg oral daily at bedtime and Latuda 80 mg oral daily.  Additionally patient prescribed Effexor 150 mg oral daily, trazodone 300 mg oral daily at bedtime, and BuSpar 30 mg oral twice daily.  Psych consult team spoke with Dr. Lam who completed patient's lap appendectomy and hernia repair.  He states that patient has been consistently agitated since waking up from anesthesia for surgery.  He received collateral information from patient's girlfriend, and that patient gets very agitated when he is not on his psychiatric medications.  On assessment patient lying in bed on ventilator, did open eyes when his name was called.  Unable to follow commands beyond this response.  Medical team states they have been utilizing Haldol 10 mg IM Q6 for agitation, it has been helpful but has not fully controlled  patient's agitation.     History Of Present Illness  Victorino Lara is a 48 y.o. year old adult patient with   referred for  .       Past Medical History  Past Medical History:   Diagnosis Date    Acute appendicitis with localized peritonitis 10/10/2023    Anxiety and depression     Bipolar 1 disorder, depressed (CMS/[facility])     Diabetes mellitus (CMS/[facility])     type II, diagnosed mid 2023    GERD (gastroesophageal reflux disease)     HLD (hyperlipidemia)     JOI (iron deficiency anemia)     Obesity (BMI 30-39.9)     Onychomycosis     Panic disorder     Retention of urine, unspecified     Inability to urinate    Scrotal hematoma     Umbilical hernia        Past Psychiatric History:   Previous therapy: yes  Previous psychiatric treatment and medication trials: yes - see HPI  Previous psychiatric hospitalizations: Unknown  Previous diagnoses: yes - bipolar 1 disorder, anxiety  Previous suicide attempts: Unknown  History of violence: Unknown      Allergies  No Known Allergies     MSE  General: Lying in bed on ventilator  Appearance: Appears stated age.  Attitude: Sedated  Behavior: Patient did open eyes in response to name  Motor activity: No agitation observed, per medical team patient has been consistently agitated when receiving treatment  Speech:   Mood: Unable to assess, patient intubated  Affect: Unable to assess, patient intubated  Thought process: Unable to assess, patient intubated/sedated  Thought content: Unable to assess, patient intubated/sedated  Thought perception: Unable to assess, patient intubated/sedated  Cognition: Unable to assess, patient intubated/sedated  Insight: Unable to assess, patient intubated/sedated  Judgment: Unable to assess, patient intubated/sedated    Psychiatric Risk Assessment  Violence Risk Assessment: major mental illness and male, agitation secondary to delirium  Acute Risk of Harm to Others is Considered: moderate   Suicide Risk Assessment: current psychiatric illness and  male  Protective Factors against Suicide: Unable to assess, patient intubated/sedated  Acute Risk of Harm to Self is Considered: Unable to assess, patient intubated/sedated    Last Recorded Vitals  Vitals:    10/18/23 0802   BP:    Pulse: 81   Resp: 20   Temp:    SpO2:         Relevant Results  Scheduled medications  [Held by provider] busPIRone, 15 mg, oral, BID  [Held by provider] docusate sodium, 100 mg, oral, BID  enoxaparin, 40 mg, subcutaneous, q24h  insulin lispro, 0-10 Units, subcutaneous, q4h  [Held by provider] insulin regular, 0-5 Units, subcutaneous, TID with meals  [Held by provider] lurasidone, 80 mg, oral, Daily  OLANZapine, 5 mg, intramuscular, q6h  [Held by provider] pantoprazole, 40 mg, oral, Daily before breakfast  pantoprazole, 40 mg, intravenous, Daily  piperacillin-tazobactam, 3.375 g, intravenous, q8h   And  sodium chloride, 10 mL, intravenous, q8h  potassium chloride, 40 mEq, intravenous, Once  potassium phosphate, 21 mmol, intravenous, Once  [Held by provider] QUEtiapine, 300 mg, oral, BID  [Held by provider] simvastatin, 40 mg, oral, Nightly  [Held by provider] traZODone, 300 mg, oral, Nightly  [Held by provider] venlafaxine XR, 150 mg, oral, Daily      Continuous medications  Adult Clinimix TPN, 70 mL/hr, Last Rate: 70 mL/hr (10/17/23 2230)  dexmedeTOMIDine, 0.1-1.5 mcg/kg/hr, Last Rate: 1.5 mcg/kg/hr (10/18/23 0829)  fentaNYL,  mcg/hr, Last Rate: 150 mcg/hr (10/18/23 0818)  propofol, 5-50 mcg/kg/min, Last Rate: 50 mcg/kg/min (10/18/23 0743)      PRN medications  PRN medications: alteplase, dextrose 10 % in water (D10W), dextrose, glucagon, HYDROmorphone, HYDROmorphone, ipratropium-albuteroL, naloxone, [START ON 10/19/2023] OLANZapine       Assessment/Plan   Patient is a 48-year-old male with past psych history of bipolar 1 disorder, anxiety and past medical history of acute appendicitis with localized peritonitis, type 2 diabetes, GERD, hyperlipidemia, obesity, and iron deficiency  anemia. Patient treated for acute appendicitis with localized peritonitis without rupture.  After surgery patient developed ileus.  Exploratory laparotomy was conducted, lysis of adhesions and repair of incisional hernia were completed.      Psych consulted as patient has been exhibiting severe agitation since the surgery was completed.  Patient intubated due to likely aspiration pneumonia. Due to his agitation, has been unable to wean off ventilator, and thus has not been able to receive oral psychiatric medications.  Patient agitation likely secondary to delirium, patient's significant other did indicate to the medical team that patient does get agitated when not on regularly scheduled psych meds.  At this time we will switch Haldol to Zyprexa 5 mg IM Q6 PRN for agitation. Zyprexa has a more overlap with patient's prescribed medications of Seroquel and Latuda.  It is also less anticholinergic when compared to Haldol, which will prevent further complications as patient recovers from recent surgery.  Our goal in administering this PRN medication is to maintain patient's safety and work towards primary goal of extubation.    Impression:  Bipolar 1 disorder, agitation secondary to delirium    Recommendations:  -- Medications:           -Discontinue Haldol           -Start Zyprexa 5 mg IM Q6 PRN for agitation, (with Zyprexa                     benzodiazepines should be avoided, and should not be              administered within 4 hours of Zyprexa)    -- Discussed recommendations with primary team.    -- Delirium precautions       - frequent orientation       - try to avoid day/night confusion: lights on/windows open during day,          off at night       - follow geriatric agitation protocol orderset for PRN meds as needed       - provide hearing aids/glasses if indicated       - avoid deliriogenic medications when possible - such as benzos,          opioids, anticholinergics       - optimize medical conditions     --  Psychiatry will follow

## 2023-10-18 NOTE — PROGRESS NOTES
Critical Care Daily Progress        Subjective   Patient is a 48 y.o. adult admitted on 10/10/2023  8:50 AM with the following indication(s) for ICU care respiratory insufficiency secondary to aspiration pneumonitis.     Interval History: Patient still agitated but less so overnight, still fighting vent requiring increased O2, WBC stable at around 16K, Cr continues to improve with good UO     Complaints: Intubated, sedated.     Scheduled Medications:   [Held by provider] busPIRone, 15 mg, oral, BID  [Held by provider] docusate sodium, 100 mg, oral, BID  enoxaparin, 40 mg, subcutaneous, q24h  insulin lispro, 0-10 Units, subcutaneous, q4h  [Held by provider] insulin regular, 0-5 Units, subcutaneous, TID with meals  [Held by provider] lurasidone, 80 mg, oral, Daily  OLANZapine, 5 mg, intramuscular, q6h  [Held by provider] pantoprazole, 40 mg, oral, Daily before breakfast  pantoprazole, 40 mg, intravenous, Daily  piperacillin-tazobactam, 3.375 g, intravenous, q8h   And  sodium chloride, 10 mL, intravenous, q8h  potassium chloride, 40 mEq, intravenous, Once  potassium phosphate, 21 mmol, intravenous, Once  [Held by provider] QUEtiapine, 300 mg, oral, BID  [Held by provider] simvastatin, 40 mg, oral, Nightly  [Held by provider] traZODone, 300 mg, oral, Nightly  [Held by provider] venlafaxine XR, 150 mg, oral, Daily         Continuous Medications:   Adult Clinimix TPN, 70 mL/hr, Last Rate: 70 mL/hr (10/17/23 2230)  dexmedeTOMIDine, 0.1-1.5 mcg/kg/hr, Last Rate: 1.5 mcg/kg/hr (10/18/23 1126)  fentaNYL,  mcg/hr, Last Rate: 150 mcg/hr (10/18/23 0818)  propofol, 5-50 mcg/kg/min, Last Rate: 50 mcg/kg/min (10/18/23 1137)         PRN Medications:   PRN medications: alteplase, dextrose 10 % in water (D10W), dextrose, glucagon, HYDROmorphone, HYDROmorphone, ipratropium-albuteroL, naloxone, [START ON 10/19/2023] OLANZapine    Objective   Vitals:  Most Recent:  Vitals:    10/18/23 1100   BP:    Pulse:    Resp: 22   Temp:     SpO2:        24hr Min/Max:  Temp  Min: 35.9 °C (96.6 °F)  Max: 37 °C (98.6 °F)  Pulse  Min: 80  Max: 98  BP  Min: 108/67  Max: 108/67  Resp  Min: 20  Max: 36  SpO2  Min: 90 %  Max: 99 %    LDA:  CVC 10/15/23 Triple lumen Left Internal jugular (Active)   Placement Date/Time: 10/15/23 (c) 1906   Hand Hygiene Performed Prior to CVC Insertion: Yes  Site Prep: Chlorhexidine   All 5 Sterile Barriers Used (Gloves, Gown, Cap, Mask, Large Sterile Drape): Yes  Local Anesthetic: Injectable  Lumen Type: Triple l...   Number of days: 2       Arterial Line 10/15/23 Right Radial (Active)   Placement Date/Time: 10/15/23 1846   Earliest Known Present: 10/15/23  Orientation: Right  Location: Radial  Securement Method: Sutured;Transparent dressing  Number of Sutures Placed: 2   Number of days: 2       ETT  7.5 mm (Active)   Placement Date/Time: 10/14/23 1950   Placed by External Staff?: (c) Other (Comment)  ETT Type: ETT - single  Single Lumen Tube Size: 7.5 mm  Location: Oral   Number of days: 3       Urethral Catheter Straight-tip 16 Fr. (Active)   Placement Date/Time: 10/12/23 0245   Placed by: ESPINOZA nevarez [facility] 3  Hand Hygiene Completed: Yes  Catheter Type: Straight-tip  Tube Size (Fr.): 16 Fr.  Catheter Balloon Size: 10 mL  Urine Returned: Yes   Number of days: 6       NG/OG/Feeding Tube Nasogastric 14 Fr Left nostril (Active)   Placement Date/Time: 10/12/23 1945   Hand Hygiene Completed: Yes  Type of Tube: NG/OG Tube  Tube Length: 55 cm  Tube Type: Nasogastric  NG/OG Tube Size: 14 Fr  Tube Location: Left nostril   Number of days: 5         Vent settings:  Vent Mode: Volume control/assist control  FiO2 (%):  [80 %-100 %] 80 %  S RR:  [14] 14  S VT:  [450 mL] 450 mL  PEEP/CPAP (cm H2O):  [5 cm H20] 5 cm H20  MAP (cm H2O):  [3.6-11] 8.7    Hemodynamic parameters for last 24 hours:       I/O:    Intake/Output Summary (Last 24 hours) at 10/18/2023 1205  Last data filed at 10/18/2023 0600  Gross per 24 hour   Intake 3420.49 ml    Output 2260 ml   Net 1160.49 ml       Physical Exam:   General appearance: Combative during much of the day but less so, but when sedated no acute distress, intubated  Head: Normocephalic, without obvious abnormality  Eyes:conjunctivae/corneas clear. PERRL  Neck:  supple, symmetrical, trachea midline  Lungs:clear to auscultation bilaterally  Chest wall:  no tenderness  Heart: regular rate and rhythm, S1, S2 normal, no murmur, click, rub or gallop  Abdomen: Soft, ND, NTTP, incision c/d/i  Male genitalia:  normal  Extremities: Minimal edema  Skin: Skin color, texture, turgor normal.  No rashes or lesions  Neurologic: Moving extremities x4       Lab/Radiology/Diagnostic Review:  Results for orders placed or performed during the hospital encounter of 10/10/23 (from the past 24 hour(s))   Blood Gas Arterial Full Panel   Result Value Ref Range    POCT pH, Arterial 7.45 (H) 7.38 - 7.42 pH    POCT pCO2, Arterial 41 38 - 42 mm Hg    POCT pO2, Arterial 69 (L) 85 - 95 mm Hg    POCT SO2, Arterial 95 94 - 100 %    POCT Oxy Hemoglobin, Arterial 93.1 (L) 94.0 - 98.0 %    POCT Hematocrit Calculated, Arterial 32.0 (L) 36.0 - 52.0 %    POCT Sodium, Arterial 139 136 - 145 mmol/L    POCT Potassium, Arterial 3.3 (L) 3.5 - 5.3 mmol/L    POCT Chloride, Arterial 107 98 - 107 mmol/L    POCT Ionized Calcium, Arterial 1.06 (L) 1.10 - 1.33 mmol/L    POCT Glucose, Arterial 137 (H) 74 - 99 mg/dL    POCT Lactate, Arterial 1.4 0.4 - 2.0 mmol/L    POCT Base Excess, Arterial 4.1 (H) -2.0 - 3.0 mmol/L    POCT HCO3 Calculated, Arterial 28.5 (H) 22.0 - 26.0 mmol/L    POCT Hemoglobin, Arterial 10.5 (L) 13.5 - 17.5 g/dL    POCT Anion Gap, Arterial 7 (L) 10 - 25 mmo/L    Patient Temperature      FiO2 55 %    Ventilator Mode A/C     Ventilator Rate 14 bpm    Tidal Volume 450 mL    Peep CHM2O 5.0 cm H2O   Basic Metabolic Panel   Result Value Ref Range    Glucose 134 (H) 74 - 99 mg/dL    Sodium 139 136 - 145 mmol/L    Potassium 3.5 3.5 - 5.3 mmol/L     Chloride 104 98 - 107 mmol/L    Bicarbonate 28 21 - 32 mmol/L    Anion Gap 11 10 - 20 mmol/L    Urea Nitrogen 24 (H) 6 - 23 mg/dL    Creatinine 1.32 (H) 0.50 - 1.30 mg/dL    eGFR 50 (L) >60 mL/min/1.73m*2    Calcium 7.8 (L) 8.6 - 10.3 mg/dL   Magnesium   Result Value Ref Range    Magnesium 2.07 1.60 - 2.40 mg/dL   Phosphorus   Result Value Ref Range    Phosphorus 3.4 2.5 - 4.9 mg/dL   POCT GLUCOSE   Result Value Ref Range    POCT Glucose 156 (H) 74 - 99 mg/dL   POCT GLUCOSE   Result Value Ref Range    POCT Glucose 127 (H) 74 - 99 mg/dL   POCT GLUCOSE   Result Value Ref Range    POCT Glucose 150 (H) 74 - 99 mg/dL   CBC and Auto Differential   Result Value Ref Range    WBC 15.8 (H) 4.4 - 11.3 x10*3/uL    nRBC 0.1 (H) 0.0 - 0.0 /100 WBCs    RBC 2.73 (L) 4.00 - 5.90 x10*6/uL    Hemoglobin 7.9 (L) 12.0 - 17.5 g/dL    Hematocrit 24.3 (L) 36.0 - 52.0 %    MCV 89 80 - 100 fL    MCH 28.9 26.0 - 34.0 pg    MCHC 32.5 32.0 - 36.0 g/dL    RDW 14.2 11.5 - 14.5 %    Platelets 430 150 - 450 x10*3/uL    MPV 10.2 7.5 - 11.5 fL    Immature Granulocytes %, Automated 14.0 (H) 0.0 - 0.9 %    Immature Granulocytes Absolute, Automated 2.21 (H) 0.00 - 0.70 x10*3/uL   Comprehensive Metabolic Panel   Result Value Ref Range    Glucose 159 (H) 74 - 99 mg/dL    Sodium 139 136 - 145 mmol/L    Potassium 3.4 (L) 3.5 - 5.3 mmol/L    Chloride 104 98 - 107 mmol/L    Bicarbonate 29 21 - 32 mmol/L    Anion Gap 9 (L) 10 - 20 mmol/L    Urea Nitrogen 19 6 - 23 mg/dL    Creatinine 1.18 0.50 - 1.30 mg/dL    eGFR 57 (L) >60 mL/min/1.73m*2    Calcium 7.7 (L) 8.6 - 10.3 mg/dL    Albumin 2.6 (L) 3.4 - 5.0 g/dL    Alkaline Phosphatase 61 33 - 120 U/L    Total Protein 5.7 (L) 6.4 - 8.2 g/dL    AST 25 9 - 39 U/L    Bilirubin, Total 0.9 0.0 - 1.2 mg/dL    ALT 16 7 - 52 U/L   Magnesium   Result Value Ref Range    Magnesium 2.03 1.60 - 2.40 mg/dL   Phosphorus   Result Value Ref Range    Phosphorus 3.3 2.5 - 4.9 mg/dL   Manual Differential   Result Value Ref Range     Neutrophils %, Manual 64.0 40.0 - 80.0 %    Bands %, Manual 7.0 0.0 - 5.0 %    Lymphocytes %, Manual 17.0 13.0 - 44.0 %    Monocytes %, Manual 5.0 2.0 - 10.0 %    Eosinophils %, Manual 3.0 0.0 - 6.0 %    Basophils %, Manual 0.0 0.0 - 2.0 %    Metamyelocytes %, Manual 2.0 0.0 - 0.0 %    Myelocytes %, Manual 2.0 0.0 - 0.0 %    Seg Neutrophils Absolute, Manual 10.11 (H) 1.20 - 7.00 x10*3/uL    Bands Absolute, Manual 1.11 (H) 0.00 - 0.70 x10*3/uL    Lymphocytes Absolute, Manual 2.69 1.20 - 4.80 x10*3/uL    Monocytes Absolute, Manual 0.79 0.10 - 1.00 x10*3/uL    Eosinophils Absolute, Manual 0.47 0.00 - 0.70 x10*3/uL    Basophils Absolute, Manual 0.00 0.00 - 0.10 x10*3/uL    Metamyelocytes Absolute, Manual 0.32 0.00 - 0.00 x10*3/uL    Myelocytes Absolute, Manual 0.32 0.00 - 0.00 x10*3/uL    Total Cells Counted 100     Neutrophils Absolute, Manual 11.22 (H) 1.20 - 7.70 x10*3/uL    RBC Morphology See Below     Ovalocytes Few     Dohle Bodies Present     Toxic Granulation Present     Clumped Platelets Present    Triglycerides   Result Value Ref Range    Triglycerides 345 (H) 0 - 149 mg/dL   Creatine Kinase   Result Value Ref Range    Creatine Kinase 815 (H) 0 - 325 U/L   Blood Gas Arterial Full Panel   Result Value Ref Range    POCT pH, Arterial 7.42 7.38 - 7.42 pH    POCT pCO2, Arterial 44 (H) 38 - 42 mm Hg    POCT pO2, Arterial 102 (H) 85 - 95 mm Hg    POCT SO2, Arterial 98 94 - 100 %    POCT Oxy Hemoglobin, Arterial 97.8 94.0 - 98.0 %    POCT Hematocrit Calculated, Arterial 26.0 (L) 36.0 - 52.0 %    POCT Sodium, Arterial 139 136 - 145 mmol/L    POCT Potassium, Arterial 3.5 3.5 - 5.3 mmol/L    POCT Chloride, Arterial 107 98 - 107 mmol/L    POCT Ionized Calcium, Arterial 1.09 (L) 1.10 - 1.33 mmol/L    POCT Glucose, Arterial 172 (H) 74 - 99 mg/dL    POCT Lactate, Arterial 1.6 0.4 - 2.0 mmol/L    POCT Base Excess, Arterial 3.6 (H) -2.0 - 3.0 mmol/L    POCT HCO3 Calculated, Arterial 28.5 (H) 22.0 - 26.0 mmol/L    POCT  Hemoglobin, Arterial 8.8 (L) 13.5 - 17.5 g/dL    POCT Anion Gap, Arterial 7 (L) 10 - 25 mmo/L    Patient Temperature      FiO2 100 %    Ventilator Rate 14 bpm    Tidal Volume 450 mL    Peep CHM2O 5.0 cm H2O   POCT GLUCOSE   Result Value Ref Range    POCT Glucose 157 (H) 74 - 99 mg/dL   POCT GLUCOSE   Result Value Ref Range    POCT Glucose 139 (H) 74 - 99 mg/dL   POCT GLUCOSE   Result Value Ref Range    POCT Glucose 164 (H) 74 - 99 mg/dL     XR chest 1 view    Result Date: 10/18/2023  Interpreted By:  Schoenberger, Joseph, STUDY: XR CHEST 1 VIEW;  10/18/2023 6:29 am   INDICATION: Signs/Symptoms:endotracheal tube verification.   COMPARISON: 10/17/2023   ACCESSION NUMBER(S): [id number]   ORDERING CLINICIAN: GAURANG STOKES   FINDINGS: The positioning of the endotracheal tube and nasogastric tube and left jugular venous infusion catheter is unchanged.       CARDIOMEDIASTINAL SILHOUETTE: The cardiac silhouette remains enlarged.   LUNGS: Right-sided perihilar hazy opacity persists but is somewhat improved from prior. Retrocardiac opacity and platelike atelectasis or scarring in the left lung base is unchanged.   ABDOMEN: No remarkable upper abdominal findings.   BONES: No acute osseous changes.       1.  The similar to prior other than slight improvement in the appearance of the right lung       MACRO: None   Signed by: Joseph Schoenberger 10/18/2023 8:10 AM Dictation workstation:   [id number]    XR chest 1 view    Result Date: 10/17/2023  Interpreted By:  Ranjeet Gil, STUDY: XR CHEST 1 VIEW;  10/17/2023 5:37 am   INDICATION: Signs/Symptoms:Pneumonia evaluation.   COMPARISON: Portable chest, 16 October 2023   ACCESSION NUMBER(S): [id number]   ORDERING CLINICIAN: KRISTINA MONTALVO   TECHNIQUE: Single frontal view of the chest; Portable technique   FINDINGS:   The endotracheal tube is well positioned   Left sided central line is unchanged and well positioned   Enteric tube unchanged in position with tip projecting over  left lateral gastric body   Hazy bilateral airspace disease is unchanged   The curvilinear peripheral left mid to basilar opacity is scarring or atelectasis based on review of prior CT   No new acute process such as large pleural effusion or pneumothorax has developed       Hazy and patchy bilateral airspace disease   MACRO: None   Signed by: Ranjeet Gil 10/17/2023 8:33 AM Dictation workstation:   [id number]    XR abdomen 1 view    Result Date: 10/16/2023  Interpreted By:  Schoenberger, Joseph, STUDY: XR ABDOMEN 1 VIEW;  10/16/2023 6:14 am   INDICATION: Signs/Symptoms:small bowel obstruction.   COMPARISON: 10/15/2023   ACCESSION NUMBER(S): [id number]   ORDERING CLINICIAN: MEAGHAN KATZ   FINDINGS: Persistent gaseous distention of small bowel loops with some gas in the rectum. Possible postoperative ileus. Greatest caliber small bowel loops is noted in the upper abdomen approximating 5.5 cm similar to a comparable 5.2 cm measurement previously. Limited evaluation of pneumoperitoneum on supine imaging, however no gross evidence of free air is noted.   Visualized lungs are clear.   Osseous structures demonstrate no acute bony changes.       1.  Possible persistent postoperative ileus. Mid small bowel obstruction not entirely excluded. Continued follow-up recommended.   MACRO: None   Signed by: Joseph Schoenberger 10/16/2023 8:57 AM Dictation workstation:   [id number]    XR chest 1 view    Result Date: 10/16/2023  Interpreted By:  Schoenberger, Joseph, STUDY: XR CHEST 1 VIEW;  10/16/2023 6:14 am   INDICATION: Signs/Symptoms:intubated.   COMPARISON: 10/15/2023   ACCESSION NUMBER(S): [id number]   ORDERING CLINICIAN: MEAGHAN KATZ   FINDINGS: Positioning of nasogastric tube, left jugular venous infusion catheter, and endotracheal tube unchanged.       CARDIOMEDIASTINAL SILHOUETTE: Cardiac silhouette remains somewhat enlarged.   LUNGS: Platelike atelectasis in the left lung base unchanged. Progressing retrocardiac  opacity possibly atelectasis or airspace consolidation.   ABDOMEN: No remarkable upper abdominal findings.   BONES: No acute osseous changes.       1.  Progressing left basal opacity.       MACRO: None   Signed by: Joseph Schoenberger 10/16/2023 8:56 AM Dictation workstation:   [id number]    XR chest 1 view    Result Date: 10/16/2023  Interpreted By:  Schoenberger, Joseph, STUDY: XR CHEST 1 VIEW;  10/15/2023 5:42 am   INDICATION: Signs/Symptoms:c/f aspiration, on vent.   COMPARISON: 05/29/2022   ACCESSION NUMBER(S): [id number]   ORDERING CLINICIAN: JESSA PENA   FINDINGS: Endotracheal tube placed distal tip 3.9 cm above the harvey. Nasogastric tube placed distal tip gastric body. Stomach is distended.       CARDIOMEDIASTINAL SILHOUETTE: Cardiomediastinal silhouette is normal in size and configuration.   LUNGS: There is a new area of platelike atelectasis in the left lung base. No other new focal lung opacities. Limited hypoventilatory exam.   ABDOMEN: No remarkable upper abdominal findings.   BONES: No acute osseous changes.       1.  Satisfactory positioning of tubes and lines. 2. New platelike atelectasis in left lung base. 3. Limited exam.       MACRO: None   Signed by: Joseph Schoenberger 10/16/2023 8:42 AM Dictation workstation:   [id number]    XR abdomen 1 view    Result Date: 10/16/2023  Interpreted By:  Schoenberger, Joseph, STUDY: XR ABDOMEN 1 VIEW;  10/15/2023 5:42 am   INDICATION: Signs/Symptoms:post-op.   COMPARISON: 10/14/2023   ACCESSION NUMBER(S): [id number]   ORDERING CLINICIAN: JESSA PENA   FINDINGS: Persistent gaseous distention of multiple small bowel loops with some gas in the colon. Stomach is distended with nasogastric tube tip gastric body. Greatest caliber small bowel loops on this exam is 5.2 cm compared to 6.6 cm and there is less gas in bowel loops on this exam than the prior. Improving ileus is consideration. Limited evaluation of pneumoperitoneum on supine imaging, however  no gross evidence of free air is noted.   Visualized lungs are clear.   Osseous structures demonstrate no acute bony changes.       1.  Likely improving postoperative ileus.   MACRO: None   Signed by: Joseph Schoenberger 10/16/2023 8:40 AM Dictation workstation:   [id number]    XR chest 1 view    Result Date: 10/15/2023  Interpreted By:  Juan Graves, STUDY: XR CHEST 1 VIEW;  10/15/2023 7:20 pm   INDICATION: Signs/Symptoms:line placement.   COMPARISON: Earlier on 10/15/2023   ACCESSION NUMBER(S): [id number]   ORDERING CLINICIAN: MEAGHAN KATZ   FINDINGS: There is interval placement of a left central venous catheter with tip terminates at the level of the right atrium. Endotracheal tube tip terminates approximately 2.9 cm superior to the harvey. Nasogastric tube tip terminates in the left upper abdomen level of the stomach. The cardiac silhouette is stable in size. There is limited inspiratory lung volumes. Stable band of opacity in the left midlung and left basilar consolidative/atelectatic opacity. No pneumothorax.       Interval placement of left central venous catheter with tip terminating at the level of the right atrium.   Limited inspiratory lung volumes with stable band of opacity in the left mid lung and medial left basilar consolidative/atelectatic opacity.   MACRO: None   Signed by: Juan Graves 10/15/2023 7:40 PM Dictation workstation:   [id number]    CT abdomen pelvis wo IV contrast    Result Date: 10/14/2023  Interpreted By:  Mana Price, STUDY: CT ABDOMEN PELVIS WO IV CONTRAST;  10/14/2023 1:25 pm   INDICATION: Signs/Symptoms:SBO postop appendectomy.   COMPARISON: 10/10/2023   ACCESSION NUMBER(S): [id number]   ORDERING CLINICIAN: DRU WYNN   TECHNIQUE: CT of the abdomen and pelvis was performed. No oral, no intravenous contrast.   FINDINGS: LOWER CHEST: There are bibasilar infiltrates.   ABDOMEN:   LIVER: There is no hepatic mass.   BILE DUCTS: There is no intrahepatic, common hepatic  or common bile ductal dilatation.   GALLBLADDER: Contracted but otherwise unremarkable.   PANCREAS: The pancreas is unremarkable.   SPLEEN: The spleen is unremarkable. There is no splenic mass or splenomegaly.   ADRENAL GLANDS: The adrenal glands are unremarkable.   KIDNEYS AND URETERS: The kidneys demonstrate no mass.  There is no intrarenal calculus or hydronephrosis. There is a Proctor catheter insufflated in a nondistended urinary bladder.   BOWEL: There are surgical clips in the right lower quadrant from appendectomy in the interval compared to 10/10/2023. There is haziness in the mesentery in the right lower quadrant from recent surgery. There is a small crescent of non walled-off ascitic fluid in the right lower quadrant. There is a small amount of ascites in the pelvis non walled-off. There is a nasogastric tube with the tip in the mid stomach. There is no gastric distention. There are markedly dilated loops of small bowel. There is collapsed distal small bowel and collapsed colon. This could represent a postoperative ileus but consider small-bowel obstruction. There are sigmoid diverticula with no diverticulitis.   VESSELS: The abdominal and pelvic vessels are unremarkable.   PERITONEUM/RETROPERITONEUM/LYMPH NODES: There is no retroperitoneal or pelvic adenopathy.  There is no ascites.   ABDOMINAL WALL: There is a periumbilical hernia with an area of opening measuring 5.8 cm in transverse dimension. There is herniation of a segment of nondilated non thickened small bowel. There is induration around the umbilicus from recent surgery. There is a small hematoma in the subcutaneous fat with hyperdense and hypodense fluid. This measures 3.0 x 1.8 cm. There is a dot of air which could be iatrogenic from prior laparoscopic surgery rather than abscess and infection..   BONE AND SOFT TISSUE: There is no acute osseous finding.   There is no soft tissue abnormality.       1. Interval appendectomy. Haziness right lower  quadrant with a small crescent of fluid non walled-off. 2. Markedly dilated loops of small bowel with nondilated collapsed distal small bowel and collapsed colon. This is likely a postoperative ileus but still consider small-bowel obstruction. Proctor catheter with the tip in the mid stomach. 3. Periumbilical hernia with a segment of nondilated non thickened small bowel. There is induration and a small amount of fluid in the fat around the umbilicus from recent surgery. There is a small hematoma in the periumbilical fat. There is a dot of air which could be iatrogenic from recent surgery rather than abscess and infection. 4. Proctor catheter insufflated in a nondistended urinary bladder. 5. Bibasilar pneumonia.   MACRO: None   Signed by: Mana Price 10/14/2023 1:34 PM Dictation workstation:   [id number]    XR abdomen 1 view    Result Date: 10/14/2023  Interpreted By:  Mana Price, STUDY: XR ABDOMEN 1 VIEW;  10/14/2023 8:15 am. 3 views.   INDICATION: Signs/Symptoms:Ileus.   COMPARISON: 10/12/2023   ACCESSION NUMBER(S): [id number]   ORDERING CLINICIAN: DRU WYNN   FINDINGS: There is a nasogastric tube with the tip in the proximal stomach. There is gastric decompression. There is progressive marked small bowel dilatation with loops of bowel dilated up to 6.6 cm. There is gas and stool in nondilated colon. Findings are consistent with a small-bowel obstruction.   There are no pathologic calcifications.   Visualized lungs are clear.   Osseous structures demonstrate no acute bony changes.         1. Nasogastric tube with the tip in the proximal stomach with gastric decompression. 2. Progressive marked small bowel dilatation consistent with small-bowel obstruction.     MACRO: None   Signed by: Mana Price 10/14/2023 8:55 AM Dictation workstation:   [id number]    XR abdomen 1 view    Result Date: 10/13/2023  Interpreted By:  Juan Graves, STUDY: XR ABDOMEN 1 VIEW;  10/12/2023 8:28 pm   INDICATION:  Signs/Symptoms:verify NG placement.   COMPARISON: 10/12/2023   ACCESSION NUMBER(S): [id number]   ORDERING CLINICIAN: VAN BRYAN   FINDINGS: Nasogastric tube tip terminates in the left upper abdomen at level of the stomach. There is cardiomegaly and persistent band of atelectasis or infiltrate in the left midlung. Gaseous distended bowel in the partially imaged abdomen.       Nasogastric tube tip terminates in the left upper abdomen at level of proximal stomach.   Cardiomegaly and stable band of atelectasis or infiltrate in the left mid lung.   Gaseous distended bowel in imaged upper abdomen; continue progress short-term progress is recommended for further evaluation.   MACRO: None   Signed by: Juan Graves 10/13/2023 1:36 AM Dictation workstation:   [id number]    XR abdomen 1 view    Result Date: 10/12/2023  Interpreted By:  Domingo Marks, STUDY: XR ABDOMEN 1 VIEW;  10/12/2023 6:55 pm   INDICATION: Signs/Symptoms:NG tube placement.   COMPARISON: 10/12/2023 at 4:46 p.m.   ACCESSION NUMBER(S): [id number]   ORDERING CLINICIAN: MEAGHAN CHARLTON   FINDINGS: Interval placement of a NG tube with its tip over the gastric body.   Dilatation of multiple small bowel loops as well as multiple colonic loops throughout the abdomen again seen. Bibasilar airspace disease present       1. NG tube tip projects over the gastric body 2. Redemonstration of bowel loop dilatation suggestive of underlying ileus versus obstruction.   MACRO: None   Signed by: Domingo Marks 10/12/2023 7:12 PM Dictation workstation:   [id number]    XR abdomen 1 view    Result Date: 10/12/2023  Interpreted By:  Mana Price, STUDY: XR ABDOMEN 1 VIEW;  10/12/2023 4:51 pm. 2 views.   INDICATION: Signs/Symptoms:r/o ileus.   COMPARISON: CT abdomen and pelvis 10/10/2023   ACCESSION NUMBER(S): [id number]   ORDERING CLINICIAN: MEAGHAN CHARLTON   FINDINGS: There is moderate small bowel and colonic distention, likely an ileus. There are surgical clips in the  right lower quadrant from recent appendectomy.   There are no pathologic calcifications.   Visualized lungs are clear.   Osseous structures demonstrate no acute bony changes.         Moderate small bowel and colonic distention, likely a postoperative ileus. Surgical clips right lower quadrant from recent appendectomy.   MACRO: None   Signed by: Mana Price 10/12/2023 4:56 PM Dictation workstation:   [id number]    CT angio chest for pulmonary embolism    Result Date: 10/12/2023  Interpreted By:  Ranjeet Gil, STUDY: CT ANGIO CHEST FOR PULMONARY EMBOLISM;  10/12/2023 4:56 am   INDICATION: Signs/Symptoms:Rule out pe.   COMPARISON: Portable chest 11 October 2023; CT abdomen and pelvis with contrast 10 October 2023   ACCESSION NUMBER(S): [id number]   ORDERING CLINICIAN: VAN BRYAN   TECHNIQUE: Pulmonary arterial phase CT chest after the uneventful administration of 75 mL IV contrast (Omnipaque 350).   Three dimensional maximum intensity projection (3-D MIPs) image/s were created on a separate dedicated workstation, reviewed and saved   FINDINGS: CARDIOVASCULAR:   Acute pulmonary embolism: Negative through the  segmental (third order) branch level. Subsegmental and more distal branches not well enough opacified to evaluate. Acute right heart strain:  Negative. No CT evidence of acute right heart strain Cardiac thrombus:  Negative; no obvious right heart or other cardiac thrombus is seen Pulmonary arteries ectasia:  Negative   Heart size:  Within normal limits Pericardial effusion:  Trace   Thoracic aortic aneurysm:  Negative Aortic dissection:  Negative   Heart failure change:  Negative.  No sign of interstitial or alveolar edema. Other:  n/a   NONVASCULAR MEDIASTINUM: Esophagus:  Grossly normal by CT Mediastinal Mass:  Negative Hiatal hernia:  None Other:  n/a   LYMPH NODES: No thoracic adenopathy   LUNGS / AIRSPACES / AIRWAYS:   LARGE AIRWAYS Filling defect: Negative Wall thickening: Negative Bronchiectasis:  Negative Other: N/A   AIRSPACES Fibrosis: Negative Emphysema: Negative Consolidation: Negative Ground glass airspace disease: Negative Edema: Negative Nodule / Mass: Negative Other: Dependent atelectasis in both lower lobes, new from two days ago. Additional bands of atelectasis in the left upper lobe also new   PLEURA: Effusion:  Both sides negative Pneumothorax:  Both sides negative Other:  n/a   CHEST WALL: Symmetric mild nonspecific bilateral gynecomastia   SKELETON: No acute or contributory abnormality   UPPER ABDOMEN: Fluid-filled, dilated small bowel loops with air-fluid levels new from CT abdomen and pelvis two days prior       SMALL BOWEL DILATION WITH MULTIPLE SMALL BOWEL AIR-FLUID LEVELS IN THE UPPER ABDOMEN, ALL NEW FROM CT TWO DAYS AGO   NO ACUTE DISEASE IN THE CHEST, ONLY SUBSTANTIAL BILATERAL LOWER LOBE DEPENDENT ATELECTASIS WHICH IS NEW FROM CT TWO DAYS PRIOR   NO AIRSPACE CONSOLIDATION OR GROUND-GLASS AIRSPACE DISEASE   NO EDEMA/FAILURE   NO ACUTE PULMONARY EMBOLISM THROUGH THE SEGMENTAL BRANCH LEVEL   NO AORTIC DISSECTION OR OTHER ACUTE THORACIC AORTIC FINDINGS   NO PLEURAL OR PERICARDIAL EFFUSION   NO PNEUMOTHORAX   MACRO: None   Signed by: Ranjeet Gil 10/12/2023 6:54 AM Dictation workstation:   [id number]    XR chest 1 view    Result Date: 10/11/2023  Interpreted By:  Chapo Cary, STUDY: XR CHEST 1 VIEW   INDICATION: Signs/Symptoms:hypoxemia.   COMPARISON: July 14, 2022   ACCESSION NUMBER(S): [id number]   ORDERING CLINICIAN: MEAGHAN CHARLTON   FINDINGS: No consolidation, effusion, edema, or pneumothorax. Low lung volumes with bibasilar atelectasis.       Low lung volumes with bibasilar atelectasis. No discrete consolidation seen.   Signed by: Chapo Cary 10/11/2023 6:18 PM Dictation workstation:   [id number]    ECG 12 lead    Result Date: 10/11/2023  Sinus tachycardia Inferior infarct (cited on or before 11-OCT-2023) Abnormal ECG When compared with ECG of 10-OCT-2023 13:57, Previous ECG has  undetermined rhythm, needs review T wave inversion now evident in Anterior leads Confirmed by Carlo Jose ([id number]) on 10/11/2023 4:50:57 PM    CT abdomen pelvis w IV contrast    Result Date: 10/10/2023  STUDY: CT Abdomen and Pelvis with IV Contrast; 10/10/2023 10:52 AM. INDICATION: Generalized abdominal pain. COMPARISON: CT AP 5/29/2022. ACCESSION NUMBER(S): [id number] ORDERING CLINICIAN: SHAI ERWIN TECHNIQUE: CT of the abdomen and pelvis was performed.  Contiguous axial images were obtained at 3 mm slice thickness through the abdomen and pelvis. Coronal and sagittal reconstructions at 3 mm slice thickness were performed.  Omnipaque 350 75 mL was administered intravenously.  FINDINGS: LOWER CHEST: No cardiomegaly.  No pericardial effusion.  Lung bases are clear.  ABDOMEN:  LIVER: No hepatomegaly.  Smooth surface contour.  Mild fatty infiltration of the liver.  BILE DUCTS: No intrahepatic or extrahepatic biliary ductal dilatation.  GALLBLADDER: The gallbladder is present without gallstones. STOMACH: No abnormalities identified.  PANCREAS: No masses or ductal dilatation.  SPLEEN: No splenomegaly or focal splenic lesion.  ADRENAL GLANDS: No thickening or nodules.  KIDNEYS AND URETERS: Kidneys are normal in size and location.  No renal or ureteral calculi.  PELVIS:  BLADDER: No abnormalities identified.  REPRODUCTIVE ORGANS: No abnormalities identified.  BOWEL: Appendix is fluid-filled and dilated measuring up to 1.6 cm with multiple intraluminal appendicoliths and mild periappendiceal inflammatory change. No extraluminal gas or abscess. Colonic diverticulosis without findings of diverticulitis  VESSELS: No abnormalities identified.  Abdominal aorta is normal in caliber.  PERITONEUM/RETROPERITONEUM/LYMPH NODES: No free fluid.  No pneumoperitoneum. No lymphadenopathy.  ABDOMINAL WALL: No abnormalities identified. SOFT TISSUES: No abnormalities identified.  BONES: No acute fracture or aggressive osseous  "lesion.    1. Findings compatible with acute uncomplicated appendicitis. No extraluminal gas or abscess. Recommend surgical consultation. 2. Mild hepatic steatosis. 3. Colonic diverticulosis without findings of diverticulitis. Findings discussed with and acknowledged by Ayden Ramires at 11:53 AM Signed by Faustino Hebert MD      Additional Labs:  SCVO2: No results found for: \"[id number]\"        This patient is intubated   Reason for patient to remain intubated today? they are unable to protect their airway and they require frequent suctioning             Assessment/Plan   Principal Problem:    Acute appendicitis with localized peritonitis  Active Problems:    Anxiety    Depression    Hyperlipidemia    Diabetes mellitus, type 2 (CMS/HCC)    Appendicitis, acute    Appendicitis    Acute appendicitis, unspecified acute appendicitis type    Small bowel obstruction (CMS/HCC)    POD8 from lap appy and hernia repair, POD2 from reexploration, EUGENIA, and repair of hernia.  Patient remains intubated for what appears to be aspiration pneumonitis and severe agitation     Neuro:   #Bipolar 1 disorder,depression  #Anxiety & Depression  #Panic disorder  -hold all oral home psych meds for now given SBO, will restart when resumption of diet  -Propofol, precedex, weaned off ketamine  -Start Zyprexa q6h, stop haldol, no benzos while on zyprexa  - Fentanyl ggt for incisional pain  -[facility] ICU  - restraints required to prevent inadvertent extubation as patient reaches for ETT when off, renewed restraints today  -FU psych recs     Pulm:   #Post-operative respiratory insufficiency  -2/2 aspiration pneumonitis, improving oxygentation but still having desaturation due to combination of fighting vent and possibly developing pneumonia  -keep intubated today given hypoxemia and agitation     CV:   -Normocardic and off pressors  -Continue to monitor     GI:   #Acute appendicitis with localized peritonitis without perforation or " gangrene  #SBO  -obstruction from an interloop adhesion and NOT from the hernia,  -s/p appendectomy 10/10/23  -NPO  -TPN for persistent lack of nutrition        :   -Replete elecrolytes  -TPN, no need for other IVF  -FU for resolving DYLAN (mostly resolved)        Heme:   - H/H stable  - lovenox for DYLAN     Endo:   #DMII   -recently diagnosed  -q4hr accuchecks with SS coverage, has not required significant amount of insulin so will just keep SSI, may need more once TPN is initiated     ID:   -WBC now 15K  - continue zosyn for now for possible pneumonia, follow up cultures, plan for 7 days todal, narrow abx when cultures return     Lines:   -ETT  -Central line (needed for TPN)  -A-line  -Proctor  -all are needed due to critical nature of patient           I spent 40 minutes in the professional and overall care of this patient.  Norman Calloway MD  10/18/23

## 2023-10-19 ENCOUNTER — APPOINTMENT (OUTPATIENT)
Dept: RADIOLOGY | Facility: HOSPITAL | Age: 48
End: 2023-10-19
Payer: COMMERCIAL

## 2023-10-19 ENCOUNTER — APPOINTMENT (OUTPATIENT)
Dept: CARDIOLOGY | Facility: HOSPITAL | Age: 48
End: 2023-10-19
Payer: COMMERCIAL

## 2023-10-19 LAB
ALBUMIN SERPL BCP-MCNC: 2.8 G/DL (ref 3.4–5)
ALP SERPL-CCNC: 68 U/L (ref 33–120)
ALT SERPL W P-5'-P-CCNC: 15 U/L (ref 7–52)
ANION GAP BLDA CALCULATED.4IONS-SCNC: 5 MMO/L (ref 10–25)
ANION GAP SERPL CALC-SCNC: 9 MMOL/L (ref 10–20)
APPEARANCE UR: CLEAR
AST SERPL W P-5'-P-CCNC: 24 U/L (ref 9–39)
ATRIAL RATE: 75 BPM
ATRIAL RATE: 81 BPM
BACTERIA #/AREA URNS AUTO: ABNORMAL /HPF
BASE EXCESS BLDA CALC-SCNC: 4.4 MMOL/L (ref -2–3)
BASOPHILS # BLD MANUAL: 0 X10*3/UL (ref 0–0.1)
BASOPHILS NFR BLD MANUAL: 0 %
BILIRUB SERPL-MCNC: 1 MG/DL (ref 0–1.2)
BILIRUB UR STRIP.AUTO-MCNC: NEGATIVE MG/DL
BODY TEMPERATURE: ABNORMAL
BUN SERPL-MCNC: 17 MG/DL (ref 6–23)
CA-I BLDA-SCNC: 1.06 MMOL/L (ref 1.1–1.33)
CALCIUM SERPL-MCNC: 7.7 MG/DL (ref 8.6–10.3)
CHLORIDE BLDA-SCNC: 109 MMOL/L (ref 98–107)
CHLORIDE SERPL-SCNC: 105 MMOL/L (ref 98–107)
CK MB CFR SERPL CALC: 0 %MB OF CK
CK MB SERPL-CCNC: 1.1 NG/ML
CO2 SERPL-SCNC: 28 MMOL/L (ref 21–32)
COLOR UR: YELLOW
CREAT SERPL-MCNC: 0.97 MG/DL (ref 0.5–1.3)
DOHLE BOD BLD QL SMEAR: PRESENT
EOSINOPHIL # BLD MANUAL: 0.18 X10*3/UL (ref 0–0.7)
EOSINOPHIL NFR BLD MANUAL: 1 %
ERYTHROCYTE [DISTWIDTH] IN BLOOD BY AUTOMATED COUNT: 14.3 % (ref 11.5–14.5)
GFR SERPL CREATININE-BSD FRML MDRD: 72 ML/MIN/1.73M*2
GLUCOSE BLD MANUAL STRIP-MCNC: 115 MG/DL (ref 74–99)
GLUCOSE BLD MANUAL STRIP-MCNC: 137 MG/DL (ref 74–99)
GLUCOSE BLD MANUAL STRIP-MCNC: 141 MG/DL (ref 74–99)
GLUCOSE BLD MANUAL STRIP-MCNC: 155 MG/DL (ref 74–99)
GLUCOSE BLD MANUAL STRIP-MCNC: 167 MG/DL (ref 74–99)
GLUCOSE BLD MANUAL STRIP-MCNC: 168 MG/DL (ref 74–99)
GLUCOSE BLDA-MCNC: 185 MG/DL (ref 74–99)
GLUCOSE SERPL-MCNC: 177 MG/DL (ref 74–99)
GLUCOSE UR STRIP.AUTO-MCNC: NEGATIVE MG/DL
HCO3 BLDA-SCNC: 28.4 MMOL/L (ref 22–26)
HCT VFR BLD AUTO: 24.3 % (ref 36–52)
HCT VFR BLD EST: 28 % (ref 36–52)
HGB BLD-MCNC: 7.8 G/DL (ref 12–17.5)
HGB BLDA-MCNC: 9.3 G/DL (ref 13.5–17.5)
HYALINE CASTS #/AREA URNS AUTO: ABNORMAL /LPF
IMM GRANULOCYTES # BLD AUTO: 2.16 X10*3/UL (ref 0–0.7)
IMM GRANULOCYTES NFR BLD AUTO: 12.3 % (ref 0–0.9)
INHALED O2 CONCENTRATION: 55 %
KETONES UR STRIP.AUTO-MCNC: NEGATIVE MG/DL
LABORATORY COMMENT REPORT: NORMAL
LACTATE BLDA-SCNC: 0.9 MMOL/L (ref 0.4–2)
LEUKOCYTE ESTERASE UR QL STRIP.AUTO: ABNORMAL
LYMPHOCYTES # BLD MANUAL: 1.75 X10*3/UL (ref 1.2–4.8)
LYMPHOCYTES NFR BLD MANUAL: 10 %
MAGNESIUM SERPL-MCNC: 2.01 MG/DL (ref 1.6–2.4)
MCH RBC QN AUTO: 28.6 PG (ref 26–34)
MCHC RBC AUTO-ENTMCNC: 32.1 G/DL (ref 32–36)
MCV RBC AUTO: 89 FL (ref 80–100)
METAMYELOCYTES # BLD MANUAL: 0.35 X10*3/UL
METAMYELOCYTES NFR BLD MANUAL: 2 %
MONOCYTES # BLD MANUAL: 0.35 X10*3/UL (ref 0.1–1)
MONOCYTES NFR BLD MANUAL: 2 %
MYELOCYTES # BLD MANUAL: 0.53 X10*3/UL
MYELOCYTES NFR BLD MANUAL: 3 %
NEUTROPHILS # BLD MANUAL: 14.36 X10*3/UL (ref 1.2–7.7)
NEUTS BAND # BLD MANUAL: 1.23 X10*3/UL (ref 0–0.7)
NEUTS BAND NFR BLD MANUAL: 7 %
NEUTS SEG # BLD MANUAL: 13.13 X10*3/UL (ref 1.2–7)
NEUTS SEG NFR BLD MANUAL: 75 %
NITRITE UR QL STRIP.AUTO: NEGATIVE
NRBC BLD-RTO: 0 /100 WBCS (ref 0–0)
OVALOCYTES BLD QL SMEAR: ABNORMAL
OXYHGB MFR BLDA: 94.8 % (ref 94–98)
P AXIS: 22 DEGREES
P AXIS: 31 DEGREES
P OFFSET: 208 MS
P OFFSET: 213 MS
P ONSET: 157 MS
P ONSET: 158 MS
PATH REPORT.FINAL DX SPEC: NORMAL
PATH REPORT.GROSS SPEC: NORMAL
PATH REPORT.RELEVANT HX SPEC: NORMAL
PATH REPORT.TOTAL CANCER: NORMAL
PCO2 BLDA: 39 MM HG (ref 38–42)
PH BLDA: 7.47 PH (ref 7.38–7.42)
PH UR STRIP.AUTO: 6 [PH]
PHOSPHATE SERPL-MCNC: 3 MG/DL (ref 2.5–4.9)
PLATELET # BLD AUTO: 419 X10*3/UL (ref 150–450)
PLATELET CLUMP BLD QL SMEAR: PRESENT
PMV BLD AUTO: 9.8 FL (ref 7.5–11.5)
PO2 BLDA: 77 MM HG (ref 85–95)
POTASSIUM BLDA-SCNC: 3.3 MMOL/L (ref 3.5–5.3)
POTASSIUM SERPL-SCNC: 3.2 MMOL/L (ref 3.5–5.3)
PR INTERVAL: 134 MS
PR INTERVAL: 136 MS
PROT SERPL-MCNC: 5.9 G/DL (ref 6.4–8.2)
PROT UR STRIP.AUTO-MCNC: ABNORMAL MG/DL
Q ONSET: 225 MS
Q ONSET: 225 MS
QRS COUNT: 12 BEATS
QRS COUNT: 14 BEATS
QRS DURATION: 106 MS
QRS DURATION: 112 MS
QT INTERVAL: 378 MS
QT INTERVAL: 406 MS
QTC CALCULATION(BAZETT): 439 MS
QTC CALCULATION(BAZETT): 453 MS
QTC FREDERICIA: 417 MS
QTC FREDERICIA: 437 MS
R AXIS: 0 DEGREES
R AXIS: 10 DEGREES
RBC # BLD AUTO: 2.73 X10*6/UL (ref 4–5.9)
RBC # UR STRIP.AUTO: ABNORMAL /UL
RBC #/AREA URNS AUTO: >20 /HPF
RBC MORPH BLD: ABNORMAL
SAO2 % BLDA: 97 % (ref 94–100)
SODIUM BLDA-SCNC: 139 MMOL/L (ref 136–145)
SODIUM SERPL-SCNC: 139 MMOL/L (ref 136–145)
SP GR UR STRIP.AUTO: 1.02
T AXIS: 2 DEGREES
T AXIS: 9 DEGREES
T OFFSET: 414 MS
T OFFSET: 428 MS
TOTAL CELLS COUNTED BLD: 100
TOXIC GRANULES BLD QL SMEAR: PRESENT
UROBILINOGEN UR STRIP.AUTO-MCNC: <2 MG/DL
VENTRICULAR RATE: 75 BPM
VENTRICULAR RATE: 81 BPM
WBC # BLD AUTO: 17.5 X10*3/UL (ref 4.4–11.3)
WBC #/AREA URNS AUTO: ABNORMAL /HPF

## 2023-10-19 PROCEDURE — 37799 UNLISTED PX VASCULAR SURGERY: CPT | Performed by: HOSPITALIST

## 2023-10-19 PROCEDURE — 51702 INSERT TEMP BLADDER CATH: CPT

## 2023-10-19 PROCEDURE — 36415 COLL VENOUS BLD VENIPUNCTURE: CPT | Performed by: NURSE PRACTITIONER

## 2023-10-19 PROCEDURE — 2500000004 HC RX 250 GENERAL PHARMACY W/ HCPCS (ALT 636 FOR OP/ED): Performed by: NURSE PRACTITIONER

## 2023-10-19 PROCEDURE — 85007 BL SMEAR W/DIFF WBC COUNT: CPT | Performed by: HOSPITALIST

## 2023-10-19 PROCEDURE — 83735 ASSAY OF MAGNESIUM: CPT | Performed by: HOSPITALIST

## 2023-10-19 PROCEDURE — 87205 SMEAR GRAM STAIN: CPT | Mod: CMCLAB,ELYLAB | Performed by: NURSE PRACTITIONER

## 2023-10-19 PROCEDURE — 93005 ELECTROCARDIOGRAM TRACING: CPT

## 2023-10-19 PROCEDURE — 87040 BLOOD CULTURE FOR BACTERIA: CPT | Mod: CMCLAB,ELYLAB | Performed by: NURSE PRACTITIONER

## 2023-10-19 PROCEDURE — 81001 URINALYSIS AUTO W/SCOPE: CPT | Performed by: NURSE PRACTITIONER

## 2023-10-19 PROCEDURE — S0166 INJ OLANZAPINE 2.5MG: HCPCS | Performed by: SURGERY

## 2023-10-19 PROCEDURE — 84132 ASSAY OF SERUM POTASSIUM: CPT | Performed by: HOSPITALIST

## 2023-10-19 PROCEDURE — 2500000004 HC RX 250 GENERAL PHARMACY W/ HCPCS (ALT 636 FOR OP/ED)

## 2023-10-19 PROCEDURE — 2020000001 HC ICU ROOM DAILY

## 2023-10-19 PROCEDURE — 96372 THER/PROPH/DIAG INJ SC/IM: CPT | Performed by: SURGERY

## 2023-10-19 PROCEDURE — 85027 COMPLETE CBC AUTOMATED: CPT | Performed by: HOSPITALIST

## 2023-10-19 PROCEDURE — 99232 SBSQ HOSP IP/OBS MODERATE 35: CPT | Performed by: PSYCHIATRY & NEUROLOGY

## 2023-10-19 PROCEDURE — 87186 SC STD MICRODIL/AGAR DIL: CPT | Mod: CMCLAB,ELYLAB | Performed by: SURGERY

## 2023-10-19 PROCEDURE — C9113 INJ PANTOPRAZOLE SODIUM, VIA: HCPCS | Performed by: SURGERY

## 2023-10-19 PROCEDURE — 2500000004 HC RX 250 GENERAL PHARMACY W/ HCPCS (ALT 636 FOR OP/ED): Performed by: SURGERY

## 2023-10-19 PROCEDURE — A4217 STERILE WATER/SALINE, 500 ML: HCPCS

## 2023-10-19 PROCEDURE — 74018 RADEX ABDOMEN 1 VIEW: CPT | Performed by: RADIOLOGY

## 2023-10-19 PROCEDURE — 84100 ASSAY OF PHOSPHORUS: CPT | Performed by: HOSPITALIST

## 2023-10-19 PROCEDURE — 2500000005 HC RX 250 GENERAL PHARMACY W/O HCPCS: Performed by: SURGERY

## 2023-10-19 PROCEDURE — 2500000004 HC RX 250 GENERAL PHARMACY W/ HCPCS (ALT 636 FOR OP/ED): Performed by: HOSPITALIST

## 2023-10-19 PROCEDURE — 74018 RADEX ABDOMEN 1 VIEW: CPT

## 2023-10-19 PROCEDURE — 94003 VENT MGMT INPAT SUBQ DAY: CPT

## 2023-10-19 PROCEDURE — 99291 CRITICAL CARE FIRST HOUR: CPT | Performed by: SURGERY

## 2023-10-19 PROCEDURE — 82947 ASSAY GLUCOSE BLOOD QUANT: CPT

## 2023-10-19 PROCEDURE — 87205 SMEAR GRAM STAIN: CPT | Mod: ELYLAB | Performed by: NURSE PRACTITIONER

## 2023-10-19 PROCEDURE — 87070 CULTURE OTHR SPECIMN AEROBIC: CPT | Mod: ELYLAB | Performed by: SURGERY

## 2023-10-19 PROCEDURE — 36415 COLL VENOUS BLD VENIPUNCTURE: CPT | Mod: ELYLAB | Performed by: NURSE PRACTITIONER

## 2023-10-19 PROCEDURE — 71045 X-RAY EXAM CHEST 1 VIEW: CPT

## 2023-10-19 PROCEDURE — A4217 STERILE WATER/SALINE, 500 ML: HCPCS | Performed by: HOSPITALIST

## 2023-10-19 PROCEDURE — 2500000001 HC RX 250 WO HCPCS SELF ADMINISTERED DRUGS (ALT 637 FOR MEDICARE OP): Performed by: HOSPITALIST

## 2023-10-19 PROCEDURE — 71045 X-RAY EXAM CHEST 1 VIEW: CPT | Performed by: RADIOLOGY

## 2023-10-19 RX ORDER — MEROPENEM 1 G/1
1 INJECTION, POWDER, FOR SOLUTION INTRAVENOUS EVERY 8 HOURS
Status: DISCONTINUED | OUTPATIENT
Start: 2023-10-19 | End: 2023-10-31

## 2023-10-19 RX ORDER — WATER 1000 ML/1000ML
INJECTION, SOLUTION INTRAVENOUS
Status: COMPLETED
Start: 2023-10-19 | End: 2023-10-19

## 2023-10-19 RX ORDER — OLANZAPINE 10 MG/2ML
5 INJECTION, POWDER, FOR SOLUTION INTRAMUSCULAR EVERY 6 HOURS
Status: DISCONTINUED | OUTPATIENT
Start: 2023-10-19 | End: 2023-10-21

## 2023-10-19 RX ORDER — OLANZAPINE 10 MG/2ML
2.5 INJECTION, POWDER, FOR SOLUTION INTRAMUSCULAR ONCE
Status: COMPLETED | OUTPATIENT
Start: 2023-10-19 | End: 2023-10-19

## 2023-10-19 RX ADMIN — INSULIN LISPRO 2 UNITS: 100 INJECTION, SOLUTION INTRAVENOUS; SUBCUTANEOUS at 16:22

## 2023-10-19 RX ADMIN — METOCLOPRAMIDE HYDROCHLORIDE 10 MG: 5 INJECTION INTRAMUSCULAR; INTRAVENOUS at 05:54

## 2023-10-19 RX ADMIN — DEXMEDETOMIDINE HYDROCHLORIDE 1.25 MCG/KG/HR: 4 INJECTION, SOLUTION INTRAVENOUS at 19:49

## 2023-10-19 RX ADMIN — OLANZAPINE 5 MG: 10 INJECTION, POWDER, FOR SOLUTION INTRAMUSCULAR at 16:01

## 2023-10-19 RX ADMIN — SODIUM CHLORIDE 10 ML: 9 INJECTION, SOLUTION INTRAVENOUS at 08:37

## 2023-10-19 RX ADMIN — OLANZAPINE 5 MG: 10 INJECTION, POWDER, FOR SOLUTION INTRAMUSCULAR at 21:22

## 2023-10-19 RX ADMIN — PROPOFOL 40 MCG/KG/MIN: 10 INJECTION, EMULSION INTRAVENOUS at 17:23

## 2023-10-19 RX ADMIN — PANTOPRAZOLE SODIUM 40 MG: 40 INJECTION, POWDER, FOR SOLUTION INTRAVENOUS at 08:37

## 2023-10-19 RX ADMIN — DEXMEDETOMIDINE HYDROCHLORIDE 1.5 MCG/KG/HR: 4 INJECTION, SOLUTION INTRAVENOUS at 06:12

## 2023-10-19 RX ADMIN — LACTULOSE: 10 SOLUTION ORAL at 12:05

## 2023-10-19 RX ADMIN — PROPOFOL 50 MCG/KG/MIN: 10 INJECTION, EMULSION INTRAVENOUS at 09:57

## 2023-10-19 RX ADMIN — PROPOFOL 50 MCG/KG/MIN: 10 INJECTION, EMULSION INTRAVENOUS at 06:12

## 2023-10-19 RX ADMIN — ASCORBIC ACID, VITAMIN A PALMITATE, CHOLECALCIFEROL, THIAMINE HYDROCHLORIDE, RIBOFLAVIN-5 PHOSPHATE SODIUM, PYRIDOXINE HYDROCHLORIDE, NIACINAMIDE, DEXPANTHENOL, ALPHA-TOCOPHEROL ACETATE, VITAMIN K1, FOLIC ACID, BIOTIN, CYANOCOBALAMIN: 200; 3300; 200; 6; 3.6; 6; 40; 15; 10; 150; 600; 60; 5 INJECTION, SOLUTION INTRAVENOUS at 00:28

## 2023-10-19 RX ADMIN — INSULIN LISPRO 2 UNITS: 100 INJECTION, SOLUTION INTRAVENOUS; SUBCUTANEOUS at 04:48

## 2023-10-19 RX ADMIN — PIPERACILLIN SODIUM AND TAZOBACTAM SODIUM 3.38 G: 3; .375 INJECTION, SOLUTION INTRAVENOUS at 01:41

## 2023-10-19 RX ADMIN — OLANZAPINE 2.5 MG: 10 INJECTION, POWDER, FOR SOLUTION INTRAMUSCULAR at 09:35

## 2023-10-19 RX ADMIN — ENOXAPARIN SODIUM 40 MG: 40 INJECTION SUBCUTANEOUS at 17:23

## 2023-10-19 RX ADMIN — DEXMEDETOMIDINE HYDROCHLORIDE 1.5 MCG/KG/HR: 4 INJECTION, SOLUTION INTRAVENOUS at 10:24

## 2023-10-19 RX ADMIN — PROPOFOL 50 MCG/KG/MIN: 10 INJECTION, EMULSION INTRAVENOUS at 03:41

## 2023-10-19 RX ADMIN — MEROPENEM 1 G: 1 INJECTION, POWDER, FOR SOLUTION INTRAVENOUS at 12:55

## 2023-10-19 RX ADMIN — FENTANYL CITRATE 100 MCG/HR: 0.05 INJECTION, SOLUTION INTRAMUSCULAR; INTRAVENOUS at 23:41

## 2023-10-19 RX ADMIN — OLANZAPINE 5 MG: 10 INJECTION, POWDER, FOR SOLUTION INTRAMUSCULAR at 05:54

## 2023-10-19 RX ADMIN — MEROPENEM 1 G: 1 INJECTION, POWDER, FOR SOLUTION INTRAVENOUS at 19:42

## 2023-10-19 RX ADMIN — DEXMEDETOMIDINE HYDROCHLORIDE 1.25 MCG/KG/HR: 4 INJECTION, SOLUTION INTRAVENOUS at 16:27

## 2023-10-19 RX ADMIN — WATER 10 ML: 1 INJECTION INTRAMUSCULAR; INTRAVENOUS; SUBCUTANEOUS at 21:23

## 2023-10-19 RX ADMIN — PROPOFOL 50 MCG/KG/MIN: 10 INJECTION, EMULSION INTRAVENOUS at 12:58

## 2023-10-19 RX ADMIN — FENTANYL CITRATE 100 MCG/HR: 0.05 INJECTION, SOLUTION INTRAMUSCULAR; INTRAVENOUS at 02:29

## 2023-10-19 RX ADMIN — METOCLOPRAMIDE HYDROCHLORIDE 10 MG: 5 INJECTION INTRAMUSCULAR; INTRAVENOUS at 12:05

## 2023-10-19 RX ADMIN — DEXMEDETOMIDINE HYDROCHLORIDE 1.5 MCG/KG/HR: 4 INJECTION, SOLUTION INTRAVENOUS at 23:41

## 2023-10-19 RX ADMIN — PIPERACILLIN SODIUM AND TAZOBACTAM SODIUM 3.38 G: 3; .375 INJECTION, SOLUTION INTRAVENOUS at 08:37

## 2023-10-19 RX ADMIN — FENTANYL CITRATE 100 MCG/HR: 0.05 INJECTION, SOLUTION INTRAMUSCULAR; INTRAVENOUS at 12:55

## 2023-10-19 RX ADMIN — PROPOFOL 40 MCG/KG/MIN: 10 INJECTION, EMULSION INTRAVENOUS at 21:38

## 2023-10-19 RX ADMIN — INSULIN LISPRO 2 UNITS: 100 INJECTION, SOLUTION INTRAVENOUS; SUBCUTANEOUS at 12:15

## 2023-10-19 RX ADMIN — DEXMEDETOMIDINE HYDROCHLORIDE 1.5 MCG/KG/HR: 4 INJECTION, SOLUTION INTRAVENOUS at 13:34

## 2023-10-19 RX ADMIN — LACTULOSE: 10 SOLUTION ORAL at 00:00

## 2023-10-19 RX ADMIN — DEXMEDETOMIDINE HYDROCHLORIDE 1 MCG/KG/HR: 4 INJECTION, SOLUTION INTRAVENOUS at 03:41

## 2023-10-19 ASSESSMENT — COGNITIVE AND FUNCTIONAL STATUS - GENERAL
HELP NEEDED FOR BATHING: TOTAL
CLIMB 3 TO 5 STEPS WITH RAILING: TOTAL
DRESSING REGULAR LOWER BODY CLOTHING: TOTAL
DRESSING REGULAR UPPER BODY CLOTHING: TOTAL
WALKING IN HOSPITAL ROOM: TOTAL
PERSONAL GROOMING: TOTAL
TURNING FROM BACK TO SIDE WHILE IN FLAT BAD: TOTAL
MOVING TO AND FROM BED TO CHAIR: TOTAL
STANDING UP FROM CHAIR USING ARMS: TOTAL
MOBILITY SCORE: 6
MOVING FROM LYING ON BACK TO SITTING ON SIDE OF FLAT BED WITH BEDRAILS: TOTAL
TOILETING: TOTAL

## 2023-10-19 ASSESSMENT — PAIN SCALES - WONG BAKER: WONGBAKER_NUMERICALRESPONSE: NO HURT

## 2023-10-19 ASSESSMENT — PAIN SCALES - GENERAL
PAINLEVEL_OUTOF10: 0 - NO PAIN
PAINLEVEL_OUTOF10: 0 - NO PAIN

## 2023-10-19 ASSESSMENT — PAIN - FUNCTIONAL ASSESSMENT
PAIN_FUNCTIONAL_ASSESSMENT: CPOT (CRITICAL CARE PAIN OBSERVATION TOOL)
PAIN_FUNCTIONAL_ASSESSMENT: CPOT (CRITICAL CARE PAIN OBSERVATION TOOL)

## 2023-10-19 NOTE — PROGRESS NOTES
"Victorino Lara is a 48 y.o. adult on day 7 of admission presenting with Acute appendicitis with localized peritonitis.   Now in ICU after Lap w EUGENIA. On [facility] Vent now,   Remains intubated due to Aspiration Pneumonia and Agitation.  Subjective   See plan / summary below           Objective   Physical Exam  Constitutional:       Comments: Sedated, moves all extremities   HENT:      Head: Normocephalic.      Mouth/Throat:      Mouth: Mucous membranes are moist.   Eyes:      Pupils: Pupils are equal, round, and reactive to light.   Cardiovascular:      Rate and Rhythm: Normal rate and regular rhythm.      Pulses: Normal pulses.   Pulmonary:      Comments: Intubated, right lung clear, left diminished  Abdominal:      Comments: Binder on, soft, no bowel sounds noted but protruding     Skin:     General: Skin is warm.   Neurological:      General: No focal deficit present.     Last Recorded Vitals  Blood pressure 101/60, pulse (!) 112, temperature 36.6 °C (97.9 °F), temperature source Temporal, resp. rate 24, height 1.702 m (5' 7\"), weight 93.9 kg (207 lb 0.2 oz), SpO2 96 %.  Intake/Output last 3 Shifts:  I/O last 3 completed shifts:  In: 5157.3 (54.9 mL/kg) [I.V.:1715.1 (18.3 mL/kg); IV Piggyback:3442.2]  Out: 8125 (86.5 mL/kg) [Urine:1925 (0.6 mL/kg/hr); Emesis/NG output:6200]  Weight: 93.9 kg      Relevant Results        Scheduled medications  busPIRone, 15 mg, oral, BID  docusate sodium, 100 mg, oral, BID  enoxaparin, 40 mg, subcutaneous, Daily  insulin lispro, 0-10 Units, subcutaneous, q4h  [Held by provider] insulin regular, 0-5 Units, subcutaneous, TID with meals  lidocaine, 0.1 mL, subcutaneous, Once  lurasidone, 80 mg, oral, Daily  [Held by provider] pantoprazole, 40 mg, oral, Daily before breakfast  pantoprazole, 40 mg, intravenous, Daily  QUEtiapine, 300 mg, oral, BID  simvastatin, 40 mg, oral, Nightly  sodium chloride, 10 mL, intravenous, q8h  traZODone, 300 mg, oral, Nightly  venlafaxine XR, 150 mg, oral, Daily   "      Continuous medications  propofol, 5-50 mcg/kg/min, Last Rate: 70 mcg/kg/min (10/15/23 0658)  sodium chloride 0.9%, 125 mL/hr, Last Rate: 125 mL/hr (10/15/23 0820)        PRN medications  PRN medications: acetaminophen, calcium carbonate, dextrose 10 % in water (D10W), dextrose, glucagon, HYDROmorphone, naloxone, oxyCODONE, oxygen, tiZANidine           Results for orders placed or performed during the hospital encounter of 10/10/23 (from the past 24 hour(s))   Results for orders placed or performed during the hospital encounter of 10/10/23 (from the past 24 hour(s))  Blood Gas Arterial Full Panel  Result  Value  Ref Range     POCT pH, Arterial  7.45 (H)  7.38 - 7.42 pH     POCT pCO2, Arterial  41  38 - 42 mm Hg     POCT pO2, Arterial  69 (L)  85 - 95 mm Hg     POCT SO2, Arterial  95  94 - 100 %     POCT Oxy Hemoglobin, Arterial  93.1 (L)  94.0 - 98.0 %     POCT Hematocrit Calculated, Arterial  32.0 (L)  36.0 - 52.0 %     POCT Sodium, Arterial  139  136 - 145 mmol/L     POCT Potassium, Arterial  3.3 (L)  3.5 - 5.3 mmol/L     POCT Chloride, Arterial  107  98 - 107 mmol/L     POCT Ionized Calcium, Arterial  1.06 (L)  1.10 - 1.33 mmol/L     POCT Glucose, Arterial  137 (H)  74 - 99 mg/dL     POCT Lactate, Arterial  1.4  0.4 - 2.0 mmol/L     POCT Base Excess, Arterial  4.1 (H)  -2.0 - 3.0 mmol/L     POCT HCO3 Calculated, Arterial  28.5 (H)  22.0 - 26.0 mmol/L     POCT Hemoglobin, Arterial  10.5 (L)  13.5 - 17.5 g/dL     POCT Anion Gap, Arterial  7 (L)  10 - 25 mmo/L     Patient Temperature           FiO2  55  %     Ventilator Mode  A/C        Ventilator Rate  14  bpm     Tidal Volume  450  mL     Peep CHM2O  5.0  cm H2O  Basic Metabolic Panel  Result  Value  Ref Range     Glucose  134 (H)  74 - 99 mg/dL     Sodium  139  136 - 145 mmol/L     Potassium  3.5  3.5 - 5.3 mmol/L     Chloride  104  98 - 107 mmol/L     Bicarbonate  28  21 - 32 mmol/L     Anion Gap  11  10 - 20 mmol/L     Urea Nitrogen  24 (H)  6 - 23 mg/dL      Creatinine  1.32 (H)  0.50 - 1.30 mg/dL     eGFR  50 (L)  >60 mL/min/1.73m*2     Calcium  7.8 (L)  8.6 - 10.3 mg/dL  Magnesium  Result  Value  Ref Range     Magnesium  2.07  1.60 - 2.40 mg/dL  Phosphorus  Result  Value  Ref Range     Phosphorus  3.4  2.5 - 4.9 mg/dL  POCT GLUCOSE  Result  Value  Ref Range     POCT Glucose  156 (H)  74 - 99 mg/dL  POCT GLUCOSE  Result  Value  Ref Range     POCT Glucose  127 (H)  74 - 99 mg/dL  POCT GLUCOSE  Result  Value  Ref Range     POCT Glucose  150 (H)  74 - 99 mg/dL  CBC and Auto Differential  Result  Value  Ref Range     WBC  15.8 (H)  4.4 - 11.3 x10*3/uL     nRBC  0.1 (H)  0.0 - 0.0 /100 WBCs     RBC  2.73 (L)  4.00 - 5.90 x10*6/uL     Hemoglobin  7.9 (L)  12.0 - 17.5 g/dL     Hematocrit  24.3 (L)  36.0 - 52.0 %     MCV  89  80 - 100 fL     MCH  28.9  26.0 - 34.0 pg     MCHC  32.5  32.0 - 36.0 g/dL     RDW  14.2  11.5 - 14.5 %     Platelets  430  150 - 450 x10*3/uL     MPV  10.2  7.5 - 11.5 fL     Immature Granulocytes %, Automated  14.0 (H)  0.0 - 0.9 %     Immature Granulocytes Absolute, Automated  2.21 (H)  0.00 - 0.70 x10*3/uL  Comprehensive Metabolic Panel  Result  Value  Ref Range     Glucose  159 (H)  74 - 99 mg/dL     Sodium  139  136 - 145 mmol/L     Potassium  3.4 (L)  3.5 - 5.3 mmol/L     Chloride  104  98 - 107 mmol/L     Bicarbonate  29  21 - 32 mmol/L     Anion Gap  9 (L)  10 - 20 mmol/L     Urea Nitrogen  19  6 - 23 mg/dL     Creatinine  1.18  0.50 - 1.30 mg/dL     eGFR  57 (L)  >60 mL/min/1.73m*2     Calcium  7.7 (L)  8.6 - 10.3 mg/dL     Albumin  2.6 (L)  3.4 - 5.0 g/dL     Alkaline Phosphatase  61  33 - 120 U/L     Total Protein  5.7 (L)  6.4 - 8.2 g/dL     AST  25  9 - 39 U/L     Bilirubin, Total  0.9  0.0 - 1.2 mg/dL     ALT  16  7 - 52 U/L  Magnesium  Result  Value  Ref Range     Magnesium  2.03  1.60 - 2.40 mg/dL  Phosphorus  Result  Value  Ref Range     Phosphorus  3.3  2.5 - 4.9 mg/dL  Manual Differential  Result  Value  Ref Range     Neutrophils %,  Manual  64.0  40.0 - 80.0 %     Bands %, Manual  7.0  0.0 - 5.0 %     Lymphocytes %, Manual  17.0  13.0 - 44.0 %     Monocytes %, Manual  5.0  2.0 - 10.0 %     Eosinophils %, Manual  3.0  0.0 - 6.0 %     Basophils %, Manual  0.0  0.0 - 2.0 %     Metamyelocytes %, Manual  2.0  0.0 - 0.0 %     Myelocytes %, Manual  2.0  0.0 - 0.0 %     Seg Neutrophils Absolute, Manual  10.11 (H)  1.20 - 7.00 x10*3/uL     Bands Absolute, Manual  1.11 (H)  0.00 - 0.70 x10*3/uL     Lymphocytes Absolute, Manual  2.69  1.20 - 4.80 x10*3/uL     Monocytes Absolute, Manual  0.79  0.10 - 1.00 x10*3/uL     Eosinophils Absolute, Manual  0.47  0.00 - 0.70 x10*3/uL     Basophils Absolute, Manual  0.00  0.00 - 0.10 x10*3/uL     Metamyelocytes Absolute, Manual  0.32  0.00 - 0.00 x10*3/uL     Myelocytes Absolute, Manual  0.32  0.00 - 0.00 x10*3/uL     Total Cells Counted  100        Neutrophils Absolute, Manual  11.22 (H)  1.20 - 7.70 x10*3/uL     RBC Morphology  See Below        Ovalocytes  Few        Dohle Bodies  Present        Toxic Granulation  Present        Clumped Platelets  Present     Triglycerides  Result  Value  Ref Range     Triglycerides  345 (H)  0 - 149 mg/dL  Creatine Kinase  Result  Value  Ref Range     Creatine Kinase  815 (H)  0 - 325 U/L  Blood Gas Arterial Full Panel  Result  Value  Ref Range     POCT pH, Arterial  7.42  7.38 - 7.42 pH     POCT pCO2, Arterial  44 (H)  38 - 42 mm Hg     POCT pO2, Arterial  102 (H)  85 - 95 mm Hg     POCT SO2, Arterial  98  94 - 100 %     POCT Oxy Hemoglobin, Arterial  97.8  94.0 - 98.0 %     POCT Hematocrit Calculated, Arterial  26.0 (L)  36.0 - 52.0 %     POCT Sodium, Arterial  139  136 - 145 mmol/L     POCT Potassium, Arterial  3.5  3.5 - 5.3 mmol/L     POCT Chloride, Arterial  107  98 - 107 mmol/L     POCT Ionized Calcium, Arterial  1.09 (L)  1.10 - 1.33 mmol/L     POCT Glucose, Arterial  172 (H)  74 - 99 mg/dL     POCT Lactate, Arterial  1.6  0.4 - 2.0 mmol/L     POCT Base Excess,  Arterial  3.6 (H)  -2.0 - 3.0 mmol/L     POCT HCO3 Calculated, Arterial  28.5 (H)  22.0 - 26.0 mmol/L     POCT Hemoglobin, Arterial  8.8 (L)  13.5 - 17.5 g/dL     POCT Anion Gap, Arterial  7 (L)  10 - 25 mmo/L     Patient Temperature           FiO2  100  %     Ventilator Rate  14  bpm     Tidal Volume  450  mL     Peep CHM2O  5.0  cm H2O  POCT GLUCOSE  Result  Value  Ref Range     POCT Glucose  157 (H)  74 - 99 mg/dL  POCT GLUCOSE  Result  Value  Ref Range     POCT Glucose  139 (H)  74 - 99 mg/dL  POCT GLUCOSE  Result  Value  Ref Range     POCT Glucose  164 (H)  74 - 99 mg/dL       CT abdomen pelvis wo IV contrast     Result Date: 10/14/2023  Interpreted By:  Mana Price, STUDY: CT ABDOMEN PELVIS WO IV CONTRAST;  10/14/2023 1:25 pm   INDICATION: Signs/Symptoms:SBO postop appendectomy.   COMPARISON: 10/10/2023   ACCESSION NUMBER(S): [id number]   ORDERING CLINICIAN: DRU WYNN   TECHNIQUE: CT of the abdomen and pelvis was performed. No oral, no intravenous contrast.   FINDINGS: LOWER CHEST: There are bibasilar infiltrates.   ABDOMEN:   LIVER: There is no hepatic mass.   BILE DUCTS: There is no intrahepatic, common hepatic or common bile ductal dilatation.   GALLBLADDER: Contracted but otherwise unremarkable.   PANCREAS: The pancreas is unremarkable.   SPLEEN: The spleen is unremarkable. There is no splenic mass or splenomegaly.   ADRENAL GLANDS: The adrenal glands are unremarkable.   KIDNEYS AND URETERS: The kidneys demonstrate no mass.  There is no intrarenal calculus or hydronephrosis. There is a Proctor catheter insufflated in a nondistended urinary bladder.   BOWEL: There are surgical clips in the right lower quadrant from appendectomy in the interval compared to 10/10/2023. There is haziness in the mesentery in the right lower quadrant from recent surgery. There is a small crescent of non walled-off ascitic fluid in the right lower quadrant. There is a small amount of ascites in the pelvis non walled-off.  There is a nasogastric tube with the tip in the mid stomach. There is no gastric distention. There are markedly dilated loops of small bowel. There is collapsed distal small bowel and collapsed colon. This could represent a postoperative ileus but consider small-bowel obstruction. There are sigmoid diverticula with no diverticulitis.   VESSELS: The abdominal and pelvic vessels are unremarkable.   PERITONEUM/RETROPERITONEUM/LYMPH NODES: There is no retroperitoneal or pelvic adenopathy.  There is no ascites.   ABDOMINAL WALL: There is a periumbilical hernia with an area of opening measuring 5.8 cm in transverse dimension. There is herniation of a segment of nondilated non thickened small bowel. There is induration around the umbilicus from recent surgery. There is a small hematoma in the subcutaneous fat with hyperdense and hypodense fluid. This measures 3.0 x 1.8 cm. There is a dot of air which could be iatrogenic from prior laparoscopic surgery rather than abscess and infection..   BONE AND SOFT TISSUE: There is no acute osseous finding.   There is no soft tissue abnormality.        1. Interval appendectomy. Haziness right lower quadrant with a small crescent of fluid non walled-off. 2. Markedly dilated loops of small bowel with nondilated collapsed distal small bowel and collapsed colon. This is likely a postoperative ileus but still consider small-bowel obstruction. Proctor catheter with the tip in the mid stomach. 3. Periumbilical hernia with a segment of nondilated non thickened small bowel. There is induration and a small amount of fluid in the fat around the umbilicus from recent surgery. There is a small hematoma in the periumbilical fat. There is a dot of air which could be iatrogenic from recent surgery rather than abscess and infection. 4. Proctor catheter insufflated in a nondistended urinary bladder. 5. Bibasilar pneumonia.   MACRO: None   Signed by: Mana Price 10/14/2023 1:34 PM Dictation workstation:    [id number]     XR abdomen 1 view     Result Date: 10/14/2023  Interpreted By:  Mana Price, STUDY: XR ABDOMEN 1 VIEW;  10/14/2023 8:15 am. 3 views.   INDICATION: Signs/Symptoms:Ileus.   COMPARISON: 10/12/2023   ACCESSION NUMBER(S): [id number]   ORDERING CLINICIAN: DRU WYNN   FINDINGS: There is a nasogastric tube with the tip in the proximal stomach. There is gastric decompression. There is progressive marked small bowel dilatation with loops of bowel dilated up to 6.6 cm. There is gas and stool in nondilated colon. Findings are consistent with a small-bowel obstruction.   There are no pathologic calcifications.   Visualized lungs are clear.   Osseous structures demonstrate no acute bony changes.          1. Nasogastric tube with the tip in the proximal stomach with gastric decompression. 2. Progressive marked small bowel dilatation consistent with small-bowel obstruction.     MACRO: None   Signed by: Mana Price 10/14/2023 8:55 AM Dictation workstation:   [id number]         ------------------------------------------------------------------------------      Victorino Lara is a 48 YM with prior abdominal hernia repair, initially presented on October 10th with right lower quadrant abdominal pain. Found to have acute appendicitis with localized peritonitis without rupture and taken to the OR for laparoscopic appendectomy.       Postoperatively patient was tachycardic which was treated with IV fluids and patient also had some hypoxemia. On the 12th pulmonology was consulted for hypoxia/dyspnea/atelectasis, CT chest showed significant bibasilar atelectasis.  Patient was only requiring nasal cannula. POD #3 patient developed ileus per KUB. Supportive measures were continued including NGT, IV fluids and replacing electrolytes. Symptoms did not improve.  CT abdomen and pelvis was done which was concerning for SBO.     10/14 patient underwent exploratory laparotomy. Findings included recurrence of umbilial  hernia, bowel distended but viable, serosanguonous abdominal fluid, single interloop adhesion about 10cm from terminal ileum which was lysted, hernia repaired primarily. As patient was being extubated he had copious amounts of bilious emesis and was retching so patient was reanesthetized and read paralyzed with rocuronium, reprepped and opened up again for reexploration.  Reexploration revealed middle third of the fascia of the stitches had pulled through, no visible bowel just omentum that was placed for closure of the fascia.  Bowel was distended but completely viable.      Transferred to ICU postop  on mechanical ventilator for acute postop respiratory insufficiency status post exploratory laparotomy, lysis of adhesions, repair of incisional hernia, reexploratory laparotomy with repair of dehiscence     10/15: large output from NGT overnight, 1400 ml.               Assessment/Plan   Principal Problem:    Acute appendicitis with localized peritonitis  Active Problems:    Anxiety    Depression    Hyperlipidemia    Diabetes mellitus, type 2 (CMS/HCC)    Appendicitis, acute    Appendicitis    Acute appendicitis, unspecified acute appendicitis type    Small bowel obstruction (CMS/HCC)        -------------------------------------------------------------------------------------------------------------------------  Neuro:   #Bipolar 1 disorder,depression  #Anxiety & Depression  #Panic disorder  -hold all oral home psych meds for now given SBO, will restart when resumption of diet  -Propofol, precedex, weaned off ketamine  -Start Zyprexa q6h, stop haldol, no benzos while on zyprexa  - Fentanyl ggt for incisional pain  -[facility] ICU  - restraints required to prevent inadvertent extubation as patient reaches for ETT when off, renewed restraints today  -FU psych recs     Pulm:   #Post-operative respiratory insufficiency  -2/2 aspiration pneumonitis, improving oxygentation but still having desaturation due to combination of  fighting vent and possibly developing pneumonia  -keep intubated today given hypoxemia and agitation     CV:   -Normocardic and off pressors  -Continue to monitor     GI:   #Acute appendicitis with localized peritonitis without perforation or gangrene  #SBO  -obstruction from an interloop adhesion and NOT from the hernia,  -s/p appendectomy 10/10/23  -NPO  -TPN for persistent lack of nutrition        :   -Replete elecrolytes  -TPN, no need for other IVF  -FU for resolving DYLAN (mostly resolved)        Heme:   - H/H stable  - lovenox for DYLAN     Endo:   #DMII   -recently diagnosed  -q4hr accuchecks with SS coverage, has not required significant amount of insulin so will just keep SSI, may need more once TPN is initiated     ID:   -WBC now 15K  - continue zosyn for now for possible pneumonia, follow up cultures, plan for 7 days todal, narrow abx when cultures return     Lines:   -ETT  -Central line (needed for TPN)  -A-line  -Proctor  -all are needed due to critical nature of patient  Pulmonary Plan:- wean off vent when able,  per ICU Sx.

## 2023-10-19 NOTE — NURSING NOTE
0930- patient very restless/agitated, 's sbp 200's, RR 30's. Dr Calloway on unit, came to bedside, verbal order for one time 2.5mg zyprexa IM. Given as ordered, patient starting to calm down, still slightly hypertensive sbp 160's 's, RR 28.

## 2023-10-19 NOTE — PROGRESS NOTES
Critical Care Daily Progress        Subjective   Patient is a 48 y.o. adult admitted on 10/10/2023  8:50 AM with the following indication(s) for ICU care aspiration pneumonia.     Interval History: Better sedation overnight with only 1-2 PRN doses needed, able to come down to 55% on vent, had worsening leukocytosis to 17K this AM and fever of 38.4C    Complaints: intubated, sedated.     Scheduled Medications:   [Held by provider] busPIRone, 15 mg, oral, BID  [Held by provider] docusate sodium, 100 mg, oral, BID  enoxaparin, 40 mg, subcutaneous, q24h  insulin lispro, 0-10 Units, subcutaneous, q4h  [Held by provider] insulin regular, 0-5 Units, subcutaneous, TID with meals  lactulose in sterile water 700 mL, , rectal, q6h  [Held by provider] lurasidone, 80 mg, oral, Daily  meropenem, 1 g, intravenous, q8h  metoclopramide, 10 mg, intravenous, q6h BETHANIE  [Held by provider] pantoprazole, 40 mg, oral, Daily before breakfast  pantoprazole, 40 mg, intravenous, Daily  potassium phosphate, 21 mmol, intravenous, Once  [Held by provider] QUEtiapine, 300 mg, oral, BID  [Held by provider] simvastatin, 40 mg, oral, Nightly  [Held by provider] traZODone, 300 mg, oral, Nightly  [Held by provider] venlafaxine XR, 150 mg, oral, Daily         Continuous Medications:   Adult Clinimix TPN, 70 mL/hr, Last Rate: 70 mL/hr (10/19/23 0734)  dexmedeTOMIDine, 0.1-1.5 mcg/kg/hr, Last Rate: 1.5 mcg/kg/hr (10/19/23 1024)  fentaNYL,  mcg/hr, Last Rate: 100 mcg/hr (10/19/23 0734)  propofol, 5-50 mcg/kg/min, Last Rate: 50 mcg/kg/min (10/19/23 0957)         PRN Medications:   PRN medications: alteplase, dextrose 10 % in water (D10W), dextrose, glucagon, HYDROmorphone, HYDROmorphone, ipratropium-albuteroL, naloxone, OLANZapine    Objective   Vitals:  Most Recent:  Vitals:    10/19/23 1140   BP:    Pulse:    Resp: 24   Temp:    SpO2:        24hr Min/Max:  Temp  Min: 36.4 °C (97.5 °F)  Max: 38.1 °C (100.6 °F)  Pulse  Min: 74  Max: 118  BP  Min:  100/63  Max: 100/63  Resp  Min: 20  Max: 37  SpO2  Min: 91 %  Max: 99 %    LDA:  CVC 10/15/23 Triple lumen Left Internal jugular (Active)   Placement Date/Time: 10/15/23 (c) 1906   Hand Hygiene Performed Prior to CVC Insertion: Yes  Site Prep: Chlorhexidine   All 5 Sterile Barriers Used (Gloves, Gown, Cap, Mask, Large Sterile Drape): Yes  Local Anesthetic: Injectable  Lumen Type: Triple l...   Number of days: 3       Arterial Line 10/15/23 Right Radial (Active)   Placement Date/Time: 10/15/23 1846   Earliest Known Present: 10/15/23  Orientation: Right  Location: Radial  Securement Method: Sutured;Transparent dressing  Number of Sutures Placed: 2   Number of days: 3       ETT  7.5 mm (Active)   Placement Date/Time: 10/14/23 1950   Placed by External Staff?: (c) Other (Comment)  ETT Type: ETT - single  Single Lumen Tube Size: 7.5 mm  Location: Oral   Number of days: 4       Urethral Catheter Straight-tip 16 Fr. (Active)   Placement Date/Time: 10/12/23 0245   Placed by: ESPINOZA nevarez l 3  Hand Hygiene Completed: Yes  Catheter Type: Straight-tip  Tube Size (Fr.): 16 Fr.  Catheter Balloon Size: 10 mL  Urine Returned: Yes   Number of days: 7       NG/OG/Feeding Tube Nasogastric 14 Fr Left nostril (Active)   Placement Date/Time: 10/12/23 1945   Hand Hygiene Completed: Yes  Type of Tube: NG/OG Tube  Tube Length: 55 cm  Tube Type: Nasogastric  NG/OG Tube Size: 14 Fr  Tube Location: Left nostril   Number of days: 6         Vent settings:  Vent Mode: Volume control/assist control  FiO2 (%):  [55 %] 55 %  S RR:  [14] 14  S VT:  [450 mL] 450 mL  PEEP/CPAP (cm H2O):  [5 cm H20] 5 cm H20  MAP (cm H2O):  [9.7-13] 13    Hemodynamic parameters for last 24 hours:       I/O:    Intake/Output Summary (Last 24 hours) at 10/19/2023 1219  Last data filed at 10/19/2023 1000  Gross per 24 hour   Intake 3013.93 ml   Output 2900 ml   Net 113.93 ml       Physical Exam:   General appearance: Ill but non-toxic  Head: Normocephalic, without  obvious abnormality  Neck:  supple, symmetrical, trachea midline  Lungs:clear to auscultation bilaterally  Chest wall:  no tenderness  Heart: regular rate and rhythm, S1, S2 normal, no murmur, click, rub or gallop  Abdomen: Soft, distended, erythematous around wound, opened up at bedside with purulence expressed, fascia appears intact and was packed  Male genitalia:  normal  Extremities: No edema  Skin: Skin color, texture, turgor normal.  No rashes or lesions    Lab/Radiology/Diagnostic Review:  Results for orders placed or performed during the hospital encounter of 10/10/23 (from the past 24 hour(s))   POCT GLUCOSE   Result Value Ref Range    POCT Glucose 152 (H) 74 - 99 mg/dL   POCT GLUCOSE   Result Value Ref Range    POCT Glucose 138 (H) 74 - 99 mg/dL   POCT GLUCOSE   Result Value Ref Range    POCT Glucose 115 (H) 74 - 99 mg/dL   Blood Gas Arterial Full Panel   Result Value Ref Range    POCT pH, Arterial 7.47 (H) 7.38 - 7.42 pH    POCT pCO2, Arterial 39 38 - 42 mm Hg    POCT pO2, Arterial 77 (L) 85 - 95 mm Hg    POCT SO2, Arterial 97 94 - 100 %    POCT Oxy Hemoglobin, Arterial 94.8 94.0 - 98.0 %    POCT Hematocrit Calculated, Arterial 28.0 (L) 36.0 - 52.0 %    POCT Sodium, Arterial 139 136 - 145 mmol/L    POCT Potassium, Arterial 3.3 (L) 3.5 - 5.3 mmol/L    POCT Chloride, Arterial 109 (H) 98 - 107 mmol/L    POCT Ionized Calcium, Arterial 1.06 (L) 1.10 - 1.33 mmol/L    POCT Glucose, Arterial 185 (H) 74 - 99 mg/dL    POCT Lactate, Arterial 0.9 0.4 - 2.0 mmol/L    POCT Base Excess, Arterial 4.4 (H) -2.0 - 3.0 mmol/L    POCT HCO3 Calculated, Arterial 28.4 (H) 22.0 - 26.0 mmol/L    POCT Hemoglobin, Arterial 9.3 (L) 13.5 - 17.5 g/dL    POCT Anion Gap, Arterial 5 (L) 10 - 25 mmo/L    Patient Temperature      FiO2 55 %   CBC and Auto Differential   Result Value Ref Range    WBC 17.5 (H) 4.4 - 11.3 x10*3/uL    nRBC 0.0 0.0 - 0.0 /100 WBCs    RBC 2.73 (L) 4.00 - 5.90 x10*6/uL    Hemoglobin 7.8 (L) 12.0 - 17.5 g/dL     Hematocrit 24.3 (L) 36.0 - 52.0 %    MCV 89 80 - 100 fL    MCH 28.6 26.0 - 34.0 pg    MCHC 32.1 32.0 - 36.0 g/dL    RDW 14.3 11.5 - 14.5 %    Platelets 419 150 - 450 x10*3/uL    MPV 9.8 7.5 - 11.5 fL    Immature Granulocytes %, Automated 12.3 (H) 0.0 - 0.9 %    Immature Granulocytes Absolute, Automated 2.16 (H) 0.00 - 0.70 x10*3/uL   Comprehensive Metabolic Panel   Result Value Ref Range    Glucose 177 (H) 74 - 99 mg/dL    Sodium 139 136 - 145 mmol/L    Potassium 3.2 (L) 3.5 - 5.3 mmol/L    Chloride 105 98 - 107 mmol/L    Bicarbonate 28 21 - 32 mmol/L    Anion Gap 9 (L) 10 - 20 mmol/L    Urea Nitrogen 17 6 - 23 mg/dL    Creatinine 0.97 0.50 - 1.30 mg/dL    eGFR 72 >60 mL/min/1.73m*2    Calcium 7.7 (L) 8.6 - 10.3 mg/dL    Albumin 2.8 (L) 3.4 - 5.0 g/dL    Alkaline Phosphatase 68 33 - 120 U/L    Total Protein 5.9 (L) 6.4 - 8.2 g/dL    AST 24 9 - 39 U/L    Bilirubin, Total 1.0 0.0 - 1.2 mg/dL    ALT 15 7 - 52 U/L   Magnesium   Result Value Ref Range    Magnesium 2.01 1.60 - 2.40 mg/dL   Phosphorus   Result Value Ref Range    Phosphorus 3.0 2.5 - 4.9 mg/dL   Manual Differential   Result Value Ref Range    Neutrophils %, Manual 75.0 40.0 - 80.0 %    Bands %, Manual 7.0 0.0 - 5.0 %    Lymphocytes %, Manual 10.0 13.0 - 44.0 %    Monocytes %, Manual 2.0 2.0 - 10.0 %    Eosinophils %, Manual 1.0 0.0 - 6.0 %    Basophils %, Manual 0.0 0.0 - 2.0 %    Metamyelocytes %, Manual 2.0 0.0 - 0.0 %    Myelocytes %, Manual 3.0 0.0 - 0.0 %    Seg Neutrophils Absolute, Manual 13.13 (H) 1.20 - 7.00 x10*3/uL    Bands Absolute, Manual 1.23 (H) 0.00 - 0.70 x10*3/uL    Lymphocytes Absolute, Manual 1.75 1.20 - 4.80 x10*3/uL    Monocytes Absolute, Manual 0.35 0.10 - 1.00 x10*3/uL    Eosinophils Absolute, Manual 0.18 0.00 - 0.70 x10*3/uL    Basophils Absolute, Manual 0.00 0.00 - 0.10 x10*3/uL    Metamyelocytes Absolute, Manual 0.35 0.00 - 0.00 x10*3/uL    Myelocytes Absolute, Manual 0.53 0.00 - 0.00 x10*3/uL    Total Cells Counted 100      Neutrophils Absolute, Manual 14.36 (H) 1.20 - 7.70 x10*3/uL    RBC Morphology See Below     Ovalocytes Few     Dohle Bodies Present     Toxic Granulation Present     Clumped Platelets Present    POCT GLUCOSE   Result Value Ref Range    POCT Glucose 168 (H) 74 - 99 mg/dL   POCT GLUCOSE   Result Value Ref Range    POCT Glucose 141 (H) 74 - 99 mg/dL     XR abdomen 1 view    Result Date: 10/19/2023  Interpreted By:  Ranjeet Gil, STUDY: XR ABDOMEN 1 VIEW;  10/19/2023 5:51 am   INDICATION: Signs/Symptoms:c/f sbo.   COMPARISON: CT abdomen and pelvis without contrast 14 October 2023; KUB 16 October 2023   ACCESSION NUMBER(S): [id number]   ORDERING CLINICIAN: JESSA PENA   TECHNIQUE: Frontal supine view of the abdomen   FINDINGS: Two gas-filled and mildly dilated upper abdominal small bowel loops are unchanged. Overall quantity of small bowel gas has decreased which seems reassuring   No interval increase in colonic gas   No pneumatosis       Persistent gas-filled small bowel dilation, but fewer gas-filled dilated small bowel loops, probably an improving ileus   MACRO: None   Signed by: Ranjeet Gil 10/19/2023 8:21 AM Dictation workstation:   [id number]    XR chest 1 view    Result Date: 10/19/2023  Interpreted By:  Ranjeet Gil, STUDY: XR CHEST 1 VIEW;  10/19/2023 5:51 am   INDICATION: Signs/Symptoms:f/u pna.   COMPARISON: Portable chest, 18 October 2023   ACCESSION NUMBER(S): [id number]   ORDERING CLINICIAN: JESSA PENA   TECHNIQUE: Single frontal view of the chest; Portable technique   FINDINGS: Endotracheal tube tip is quite close to the harvey, about 1 cm but unchanged   The enteric tube courses below the diaphragm, but the distal tip is cut off the bottom of the radiograph   Left sided central line is unchanged and well positioned   The cardiomediastinal silhouette is unchanged   Low lung volumes with exaggerated interstitial markings due to hypoinflation. Nonspecific patchy airspace opacities are unchanged    No new acute process such as large pleural effusion or pneumothorax has developed       No interval change   MACRO: None   Signed by: Ranjeet Gil 10/19/2023 8:17 AM Dictation workstation:   [id number]    XR chest 1 view    Result Date: 10/18/2023  Interpreted By:  Schoenberger, Joseph, STUDY: XR CHEST 1 VIEW;  10/18/2023 6:29 am   INDICATION: Signs/Symptoms:endotracheal tube verification.   COMPARISON: 10/17/2023   ACCESSION NUMBER(S): [id number]   ORDERING CLINICIAN: GAURANG STOKES   FINDINGS: The positioning of the endotracheal tube and nasogastric tube and left jugular venous infusion catheter is unchanged.       CARDIOMEDIASTINAL SILHOUETTE: The cardiac silhouette remains enlarged.   LUNGS: Right-sided perihilar hazy opacity persists but is somewhat improved from prior. Retrocardiac opacity and platelike atelectasis or scarring in the left lung base is unchanged.   ABDOMEN: No remarkable upper abdominal findings.   BONES: No acute osseous changes.       1.  The similar to prior other than slight improvement in the appearance of the right lung       MACRO: None   Signed by: Joseph Schoenberger 10/18/2023 8:10 AM Dictation workstation:   [id number]    XR chest 1 view    Result Date: 10/17/2023  Interpreted By:  Ranjeet Gil, STUDY: XR CHEST 1 VIEW;  10/17/2023 5:37 am   INDICATION: Signs/Symptoms:Pneumonia evaluation.   COMPARISON: Portable chest, 16 October 2023   ACCESSION NUMBER(S): [id number]   ORDERING CLINICIAN: KRISTINA MONTALVO   TECHNIQUE: Single frontal view of the chest; Portable technique   FINDINGS:   The endotracheal tube is well positioned   Left sided central line is unchanged and well positioned   Enteric tube unchanged in position with tip projecting over left lateral gastric body   Hazy bilateral airspace disease is unchanged   The curvilinear peripheral left mid to basilar opacity is scarring or atelectasis based on review of prior CT   No new acute process such as large pleural effusion  or pneumothorax has developed       Hazy and patchy bilateral airspace disease   MACRO: None   Signed by: Ranjeet Gil 10/17/2023 8:33 AM Dictation workstation:   [id number]    XR abdomen 1 view    Result Date: 10/16/2023  Interpreted By:  Schoenberger, Joseph, STUDY: XR ABDOMEN 1 VIEW;  10/16/2023 6:14 am   INDICATION: Signs/Symptoms:small bowel obstruction.   COMPARISON: 10/15/2023   ACCESSION NUMBER(S): [id number]   ORDERING CLINICIAN: MEAGHAN KATZ   FINDINGS: Persistent gaseous distention of small bowel loops with some gas in the rectum. Possible postoperative ileus. Greatest caliber small bowel loops is noted in the upper abdomen approximating 5.5 cm similar to a comparable 5.2 cm measurement previously. Limited evaluation of pneumoperitoneum on supine imaging, however no gross evidence of free air is noted.   Visualized lungs are clear.   Osseous structures demonstrate no acute bony changes.       1.  Possible persistent postoperative ileus. Mid small bowel obstruction not entirely excluded. Continued follow-up recommended.   MACRO: None   Signed by: Joseph Schoenberger 10/16/2023 8:57 AM Dictation workstation:   [id number]    XR chest 1 view    Result Date: 10/16/2023  Interpreted By:  Schoenberger, Joseph, STUDY: XR CHEST 1 VIEW;  10/16/2023 6:14 am   INDICATION: Signs/Symptoms:intubated.   COMPARISON: 10/15/2023   ACCESSION NUMBER(S): [id number]   ORDERING CLINICIAN: MEAGHAN KATZ   FINDINGS: Positioning of nasogastric tube, left jugular venous infusion catheter, and endotracheal tube unchanged.       CARDIOMEDIASTINAL SILHOUETTE: Cardiac silhouette remains somewhat enlarged.   LUNGS: Platelike atelectasis in the left lung base unchanged. Progressing retrocardiac opacity possibly atelectasis or airspace consolidation.   ABDOMEN: No remarkable upper abdominal findings.   BONES: No acute osseous changes.       1.  Progressing left basal opacity.       MACRO: None   Signed by: Joseph Schoenberger  10/16/2023 8:56 AM Dictation workstation:   [id number]    XR chest 1 view    Result Date: 10/16/2023  Interpreted By:  Schoenberger, Joseph, STUDY: XR CHEST 1 VIEW;  10/15/2023 5:42 am   INDICATION: Signs/Symptoms:c/f aspiration, on vent.   COMPARISON: 05/29/2022   ACCESSION NUMBER(S): [id number]   ORDERING CLINICIAN: JESSA PENA   FINDINGS: Endotracheal tube placed distal tip 3.9 cm above the harvey. Nasogastric tube placed distal tip gastric body. Stomach is distended.       CARDIOMEDIASTINAL SILHOUETTE: Cardiomediastinal silhouette is normal in size and configuration.   LUNGS: There is a new area of platelike atelectasis in the left lung base. No other new focal lung opacities. Limited hypoventilatory exam.   ABDOMEN: No remarkable upper abdominal findings.   BONES: No acute osseous changes.       1.  Satisfactory positioning of tubes and lines. 2. New platelike atelectasis in left lung base. 3. Limited exam.       MACRO: None   Signed by: Joseph Schoenberger 10/16/2023 8:42 AM Dictation workstation:   [id number]    XR abdomen 1 view    Result Date: 10/16/2023  Interpreted By:  Schoenberger, Joseph, STUDY: XR ABDOMEN 1 VIEW;  10/15/2023 5:42 am   INDICATION: Signs/Symptoms:post-op.   COMPARISON: 10/14/2023   ACCESSION NUMBER(S): [id number]   ORDERING CLINICIAN: JESSA PENA   FINDINGS: Persistent gaseous distention of multiple small bowel loops with some gas in the colon. Stomach is distended with nasogastric tube tip gastric body. Greatest caliber small bowel loops on this exam is 5.2 cm compared to 6.6 cm and there is less gas in bowel loops on this exam than the prior. Improving ileus is consideration. Limited evaluation of pneumoperitoneum on supine imaging, however no gross evidence of free air is noted.   Visualized lungs are clear.   Osseous structures demonstrate no acute bony changes.       1.  Likely improving postoperative ileus.   MACRO: None   Signed by: Joseph Schoenberger 10/16/2023  8:40 AM Dictation workstation:   [id number]    XR chest 1 view    Result Date: 10/15/2023  Interpreted By:  Juan Graves, STUDY: XR CHEST 1 VIEW;  10/15/2023 7:20 pm   INDICATION: Signs/Symptoms:line placement.   COMPARISON: Earlier on 10/15/2023   ACCESSION NUMBER(S): [id number]   ORDERING CLINICIAN: MEAGHAN KATZ   FINDINGS: There is interval placement of a left central venous catheter with tip terminates at the level of the right atrium. Endotracheal tube tip terminates approximately 2.9 cm superior to the harvey. Nasogastric tube tip terminates in the left upper abdomen level of the stomach. The cardiac silhouette is stable in size. There is limited inspiratory lung volumes. Stable band of opacity in the left midlung and left basilar consolidative/atelectatic opacity. No pneumothorax.       Interval placement of left central venous catheter with tip terminating at the level of the right atrium.   Limited inspiratory lung volumes with stable band of opacity in the left mid lung and medial left basilar consolidative/atelectatic opacity.   MACRO: None   Signed by: Juan Graves 10/15/2023 7:40 PM Dictation workstation:   [id number]    CT abdomen pelvis wo IV contrast    Result Date: 10/14/2023  Interpreted By:  Mana Price, STUDY: CT ABDOMEN PELVIS WO IV CONTRAST;  10/14/2023 1:25 pm   INDICATION: Signs/Symptoms:SBO postop appendectomy.   COMPARISON: 10/10/2023   ACCESSION NUMBER(S): [id number]   ORDERING CLINICIAN: DRU WYNN   TECHNIQUE: CT of the abdomen and pelvis was performed. No oral, no intravenous contrast.   FINDINGS: LOWER CHEST: There are bibasilar infiltrates.   ABDOMEN:   LIVER: There is no hepatic mass.   BILE DUCTS: There is no intrahepatic, common hepatic or common bile ductal dilatation.   GALLBLADDER: Contracted but otherwise unremarkable.   PANCREAS: The pancreas is unremarkable.   SPLEEN: The spleen is unremarkable. There is no splenic mass or splenomegaly.   ADRENAL GLANDS: The  adrenal glands are unremarkable.   KIDNEYS AND URETERS: The kidneys demonstrate no mass.  There is no intrarenal calculus or hydronephrosis. There is a Proctor catheter insufflated in a nondistended urinary bladder.   BOWEL: There are surgical clips in the right lower quadrant from appendectomy in the interval compared to 10/10/2023. There is haziness in the mesentery in the right lower quadrant from recent surgery. There is a small crescent of non walled-off ascitic fluid in the right lower quadrant. There is a small amount of ascites in the pelvis non walled-off. There is a nasogastric tube with the tip in the mid stomach. There is no gastric distention. There are markedly dilated loops of small bowel. There is collapsed distal small bowel and collapsed colon. This could represent a postoperative ileus but consider small-bowel obstruction. There are sigmoid diverticula with no diverticulitis.   VESSELS: The abdominal and pelvic vessels are unremarkable.   PERITONEUM/RETROPERITONEUM/LYMPH NODES: There is no retroperitoneal or pelvic adenopathy.  There is no ascites.   ABDOMINAL WALL: There is a periumbilical hernia with an area of opening measuring 5.8 cm in transverse dimension. There is herniation of a segment of nondilated non thickened small bowel. There is induration around the umbilicus from recent surgery. There is a small hematoma in the subcutaneous fat with hyperdense and hypodense fluid. This measures 3.0 x 1.8 cm. There is a dot of air which could be iatrogenic from prior laparoscopic surgery rather than abscess and infection..   BONE AND SOFT TISSUE: There is no acute osseous finding.   There is no soft tissue abnormality.       1. Interval appendectomy. Haziness right lower quadrant with a small crescent of fluid non walled-off. 2. Markedly dilated loops of small bowel with nondilated collapsed distal small bowel and collapsed colon. This is likely a postoperative ileus but still consider small-bowel  obstruction. Proctor catheter with the tip in the mid stomach. 3. Periumbilical hernia with a segment of nondilated non thickened small bowel. There is induration and a small amount of fluid in the fat around the umbilicus from recent surgery. There is a small hematoma in the periumbilical fat. There is a dot of air which could be iatrogenic from recent surgery rather than abscess and infection. 4. Proctor catheter insufflated in a nondistended urinary bladder. 5. Bibasilar pneumonia.   MACRO: None   Signed by: Mana Price 10/14/2023 1:34 PM Dictation workstation:   [id number]    XR abdomen 1 view    Result Date: 10/14/2023  Interpreted By:  Mana Price, STUDY: XR ABDOMEN 1 VIEW;  10/14/2023 8:15 am. 3 views.   INDICATION: Signs/Symptoms:Ileus.   COMPARISON: 10/12/2023   ACCESSION NUMBER(S): [id number]   ORDERING CLINICIAN: DRU WYNN   FINDINGS: There is a nasogastric tube with the tip in the proximal stomach. There is gastric decompression. There is progressive marked small bowel dilatation with loops of bowel dilated up to 6.6 cm. There is gas and stool in nondilated colon. Findings are consistent with a small-bowel obstruction.   There are no pathologic calcifications.   Visualized lungs are clear.   Osseous structures demonstrate no acute bony changes.         1. Nasogastric tube with the tip in the proximal stomach with gastric decompression. 2. Progressive marked small bowel dilatation consistent with small-bowel obstruction.     MACRO: None   Signed by: Mana Price 10/14/2023 8:55 AM Dictation workstation:   [id number]    XR abdomen 1 view    Result Date: 10/13/2023  Interpreted By:  Juan Graves, STUDY: XR ABDOMEN 1 VIEW;  10/12/2023 8:28 pm   INDICATION: Signs/Symptoms:verify NG placement.   COMPARISON: 10/12/2023   ACCESSION NUMBER(S): [id number]   ORDERING CLINICIAN: VAN BRYAN   FINDINGS: Nasogastric tube tip terminates in the left upper abdomen at level of the stomach. There is  cardiomegaly and persistent band of atelectasis or infiltrate in the left midlung. Gaseous distended bowel in the partially imaged abdomen.       Nasogastric tube tip terminates in the left upper abdomen at level of proximal stomach.   Cardiomegaly and stable band of atelectasis or infiltrate in the left mid lung.   Gaseous distended bowel in imaged upper abdomen; continue progress short-term progress is recommended for further evaluation.   MACRO: None   Signed by: Juan Graves 10/13/2023 1:36 AM Dictation workstation:   [id number]    XR abdomen 1 view    Result Date: 10/12/2023  Interpreted By:  Domingo Marks, STUDY: XR ABDOMEN 1 VIEW;  10/12/2023 6:55 pm   INDICATION: Signs/Symptoms:NG tube placement.   COMPARISON: 10/12/2023 at 4:46 p.m.   ACCESSION NUMBER(S): [id number]   ORDERING CLINICIAN: MEAGHAN CHARLTON   FINDINGS: Interval placement of a NG tube with its tip over the gastric body.   Dilatation of multiple small bowel loops as well as multiple colonic loops throughout the abdomen again seen. Bibasilar airspace disease present       1. NG tube tip projects over the gastric body 2. Redemonstration of bowel loop dilatation suggestive of underlying ileus versus obstruction.   MACRO: None   Signed by: Domingo Marks 10/12/2023 7:12 PM Dictation workstation:   [id number]    XR abdomen 1 view    Result Date: 10/12/2023  Interpreted By:  Mana Price, STUDY: XR ABDOMEN 1 VIEW;  10/12/2023 4:51 pm. 2 views.   INDICATION: Signs/Symptoms:r/o ileus.   COMPARISON: CT abdomen and pelvis 10/10/2023   ACCESSION NUMBER(S): [id number]   ORDERING CLINICIAN: MEAGHAN CHARLTON   FINDINGS: There is moderate small bowel and colonic distention, likely an ileus. There are surgical clips in the right lower quadrant from recent appendectomy.   There are no pathologic calcifications.   Visualized lungs are clear.   Osseous structures demonstrate no acute bony changes.         Moderate small bowel and colonic distention,  likely a postoperative ileus. Surgical clips right lower quadrant from recent appendectomy.   MACRO: None   Signed by: Mana Price 10/12/2023 4:56 PM Dictation workstation:   [id number]    CT angio chest for pulmonary embolism    Result Date: 10/12/2023  Interpreted By:  Ranjeet Gil, STUDY: CT ANGIO CHEST FOR PULMONARY EMBOLISM;  10/12/2023 4:56 am   INDICATION: Signs/Symptoms:Rule out pe.   COMPARISON: Portable chest 11 October 2023; CT abdomen and pelvis with contrast 10 October 2023   ACCESSION NUMBER(S): [id number]   ORDERING CLINICIAN: VAN BRYAN   TECHNIQUE: Pulmonary arterial phase CT chest after the uneventful administration of 75 mL IV contrast (Omnipaque 350).   Three dimensional maximum intensity projection (3-D MIPs) image/s were created on a separate dedicated workstation, reviewed and saved   FINDINGS: CARDIOVASCULAR:   Acute pulmonary embolism: Negative through the  segmental (third order) branch level. Subsegmental and more distal branches not well enough opacified to evaluate. Acute right heart strain:  Negative. No CT evidence of acute right heart strain Cardiac thrombus:  Negative; no obvious right heart or other cardiac thrombus is seen Pulmonary arteries ectasia:  Negative   Heart size:  Within normal limits Pericardial effusion:  Trace   Thoracic aortic aneurysm:  Negative Aortic dissection:  Negative   Heart failure change:  Negative.  No sign of interstitial or alveolar edema. Other:  n/a   NONVASCULAR MEDIASTINUM: Esophagus:  Grossly normal by CT Mediastinal Mass:  Negative Hiatal hernia:  None Other:  n/a   LYMPH NODES: No thoracic adenopathy   LUNGS / AIRSPACES / AIRWAYS:   LARGE AIRWAYS Filling defect: Negative Wall thickening: Negative Bronchiectasis: Negative Other: N/A   AIRSPACES Fibrosis: Negative Emphysema: Negative Consolidation: Negative Ground glass airspace disease: Negative Edema: Negative Nodule / Mass: Negative Other: Dependent atelectasis in both lower lobes, new  from two days ago. Additional bands of atelectasis in the left upper lobe also new   PLEURA: Effusion:  Both sides negative Pneumothorax:  Both sides negative Other:  n/a   CHEST WALL: Symmetric mild nonspecific bilateral gynecomastia   SKELETON: No acute or contributory abnormality   UPPER ABDOMEN: Fluid-filled, dilated small bowel loops with air-fluid levels new from CT abdomen and pelvis two days prior       SMALL BOWEL DILATION WITH MULTIPLE SMALL BOWEL AIR-FLUID LEVELS IN THE UPPER ABDOMEN, ALL NEW FROM CT TWO DAYS AGO   NO ACUTE DISEASE IN THE CHEST, ONLY SUBSTANTIAL BILATERAL LOWER LOBE DEPENDENT ATELECTASIS WHICH IS NEW FROM CT TWO DAYS PRIOR   NO AIRSPACE CONSOLIDATION OR GROUND-GLASS AIRSPACE DISEASE   NO EDEMA/FAILURE   NO ACUTE PULMONARY EMBOLISM THROUGH THE SEGMENTAL BRANCH LEVEL   NO AORTIC DISSECTION OR OTHER ACUTE THORACIC AORTIC FINDINGS   NO PLEURAL OR PERICARDIAL EFFUSION   NO PNEUMOTHORAX   MACRO: None   Signed by: Ranjeet Gil 10/12/2023 6:54 AM Dictation workstation:   [id number]    XR chest 1 view    Result Date: 10/11/2023  Interpreted By:  Chapo Cary, STUDY: XR CHEST 1 VIEW   INDICATION: Signs/Symptoms:hypoxemia.   COMPARISON: July 14, 2022   ACCESSION NUMBER(S): [id number]   ORDERING CLINICIAN: MEAGHAN CHARLTON   FINDINGS: No consolidation, effusion, edema, or pneumothorax. Low lung volumes with bibasilar atelectasis.       Low lung volumes with bibasilar atelectasis. No discrete consolidation seen.   Signed by: Chapo Cary 10/11/2023 6:18 PM Dictation workstation:   [id number]    ECG 12 lead    Result Date: 10/11/2023  Sinus tachycardia Inferior infarct (cited on or before 11-OCT-2023) Abnormal ECG When compared with ECG of 10-OCT-2023 13:57, Previous ECG has undetermined rhythm, needs review T wave inversion now evident in Anterior leads Confirmed by Carlo Jose ([id number]) on 10/11/2023 4:50:57 PM    CT abdomen pelvis w IV contrast    Result Date: 10/10/2023  STUDY: CT Abdomen and  Pelvis with IV Contrast; 10/10/2023 10:52 AM. INDICATION: Generalized abdominal pain. COMPARISON: CT AP 5/29/2022. ACCESSION NUMBER(S): [id number] ORDERING CLINICIAN: SHAI ERWIN TECHNIQUE: CT of the abdomen and pelvis was performed.  Contiguous axial images were obtained at 3 mm slice thickness through the abdomen and pelvis. Coronal and sagittal reconstructions at 3 mm slice thickness were performed.  Omnipaque 350 75 mL was administered intravenously.  FINDINGS: LOWER CHEST: No cardiomegaly.  No pericardial effusion.  Lung bases are clear.  ABDOMEN:  LIVER: No hepatomegaly.  Smooth surface contour.  Mild fatty infiltration of the liver.  BILE DUCTS: No intrahepatic or extrahepatic biliary ductal dilatation.  GALLBLADDER: The gallbladder is present without gallstones. STOMACH: No abnormalities identified.  PANCREAS: No masses or ductal dilatation.  SPLEEN: No splenomegaly or focal splenic lesion.  ADRENAL GLANDS: No thickening or nodules.  KIDNEYS AND URETERS: Kidneys are normal in size and location.  No renal or ureteral calculi.  PELVIS:  BLADDER: No abnormalities identified.  REPRODUCTIVE ORGANS: No abnormalities identified.  BOWEL: Appendix is fluid-filled and dilated measuring up to 1.6 cm with multiple intraluminal appendicoliths and mild periappendiceal inflammatory change. No extraluminal gas or abscess. Colonic diverticulosis without findings of diverticulitis  VESSELS: No abnormalities identified.  Abdominal aorta is normal in caliber.  PERITONEUM/RETROPERITONEUM/LYMPH NODES: No free fluid.  No pneumoperitoneum. No lymphadenopathy.  ABDOMINAL WALL: No abnormalities identified. SOFT TISSUES: No abnormalities identified.  BONES: No acute fracture or aggressive osseous lesion.    1. Findings compatible with acute uncomplicated appendicitis. No extraluminal gas or abscess. Recommend surgical consultation. 2. Mild hepatic steatosis. 3. Colonic diverticulosis without findings of diverticulitis. Findings  "discussed with and acknowledged by Ayden Ramires at 11:53 AM Signed by Faustino Hebert MD      Additional Labs:  SCVO2: No results found for: \"[id number]\"      This patient has a urinary catheter   Reason for the urinary catheter remaining today? critically ill patient who need accurate urinary output measurements    This patient is intubated   Reason for patient to remain intubated today? they are unable to protect their airway             Assessment/Plan   Principal Problem:    Acute appendicitis with localized peritonitis  Active Problems:    Anxiety    Depression    Hyperlipidemia    Diabetes mellitus, type 2 (CMS/HCC)    Appendicitis, acute    Appendicitis    Acute appendicitis, unspecified acute appendicitis type    Small bowel obstruction (CMS/HCC)    POD9 from lap appy and hernia repair, POD5 from reexploration, EUGENIA, and repair of hernia.  Patient remains intubated for what appears to be aspiration pneumonia, wound infection, and severe agitation     Neuro:   #Bipolar 1 disorder,depression  #Anxiety & Depression  #Panic disorder  -hold all oral home psych meds for now given SBO, will restart when resumption of diet  -Propofol, precedex, weaned off ketamine  -Increase zyprexa to 7.5mg q6h with 2.5prn, will follow up psych recs and try to wean propofol due to increasing triglycerides  - Fentanyl ggt for incisional pain  -[facility] ICU  - restraints required to prevent inadvertent extubation as patient reaches for ETT when off, renewed restraints today     Pulm:   #Post-operative respiratory insufficiency  -2/2 aspiration pneumonia with mdr klebsiela, will change abx to meropenem, reset to day 1/7  -keep intubated today given hypoxemia and agitation  -If improving in next day or two, will consider diuresis, but not with concern for sepsis     CV:   -Normocardic and off pressors  -Continue to monitor     GI:   #Acute appendicitis with localized peritonitis without perforation or gangrene  #SBO  -obstruction from an " interloop adhesion and NOT from the hernia,  -s/p appendectomy 10/10/23  -NPO  -TPN for persistent lack of nutrition  -Still awaiting return of bowel function        :   -Replete elecrolytes  -TPN, no need for other IVF  -FU for resolving DYLAN (mostly resolved)        Heme:   - H/H stable  - lovenox for DYLAN     Endo:   #DMII   -recently diagnosed  -q4hr accuchecks with SS coverage, has not required significant amount of insulin so will just keep SSI, may need more once TPN is initiated     ID:   -WBC now 17K  - escalate to maki due to mdr klebsiela pneumonia, will follow up cultures from wound  -Pack WTD BID for wound     Lines:   -ETT  -Central line (needed for TPN)  -A-line  -Proctor  -all are needed due to critical nature of patient      I spent 45 minutes in the professional and overall care of this patient.  Norman Calloway MD  10/19/23

## 2023-10-19 NOTE — PROGRESS NOTES
"Victorino Lara is a 48 y.o. adult on day 7 of admission presenting with Acute appendicitis with localized peritonitis.    Subjective   Patient sedated/intubated in the ICU. Reported by RN that at 0600  patient had 250 out NG. Otherwise no acute events last night.        Objective     Physical Exam  Constitutional:       Appearance: He is ill-appearing.   HENT:      Head: Normocephalic.      Nose: Nose normal.      Mouth/Throat:      Comments: Breathing tube in place  Eyes:      Conjunctiva/sclera: Conjunctivae normal.      Pupils: Pupils are equal, round, and reactive to light.   Cardiovascular:      Rate and Rhythm: Normal rate.   Pulmonary:      Breath sounds: Decreased breath sounds present.   Abdominal:      General: Bowel sounds are decreased. There is distension.      Palpations: Abdomen is soft.      Comments: Binder in place. Erythema around incision, outlined.   Skin:     General: Skin is warm.       Last Recorded Vitals  Blood pressure 100/63, pulse 78, temperature 36.5 °C (97.7 °F), temperature source Temporal, resp. rate 20, height 1.702 m (5' 7.01\"), weight 97.7 kg (215 lb 6.2 oz), SpO2 97 %.  Intake/Output last 3 Shifts:  I/O last 3 completed shifts:  In: 2957.7 (30.3 mL/kg) [I.V.:1493.6 (15.3 mL/kg); IV Piggyback:150]  Out: 4250 (43.5 mL/kg) [Urine:2275 (0.6 mL/kg/hr); Emesis/NG output:1975]  Weight: 97.7 kg     Relevant Results  Lab Results   Component Value Date    WBC 17.5 (H) 10/19/2023    HGB 7.8 (L) 10/19/2023    HCT 24.3 (L) 10/19/2023    MCV 89 10/19/2023     10/19/2023      Lab Results   Component Value Date    GLUCOSE 177 (H) 10/19/2023    CALCIUM 7.7 (L) 10/19/2023     10/19/2023    K 3.2 (L) 10/19/2023    CO2 28 10/19/2023     10/19/2023    BUN 17 10/19/2023    CREATININE 0.97 10/19/2023      This patient has a central line   Reason for the central line remaining today? Parenteral medication      Assessment/Plan   Subjective  Patient sedated/intubated. ABD is distended and " ABD binder in place. Patient on multiple pressors. NG tube in place with 250 ml reported from RN at 0600.      Impression  POD 9 Appendectomy and hernia repair    Plan  NG/NPO/IVF  Nausea Control  Pain Control  Keep K+ 4.0-4.5 and Mag above 2  Continue care per primary team  Further recommendations per Dr. Mcadams    Principal Problem:    Acute appendicitis with localized peritonitis  Active Problems:    Anxiety    Depression    Hyperlipidemia    Diabetes mellitus, type 2 (CMS/HCC)    Appendicitis, acute    Appendicitis    Acute appendicitis, unspecified acute appendicitis type    Small bowel obstruction (CMS/HCC)        Alisha Guerra, APRN-CNP

## 2023-10-19 NOTE — PROGRESS NOTES
Mr. Lara remains intubated and sedated.  He is less agitated as per the primary medical team after the start of Zyprexa.  He has received a total of 20 mg in the last 24 hours with an additional 2.5 mg.  Although FDA maximum is a generally recommendation if the benefits outweigh the risks especially if there is imminent risk of physical harm or if there is a extreme elevation of her CPK or other deleterious effect of agitation that can be prevented with olanzapine which patient is tolerating and responding to well the dose may be increased.  Steady state stabilizes by fourth or fifth day for olanzapine and if his agitation has improved from yesterday another day continued at 5 mg of Zyprexa every 6 hours can be continued.  His EKG looked good.  Although when his abdominal suture wound was attended to he did experience tachycardia at that time.  Patient is tolerating medications well.   Labs Reviewed.  Vitals Reviewed.   Nursing Notes Reviewed.   No EPS, TD, vitals stable.   Mental status examination completed as patient is intubated and sedated    Diagnosis: Bipolar 1 disorder    Assessment and Plan:     Continue with olanzapine 5 mg intramuscular every 6 hours as needed for agitation  Psychiatry will follow

## 2023-10-20 ENCOUNTER — APPOINTMENT (OUTPATIENT)
Dept: RADIOLOGY | Facility: HOSPITAL | Age: 48
End: 2023-10-20
Payer: COMMERCIAL

## 2023-10-20 LAB
ALBUMIN SERPL BCP-MCNC: 2.7 G/DL (ref 3.4–5)
ALP SERPL-CCNC: 73 U/L (ref 33–120)
ALT SERPL W P-5'-P-CCNC: 14 U/L (ref 7–52)
ANION GAP BLDA CALCULATED.4IONS-SCNC: 5 MMO/L (ref 10–25)
ANION GAP SERPL CALC-SCNC: 10 MMOL/L (ref 10–20)
AST SERPL W P-5'-P-CCNC: 20 U/L (ref 9–39)
BASE EXCESS BLDA CALC-SCNC: 4.8 MMOL/L (ref -2–3)
BASOPHILS # BLD MANUAL: 0 X10*3/UL (ref 0–0.1)
BASOPHILS NFR BLD MANUAL: 0 %
BILIRUB SERPL-MCNC: 1 MG/DL (ref 0–1.2)
BODY TEMPERATURE: ABNORMAL
BUN SERPL-MCNC: 14 MG/DL (ref 6–23)
CA-I BLDA-SCNC: 1.07 MMOL/L (ref 1.1–1.33)
CALCIUM SERPL-MCNC: 7.6 MG/DL (ref 8.6–10.3)
CHLORIDE BLDA-SCNC: 109 MMOL/L (ref 98–107)
CHLORIDE SERPL-SCNC: 106 MMOL/L (ref 98–107)
CO2 SERPL-SCNC: 28 MMOL/L (ref 21–32)
CREAT SERPL-MCNC: 0.91 MG/DL (ref 0.5–1.3)
DOHLE BOD BLD QL SMEAR: PRESENT
EOSINOPHIL # BLD MANUAL: 0.41 X10*3/UL (ref 0–0.7)
EOSINOPHIL NFR BLD MANUAL: 2 %
ERYTHROCYTE [DISTWIDTH] IN BLOOD BY AUTOMATED COUNT: 13.9 % (ref 11.5–14.5)
GFR SERPL CREATININE-BSD FRML MDRD: 77 ML/MIN/1.73M*2
GLUCOSE BLD MANUAL STRIP-MCNC: 141 MG/DL (ref 74–99)
GLUCOSE BLD MANUAL STRIP-MCNC: 148 MG/DL (ref 74–99)
GLUCOSE BLD MANUAL STRIP-MCNC: 154 MG/DL (ref 74–99)
GLUCOSE BLD MANUAL STRIP-MCNC: 156 MG/DL (ref 74–99)
GLUCOSE BLD MANUAL STRIP-MCNC: 170 MG/DL (ref 74–99)
GLUCOSE BLDA-MCNC: 185 MG/DL (ref 74–99)
GLUCOSE SERPL-MCNC: 179 MG/DL (ref 74–99)
HCO3 BLDA-SCNC: 27.9 MMOL/L (ref 22–26)
HCT VFR BLD AUTO: 24.5 % (ref 36–52)
HCT VFR BLD EST: 32 % (ref 36–52)
HGB BLD-MCNC: 8.1 G/DL (ref 12–17.5)
HGB BLDA-MCNC: 10.7 G/DL (ref 13.5–17.5)
IMM GRANULOCYTES # BLD AUTO: 1.55 X10*3/UL (ref 0–0.7)
IMM GRANULOCYTES NFR BLD AUTO: 7.6 % (ref 0–0.9)
INHALED O2 CONCENTRATION: 40 %
LACTATE BLDA-SCNC: 0.8 MMOL/L (ref 0.4–2)
LYMPHOCYTES # BLD MANUAL: 1.44 X10*3/UL (ref 1.2–4.8)
LYMPHOCYTES NFR BLD MANUAL: 7 %
MAGNESIUM SERPL-MCNC: 1.92 MG/DL (ref 1.6–2.4)
MCH RBC QN AUTO: 29.2 PG (ref 26–34)
MCHC RBC AUTO-ENTMCNC: 33.1 G/DL (ref 32–36)
MCV RBC AUTO: 88 FL (ref 80–100)
METAMYELOCYTES # BLD MANUAL: 0.41 X10*3/UL
METAMYELOCYTES NFR BLD MANUAL: 2 %
MONOCYTES # BLD MANUAL: 0.41 X10*3/UL (ref 0.1–1)
MONOCYTES NFR BLD MANUAL: 2 %
MYELOCYTES # BLD MANUAL: 0.41 X10*3/UL
MYELOCYTES NFR BLD MANUAL: 2 %
NEUTROPHILS # BLD MANUAL: 17.43 X10*3/UL (ref 1.2–7.7)
NEUTS BAND # BLD MANUAL: 1.23 X10*3/UL (ref 0–0.7)
NEUTS BAND NFR BLD MANUAL: 6 %
NEUTS SEG # BLD MANUAL: 16.2 X10*3/UL (ref 1.2–7)
NEUTS SEG NFR BLD MANUAL: 79 %
NRBC BLD-RTO: 0 /100 WBCS (ref 0–0)
OXYHGB MFR BLDA: 95 % (ref 94–98)
PCO2 BLDA: 35 MM HG (ref 38–42)
PH BLDA: 7.51 PH (ref 7.38–7.42)
PHOSPHATE SERPL-MCNC: 2.6 MG/DL (ref 2.5–4.9)
PLATELET # BLD AUTO: 442 X10*3/UL (ref 150–450)
PLATELET CLUMP BLD QL SMEAR: PRESENT
PMV BLD AUTO: 10.1 FL (ref 7.5–11.5)
PO2 BLDA: 72 MM HG (ref 85–95)
POTASSIUM BLDA-SCNC: 3.2 MMOL/L (ref 3.5–5.3)
POTASSIUM SERPL-SCNC: 3.2 MMOL/L (ref 3.5–5.3)
PROCALCITONIN SERPL-MCNC: 0.18 NG/ML
PROT SERPL-MCNC: 6.1 G/DL (ref 6.4–8.2)
RBC # BLD AUTO: 2.77 X10*6/UL (ref 4–5.9)
RBC MORPH BLD: ABNORMAL
SAO2 % BLDA: 97 % (ref 94–100)
SODIUM BLDA-SCNC: 139 MMOL/L (ref 136–145)
SODIUM SERPL-SCNC: 141 MMOL/L (ref 136–145)
TOTAL CELLS COUNTED BLD: 100
TOXIC GRANULES BLD QL SMEAR: PRESENT
WBC # BLD AUTO: 20.5 X10*3/UL (ref 4.4–11.3)

## 2023-10-20 PROCEDURE — 99291 CRITICAL CARE FIRST HOUR: CPT | Performed by: SURGERY

## 2023-10-20 PROCEDURE — 85007 BL SMEAR W/DIFF WBC COUNT: CPT | Performed by: HOSPITALIST

## 2023-10-20 PROCEDURE — 37799 UNLISTED PX VASCULAR SURGERY: CPT | Performed by: HOSPITALIST

## 2023-10-20 PROCEDURE — 71260 CT THORAX DX C+: CPT | Performed by: STUDENT IN AN ORGANIZED HEALTH CARE EDUCATION/TRAINING PROGRAM

## 2023-10-20 PROCEDURE — 74177 CT ABD & PELVIS W/CONTRAST: CPT | Performed by: STUDENT IN AN ORGANIZED HEALTH CARE EDUCATION/TRAINING PROGRAM

## 2023-10-20 PROCEDURE — 2500000001 HC RX 250 WO HCPCS SELF ADMINISTERED DRUGS (ALT 637 FOR MEDICARE OP): Performed by: SURGERY

## 2023-10-20 PROCEDURE — 2020000001 HC ICU ROOM DAILY

## 2023-10-20 PROCEDURE — 2500000004 HC RX 250 GENERAL PHARMACY W/ HCPCS (ALT 636 FOR OP/ED): Performed by: SURGERY

## 2023-10-20 PROCEDURE — 2500000004 HC RX 250 GENERAL PHARMACY W/ HCPCS (ALT 636 FOR OP/ED): Performed by: HOSPITALIST

## 2023-10-20 PROCEDURE — 2500000005 HC RX 250 GENERAL PHARMACY W/O HCPCS: Performed by: SURGERY

## 2023-10-20 PROCEDURE — 84145 PROCALCITONIN (PCT): CPT | Mod: CMCLAB,ELYLAB | Performed by: HOSPITALIST

## 2023-10-20 PROCEDURE — 96372 THER/PROPH/DIAG INJ SC/IM: CPT | Performed by: SURGERY

## 2023-10-20 PROCEDURE — 84132 ASSAY OF SERUM POTASSIUM: CPT | Performed by: HOSPITALIST

## 2023-10-20 PROCEDURE — 83735 ASSAY OF MAGNESIUM: CPT | Performed by: HOSPITALIST

## 2023-10-20 PROCEDURE — C9113 INJ PANTOPRAZOLE SODIUM, VIA: HCPCS | Performed by: SURGERY

## 2023-10-20 PROCEDURE — 71045 X-RAY EXAM CHEST 1 VIEW: CPT | Performed by: RADIOLOGY

## 2023-10-20 PROCEDURE — 84100 ASSAY OF PHOSPHORUS: CPT | Performed by: HOSPITALIST

## 2023-10-20 PROCEDURE — 2500000004 HC RX 250 GENERAL PHARMACY W/ HCPCS (ALT 636 FOR OP/ED)

## 2023-10-20 PROCEDURE — 2550000001 HC RX 255 CONTRASTS: Performed by: SURGERY

## 2023-10-20 PROCEDURE — 85027 COMPLETE CBC AUTOMATED: CPT | Performed by: HOSPITALIST

## 2023-10-20 PROCEDURE — 82947 ASSAY GLUCOSE BLOOD QUANT: CPT

## 2023-10-20 PROCEDURE — 74177 CT ABD & PELVIS W/CONTRAST: CPT

## 2023-10-20 PROCEDURE — 84132 ASSAY OF SERUM POTASSIUM: CPT

## 2023-10-20 PROCEDURE — S0166 INJ OLANZAPINE 2.5MG: HCPCS | Performed by: SURGERY

## 2023-10-20 PROCEDURE — 94003 VENT MGMT INPAT SUBQ DAY: CPT

## 2023-10-20 PROCEDURE — 2500000004 HC RX 250 GENERAL PHARMACY W/ HCPCS (ALT 636 FOR OP/ED): Performed by: NURSE PRACTITIONER

## 2023-10-20 PROCEDURE — 71045 X-RAY EXAM CHEST 1 VIEW: CPT

## 2023-10-20 RX ORDER — MIDAZOLAM HYDROCHLORIDE 1 MG/ML
5 INJECTION, SOLUTION INTRAMUSCULAR; INTRAVENOUS ONCE
Status: COMPLETED | OUTPATIENT
Start: 2023-10-20 | End: 2023-10-20

## 2023-10-20 RX ORDER — POTASSIUM CHLORIDE 29.8 MG/ML
40 INJECTION INTRAVENOUS ONCE
Status: COMPLETED | OUTPATIENT
Start: 2023-10-20 | End: 2023-10-20

## 2023-10-20 RX ORDER — CALCIUM GLUCONATE 20 MG/ML
2 INJECTION, SOLUTION INTRAVENOUS ONCE
Status: COMPLETED | OUTPATIENT
Start: 2023-10-20 | End: 2023-10-20

## 2023-10-20 RX ORDER — MIDAZOLAM HYDROCHLORIDE 1 MG/ML
INJECTION, SOLUTION INTRAMUSCULAR; INTRAVENOUS
Status: DISPENSED
Start: 2023-10-20 | End: 2023-10-21

## 2023-10-20 RX ADMIN — DEXMEDETOMIDINE HYDROCHLORIDE 1.5 MCG/KG/HR: 4 INJECTION, SOLUTION INTRAVENOUS at 12:00

## 2023-10-20 RX ADMIN — OLANZAPINE 5 MG: 10 INJECTION, POWDER, FOR SOLUTION INTRAMUSCULAR at 03:36

## 2023-10-20 RX ADMIN — MIDAZOLAM HYDROCHLORIDE 5 MG: 1 INJECTION, SOLUTION INTRAMUSCULAR; INTRAVENOUS at 23:00

## 2023-10-20 RX ADMIN — FENTANYL CITRATE 10 MCG/HR: 0.05 INJECTION, SOLUTION INTRAMUSCULAR; INTRAVENOUS at 19:19

## 2023-10-20 RX ADMIN — FENTANYL CITRATE 100 MCG/HR: 0.05 INJECTION, SOLUTION INTRAMUSCULAR; INTRAVENOUS at 09:48

## 2023-10-20 RX ADMIN — OLANZAPINE 5 MG: 10 INJECTION, POWDER, FOR SOLUTION INTRAMUSCULAR at 08:09

## 2023-10-20 RX ADMIN — POTASSIUM CHLORIDE 40 MEQ: 29.8 INJECTION, SOLUTION INTRAVENOUS at 09:49

## 2023-10-20 RX ADMIN — MEROPENEM 1 G: 1 INJECTION, POWDER, FOR SOLUTION INTRAVENOUS at 03:35

## 2023-10-20 RX ADMIN — DEXMEDETOMIDINE HYDROCHLORIDE 1.5 MCG/KG/HR: 4 INJECTION, SOLUTION INTRAVENOUS at 05:46

## 2023-10-20 RX ADMIN — DEXMEDETOMIDINE HYDROCHLORIDE 1.5 MCG/KG/HR: 4 INJECTION, SOLUTION INTRAVENOUS at 14:50

## 2023-10-20 RX ADMIN — DEXMEDETOMIDINE HYDROCHLORIDE 1.5 MCG/KG/HR: 4 INJECTION, SOLUTION INTRAVENOUS at 17:28

## 2023-10-20 RX ADMIN — INSULIN LISPRO 2 UNITS: 100 INJECTION, SOLUTION INTRAVENOUS; SUBCUTANEOUS at 03:36

## 2023-10-20 RX ADMIN — PROPOFOL 35 MCG/KG/MIN: 10 INJECTION, EMULSION INTRAVENOUS at 01:36

## 2023-10-20 RX ADMIN — PROPOFOL 17.5 MCG/KG/MIN: 10 INJECTION, EMULSION INTRAVENOUS at 20:01

## 2023-10-20 RX ADMIN — Medication 40 PERCENT: at 20:19

## 2023-10-20 RX ADMIN — OLANZAPINE 5 MG: 10 INJECTION, POWDER, FOR SOLUTION INTRAMUSCULAR at 14:49

## 2023-10-20 RX ADMIN — CALCIUM GLUCONATE 2 G: 20 INJECTION, SOLUTION INTRAVENOUS at 12:03

## 2023-10-20 RX ADMIN — IOHEXOL 75 ML: 350 INJECTION, SOLUTION INTRAVENOUS at 10:31

## 2023-10-20 RX ADMIN — ASCORBIC ACID, VITAMIN A PALMITATE, CHOLECALCIFEROL, THIAMINE HYDROCHLORIDE, RIBOFLAVIN-5 PHOSPHATE SODIUM, PYRIDOXINE HYDROCHLORIDE, NIACINAMIDE, DEXPANTHENOL, ALPHA-TOCOPHEROL ACETATE, VITAMIN K1, FOLIC ACID, BIOTIN, CYANOCOBALAMIN: 200; 3300; 200; 6; 3.6; 6; 40; 15; 10; 150; 600; 60; 5 INJECTION, SOLUTION INTRAVENOUS at 00:42

## 2023-10-20 RX ADMIN — HYOSCYAMINE SULFATE 473 ML: 16 SOLUTION at 20:02

## 2023-10-20 RX ADMIN — INSULIN LISPRO 2 UNITS: 100 INJECTION, SOLUTION INTRAVENOUS; SUBCUTANEOUS at 08:09

## 2023-10-20 RX ADMIN — MEROPENEM 1 G: 1 INJECTION, POWDER, FOR SOLUTION INTRAVENOUS at 18:58

## 2023-10-20 RX ADMIN — INSULIN LISPRO 2 UNITS: 100 INJECTION, SOLUTION INTRAVENOUS; SUBCUTANEOUS at 16:57

## 2023-10-20 RX ADMIN — PROPOFOL 30 MCG/KG/MIN: 10 INJECTION, EMULSION INTRAVENOUS at 10:00

## 2023-10-20 RX ADMIN — ENOXAPARIN SODIUM 40 MG: 40 INJECTION SUBCUTANEOUS at 17:29

## 2023-10-20 RX ADMIN — DEXMEDETOMIDINE HYDROCHLORIDE 1.5 MCG/KG/HR: 4 INJECTION, SOLUTION INTRAVENOUS at 02:46

## 2023-10-20 RX ADMIN — PROPOFOL 30 MCG/KG/MIN: 10 INJECTION, EMULSION INTRAVENOUS at 16:41

## 2023-10-20 RX ADMIN — MEROPENEM 1 G: 1 INJECTION, POWDER, FOR SOLUTION INTRAVENOUS at 10:57

## 2023-10-20 RX ADMIN — PANTOPRAZOLE SODIUM 40 MG: 40 INJECTION, POWDER, FOR SOLUTION INTRAVENOUS at 08:09

## 2023-10-20 RX ADMIN — HYOSCYAMINE SULFATE 473 ML: 16 SOLUTION at 09:48

## 2023-10-20 RX ADMIN — OLANZAPINE 5 MG: 10 INJECTION, POWDER, FOR SOLUTION INTRAMUSCULAR at 20:01

## 2023-10-20 RX ADMIN — DEXMEDETOMIDINE HYDROCHLORIDE 1.5 MCG/KG/HR: 4 INJECTION, SOLUTION INTRAVENOUS at 08:26

## 2023-10-20 RX ADMIN — PROPOFOL 40 MCG/KG/MIN: 10 INJECTION, EMULSION INTRAVENOUS at 06:15

## 2023-10-20 ASSESSMENT — PAIN - FUNCTIONAL ASSESSMENT: PAIN_FUNCTIONAL_ASSESSMENT: CPOT (CRITICAL CARE PAIN OBSERVATION TOOL)

## 2023-10-20 ASSESSMENT — PAIN SCALES - GENERAL: PAINLEVEL_OUTOF10: 0 - NO PAIN

## 2023-10-20 ASSESSMENT — PAIN SCALES - WONG BAKER: WONGBAKER_NUMERICALRESPONSE: NO HURT

## 2023-10-20 NOTE — CARE PLAN
The patient's goals for the shift include Pain management    The clinical goals for the shift include Patient will remain free from additional injury for the remainder of the shift.

## 2023-10-20 NOTE — PROGRESS NOTES
Critical Care Daily Progress        Subjective   Patient is a 48 y.o. adult admitted on 10/10/2023  8:50 AM with the following indication(s) for ICU care aspiration pneumonia.     Interval History: No fevers overnight, hemodynamically stable, was tolerating decreased propofol overnight at 40mg/hr and decesnt oxygenation at 40%, WBC 17->20K.     Complaints: Intubated, sedated.     Scheduled Medications:   [Held by provider] busPIRone, 15 mg, oral, BID  calcium gluconate, 2 g, intravenous, Once  [Held by provider] docusate sodium, 100 mg, oral, BID  enoxaparin, 40 mg, subcutaneous, q24h  insulin lispro, 0-10 Units, subcutaneous, q4h  [Held by provider] insulin regular, 0-5 Units, subcutaneous, TID with meals  [Held by provider] lurasidone, 80 mg, oral, Daily  meropenem, 1 g, intravenous, q8h  OLANZapine, 5 mg, intramuscular, q6h  [Held by provider] pantoprazole, 40 mg, oral, Daily before breakfast  pantoprazole, 40 mg, intravenous, Daily  potassium chloride, 40 mEq, intravenous, Once  potassium phosphate, 21 mmol, intravenous, Once  [Held by provider] QUEtiapine, 300 mg, oral, BID  [Held by provider] simvastatin, 40 mg, oral, Nightly  sodium hypochlorite, , irrigation, BID  [Held by provider] traZODone, 300 mg, oral, Nightly  [Held by provider] venlafaxine XR, 150 mg, oral, Daily         Continuous Medications:   Adult Clinimix TPN, 70 mL/hr, Last Rate: 70 mL/hr (10/20/23 0042)  dexmedeTOMIDine, 0.1-1.5 mcg/kg/hr, Last Rate: 1.5 mcg/kg/hr (10/20/23 0826)  fentaNYL,  mcg/hr, Last Rate: 100 mcg/hr (10/20/23 0948)  propofol, 5-50 mcg/kg/min, Last Rate: 30 mcg/kg/min (10/20/23 1000)         PRN Medications:   PRN medications: alteplase, dextrose 10 % in water (D10W), dextrose, glucagon, HYDROmorphone, HYDROmorphone, ipratropium-albuteroL, naloxone, oxygen    Objective   Vitals:  Most Recent:  Vitals:    10/20/23 1137   BP:    Pulse:    Resp: 23   Temp:    SpO2: 94%       24hr Min/Max:  Temp  Min: 36.2 °C (97.2 °F)   Max: 37.3 °C (99.1 °F)  Pulse  Min: 78  Max: 96  Resp  Min: 0  Max: 37  SpO2  Min: 91 %  Max: 98 %    LDA:  CVC 10/15/23 Triple lumen Left Internal jugular (Active)   Placement Date/Time: 10/15/23 (c) 1906   Hand Hygiene Performed Prior to CVC Insertion: Yes  Site Prep: Chlorhexidine   All 5 Sterile Barriers Used (Gloves, Gown, Cap, Mask, Large Sterile Drape): Yes  Local Anesthetic: Injectable  Lumen Type: Triple l...   Number of days: 4       Arterial Line 10/15/23 Right Radial (Active)   Placement Date/Time: 10/15/23 1846   Earliest Known Present: 10/15/23  Orientation: Right  Location: Radial  Securement Method: Sutured;Transparent dressing  Number of Sutures Placed: 2   Number of days: 4       ETT  7.5 mm (Active)   Placement Date/Time: 10/14/23 1950   Placed by External Staff?: (c) Other (Comment)  ETT Type: ETT - single  Single Lumen Tube Size: 7.5 mm  Location: Oral   Number of days: 5       Urethral Catheter Straight-tip 16 Fr. (Active)   Placement Date/Time: 10/19/23 1245   Placed by: Johanna Green RN  Hand Hygiene Completed: Yes  Catheter Type: Straight-tip  Tube Size (Fr.): 16 Fr.  Catheter Balloon Size: 10 mL  Urine Returned: Yes   Number of days: 0       NG/OG/Feeding Tube Nasogastric 14 Fr Left nostril (Active)   Placement Date/Time: 10/12/23 1945   Hand Hygiene Completed: Yes  Type of Tube: NG/OG Tube  Tube Length: 55 cm  Tube Type: Nasogastric  NG/OG Tube Size: 14 Fr  Tube Location: Left nostril   Number of days: 7         Vent settings:  Vent Mode: Volume control/assist control  FiO2 (%):  [40 %] 40 %  S RR:  [14] 14  S VT:  [450 mL] 450 mL  PEEP/CPAP (cm H2O):  [5 cm H20-8 cm H20] 8 cm H20  MAP (cm H2O):  [11-13] 13    Hemodynamic parameters for last 24 hours:       I/O:    Intake/Output Summary (Last 24 hours) at 10/20/2023 1146  Last data filed at 10/20/2023 0900  Gross per 24 hour   Intake 3398 ml   Output 2135 ml   Net 1263 ml       Physical Exam:   General appearance: NAD, intubated,  sedated  Head: Normocephalic, without obvious abnormality  Eyes:conjunctivae/corneas clear. PERRL  Neck:  supple, symmetrical, trachea midline  Lungs:clear to auscultation bilaterally  Chest wall:  no tenderness  Heart: regular rate and rhythm, S1, S2 normal, no murmur, click, rub or gallop  Abdomen: Soft, ND, still erythematouns around wound but is stable, wound is open about 5cm with packing, fascia intact, no more drainage  Male genitalia:  normal  Extremities: 2+ edema  Pulses:2+ and symmetric  Skin: Skin color, texture, turgor normal.  No rashes or lesions  Neurologic: Intubated, sedated, moving extremities x4 spontaneously    Lab/Radiology/Diagnostic Review:  Results for orders placed or performed during the hospital encounter of 10/10/23 (from the past 24 hour(s))   POCT GLUCOSE   Result Value Ref Range    POCT Glucose 167 (H) 74 - 99 mg/dL   Tissue/Wound Culture/Smear    Specimen: Surgical Site Infection; Tissue/Biopsy   Result Value Ref Range    Gram Stain (3+) Moderate Polymorphonuclear leukocytes (A)     Gram Stain (3+) Moderate Gram negative bacilli (A)    Urinalysis with Reflex Microscopic   Result Value Ref Range    Color, Urine Yellow Straw, Yellow    Appearance, Urine Clear Clear    Specific Gravity, Urine 1.021 1.005 - 1.035    pH, Urine 6.0 5.0, 5.5, 6.0, 6.5, 7.0, 7.5, 8.0    Protein, Urine 100 (2+) (N) NEGATIVE mg/dL    Glucose, Urine NEGATIVE NEGATIVE mg/dL    Blood, Urine MODERATE (2+) (A) NEGATIVE    Ketones, Urine NEGATIVE NEGATIVE mg/dL    Bilirubin, Urine NEGATIVE NEGATIVE    Urobilinogen, Urine <2.0 <2.0 mg/dL    Nitrite, Urine NEGATIVE NEGATIVE    Leukocyte Esterase, Urine TRACE (A) NEGATIVE   Microscopic Only, Urine   Result Value Ref Range    WBC, Urine 1-5 1-5, NONE /HPF    RBC, Urine >20 (A) NONE, 1-2, 3-5 /HPF    Bacteria, Urine 1+ (A) NONE SEEN /HPF    Hyaline Casts, Urine OCCASIONAL (A) NONE /LPF   Blood Culture    Specimen: Peripheral Venipuncture; Blood culture   Result Value Ref  Range    Blood Culture Loaded on Instrument - Culture in progress    Blood Culture    Specimen: Peripheral Venipuncture; Blood culture   Result Value Ref Range    Blood Culture Loaded on Instrument - Culture in progress    ECG 12 Lead   Result Value Ref Range    Ventricular Rate 81 BPM    Atrial Rate 81 BPM    GA Interval 136 ms    QRS Duration 106 ms    QT Interval 378 ms    QTC Calculation(Bazett) 439 ms    P Axis 31 degrees    R Axis 10 degrees    T Axis 9 degrees    QRS Count 14 beats    Q Onset 225 ms    P Onset 157 ms    P Offset 208 ms    T Offset 414 ms    QTC Fredericia 417 ms   POCT GLUCOSE   Result Value Ref Range    POCT Glucose 155 (H) 74 - 99 mg/dL   Respiratory Culture/Smear    Specimen: Tracheal Aspirate; Fluid   Result Value Ref Range    Gram Stain (3+) Moderate Polymorphonuclear leukocytes (A)     Gram Stain (3+) Moderate Gram positive cocci (A)    POCT GLUCOSE   Result Value Ref Range    POCT Glucose 137 (H) 74 - 99 mg/dL   POCT GLUCOSE   Result Value Ref Range    POCT Glucose 148 (H) 74 - 99 mg/dL   POCT GLUCOSE   Result Value Ref Range    POCT Glucose 170 (H) 74 - 99 mg/dL   Blood Gas Arterial Full Panel   Result Value Ref Range    POCT pH, Arterial 7.51 (H) 7.38 - 7.42 pH    POCT pCO2, Arterial 35 (L) 38 - 42 mm Hg    POCT pO2, Arterial 72 (L) 85 - 95 mm Hg    POCT SO2, Arterial 97 94 - 100 %    POCT Oxy Hemoglobin, Arterial 95.0 94.0 - 98.0 %    POCT Hematocrit Calculated, Arterial 32.0 (L) 36.0 - 52.0 %    POCT Sodium, Arterial 139 136 - 145 mmol/L    POCT Potassium, Arterial 3.2 (L) 3.5 - 5.3 mmol/L    POCT Chloride, Arterial 109 (H) 98 - 107 mmol/L    POCT Ionized Calcium, Arterial 1.07 (L) 1.10 - 1.33 mmol/L    POCT Glucose, Arterial 185 (H) 74 - 99 mg/dL    POCT Lactate, Arterial 0.8 0.4 - 2.0 mmol/L    POCT Base Excess, Arterial 4.8 (H) -2.0 - 3.0 mmol/L    POCT HCO3 Calculated, Arterial 27.9 (H) 22.0 - 26.0 mmol/L    POCT Hemoglobin, Arterial 10.7 (L) 13.5 - 17.5 g/dL    POCT Anion Gap,  Arterial 5 (L) 10 - 25 mmo/L    Patient Temperature      FiO2 40 %   CBC and Auto Differential   Result Value Ref Range    WBC 20.5 (H) 4.4 - 11.3 x10*3/uL    nRBC 0.0 0.0 - 0.0 /100 WBCs    RBC 2.77 (L) 4.00 - 5.90 x10*6/uL    Hemoglobin 8.1 (L) 12.0 - 17.5 g/dL    Hematocrit 24.5 (L) 36.0 - 52.0 %    MCV 88 80 - 100 fL    MCH 29.2 26.0 - 34.0 pg    MCHC 33.1 32.0 - 36.0 g/dL    RDW 13.9 11.5 - 14.5 %    Platelets 442 150 - 450 x10*3/uL    MPV 10.1 7.5 - 11.5 fL    Immature Granulocytes %, Automated 7.6 (H) 0.0 - 0.9 %    Immature Granulocytes Absolute, Automated 1.55 (H) 0.00 - 0.70 x10*3/uL   Comprehensive Metabolic Panel   Result Value Ref Range    Glucose 179 (H) 74 - 99 mg/dL    Sodium 141 136 - 145 mmol/L    Potassium 3.2 (L) 3.5 - 5.3 mmol/L    Chloride 106 98 - 107 mmol/L    Bicarbonate 28 21 - 32 mmol/L    Anion Gap 10 10 - 20 mmol/L    Urea Nitrogen 14 6 - 23 mg/dL    Creatinine 0.91 0.50 - 1.30 mg/dL    eGFR 77 >60 mL/min/1.73m*2    Calcium 7.6 (L) 8.6 - 10.3 mg/dL    Albumin 2.7 (L) 3.4 - 5.0 g/dL    Alkaline Phosphatase 73 33 - 120 U/L    Total Protein 6.1 (L) 6.4 - 8.2 g/dL    AST 20 9 - 39 U/L    Bilirubin, Total 1.0 0.0 - 1.2 mg/dL    ALT 14 7 - 52 U/L   Magnesium   Result Value Ref Range    Magnesium 1.92 1.60 - 2.40 mg/dL   Phosphorus   Result Value Ref Range    Phosphorus 2.6 2.5 - 4.9 mg/dL   Procalcitonin   Result Value Ref Range    Procalcitonin 0.18 (H) <=0.07 ng/mL   Manual Differential   Result Value Ref Range    Neutrophils %, Manual 79.0 40.0 - 80.0 %    Bands %, Manual 6.0 0.0 - 5.0 %    Lymphocytes %, Manual 7.0 13.0 - 44.0 %    Monocytes %, Manual 2.0 2.0 - 10.0 %    Eosinophils %, Manual 2.0 0.0 - 6.0 %    Basophils %, Manual 0.0 0.0 - 2.0 %    Metamyelocytes %, Manual 2.0 0.0 - 0.0 %    Myelocytes %, Manual 2.0 0.0 - 0.0 %    Seg Neutrophils Absolute, Manual 16.20 (H) 1.20 - 7.00 x10*3/uL    Bands Absolute, Manual 1.23 (H) 0.00 - 0.70 x10*3/uL    Lymphocytes Absolute, Manual 1.44  1.20 - 4.80 x10*3/uL    Monocytes Absolute, Manual 0.41 0.10 - 1.00 x10*3/uL    Eosinophils Absolute, Manual 0.41 0.00 - 0.70 x10*3/uL    Basophils Absolute, Manual 0.00 0.00 - 0.10 x10*3/uL    Metamyelocytes Absolute, Manual 0.41 0.00 - 0.00 x10*3/uL    Myelocytes Absolute, Manual 0.41 0.00 - 0.00 x10*3/uL    Total Cells Counted 100     Neutrophils Absolute, Manual 17.43 (H) 1.20 - 7.70 x10*3/uL    RBC Morphology See Below     Dohle Bodies Present     Toxic Granulation Present     Clumped Platelets Present    POCT GLUCOSE   Result Value Ref Range    POCT Glucose 156 (H) 74 - 99 mg/dL     XR chest 1 view    Result Date: 10/20/2023  Interpreted By:  Ranjeet Gil, STUDY: XR CHEST 1 VIEW;  10/20/2023 5:43 am   INDICATION: Signs/Symptoms:pna f/u, on vent.   COMPARISON: Portable chest, 19 October 2023   ACCESSION NUMBER(S): [id number]   ORDERING CLINICIAN: JESSA PENA   TECHNIQUE: Single frontal view of the chest; Portable technique   FINDINGS:   The endotracheal tube is well positioned   Left sided central line is unchanged and well positioned   Enteric tube has been retracted, tip now projects over proximal gastric body with about 10 cm purchase in the stomach   Chronic band of atelectasis or scarring at left lung base is unchanged   Again low lung volumes with exaggerated interstitial markings and patchy airspace opacities   No new acute process such as large pleural effusion or pneumothorax has developed       No interval change   MACRO: None   Signed by: Ranjeet Gil 10/20/2023 8:11 AM Dictation workstation:   [id number]    ECG 12 Lead    Result Date: 10/19/2023  Normal sinus rhythm Normal ECG No previous ECGs available Confirmed by Amilcar Lopez ([id number]) on 10/19/2023 3:23:25 PM    ECG 12 lead    Result Date: 10/19/2023  Normal sinus rhythm Normal ECG When compared with ECG of 11-OCT-2023 08:06, Vent. rate has decreased BY  61 BPM Questionable change in QRS duration Criteria for Inferior infarct are no  longer Present Confirmed by Amilcar Lopez ([id number]) on 10/19/2023 2:04:13 PM    XR abdomen 1 view    Result Date: 10/19/2023  Interpreted By:  Ranjeet Gil, STUDY: XR ABDOMEN 1 VIEW;  10/19/2023 5:51 am   INDICATION: Signs/Symptoms:c/f sbo.   COMPARISON: CT abdomen and pelvis without contrast 14 October 2023; KUB 16 October 2023   ACCESSION NUMBER(S): [id number]   ORDERING CLINICIAN: JESSA PENA   TECHNIQUE: Frontal supine view of the abdomen   FINDINGS: Two gas-filled and mildly dilated upper abdominal small bowel loops are unchanged. Overall quantity of small bowel gas has decreased which seems reassuring   No interval increase in colonic gas   No pneumatosis       Persistent gas-filled small bowel dilation, but fewer gas-filled dilated small bowel loops, probably an improving ileus   MACRO: None   Signed by: Ranjeet Gil 10/19/2023 8:21 AM Dictation workstation:   [id number]    XR chest 1 view    Result Date: 10/19/2023  Interpreted By:  Ranjeet Gil, STUDY: XR CHEST 1 VIEW;  10/19/2023 5:51 am   INDICATION: Signs/Symptoms:f/u pna.   COMPARISON: Portable chest, 18 October 2023   ACCESSION NUMBER(S): [id number]   ORDERING CLINICIAN: JESSA PENA   TECHNIQUE: Single frontal view of the chest; Portable technique   FINDINGS: Endotracheal tube tip is quite close to the harvey, about 1 cm but unchanged   The enteric tube courses below the diaphragm, but the distal tip is cut off the bottom of the radiograph   Left sided central line is unchanged and well positioned   The cardiomediastinal silhouette is unchanged   Low lung volumes with exaggerated interstitial markings due to hypoinflation. Nonspecific patchy airspace opacities are unchanged   No new acute process such as large pleural effusion or pneumothorax has developed       No interval change   MACRO: None   Signed by: Ranjeet Gil 10/19/2023 8:17 AM Dictation workstation:   [id number]    XR chest 1 view    Result Date: 10/18/2023  Interpreted By:   Schoenberger, Joseph, STUDY: XR CHEST 1 VIEW;  10/18/2023 6:29 am   INDICATION: Signs/Symptoms:endotracheal tube verification.   COMPARISON: 10/17/2023   ACCESSION NUMBER(S): [id number]   ORDERING CLINICIAN: GAURANG STOKES   FINDINGS: The positioning of the endotracheal tube and nasogastric tube and left jugular venous infusion catheter is unchanged.       CARDIOMEDIASTINAL SILHOUETTE: The cardiac silhouette remains enlarged.   LUNGS: Right-sided perihilar hazy opacity persists but is somewhat improved from prior. Retrocardiac opacity and platelike atelectasis or scarring in the left lung base is unchanged.   ABDOMEN: No remarkable upper abdominal findings.   BONES: No acute osseous changes.       1.  The similar to prior other than slight improvement in the appearance of the right lung       MACRO: None   Signed by: Joseph Schoenberger 10/18/2023 8:10 AM Dictation workstation:   [id number]    XR chest 1 view    Result Date: 10/17/2023  Interpreted By:  Ranjeet Gil, STUDY: XR CHEST 1 VIEW;  10/17/2023 5:37 am   INDICATION: Signs/Symptoms:Pneumonia evaluation.   COMPARISON: Portable chest, 16 October 2023   ACCESSION NUMBER(S): [id number]   ORDERING CLINICIAN: KRISTINA MONTALVO   TECHNIQUE: Single frontal view of the chest; Portable technique   FINDINGS:   The endotracheal tube is well positioned   Left sided central line is unchanged and well positioned   Enteric tube unchanged in position with tip projecting over left lateral gastric body   Hazy bilateral airspace disease is unchanged   The curvilinear peripheral left mid to basilar opacity is scarring or atelectasis based on review of prior CT   No new acute process such as large pleural effusion or pneumothorax has developed       Hazy and patchy bilateral airspace disease   MACRO: None   Signed by: Ranjeet Gil 10/17/2023 8:33 AM Dictation workstation:   [id number]    XR abdomen 1 view    Result Date: 10/16/2023  Interpreted By:  Schoenberger, Joseph, STUDY:  XR ABDOMEN 1 VIEW;  10/16/2023 6:14 am   INDICATION: Signs/Symptoms:small bowel obstruction.   COMPARISON: 10/15/2023   ACCESSION NUMBER(S): [id number]   ORDERING CLINICIAN: MEAGHAN KATZ   FINDINGS: Persistent gaseous distention of small bowel loops with some gas in the rectum. Possible postoperative ileus. Greatest caliber small bowel loops is noted in the upper abdomen approximating 5.5 cm similar to a comparable 5.2 cm measurement previously. Limited evaluation of pneumoperitoneum on supine imaging, however no gross evidence of free air is noted.   Visualized lungs are clear.   Osseous structures demonstrate no acute bony changes.       1.  Possible persistent postoperative ileus. Mid small bowel obstruction not entirely excluded. Continued follow-up recommended.   MACRO: None   Signed by: Joseph Schoenberger 10/16/2023 8:57 AM Dictation workstation:   [id number]    XR chest 1 view    Result Date: 10/16/2023  Interpreted By:  Schoenberger, Joseph, STUDY: XR CHEST 1 VIEW;  10/16/2023 6:14 am   INDICATION: Signs/Symptoms:intubated.   COMPARISON: 10/15/2023   ACCESSION NUMBER(S): [id number]   ORDERING CLINICIAN: MEAGHAN KATZ   FINDINGS: Positioning of nasogastric tube, left jugular venous infusion catheter, and endotracheal tube unchanged.       CARDIOMEDIASTINAL SILHOUETTE: Cardiac silhouette remains somewhat enlarged.   LUNGS: Platelike atelectasis in the left lung base unchanged. Progressing retrocardiac opacity possibly atelectasis or airspace consolidation.   ABDOMEN: No remarkable upper abdominal findings.   BONES: No acute osseous changes.       1.  Progressing left basal opacity.       MACRO: None   Signed by: Joseph Schoenberger 10/16/2023 8:56 AM Dictation workstation:   [id number]    XR chest 1 view    Result Date: 10/16/2023  Interpreted By:  Schoenberger, Joseph, STUDY: XR CHEST 1 VIEW;  10/15/2023 5:42 am   INDICATION: Signs/Symptoms:c/f aspiration, on vent.   COMPARISON: 05/29/2022    ACCESSION NUMBER(S): [id number]   ORDERING CLINICIAN: JESSA PENA   FINDINGS: Endotracheal tube placed distal tip 3.9 cm above the harvey. Nasogastric tube placed distal tip gastric body. Stomach is distended.       CARDIOMEDIASTINAL SILHOUETTE: Cardiomediastinal silhouette is normal in size and configuration.   LUNGS: There is a new area of platelike atelectasis in the left lung base. No other new focal lung opacities. Limited hypoventilatory exam.   ABDOMEN: No remarkable upper abdominal findings.   BONES: No acute osseous changes.       1.  Satisfactory positioning of tubes and lines. 2. New platelike atelectasis in left lung base. 3. Limited exam.       MACRO: None   Signed by: Joseph Schoenberger 10/16/2023 8:42 AM Dictation workstation:   [id number]    XR abdomen 1 view    Result Date: 10/16/2023  Interpreted By:  Schoenberger, Joseph, STUDY: XR ABDOMEN 1 VIEW;  10/15/2023 5:42 am   INDICATION: Signs/Symptoms:post-op.   COMPARISON: 10/14/2023   ACCESSION NUMBER(S): [id number]   ORDERING CLINICIAN: JESSA PENA   FINDINGS: Persistent gaseous distention of multiple small bowel loops with some gas in the colon. Stomach is distended with nasogastric tube tip gastric body. Greatest caliber small bowel loops on this exam is 5.2 cm compared to 6.6 cm and there is less gas in bowel loops on this exam than the prior. Improving ileus is consideration. Limited evaluation of pneumoperitoneum on supine imaging, however no gross evidence of free air is noted.   Visualized lungs are clear.   Osseous structures demonstrate no acute bony changes.       1.  Likely improving postoperative ileus.   MACRO: None   Signed by: Joseph Schoenberger 10/16/2023 8:40 AM Dictation workstation:   [id number]    XR chest 1 view    Result Date: 10/15/2023  Interpreted By:  Juan Graves, STUDY: XR CHEST 1 VIEW;  10/15/2023 7:20 pm   INDICATION: Signs/Symptoms:line placement.   COMPARISON: Earlier on 10/15/2023   ACCESSION  NUMBER(S): [id number]   ORDERING CLINICIAN: MEAGHAN KATZ   FINDINGS: There is interval placement of a left central venous catheter with tip terminates at the level of the right atrium. Endotracheal tube tip terminates approximately 2.9 cm superior to the harvey. Nasogastric tube tip terminates in the left upper abdomen level of the stomach. The cardiac silhouette is stable in size. There is limited inspiratory lung volumes. Stable band of opacity in the left midlung and left basilar consolidative/atelectatic opacity. No pneumothorax.       Interval placement of left central venous catheter with tip terminating at the level of the right atrium.   Limited inspiratory lung volumes with stable band of opacity in the left mid lung and medial left basilar consolidative/atelectatic opacity.   MACRO: None   Signed by: Juan Graves 10/15/2023 7:40 PM Dictation workstation:   [id number]    CT abdomen pelvis wo IV contrast    Result Date: 10/14/2023  Interpreted By:  Mana Price, STUDY: CT ABDOMEN PELVIS WO IV CONTRAST;  10/14/2023 1:25 pm   INDICATION: Signs/Symptoms:SBO postop appendectomy.   COMPARISON: 10/10/2023   ACCESSION NUMBER(S): [id number]   ORDERING CLINICIAN: DRU WYNN   TECHNIQUE: CT of the abdomen and pelvis was performed. No oral, no intravenous contrast.   FINDINGS: LOWER CHEST: There are bibasilar infiltrates.   ABDOMEN:   LIVER: There is no hepatic mass.   BILE DUCTS: There is no intrahepatic, common hepatic or common bile ductal dilatation.   GALLBLADDER: Contracted but otherwise unremarkable.   PANCREAS: The pancreas is unremarkable.   SPLEEN: The spleen is unremarkable. There is no splenic mass or splenomegaly.   ADRENAL GLANDS: The adrenal glands are unremarkable.   KIDNEYS AND URETERS: The kidneys demonstrate no mass.  There is no intrarenal calculus or hydronephrosis. There is a Proctor catheter insufflated in a nondistended urinary bladder.   BOWEL: There are surgical clips in the right  lower quadrant from appendectomy in the interval compared to 10/10/2023. There is haziness in the mesentery in the right lower quadrant from recent surgery. There is a small crescent of non walled-off ascitic fluid in the right lower quadrant. There is a small amount of ascites in the pelvis non walled-off. There is a nasogastric tube with the tip in the mid stomach. There is no gastric distention. There are markedly dilated loops of small bowel. There is collapsed distal small bowel and collapsed colon. This could represent a postoperative ileus but consider small-bowel obstruction. There are sigmoid diverticula with no diverticulitis.   VESSELS: The abdominal and pelvic vessels are unremarkable.   PERITONEUM/RETROPERITONEUM/LYMPH NODES: There is no retroperitoneal or pelvic adenopathy.  There is no ascites.   ABDOMINAL WALL: There is a periumbilical hernia with an area of opening measuring 5.8 cm in transverse dimension. There is herniation of a segment of nondilated non thickened small bowel. There is induration around the umbilicus from recent surgery. There is a small hematoma in the subcutaneous fat with hyperdense and hypodense fluid. This measures 3.0 x 1.8 cm. There is a dot of air which could be iatrogenic from prior laparoscopic surgery rather than abscess and infection..   BONE AND SOFT TISSUE: There is no acute osseous finding.   There is no soft tissue abnormality.       1. Interval appendectomy. Haziness right lower quadrant with a small crescent of fluid non walled-off. 2. Markedly dilated loops of small bowel with nondilated collapsed distal small bowel and collapsed colon. This is likely a postoperative ileus but still consider small-bowel obstruction. Proctor catheter with the tip in the mid stomach. 3. Periumbilical hernia with a segment of nondilated non thickened small bowel. There is induration and a small amount of fluid in the fat around the umbilicus from recent surgery. There is a small  hematoma in the periumbilical fat. There is a dot of air which could be iatrogenic from recent surgery rather than abscess and infection. 4. Proctor catheter insufflated in a nondistended urinary bladder. 5. Bibasilar pneumonia.   MACRO: None   Signed by: Mana Price 10/14/2023 1:34 PM Dictation workstation:   [id number]    XR abdomen 1 view    Result Date: 10/14/2023  Interpreted By:  Mana Price, STUDY: XR ABDOMEN 1 VIEW;  10/14/2023 8:15 am. 3 views.   INDICATION: Signs/Symptoms:Ileus.   COMPARISON: 10/12/2023   ACCESSION NUMBER(S): [id number]   ORDERING CLINICIAN: DRU WYNN   FINDINGS: There is a nasogastric tube with the tip in the proximal stomach. There is gastric decompression. There is progressive marked small bowel dilatation with loops of bowel dilated up to 6.6 cm. There is gas and stool in nondilated colon. Findings are consistent with a small-bowel obstruction.   There are no pathologic calcifications.   Visualized lungs are clear.   Osseous structures demonstrate no acute bony changes.         1. Nasogastric tube with the tip in the proximal stomach with gastric decompression. 2. Progressive marked small bowel dilatation consistent with small-bowel obstruction.     MACRO: None   Signed by: Mana Price 10/14/2023 8:55 AM Dictation workstation:   [id number]    XR abdomen 1 view    Result Date: 10/13/2023  Interpreted By:  Juan Graves, STUDY: XR ABDOMEN 1 VIEW;  10/12/2023 8:28 pm   INDICATION: Signs/Symptoms:verify NG placement.   COMPARISON: 10/12/2023   ACCESSION NUMBER(S): [id number]   ORDERING CLINICIAN: VAN BRYAN   FINDINGS: Nasogastric tube tip terminates in the left upper abdomen at level of the stomach. There is cardiomegaly and persistent band of atelectasis or infiltrate in the left midlung. Gaseous distended bowel in the partially imaged abdomen.       Nasogastric tube tip terminates in the left upper abdomen at level of proximal stomach.   Cardiomegaly and stable band  of atelectasis or infiltrate in the left mid lung.   Gaseous distended bowel in imaged upper abdomen; continue progress short-term progress is recommended for further evaluation.   MACRO: None   Signed by: Juan Graves 10/13/2023 1:36 AM Dictation workstation:   [id number]    XR abdomen 1 view    Result Date: 10/12/2023  Interpreted By:  Domingo Marks, STUDY: XR ABDOMEN 1 VIEW;  10/12/2023 6:55 pm   INDICATION: Signs/Symptoms:NG tube placement.   COMPARISON: 10/12/2023 at 4:46 p.m.   ACCESSION NUMBER(S): [id number]   ORDERING CLINICIAN: MEAGHAN CHARLTON   FINDINGS: Interval placement of a NG tube with its tip over the gastric body.   Dilatation of multiple small bowel loops as well as multiple colonic loops throughout the abdomen again seen. Bibasilar airspace disease present       1. NG tube tip projects over the gastric body 2. Redemonstration of bowel loop dilatation suggestive of underlying ileus versus obstruction.   MACRO: None   Signed by: Domingo Marks 10/12/2023 7:12 PM Dictation workstation:   [id number]    XR abdomen 1 view    Result Date: 10/12/2023  Interpreted By:  Mana Price, STUDY: XR ABDOMEN 1 VIEW;  10/12/2023 4:51 pm. 2 views.   INDICATION: Signs/Symptoms:r/o ileus.   COMPARISON: CT abdomen and pelvis 10/10/2023   ACCESSION NUMBER(S): [id number]   ORDERING CLINICIAN: MEAGHAN CHARLTON   FINDINGS: There is moderate small bowel and colonic distention, likely an ileus. There are surgical clips in the right lower quadrant from recent appendectomy.   There are no pathologic calcifications.   Visualized lungs are clear.   Osseous structures demonstrate no acute bony changes.         Moderate small bowel and colonic distention, likely a postoperative ileus. Surgical clips right lower quadrant from recent appendectomy.   MACRO: None   Signed by: Mana Price 10/12/2023 4:56 PM Dictation workstation:   [id number]    CT angio chest for pulmonary embolism    Result Date:  10/12/2023  Interpreted By:  Ranjeet Gil, STUDY: CT ANGIO CHEST FOR PULMONARY EMBOLISM;  10/12/2023 4:56 am   INDICATION: Signs/Symptoms:Rule out pe.   COMPARISON: Portable chest 11 October 2023; CT abdomen and pelvis with contrast 10 October 2023   ACCESSION NUMBER(S): [id number]   ORDERING CLINICIAN: VAN BRYAN   TECHNIQUE: Pulmonary arterial phase CT chest after the uneventful administration of 75 mL IV contrast (Omnipaque 350).   Three dimensional maximum intensity projection (3-D MIPs) image/s were created on a separate dedicated workstation, reviewed and saved   FINDINGS: CARDIOVASCULAR:   Acute pulmonary embolism: Negative through the  segmental (third order) branch level. Subsegmental and more distal branches not well enough opacified to evaluate. Acute right heart strain:  Negative. No CT evidence of acute right heart strain Cardiac thrombus:  Negative; no obvious right heart or other cardiac thrombus is seen Pulmonary arteries ectasia:  Negative   Heart size:  Within normal limits Pericardial effusion:  Trace   Thoracic aortic aneurysm:  Negative Aortic dissection:  Negative   Heart failure change:  Negative.  No sign of interstitial or alveolar edema. Other:  n/a   NONVASCULAR MEDIASTINUM: Esophagus:  Grossly normal by CT Mediastinal Mass:  Negative Hiatal hernia:  None Other:  n/a   LYMPH NODES: No thoracic adenopathy   LUNGS / AIRSPACES / AIRWAYS:   LARGE AIRWAYS Filling defect: Negative Wall thickening: Negative Bronchiectasis: Negative Other: N/A   AIRSPACES Fibrosis: Negative Emphysema: Negative Consolidation: Negative Ground glass airspace disease: Negative Edema: Negative Nodule / Mass: Negative Other: Dependent atelectasis in both lower lobes, new from two days ago. Additional bands of atelectasis in the left upper lobe also new   PLEURA: Effusion:  Both sides negative Pneumothorax:  Both sides negative Other:  n/a   CHEST WALL: Symmetric mild nonspecific bilateral gynecomastia   SKELETON: No  acute or contributory abnormality   UPPER ABDOMEN: Fluid-filled, dilated small bowel loops with air-fluid levels new from CT abdomen and pelvis two days prior       SMALL BOWEL DILATION WITH MULTIPLE SMALL BOWEL AIR-FLUID LEVELS IN THE UPPER ABDOMEN, ALL NEW FROM CT TWO DAYS AGO   NO ACUTE DISEASE IN THE CHEST, ONLY SUBSTANTIAL BILATERAL LOWER LOBE DEPENDENT ATELECTASIS WHICH IS NEW FROM CT TWO DAYS PRIOR   NO AIRSPACE CONSOLIDATION OR GROUND-GLASS AIRSPACE DISEASE   NO EDEMA/FAILURE   NO ACUTE PULMONARY EMBOLISM THROUGH THE SEGMENTAL BRANCH LEVEL   NO AORTIC DISSECTION OR OTHER ACUTE THORACIC AORTIC FINDINGS   NO PLEURAL OR PERICARDIAL EFFUSION   NO PNEUMOTHORAX   MACRO: None   Signed by: Ranjeet Gil 10/12/2023 6:54 AM Dictation workstation:   [id number]    XR chest 1 view    Result Date: 10/11/2023  Interpreted By:  Chapo Cary, STUDY: XR CHEST 1 VIEW   INDICATION: Signs/Symptoms:hypoxemia.   COMPARISON: July 14, 2022   ACCESSION NUMBER(S): [id number]   ORDERING CLINICIAN: MEAGHAN CHARLTON   FINDINGS: No consolidation, effusion, edema, or pneumothorax. Low lung volumes with bibasilar atelectasis.       Low lung volumes with bibasilar atelectasis. No discrete consolidation seen.   Signed by: Chapo Cary 10/11/2023 6:18 PM Dictation workstation:   [id number]    ECG 12 lead    Result Date: 10/11/2023  Sinus tachycardia Inferior infarct (cited on or before 11-OCT-2023) Abnormal ECG When compared with ECG of 10-OCT-2023 13:57, Previous ECG has undetermined rhythm, needs review T wave inversion now evident in Anterior leads Confirmed by Carlo Jose ([id number]) on 10/11/2023 4:50:57 PM    CT abdomen pelvis w IV contrast    Result Date: 10/10/2023  STUDY: CT Abdomen and Pelvis with IV Contrast; 10/10/2023 10:52 AM. INDICATION: Generalized abdominal pain. COMPARISON: CT AP 5/29/2022. ACCESSION NUMBER(S): [id number] ORDERING CLINICIAN: SHAI ERWIN TECHNIQUE: CT of the abdomen and pelvis was performed.  Contiguous  "axial images were obtained at 3 mm slice thickness through the abdomen and pelvis. Coronal and sagittal reconstructions at 3 mm slice thickness were performed.  Omnipaque 350 75 mL was administered intravenously.  FINDINGS: LOWER CHEST: No cardiomegaly.  No pericardial effusion.  Lung bases are clear.  ABDOMEN:  LIVER: No hepatomegaly.  Smooth surface contour.  Mild fatty infiltration of the liver.  BILE DUCTS: No intrahepatic or extrahepatic biliary ductal dilatation.  GALLBLADDER: The gallbladder is present without gallstones. STOMACH: No abnormalities identified.  PANCREAS: No masses or ductal dilatation.  SPLEEN: No splenomegaly or focal splenic lesion.  ADRENAL GLANDS: No thickening or nodules.  KIDNEYS AND URETERS: Kidneys are normal in size and location.  No renal or ureteral calculi.  PELVIS:  BLADDER: No abnormalities identified.  REPRODUCTIVE ORGANS: No abnormalities identified.  BOWEL: Appendix is fluid-filled and dilated measuring up to 1.6 cm with multiple intraluminal appendicoliths and mild periappendiceal inflammatory change. No extraluminal gas or abscess. Colonic diverticulosis without findings of diverticulitis  VESSELS: No abnormalities identified.  Abdominal aorta is normal in caliber.  PERITONEUM/RETROPERITONEUM/LYMPH NODES: No free fluid.  No pneumoperitoneum. No lymphadenopathy.  ABDOMINAL WALL: No abnormalities identified. SOFT TISSUES: No abnormalities identified.  BONES: No acute fracture or aggressive osseous lesion.    1. Findings compatible with acute uncomplicated appendicitis. No extraluminal gas or abscess. Recommend surgical consultation. 2. Mild hepatic steatosis. 3. Colonic diverticulosis without findings of diverticulitis. Findings discussed with and acknowledged by Ayden Ramires at 11:53 AM Signed by Faustino Hebert MD      Additional Labs:  SCVO2: No results found for: \"[id number]\"                   Assessment/Plan   Principal Problem:    Acute appendicitis with localized " peritonitis  Active Problems:    Anxiety    Depression    Hyperlipidemia    Diabetes mellitus, type 2 (CMS/HCC)    Appendicitis, acute    Appendicitis    Acute appendicitis, unspecified acute appendicitis type    Small bowel obstruction (CMS/HCC)    POD9 from lap appy and hernia repair, POD5 from reexploration, EUGENIA, and repair of hernia.  Patient remains intubated for what appears to be aspiration pneumonia, wound infection, and severe agitation     Neuro:   #Bipolar 1 disorder,depression  #Anxiety & Depression  #Panic disorder  -hold all oral home psych meds for now given SBO, will restart when resumption of diet  -Propofol, precedex, weaned off ketamine  -Keep zyprexa at 5mg q6h, will try to wean propofol due to increasing triglycerides  - Fentanyl ggt for incisional pain  -[facility] ICU  - restraints required to prevent inadvertent extubation as patient reaches for ETT when off, renewed restraints today     Pulm:   #Post-operative respiratory insufficiency  -2/2 aspiration pneumonia with mdr klebsiela, will change abx to meropenem, reset to day 2/7  -keep intubated today given hypoxemia and agitation  -If improving in next day or two, will consider diuresis, but not with concern for sepsis     CV:   -Normocardic and off pressors  -Continue to monitor     GI:   #Acute appendicitis with localized peritonitis without perforation or gangrene  #SBO  -obstruction from an interloop adhesion and NOT from the hernia,  -s/p appendectomy 10/10/23  -NPO  -TPN for persistent lack of nutrition  -Still awaiting return of bowel function        :   -Replete elecrolytes  -TPN, no need for other IVF  -FU for resolved DYLAN        Heme:   - H/H stable  - lovenox     Endo:   #DMII   -recently diagnosed  -q4hr accuchecks with SS coverage, has not required significant amount of insulin so will just keep SSI, may need more once TPN is initiated     ID:   -WBC now 20K  -CT CAP to ensure no other sources of infection  - maki due to mdr  klebsiela pneumonia, will follow up cultures from wound  -Pack WTD BID for wound     Lines:   -ETT  -Central line (needed for TPN)  -A-line  -Proctor  -all are needed due to critical nature of patient  I spent 43 minutes in the professional and overall care of this patient.    Norman Calloway MD  10/20/23

## 2023-10-20 NOTE — PROGRESS NOTES
" Victorino Lara is a 48 y.o. adult on day 7 of admission presenting with Acute appendicitis with localized peritonitis.   Now in ICU after Lap w EUGENIA. On [facility] Vent now,   Remains intubated due to Aspiration Pneumonia and Agitation.  Subjective   See plan / summary below           Objective   Physical Exam  Constitutional:       Comments: Sedated, moves all extremities   HENT:      Head: Normocephalic.      Mouth/Throat:      Mouth: Mucous membranes are moist.   Eyes:      Pupils: Pupils are equal, round, and reactive to light.   Cardiovascular:      Rate and Rhythm: Normal rate and regular rhythm.      Pulses: Normal pulses.   Pulmonary:      Comments: Intubated, right lung clear, left diminished  Abdominal:      Comments: Binder on, soft, no bowel sounds noted but protruding     Skin:     General: Skin is warm.   Neurological:      General: No focal deficit present.      Last Recorded Vitals  Blood pressure 101/60, pulse (!) 112, temperature 36.6 °C (97.9 °F), temperature source Temporal, resp. rate 24, height 1.702 m (5' 7\"), weight 93.9 kg (207 lb 0.2 oz), SpO2 96 %.  Intake/Output last 3 Shifts:  I/O last 3 completed shifts:  In: 5157.3 (54.9 mL/kg) [I.V.:1715.1 (18.3 mL/kg); IV Piggyback:3442.2]  Out: 8125 (86.5 mL/kg) [Urine:1925 (0.6 mL/kg/hr); Emesis/NG output:6200]  Weight: 93.9 kg      Relevant Results     XR chest 1 view    Result Date: 10/21/2023  Interpreted By:  Kavon Smiley, STUDY: XR CHEST 1 VIEW;  10/21/2023 9:57 am   INDICATION: Signs/Symptoms:CVC PLACEMENT.   COMPARISON: 10/21/2023.   ACCESSION NUMBER(S): [id number]   ORDERING CLINICIAN: MEAGHAN CHARLTON   FINDINGS: CARDIOMEDIASTINAL SILHOUETTE: Patient is slightly rotated to the left. Endotracheal tube tip projects 3 cm above the harvey level. There is a left jugular central line with tubing tip at the brachiocephalic vein level. Cardiomegaly mild aortic prominence is stable.   LUNGS: Inspiratory volume is low. Mild elevation of the right " hemidiaphragm again seen. Persistent opacification in the left base retrocardiac region with air bronchograms is compatible with consolidation. Additional small bandlike region of subsegmental atelectasis or scarring in the left base lateral aspect again seen. Small left pleural effusion not fully excluded. No appreciable pneumothorax.   ABDOMEN: No remarkable upper abdominal findings.   BONES: Thoracic mild dextrocurvature may be exaggerated by positioning.       1.  Left base retrocardiac opacification again seen compatible with consolidation and possible volume loss. 2. Left basilar lateral aspect bandlike subsegmental atelectasis or scar similar to prior.       MACRO: None.   Signed by: Kavon Smiley 10/21/2023 12:26 PM Dictation workstation:   [id number]    XR chest 1 view    Result Date: 10/21/2023  Interpreted By:  Kavon Smiley, STUDY: XR CHEST 1 VIEW;  10/21/2023 5:30 am   INDICATION: Signs/Symptoms:hypoxia.   COMPARISON: 10/20/2023.   ACCESSION NUMBER(S): [id number]   ORDERING CLINICIAN: JESSA PENA   FINDINGS: CARDIOMEDIASTINAL SILHOUETTE: Patient is slightly rotated to the left. Endotracheal tube tip projects 2.5 cm above the harvey level. Left jugular central line has tip near the brachiocephalic/SVC junction level similar to prior. NG tube tip projects in the left upper quadrant at the gastric fundus level. Cardiomegaly is stable.   LUNGS: Inspiratory volume is low. There is mild elevation of the right hemidiaphragm. There is mild interstitial prominence slightly increased from prior. Left basilar retrocardiac consolidation is seen with air bronchograms. Left basilar bandlike subsegmental atelectasis or scarring also present. Mild right basilar atelectasis present. No definite pleural effusion. No appreciable pneumothorax.   ABDOMEN: No remarkable upper abdominal findings.   BONES: Bones are stable.       1.  Low inspiratory volume. Left basilar retrocardiac consolidation as well as left  basilar small bandlike region of subsegmental atelectasis or scar.       MACRO: None.   Signed by: Kavon Smiley 10/21/2023 12:24 PM Dictation workstation:   [id number]    CT chest abdomen pelvis w IV contrast    Result Date: 10/20/2023  Interpreted By:  Jesus Olguin, STUDY: CT CHEST ABDOMEN PELVIS W IV CONTRAST;  10/20/2023 10:30 am   INDICATION: Signs/Symptoms:sepsis, wound infection, pneumonia.   COMPARISON: Multiple chest radiographs, most recently 10/30/2023 CT abdomen and pelvis 10/10/2023   ACCESSION NUMBER(S): [id number]   ORDERING CLINICIAN: MEAGHAN CHARLTON   TECHNIQUE: CT of the chest, abdomen, and pelvis was performed.  Contiguous axial images were obtained at 3 mm slice thickness through the chest, abdomen and pelvis. Coronal and sagittal reconstructions at 3 mm slice thickness were performed. 75 ml of contrast Omnipaque 350 were administered intravenously without immediate complication.   FINDINGS: CHEST:   LUNG/PLEURA/LARGE AIRWAYS: Stable endotracheal tube with the tip approximately 3 cm from the harvey. Otherwise, central airways are patent without endobronchial lesions. Consolidative opacities in the lung bases, likely secondary to combination of pneumonia and atelectasis. Other multifocal ground-glass opacities, predominantly in the right upper lobe are also noted, likely osseous secondary to pneumonia. No pneumothorax. No large pleural effusion.   VESSELS: Aorta and pulmonary arteries are normal caliber.  No atherosclerotic changes of the aorta are identified.  No coronary artery calcifications are present.   HEART: Normal size.  No pericardial effusion   MEDIASTINUM AND DARYL: No mediastinal, hilar or axillary lymphadenopathy is present.  The esophagus is nondilated and appears normal in entire length.   CHEST WALL AND LOWER NECK: The soft tissues of the chest wall demonstrate no gross abnormality. Partially imaged low-attenuation lesion medial left thyroid lobe.   ABDOMEN:   LIVER: The  liver demonstrates homogeneous attenuation without evidence of focal liver lesions.   BILE DUCTS: The intrahepatic and extrahepatic ducts are not dilated.   GALLBLADDER: No calcified stones. No wall thickening.   PANCREAS: The pancreas appears unremarkable without evidence of ductal dilatation or masses.   SPLEEN: The spleen is normal in size without focal lesions.   ADRENAL GLANDS: Bilateral adrenal glands appear normal.   KIDNEYS AND URETERS: The kidneys are normal in size and enhance symmetrically.  No hydroureteronephrosis or nephroureterolithiasis is identified.   PELVIS:   BLADDER: Decompressed urinary bladder Proctor catheter in-situ.   REPRODUCTIVE ORGANS: No pelvic masses.   BOWEL: Feeding tube with tip in the proximal gastric body. Redemonstration of multiple dilated small bowel loops, measuring up to 3.3 cm. However, compatible with the CT from 10/14/2023 the degree of small-bowel distention has improved. No pneumoperitoneum. Postoperative changes appendectomy with interval resolution previously noted fluid within the appendectomy bed. Fluid-filled nondistended colonic loops.     VESSELS: There is no aneurysmal dilatation of the abdominal aorta. The IVC appears normal.   PERITONEUM/RETROPERITONEUM/LYMPH NODES: Small amount of multiloculated contiguous pelvic collections are noted. For example a 4.9 x 3.4 cm (series 201, image 178), with scattered foci of gas within the collection. Additionally, there is an adjacent contiguous collection which measures 5.3 x 3.9 cm (series 201, image 182). Moderate mesenteric soft tissue stranding, predominantly in the right lower quadrant, likely secondary to postoperative changes. No abdominopelvic lymphadenopathy is present.   BONE AND SOFT TISSUE: No suspicious osseous lesions are identified.  Moderate ventral skin defect (series 201, image 156), with packing material in-situ and surgical staples along the anterior abdominal wall are noted related to postoperative  changes.       CHEST: 1.  Consolidative opacities in the lung bases, with scattered ground-glass opacities as described above, likely secondary to a combination of atelectasis and pneumonia.   ABDOMEN-PELVIS: 1.  Interval postoperative changes appendectomy with contiguous multiloculated fluid collections within the right hemipelvis as noted above, slightly increased since prior. Foci of gas within 1 of the collections as described above may represent an abscess. However postoperative seromas can not be excluded. 2. Interval improvement in degree of small-bowel dilation, now measuring up to 3.3 cm, likely secondary to improving postoperative ileus. 3. Moderate size skin defect with surgical jaz.     Signed by: Jesus Olguin 10/20/2023 12:25 PM Dictation workstation:   [id number]    XR chest 1 view    Result Date: 10/20/2023  Interpreted By:  Ranjeet Gil, STUDY: XR CHEST 1 VIEW;  10/20/2023 5:43 am   INDICATION: Signs/Symptoms:pna f/u, on vent.   COMPARISON: Portable chest, 19 October 2023   ACCESSION NUMBER(S): [id number]   ORDERING CLINICIAN: JESSA PENA   TECHNIQUE: Single frontal view of the chest; Portable technique   FINDINGS:   The endotracheal tube is well positioned   Left sided central line is unchanged and well positioned   Enteric tube has been retracted, tip now projects over proximal gastric body with about 10 cm purchase in the stomach   Chronic band of atelectasis or scarring at left lung base is unchanged   Again low lung volumes with exaggerated interstitial markings and patchy airspace opacities   No new acute process such as large pleural effusion or pneumothorax has developed       No interval change   MACRO: None   Signed by: Ranjeet Gil 10/20/2023 8:11 AM Dictation workstation:   [id number]         Scheduled medications  busPIRone, 15 mg, oral, BID  docusate sodium, 100 mg, oral, BID  enoxaparin, 40 mg, subcutaneous, Daily  insulin lispro, 0-10 Units, subcutaneous, q4h  [Held by  provider] insulin regular, 0-5 Units, subcutaneous, TID with meals  lidocaine, 0.1 mL, subcutaneous, Once  lurasidone, 80 mg, oral, Daily  [Held by provider] pantoprazole, 40 mg, oral, Daily before breakfast  pantoprazole, 40 mg, intravenous, Daily  QUEtiapine, 300 mg, oral, BID  simvastatin, 40 mg, oral, Nightly  sodium chloride, 10 mL, intravenous, q8h  traZODone, 300 mg, oral, Nightly  venlafaxine XR, 150 mg, oral, Daily        Continuous medications  propofol, 5-50 mcg/kg/min, Last Rate: 70 mcg/kg/min (10/15/23 0658)  sodium chloride 0.9%, 125 mL/hr, Last Rate: 125 mL/hr (10/15/23 0820)        PRN medications  PRN medications: acetaminophen, calcium carbonate, dextrose 10 % in water (D10W), dextrose, glucagon, HYDROmorphone, naloxone, oxyCODONE, oxygen, tiZANidine                     ------------------------------------------------------------------------------      Victorino Lara is a 48 YM with prior abdominal hernia repair, initially presented on October 10th with right lower quadrant abdominal pain. Found to have acute appendicitis with localized peritonitis without rupture and taken to the OR for laparoscopic appendectomy.       Postoperatively patient was tachycardic which was treated with IV fluids and patient also had some hypoxemia. On the 12th pulmonology was consulted for hypoxia/dyspnea/atelectasis, CT chest showed significant bibasilar atelectasis.  Patient was only requiring nasal cannula. POD #3 patient developed ileus per KUB. Supportive measures were continued including NGT, IV fluids and replacing electrolytes. Symptoms did not improve.  CT abdomen and pelvis was done which was concerning for SBO.     10/14 patient underwent exploratory laparotomy. Findings included recurrence of umbilial hernia, bowel distended but viable, serosanguonous abdominal fluid, single interloop adhesion about 10cm from terminal ileum which was lysted, hernia repaired primarily. As patient was being extubated he had  copious amounts of bilious emesis and was retching so patient was reanesthetized and read paralyzed with rocuronium, reprepped and opened up again for reexploration.  Reexploration revealed middle third of the fascia of the stitches had pulled through, no visible bowel just omentum that was placed for closure of the fascia.  Bowel was distended but completely viable.      Transferred to ICU postop  on mechanical ventilator for acute postop respiratory insufficiency status post exploratory laparotomy, lysis of adhesions, repair of incisional hernia, reexploratory laparotomy with repair of dehiscence       Assessment/Plan   Principal Problem:    Acute appendicitis with localized peritonitis  Active Problems:  Acute Respiratory Failure, Post Op  Aspiration Pneumonia    Anxiety    Depression    Hyperlipidemia    Diabetes mellitus, type 2 (CMS/HCC)    Appendicitis, acute    Appendicitis    Acute appendicitis, unspecified acute appendicitis type    Small bowel obstruction (CMS/HCC)        -------------------------------------------------------------------------------------------------------------------------  Neuro:   #Bipolar 1 disorder,depression  #Anxiety & Depression  #Panic disorder  -hold all oral home psych meds for now given SBO, will restart when resumption of diet  -Propofol, precedex, weaned off ketamine  -Start Zyprexa q6h, stop haldol, no benzos while on zyprexa  - Fentanyl ggt for incisional pain  -[facility] ICU  - restraints required to prevent inadvertent extubation as patient reaches for ETT when off, renewed restraints today  -FU psych recs     Pulm:   #Post-operative respiratory insufficiency  -2/2 aspiration pneumonitis, improving oxygentation but still having desaturation due to combination of fighting vent and possibly developing pneumonia  -keep intubated today given hypoxemia and agitation     CV:   -Normocardic and off pressors  -Continue to monitor     GI:   #Acute appendicitis with localized  peritonitis without perforation or gangrene  #SBO  -obstruction from an interloop adhesion and NOT from the hernia,  -s/p appendectomy 10/10/23  -NPO  -TPN for persistent lack of nutrition        :   -Replete elecrolytes  -TPN, no need for other IVF  -FU for resolving DYLAN (mostly resolved)        Heme:   - H/H stable  - lovenox for DYLAN     Endo:   #DMII   -recently diagnosed  -q4hr accuchecks with SS coverage, has not required significant amount of insulin so will just keep SSI, may need more once TPN is initiated     ID:   -WBC now 15K  - continue zosyn for now for possible pneumonia, follow up cultures, plan for 7 days todal, narrow abx when cultures return     Lines:   -ETT  -Central line (needed for TPN)  -A-line  -Proctor  -all are needed due to critical nature of patient  Pulmonary Plan:- wean off vent when able,  per ICU Sx.    Mann Ricks MD

## 2023-10-21 ENCOUNTER — APPOINTMENT (OUTPATIENT)
Dept: RADIOLOGY | Facility: HOSPITAL | Age: 48
End: 2023-10-21
Payer: COMMERCIAL

## 2023-10-21 LAB
ALBUMIN SERPL BCP-MCNC: 2.7 G/DL (ref 3.4–5)
ALP SERPL-CCNC: 72 U/L (ref 33–120)
ALT SERPL W P-5'-P-CCNC: 13 U/L (ref 7–52)
ANION GAP BLDA CALCULATED.4IONS-SCNC: 4 MMO/L (ref 10–25)
ANION GAP BLDA CALCULATED.4IONS-SCNC: 8 MMO/L (ref 10–25)
ANION GAP SERPL CALC-SCNC: 12 MMOL/L (ref 10–20)
AST SERPL W P-5'-P-CCNC: 20 U/L (ref 9–39)
BACTERIA SPEC CULT: ABNORMAL
BACTERIA SPEC CULT: ABNORMAL
BASE EXCESS BLDA CALC-SCNC: 1.6 MMOL/L (ref -2–3)
BASE EXCESS BLDA CALC-SCNC: 3.2 MMOL/L (ref -2–3)
BASOPHILS # BLD MANUAL: 0 X10*3/UL (ref 0–0.1)
BASOPHILS NFR BLD MANUAL: 0 %
BILIRUB SERPL-MCNC: 0.8 MG/DL (ref 0–1.2)
BODY TEMPERATURE: ABNORMAL
BODY TEMPERATURE: ABNORMAL
BUN SERPL-MCNC: 15 MG/DL (ref 6–23)
CA-I BLDA-SCNC: 1.05 MMOL/L (ref 1.1–1.33)
CA-I BLDA-SCNC: 1.13 MMOL/L (ref 1.1–1.33)
CALCIUM SERPL-MCNC: 7.9 MG/DL (ref 8.6–10.3)
CHLORIDE BLDA-SCNC: 109 MMOL/L (ref 98–107)
CHLORIDE BLDA-SCNC: 111 MMOL/L (ref 98–107)
CHLORIDE SERPL-SCNC: 105 MMOL/L (ref 98–107)
CO2 SERPL-SCNC: 25 MMOL/L (ref 21–32)
CREAT SERPL-MCNC: 0.86 MG/DL (ref 0.5–1.3)
DOHLE BOD BLD QL SMEAR: PRESENT
EOSINOPHIL # BLD MANUAL: 0.49 X10*3/UL (ref 0–0.7)
EOSINOPHIL NFR BLD MANUAL: 3 %
ERYTHROCYTE [DISTWIDTH] IN BLOOD BY AUTOMATED COUNT: 14 % (ref 11.5–14.5)
GFR SERPL CREATININE-BSD FRML MDRD: 83 ML/MIN/1.73M*2
GLUCOSE BLD MANUAL STRIP-MCNC: 102 MG/DL (ref 74–99)
GLUCOSE BLD MANUAL STRIP-MCNC: 114 MG/DL (ref 74–99)
GLUCOSE BLD MANUAL STRIP-MCNC: 136 MG/DL (ref 74–99)
GLUCOSE BLD MANUAL STRIP-MCNC: 137 MG/DL (ref 74–99)
GLUCOSE BLD MANUAL STRIP-MCNC: 141 MG/DL (ref 74–99)
GLUCOSE BLD MANUAL STRIP-MCNC: 158 MG/DL (ref 74–99)
GLUCOSE BLDA-MCNC: 147 MG/DL (ref 74–99)
GLUCOSE BLDA-MCNC: 161 MG/DL (ref 74–99)
GLUCOSE SERPL-MCNC: 154 MG/DL (ref 74–99)
GRAM STN SPEC: ABNORMAL
GRAM STN SPEC: ABNORMAL
HCO3 BLDA-SCNC: 25.8 MMOL/L (ref 22–26)
HCO3 BLDA-SCNC: 26.8 MMOL/L (ref 22–26)
HCT VFR BLD AUTO: 24.8 % (ref 36–52)
HCT VFR BLD EST: 25 % (ref 36–52)
HCT VFR BLD EST: 26 % (ref 36–52)
HGB BLD-MCNC: 7.9 G/DL (ref 12–17.5)
HGB BLDA-MCNC: 8.2 G/DL (ref 13.5–17.5)
HGB BLDA-MCNC: 8.6 G/DL (ref 13.5–17.5)
IMM GRANULOCYTES # BLD AUTO: 1.2 X10*3/UL (ref 0–0.7)
IMM GRANULOCYTES NFR BLD AUTO: 7.3 % (ref 0–0.9)
INHALED O2 CONCENTRATION: 40 %
INHALED O2 CONCENTRATION: 45 %
LACTATE BLDA-SCNC: 0.9 MMOL/L (ref 0.4–2)
LACTATE BLDA-SCNC: 1 MMOL/L (ref 0.4–2)
LYMPHOCYTES # BLD MANUAL: 2.46 X10*3/UL (ref 1.2–4.8)
LYMPHOCYTES NFR BLD MANUAL: 15 %
MAGNESIUM SERPL-MCNC: 1.86 MG/DL (ref 1.6–2.4)
MCH RBC QN AUTO: 28.5 PG (ref 26–34)
MCHC RBC AUTO-ENTMCNC: 31.9 G/DL (ref 32–36)
MCV RBC AUTO: 90 FL (ref 80–100)
METAMYELOCYTES # BLD MANUAL: 0.16 X10*3/UL
METAMYELOCYTES NFR BLD MANUAL: 1 %
MONOCYTES # BLD MANUAL: 0.66 X10*3/UL (ref 0.1–1)
MONOCYTES NFR BLD MANUAL: 4 %
MYELOCYTES # BLD MANUAL: 0.16 X10*3/UL
MYELOCYTES NFR BLD MANUAL: 1 %
NEUTROPHILS # BLD MANUAL: 12.46 X10*3/UL (ref 1.2–7.7)
NEUTS BAND # BLD MANUAL: 0.49 X10*3/UL (ref 0–0.7)
NEUTS BAND NFR BLD MANUAL: 3 %
NEUTS SEG # BLD MANUAL: 11.97 X10*3/UL (ref 1.2–7)
NEUTS SEG NFR BLD MANUAL: 73 %
NRBC BLD-RTO: 0 /100 WBCS (ref 0–0)
OXYHGB MFR BLDA: 93.3 % (ref 94–98)
OXYHGB MFR BLDA: 96.8 % (ref 94–98)
PCO2 BLDA: 36 MM HG (ref 38–42)
PCO2 BLDA: 38 MM HG (ref 38–42)
PH BLDA: 7.44 PH (ref 7.38–7.42)
PH BLDA: 7.48 PH (ref 7.38–7.42)
PHOSPHATE SERPL-MCNC: 3.8 MG/DL (ref 2.5–4.9)
PLATELET # BLD AUTO: 490 X10*3/UL (ref 150–450)
PLATELET CLUMP BLD QL SMEAR: PRESENT
PMV BLD AUTO: 10.1 FL (ref 7.5–11.5)
PO2 BLDA: 69 MM HG (ref 85–95)
PO2 BLDA: 91 MM HG (ref 85–95)
POTASSIUM BLDA-SCNC: 3.1 MMOL/L (ref 3.5–5.3)
POTASSIUM BLDA-SCNC: 3.3 MMOL/L (ref 3.5–5.3)
POTASSIUM SERPL-SCNC: 3.3 MMOL/L (ref 3.5–5.3)
PROT SERPL-MCNC: 6.2 G/DL (ref 6.4–8.2)
RBC # BLD AUTO: 2.77 X10*6/UL (ref 4–5.9)
RBC MORPH BLD: ABNORMAL
SAO2 % BLDA: 96 % (ref 94–100)
SAO2 % BLDA: 99 % (ref 94–100)
SODIUM BLDA-SCNC: 139 MMOL/L (ref 136–145)
SODIUM BLDA-SCNC: 139 MMOL/L (ref 136–145)
SODIUM SERPL-SCNC: 139 MMOL/L (ref 136–145)
TOTAL CELLS COUNTED BLD: 100
TOXIC GRANULES BLD QL SMEAR: PRESENT
TRIGL SERPL-MCNC: 327 MG/DL (ref 0–149)
WBC # BLD AUTO: 16.4 X10*3/UL (ref 4.4–11.3)

## 2023-10-21 PROCEDURE — 85027 COMPLETE CBC AUTOMATED: CPT

## 2023-10-21 PROCEDURE — 99291 CRITICAL CARE FIRST HOUR: CPT | Performed by: SURGERY

## 2023-10-21 PROCEDURE — 2020000001 HC ICU ROOM DAILY

## 2023-10-21 PROCEDURE — S0166 INJ OLANZAPINE 2.5MG: HCPCS | Performed by: SURGERY

## 2023-10-21 PROCEDURE — 36556 INSERT NON-TUNNEL CV CATH: CPT | Performed by: SURGERY

## 2023-10-21 PROCEDURE — 80053 COMPREHEN METABOLIC PANEL: CPT | Performed by: HOSPITALIST

## 2023-10-21 PROCEDURE — 02HV33Z INSERTION OF INFUSION DEVICE INTO SUPERIOR VENA CAVA, PERCUTANEOUS APPROACH: ICD-10-PCS | Performed by: SURGERY

## 2023-10-21 PROCEDURE — 2500000004 HC RX 250 GENERAL PHARMACY W/ HCPCS (ALT 636 FOR OP/ED): Performed by: NURSE PRACTITIONER

## 2023-10-21 PROCEDURE — 71045 X-RAY EXAM CHEST 1 VIEW: CPT

## 2023-10-21 PROCEDURE — 2500000004 HC RX 250 GENERAL PHARMACY W/ HCPCS (ALT 636 FOR OP/ED)

## 2023-10-21 PROCEDURE — 2500000005 HC RX 250 GENERAL PHARMACY W/O HCPCS

## 2023-10-21 PROCEDURE — 71045 X-RAY EXAM CHEST 1 VIEW: CPT | Performed by: RADIOLOGY

## 2023-10-21 PROCEDURE — 2500000004 HC RX 250 GENERAL PHARMACY W/ HCPCS (ALT 636 FOR OP/ED): Performed by: SURGERY

## 2023-10-21 PROCEDURE — 37799 UNLISTED PX VASCULAR SURGERY: CPT

## 2023-10-21 PROCEDURE — 94003 VENT MGMT INPAT SUBQ DAY: CPT

## 2023-10-21 PROCEDURE — 82947 ASSAY GLUCOSE BLOOD QUANT: CPT

## 2023-10-21 PROCEDURE — 85007 BL SMEAR W/DIFF WBC COUNT: CPT

## 2023-10-21 PROCEDURE — 84478 ASSAY OF TRIGLYCERIDES: CPT

## 2023-10-21 PROCEDURE — C9113 INJ PANTOPRAZOLE SODIUM, VIA: HCPCS | Performed by: SURGERY

## 2023-10-21 PROCEDURE — 2500000001 HC RX 250 WO HCPCS SELF ADMINISTERED DRUGS (ALT 637 FOR MEDICARE OP): Performed by: SURGERY

## 2023-10-21 PROCEDURE — 96372 THER/PROPH/DIAG INJ SC/IM: CPT | Performed by: SURGERY

## 2023-10-21 PROCEDURE — 84100 ASSAY OF PHOSPHORUS: CPT | Performed by: HOSPITALIST

## 2023-10-21 PROCEDURE — 83735 ASSAY OF MAGNESIUM: CPT | Performed by: HOSPITALIST

## 2023-10-21 PROCEDURE — 84132 ASSAY OF SERUM POTASSIUM: CPT | Performed by: HOSPITALIST

## 2023-10-21 PROCEDURE — 36430 TRANSFUSION BLD/BLD COMPNT: CPT

## 2023-10-21 PROCEDURE — 2500000005 HC RX 250 GENERAL PHARMACY W/O HCPCS: Performed by: SURGERY

## 2023-10-21 RX ORDER — OLANZAPINE 10 MG/2ML
2.5 INJECTION, POWDER, FOR SOLUTION INTRAMUSCULAR EVERY 6 HOURS PRN
Status: DISCONTINUED | OUTPATIENT
Start: 2023-10-21 | End: 2023-10-26

## 2023-10-21 RX ORDER — MIDAZOLAM HYDROCHLORIDE 1 MG/ML
INJECTION, SOLUTION INTRAMUSCULAR; INTRAVENOUS
Status: COMPLETED
Start: 2023-10-21 | End: 2023-10-21

## 2023-10-21 RX ORDER — MAGNESIUM SULFATE HEPTAHYDRATE 40 MG/ML
2 INJECTION, SOLUTION INTRAVENOUS ONCE
Status: COMPLETED | OUTPATIENT
Start: 2023-10-21 | End: 2023-10-21

## 2023-10-21 RX ORDER — POTASSIUM CHLORIDE 29.8 MG/ML
40 INJECTION INTRAVENOUS ONCE
Status: COMPLETED | OUTPATIENT
Start: 2023-10-22 | End: 2023-10-22

## 2023-10-21 RX ORDER — FUROSEMIDE 10 MG/ML
20 INJECTION INTRAMUSCULAR; INTRAVENOUS ONCE
Status: COMPLETED | OUTPATIENT
Start: 2023-10-21 | End: 2023-10-21

## 2023-10-21 RX ORDER — MIDAZOLAM HYDROCHLORIDE 1 MG/ML
2 INJECTION, SOLUTION INTRAMUSCULAR; INTRAVENOUS ONCE
Status: COMPLETED | OUTPATIENT
Start: 2023-10-21 | End: 2023-10-21

## 2023-10-21 RX ORDER — POTASSIUM CHLORIDE 29.8 MG/ML
40 INJECTION INTRAVENOUS ONCE
Status: COMPLETED | OUTPATIENT
Start: 2023-10-21 | End: 2023-10-21

## 2023-10-21 RX ADMIN — PROPOFOL 50 MCG/KG/MIN: 10 INJECTION, EMULSION INTRAVENOUS at 14:49

## 2023-10-21 RX ADMIN — VALPROATE SODIUM 500 MG: 100 INJECTION, SOLUTION INTRAVENOUS at 14:49

## 2023-10-21 RX ADMIN — OLANZAPINE 5 MG: 10 INJECTION, POWDER, FOR SOLUTION INTRAMUSCULAR at 08:28

## 2023-10-21 RX ADMIN — DEXMEDETOMIDINE HYDROCHLORIDE 1 MCG/KG/HR: 4 INJECTION, SOLUTION INTRAVENOUS at 13:12

## 2023-10-21 RX ADMIN — OLANZAPINE 5 MG: 10 INJECTION, POWDER, FOR SOLUTION INTRAMUSCULAR at 03:12

## 2023-10-21 RX ADMIN — DEXMEDETOMIDINE HYDROCHLORIDE 1.5 MCG/KG/HR: 4 INJECTION, SOLUTION INTRAVENOUS at 22:11

## 2023-10-21 RX ADMIN — PROPOFOL 50 MCG/KG/MIN: 10 INJECTION, EMULSION INTRAVENOUS at 22:11

## 2023-10-21 RX ADMIN — ASCORBIC ACID, VITAMIN A PALMITATE, CHOLECALCIFEROL, THIAMINE HYDROCHLORIDE, RIBOFLAVIN-5 PHOSPHATE SODIUM, PYRIDOXINE HYDROCHLORIDE, NIACINAMIDE, DEXPANTHENOL, ALPHA-TOCOPHEROL ACETATE, VITAMIN K1, FOLIC ACID, BIOTIN, CYANOCOBALAMIN: 200; 3300; 200; 6; 3.6; 6; 40; 15; 10; 150; 600; 60; 5 INJECTION, SOLUTION INTRAVENOUS at 08:33

## 2023-10-21 RX ADMIN — FENTANYL CITRATE 125 MCG/HR: 0.05 INJECTION, SOLUTION INTRAMUSCULAR; INTRAVENOUS at 13:11

## 2023-10-21 RX ADMIN — PROPOFOL 40 MCG/KG/MIN: 10 INJECTION, EMULSION INTRAVENOUS at 17:58

## 2023-10-21 RX ADMIN — MAGNESIUM SULFATE HEPTAHYDRATE 2 G: 40 INJECTION, SOLUTION INTRAVENOUS at 17:58

## 2023-10-21 RX ADMIN — MIDAZOLAM 2 MG: 1 INJECTION INTRAMUSCULAR; INTRAVENOUS at 13:47

## 2023-10-21 RX ADMIN — PROPOFOL 40 MCG/KG/MIN: 10 INJECTION, EMULSION INTRAVENOUS at 08:28

## 2023-10-21 RX ADMIN — PROPOFOL 30 MCG/KG/MIN: 10 INJECTION, EMULSION INTRAVENOUS at 03:14

## 2023-10-21 RX ADMIN — FENTANYL CITRATE 100 MCG/HR: 0.05 INJECTION, SOLUTION INTRAMUSCULAR; INTRAVENOUS at 19:56

## 2023-10-21 RX ADMIN — MIDAZOLAM 2 MG: 1 INJECTION INTRAMUSCULAR; INTRAVENOUS at 13:15

## 2023-10-21 RX ADMIN — PANTOPRAZOLE SODIUM 40 MG: 40 INJECTION, POWDER, FOR SOLUTION INTRAVENOUS at 08:28

## 2023-10-21 RX ADMIN — FUROSEMIDE 20 MG: 10 INJECTION, SOLUTION INTRAMUSCULAR; INTRAVENOUS at 13:59

## 2023-10-21 RX ADMIN — DEXMEDETOMIDINE HYDROCHLORIDE 1 MCG/KG/HR: 4 INJECTION, SOLUTION INTRAVENOUS at 08:28

## 2023-10-21 RX ADMIN — POTASSIUM CHLORIDE 40 MEQ: 29.8 INJECTION, SOLUTION INTRAVENOUS at 17:58

## 2023-10-21 RX ADMIN — MIDAZOLAM HYDROCHLORIDE 2 MG: 1 INJECTION, SOLUTION INTRAMUSCULAR; INTRAVENOUS at 13:15

## 2023-10-21 RX ADMIN — MEROPENEM 1 G: 1 INJECTION, POWDER, FOR SOLUTION INTRAVENOUS at 19:56

## 2023-10-21 RX ADMIN — MEROPENEM 1 G: 1 INJECTION, POWDER, FOR SOLUTION INTRAVENOUS at 14:01

## 2023-10-21 RX ADMIN — ENOXAPARIN SODIUM 40 MG: 40 INJECTION SUBCUTANEOUS at 17:51

## 2023-10-21 RX ADMIN — DEXMEDETOMIDINE HYDROCHLORIDE 1 MCG/KG/HR: 4 INJECTION, SOLUTION INTRAVENOUS at 03:13

## 2023-10-21 RX ADMIN — MEROPENEM 1 G: 1 INJECTION, POWDER, FOR SOLUTION INTRAVENOUS at 03:13

## 2023-10-21 RX ADMIN — DEXMEDETOMIDINE HYDROCHLORIDE 1 MCG/KG/HR: 4 INJECTION, SOLUTION INTRAVENOUS at 00:53

## 2023-10-21 RX ADMIN — FENTANYL CITRATE 200 MCG/HR: 0.05 INJECTION, SOLUTION INTRAMUSCULAR; INTRAVENOUS at 22:11

## 2023-10-21 RX ADMIN — Medication 50 MG: at 22:10

## 2023-10-21 RX ADMIN — DEXMEDETOMIDINE HYDROCHLORIDE 1.5 MCG/KG/HR: 4 INJECTION, SOLUTION INTRAVENOUS at 22:45

## 2023-10-21 RX ADMIN — DEXMEDETOMIDINE HYDROCHLORIDE 1 MCG/KG/HR: 4 INJECTION, SOLUTION INTRAVENOUS at 17:58

## 2023-10-21 RX ADMIN — PROPOFOL 50 MCG/KG/MIN: 10 INJECTION, EMULSION INTRAVENOUS at 13:11

## 2023-10-21 RX ADMIN — PROPOFOL 25 MCG/KG/MIN: 10 INJECTION, EMULSION INTRAVENOUS at 00:54

## 2023-10-21 RX ADMIN — HYOSCYAMINE SULFATE 473 ML: 16 SOLUTION at 08:47

## 2023-10-21 ASSESSMENT — PAIN - FUNCTIONAL ASSESSMENT
PAIN_FUNCTIONAL_ASSESSMENT: CPOT (CRITICAL CARE PAIN OBSERVATION TOOL)

## 2023-10-21 ASSESSMENT — COGNITIVE AND FUNCTIONAL STATUS - GENERAL
WALKING IN HOSPITAL ROOM: TOTAL
HELP NEEDED FOR BATHING: TOTAL
MOVING FROM LYING ON BACK TO SITTING ON SIDE OF FLAT BED WITH BEDRAILS: TOTAL
STANDING UP FROM CHAIR USING ARMS: TOTAL
DRESSING REGULAR LOWER BODY CLOTHING: TOTAL
MOBILITY SCORE: 6
DAILY ACTIVITIY SCORE: 6
PERSONAL GROOMING: TOTAL
CLIMB 3 TO 5 STEPS WITH RAILING: TOTAL
DRESSING REGULAR UPPER BODY CLOTHING: TOTAL
EATING MEALS: TOTAL
TOILETING: TOTAL
MOVING TO AND FROM BED TO CHAIR: TOTAL
TURNING FROM BACK TO SIDE WHILE IN FLAT BAD: TOTAL

## 2023-10-21 ASSESSMENT — PAIN SCALES - WONG BAKER: WONGBAKER_NUMERICALRESPONSE: NO HURT

## 2023-10-21 ASSESSMENT — PAIN SCALES - GENERAL: PAINLEVEL_OUTOF10: 0 - NO PAIN

## 2023-10-21 NOTE — CARE PLAN
The patient's goals for the shift include Pain management    The clinical goals for the shift include Pt is progressing    Continue antibiotics and dressing changes BID

## 2023-10-21 NOTE — PROGRESS NOTES
Critical Care Daily Progress        Subjective   Patient is a 48 y.o. adult admitted on 10/10/2023  8:50 AM with the following indication(s) for ICU care Aspiration pneumonia.     Interval History: No acute events overnight, afebrile, had some issues with sedation requiring increasing propofol to 50mcg/hr, WBC down from 20->17K, per nurse, left internal jugular line was pulled and not working despite in place on CXR    Complaints: Intubated, sedated.     Scheduled Medications:   [Held by provider] busPIRone, 15 mg, oral, BID  [Held by provider] docusate sodium, 100 mg, oral, BID  enoxaparin, 40 mg, subcutaneous, q24h  insulin lispro, 0-10 Units, subcutaneous, q4h  [Held by provider] insulin regular, 0-5 Units, subcutaneous, TID with meals  [Held by provider] lurasidone, 80 mg, oral, Daily  meropenem, 1 g, intravenous, q8h  [Held by provider] pantoprazole, 40 mg, oral, Daily before breakfast  pantoprazole, 40 mg, intravenous, Daily  potassium phosphate, 21 mmol, intravenous, Once  [Held by provider] QUEtiapine, 300 mg, oral, BID  [Held by provider] simvastatin, 40 mg, oral, Nightly  sodium hypochlorite, , irrigation, BID  [Held by provider] traZODone, 300 mg, oral, Nightly  valproate sodium, 500 mg, intravenous, q12h  [Held by provider] venlafaxine XR, 150 mg, oral, Daily         Continuous Medications:   Adult Clinimix TPN, 70 mL/hr, Last Rate: 70 mL/hr (10/21/23 0833)  dexmedeTOMIDine, 0.1-1.5 mcg/kg/hr, Last Rate: 1 mcg/kg/hr (10/21/23 1312)  fentaNYL,  mcg/hr, Last Rate: 125 mcg/hr (10/21/23 1311)  propofol, 5-50 mcg/kg/min, Last Rate: 50 mcg/kg/min (10/21/23 1449)         PRN Medications:   PRN medications: alteplase, dextrose 10 % in water (D10W), dextrose, glucagon, HYDROmorphone, HYDROmorphone, ipratropium-albuteroL, naloxone, OLANZapine, oxygen    Objective   Vitals:  Most Recent:  Vitals:    10/21/23 1250   BP:    Pulse:    Resp: (!) 36   Temp:    SpO2:        24hr Min/Max:  Temp  Min: 37 °C (98.6 °F)   Max: 37.1 °C (98.8 °F)  Pulse  Min: 66  Max: 78  Resp  Min: 17  Max: 36  SpO2  Min: 93 %  Max: 100 %    LDA:  CVC 10/21/23 Quadruple lumen Non-tunneled Right Internal jugular (Active)   Placement Date/Time: 10/21/23 1334   Hand Hygiene Performed Prior to CVC Insertion: Yes  Site Prep: Chlorhexidine   Site Prep Agent has Completely Dried Before Insertion: Yes  All 5 Sterile Barriers Used (Gloves, Gown, Cap, Mask, Large Sterile Drape):...   Number of days: 0       Arterial Line 10/15/23 Right Radial (Active)   Placement Date/Time: 10/15/23 1846   Earliest Known Present: 10/15/23  Orientation: Right  Location: Radial  Securement Method: Sutured;Transparent dressing  Number of Sutures Placed: 2   Number of days: 5       ETT  7.5 mm (Active)   Placement Date/Time: 10/14/23 1950   Placed by External Staff?: (c) Other (Comment)  ETT Type: ETT - single  Single Lumen Tube Size: 7.5 mm  Location: Oral   Number of days: 6       Urethral Catheter Straight-tip 16 Fr. (Active)   Placement Date/Time: 10/19/23 1245   Placed by: Johanna Green RN  Hand Hygiene Completed: Yes  Catheter Type: Straight-tip  Tube Size (Fr.): 16 Fr.  Catheter Balloon Size: 10 mL  Urine Returned: Yes   Number of days: 2       NG/OG/Feeding Tube Nasogastric 14 Fr Left nostril (Active)   Placement Date/Time: 10/12/23 1945   Hand Hygiene Completed: Yes  Type of Tube: NG/OG Tube  Tube Length: 55 cm  Tube Type: Nasogastric  NG/OG Tube Size: 14 Fr  Tube Location: Left nostril   Number of days: 8         Vent settings:  Vent Mode: Assist control/Volume control plus  FiO2 (%):  [40 %-50 %] 50 %  S RR:  [14] 14  S VT:  [5 mL-450 mL] 450 mL  PEEP/CPAP (cm H2O):  [7 cm H20-8 cm H20] 7 cm H20  MAP (cm H2O):  [11-12] 11    Hemodynamic parameters for last 24 hours:       I/O:    Intake/Output Summary (Last 24 hours) at 10/21/2023 1542  Last data filed at 10/21/2023 1531  Gross per 24 hour   Intake 2250 ml   Output 1925 ml   Net 325 ml       Physical Exam:   General  appearance: NAD, intubated, sedated  Head: Normocephalic, without obvious abnormality  Eyes:conjunctivae/corneas clear. PERRL  Neck:  supple, symmetrical, trachea midline, left sided internal jugular line pulled out slightly  Lungs:clear to auscultation bilaterally  Chest wall:  no tenderness  Heart: regular rate and rhythm, S1, S2 normal, no murmur, click, rub or gallop  Abdomen: Soft, ND, still erythematouns around wound but is stable, wound is open about 5cm with packing, fascia intact, no more drainage  Male genitalia:  normal  Extremities: 2+ edema  Pulses:2+ and symmetric  Skin: Skin color, texture, turgor normal.  No rashes or lesions  Neurologic: Intubated, sedated, moving extremities x4 spontaneously    Lab/Radiology/Diagnostic Review:  Results for orders placed or performed during the hospital encounter of 10/10/23 (from the past 24 hour(s))   POCT GLUCOSE   Result Value Ref Range    POCT Glucose 154 (H) 74 - 99 mg/dL   Blood Gas Arterial Full Panel   Result Value Ref Range    POCT pH, Arterial 7.48 (H) 7.38 - 7.42 pH    POCT pCO2, Arterial 36 (L) 38 - 42 mm Hg    POCT pO2, Arterial 91 85 - 95 mm Hg    POCT SO2, Arterial 99 94 - 100 %    POCT Oxy Hemoglobin, Arterial 96.8 94.0 - 98.0 %    POCT Hematocrit Calculated, Arterial 26.0 (L) 36.0 - 52.0 %    POCT Sodium, Arterial 139 136 - 145 mmol/L    POCT Potassium, Arterial 3.1 (L) 3.5 - 5.3 mmol/L    POCT Chloride, Arterial 111 (H) 98 - 107 mmol/L    POCT Ionized Calcium, Arterial 1.05 (L) 1.10 - 1.33 mmol/L    POCT Glucose, Arterial 147 (H) 74 - 99 mg/dL    POCT Lactate, Arterial 1.0 0.4 - 2.0 mmol/L    POCT Base Excess, Arterial 3.2 (H) -2.0 - 3.0 mmol/L    POCT HCO3 Calculated, Arterial 26.8 (H) 22.0 - 26.0 mmol/L    POCT Hemoglobin, Arterial 8.6 (L) 13.5 - 17.5 g/dL    POCT Anion Gap, Arterial 4 (L) 10 - 25 mmo/L    Patient Temperature      FiO2 45 %   Blood Gas Arterial Full Panel   Result Value Ref Range    POCT pH, Arterial 7.44 (H) 7.38 - 7.42 pH     POCT pCO2, Arterial 38 38 - 42 mm Hg    POCT pO2, Arterial 69 (L) 85 - 95 mm Hg    POCT SO2, Arterial 96 94 - 100 %    POCT Oxy Hemoglobin, Arterial 93.3 (L) 94.0 - 98.0 %    POCT Hematocrit Calculated, Arterial 25.0 (L) 36.0 - 52.0 %    POCT Sodium, Arterial 139 136 - 145 mmol/L    POCT Potassium, Arterial 3.3 (L) 3.5 - 5.3 mmol/L    POCT Chloride, Arterial 109 (H) 98 - 107 mmol/L    POCT Ionized Calcium, Arterial 1.13 1.10 - 1.33 mmol/L    POCT Glucose, Arterial 161 (H) 74 - 99 mg/dL    POCT Lactate, Arterial 0.9 0.4 - 2.0 mmol/L    POCT Base Excess, Arterial 1.6 -2.0 - 3.0 mmol/L    POCT HCO3 Calculated, Arterial 25.8 22.0 - 26.0 mmol/L    POCT Hemoglobin, Arterial 8.2 (L) 13.5 - 17.5 g/dL    POCT Anion Gap, Arterial 8 (L) 10 - 25 mmo/L    Patient Temperature      FiO2 40 %   Comprehensive Metabolic Panel   Result Value Ref Range    Glucose 154 (H) 74 - 99 mg/dL    Sodium 139 136 - 145 mmol/L    Potassium 3.3 (L) 3.5 - 5.3 mmol/L    Chloride 105 98 - 107 mmol/L    Bicarbonate 25 21 - 32 mmol/L    Anion Gap 12 10 - 20 mmol/L    Urea Nitrogen 15 6 - 23 mg/dL    Creatinine 0.86 0.50 - 1.30 mg/dL    eGFR 83 >60 mL/min/1.73m*2    Calcium 7.9 (L) 8.6 - 10.3 mg/dL    Albumin 2.7 (L) 3.4 - 5.0 g/dL    Alkaline Phosphatase 72 33 - 120 U/L    Total Protein 6.2 (L) 6.4 - 8.2 g/dL    AST 20 9 - 39 U/L    Bilirubin, Total 0.8 0.0 - 1.2 mg/dL    ALT 13 7 - 52 U/L   Magnesium   Result Value Ref Range    Magnesium 1.86 1.60 - 2.40 mg/dL   Phosphorus   Result Value Ref Range    Phosphorus 3.8 2.5 - 4.9 mg/dL   CBC and Auto Differential   Result Value Ref Range    WBC 16.4 (H) 4.4 - 11.3 x10*3/uL    nRBC 0.0 0.0 - 0.0 /100 WBCs    RBC 2.77 (L) 4.00 - 5.90 x10*6/uL    Hemoglobin 7.9 (L) 12.0 - 17.5 g/dL    Hematocrit 24.8 (L) 36.0 - 52.0 %    MCV 90 80 - 100 fL    MCH 28.5 26.0 - 34.0 pg    MCHC 31.9 (L) 32.0 - 36.0 g/dL    RDW 14.0 11.5 - 14.5 %    Platelets 490 (H) 150 - 450 x10*3/uL    MPV 10.1 7.5 - 11.5 fL    Immature  Granulocytes %, Automated 7.3 (H) 0.0 - 0.9 %    Immature Granulocytes Absolute, Automated 1.20 (H) 0.00 - 0.70 x10*3/uL   Triglycerides   Result Value Ref Range    Triglycerides 327 (H) 0 - 149 mg/dL   Manual Differential   Result Value Ref Range    Neutrophils %, Manual 73.0 40.0 - 80.0 %    Bands %, Manual 3.0 0.0 - 5.0 %    Lymphocytes %, Manual 15.0 13.0 - 44.0 %    Monocytes %, Manual 4.0 2.0 - 10.0 %    Eosinophils %, Manual 3.0 0.0 - 6.0 %    Basophils %, Manual 0.0 0.0 - 2.0 %    Metamyelocytes %, Manual 1.0 0.0 - 0.0 %    Myelocytes %, Manual 1.0 0.0 - 0.0 %    Seg Neutrophils Absolute, Manual 11.97 (H) 1.20 - 7.00 x10*3/uL    Bands Absolute, Manual 0.49 0.00 - 0.70 x10*3/uL    Lymphocytes Absolute, Manual 2.46 1.20 - 4.80 x10*3/uL    Monocytes Absolute, Manual 0.66 0.10 - 1.00 x10*3/uL    Eosinophils Absolute, Manual 0.49 0.00 - 0.70 x10*3/uL    Basophils Absolute, Manual 0.00 0.00 - 0.10 x10*3/uL    Metamyelocytes Absolute, Manual 0.16 0.00 - 0.00 x10*3/uL    Myelocytes Absolute, Manual 0.16 0.00 - 0.00 x10*3/uL    Total Cells Counted 100     Neutrophils Absolute, Manual 12.46 (H) 1.20 - 7.70 x10*3/uL    RBC Morphology See Below     Dohle Bodies Present     Toxic Granulation Present     Clumped Platelets Present    POCT GLUCOSE   Result Value Ref Range    POCT Glucose 141 (H) 74 - 99 mg/dL   POCT GLUCOSE   Result Value Ref Range    POCT Glucose 102 (H) 74 - 99 mg/dL     XR chest 1 view    Result Date: 10/21/2023  Interpreted By:  Kavon Smiley, STUDY: XR CHEST 1 VIEW;  10/21/2023 9:57 am   INDICATION: Signs/Symptoms:CVC PLACEMENT.   COMPARISON: 10/21/2023.   ACCESSION NUMBER(S): [id number]   ORDERING CLINICIAN: MEAGHAN CHARLTON   FINDINGS: CARDIOMEDIASTINAL SILHOUETTE: Patient is slightly rotated to the left. Endotracheal tube tip projects 3 cm above the harvey level. There is a left jugular central line with tubing tip at the brachiocephalic vein level. Cardiomegaly mild aortic prominence is stable.    LUNGS: Inspiratory volume is low. Mild elevation of the right hemidiaphragm again seen. Persistent opacification in the left base retrocardiac region with air bronchograms is compatible with consolidation. Additional small bandlike region of subsegmental atelectasis or scarring in the left base lateral aspect again seen. Small left pleural effusion not fully excluded. No appreciable pneumothorax.   ABDOMEN: No remarkable upper abdominal findings.   BONES: Thoracic mild dextrocurvature may be exaggerated by positioning.       1.  Left base retrocardiac opacification again seen compatible with consolidation and possible volume loss. 2. Left basilar lateral aspect bandlike subsegmental atelectasis or scar similar to prior.       MACRO: None.   Signed by: Kavon Smiley 10/21/2023 12:26 PM Dictation workstation:   [id number]    XR chest 1 view    Result Date: 10/21/2023  Interpreted By:  Kavon Smiley, STUDY: XR CHEST 1 VIEW;  10/21/2023 5:30 am   INDICATION: Signs/Symptoms:hypoxia.   COMPARISON: 10/20/2023.   ACCESSION NUMBER(S): [id number]   ORDERING CLINICIAN: JESSA PENA   FINDINGS: CARDIOMEDIASTINAL SILHOUETTE: Patient is slightly rotated to the left. Endotracheal tube tip projects 2.5 cm above the harvey level. Left jugular central line has tip near the brachiocephalic/SVC junction level similar to prior. NG tube tip projects in the left upper quadrant at the gastric fundus level. Cardiomegaly is stable.   LUNGS: Inspiratory volume is low. There is mild elevation of the right hemidiaphragm. There is mild interstitial prominence slightly increased from prior. Left basilar retrocardiac consolidation is seen with air bronchograms. Left basilar bandlike subsegmental atelectasis or scarring also present. Mild right basilar atelectasis present. No definite pleural effusion. No appreciable pneumothorax.   ABDOMEN: No remarkable upper abdominal findings.   BONES: Bones are stable.       1.  Low inspiratory  volume. Left basilar retrocardiac consolidation as well as left basilar small bandlike region of subsegmental atelectasis or scar.       MACRO: None.   Signed by: Kavon Smiley 10/21/2023 12:24 PM Dictation workstation:   [id number]    CT chest abdomen pelvis w IV contrast    Result Date: 10/20/2023  Interpreted By:  Jesus Olguin, STUDY: CT CHEST ABDOMEN PELVIS W IV CONTRAST;  10/20/2023 10:30 am   INDICATION: Signs/Symptoms:sepsis, wound infection, pneumonia.   COMPARISON: Multiple chest radiographs, most recently 10/30/2023 CT abdomen and pelvis 10/10/2023   ACCESSION NUMBER(S): [id number]   ORDERING CLINICIAN: MEAGHAN CHARLTON   TECHNIQUE: CT of the chest, abdomen, and pelvis was performed.  Contiguous axial images were obtained at 3 mm slice thickness through the chest, abdomen and pelvis. Coronal and sagittal reconstructions at 3 mm slice thickness were performed. 75 ml of contrast Omnipaque 350 were administered intravenously without immediate complication.   FINDINGS: CHEST:   LUNG/PLEURA/LARGE AIRWAYS: Stable endotracheal tube with the tip approximately 3 cm from the harvey. Otherwise, central airways are patent without endobronchial lesions. Consolidative opacities in the lung bases, likely secondary to combination of pneumonia and atelectasis. Other multifocal ground-glass opacities, predominantly in the right upper lobe are also noted, likely osseous secondary to pneumonia. No pneumothorax. No large pleural effusion.   VESSELS: Aorta and pulmonary arteries are normal caliber.  No atherosclerotic changes of the aorta are identified.  No coronary artery calcifications are present.   HEART: Normal size.  No pericardial effusion   MEDIASTINUM AND DARYL: No mediastinal, hilar or axillary lymphadenopathy is present.  The esophagus is nondilated and appears normal in entire length.   CHEST WALL AND LOWER NECK: The soft tissues of the chest wall demonstrate no gross abnormality. Partially imaged  low-attenuation lesion medial left thyroid lobe.   ABDOMEN:   LIVER: The liver demonstrates homogeneous attenuation without evidence of focal liver lesions.   BILE DUCTS: The intrahepatic and extrahepatic ducts are not dilated.   GALLBLADDER: No calcified stones. No wall thickening.   PANCREAS: The pancreas appears unremarkable without evidence of ductal dilatation or masses.   SPLEEN: The spleen is normal in size without focal lesions.   ADRENAL GLANDS: Bilateral adrenal glands appear normal.   KIDNEYS AND URETERS: The kidneys are normal in size and enhance symmetrically.  No hydroureteronephrosis or nephroureterolithiasis is identified.   PELVIS:   BLADDER: Decompressed urinary bladder Proctor catheter in-situ.   REPRODUCTIVE ORGANS: No pelvic masses.   BOWEL: Feeding tube with tip in the proximal gastric body. Redemonstration of multiple dilated small bowel loops, measuring up to 3.3 cm. However, compatible with the CT from 10/14/2023 the degree of small-bowel distention has improved. No pneumoperitoneum. Postoperative changes appendectomy with interval resolution previously noted fluid within the appendectomy bed. Fluid-filled nondistended colonic loops.     VESSELS: There is no aneurysmal dilatation of the abdominal aorta. The IVC appears normal.   PERITONEUM/RETROPERITONEUM/LYMPH NODES: Small amount of multiloculated contiguous pelvic collections are noted. For example a 4.9 x 3.4 cm (series 201, image 178), with scattered foci of gas within the collection. Additionally, there is an adjacent contiguous collection which measures 5.3 x 3.9 cm (series 201, image 182). Moderate mesenteric soft tissue stranding, predominantly in the right lower quadrant, likely secondary to postoperative changes. No abdominopelvic lymphadenopathy is present.   BONE AND SOFT TISSUE: No suspicious osseous lesions are identified.  Moderate ventral skin defect (series 201, image 156), with packing material in-situ and surgical staples  along the anterior abdominal wall are noted related to postoperative changes.       CHEST: 1.  Consolidative opacities in the lung bases, with scattered ground-glass opacities as described above, likely secondary to a combination of atelectasis and pneumonia.   ABDOMEN-PELVIS: 1.  Interval postoperative changes appendectomy with contiguous multiloculated fluid collections within the right hemipelvis as noted above, slightly increased since prior. Foci of gas within 1 of the collections as described above may represent an abscess. However postoperative seromas can not be excluded. 2. Interval improvement in degree of small-bowel dilation, now measuring up to 3.3 cm, likely secondary to improving postoperative ileus. 3. Moderate size skin defect with surgical jaz.     Signed by: Jesus Olguin 10/20/2023 12:25 PM Dictation workstation:   [id number]    XR chest 1 view    Result Date: 10/20/2023  Interpreted By:  Ranjeet Gil, STUDY: XR CHEST 1 VIEW;  10/20/2023 5:43 am   INDICATION: Signs/Symptoms:pna f/u, on vent.   COMPARISON: Portable chest, 19 October 2023   ACCESSION NUMBER(S): [id number]   ORDERING CLINICIAN: JESSA PENA   TECHNIQUE: Single frontal view of the chest; Portable technique   FINDINGS:   The endotracheal tube is well positioned   Left sided central line is unchanged and well positioned   Enteric tube has been retracted, tip now projects over proximal gastric body with about 10 cm purchase in the stomach   Chronic band of atelectasis or scarring at left lung base is unchanged   Again low lung volumes with exaggerated interstitial markings and patchy airspace opacities   No new acute process such as large pleural effusion or pneumothorax has developed       No interval change   MACRO: None   Signed by: Ranjeet Gil 10/20/2023 8:11 AM Dictation workstation:   [id number]    ECG 12 Lead    Result Date: 10/19/2023  Normal sinus rhythm Normal ECG No previous ECGs available Confirmed by John  Amilcar ([id number]) on 10/19/2023 3:23:25 PM    ECG 12 lead    Result Date: 10/19/2023  Normal sinus rhythm Normal ECG When compared with ECG of 11-OCT-2023 08:06, Vent. rate has decreased BY  61 BPM Questionable change in QRS duration Criteria for Inferior infarct are no longer Present Confirmed by Amilcar Lopez ([id number]) on 10/19/2023 2:04:13 PM    XR abdomen 1 view    Result Date: 10/19/2023  Interpreted By:  Ranjeet Gil, STUDY: XR ABDOMEN 1 VIEW;  10/19/2023 5:51 am   INDICATION: Signs/Symptoms:c/f sbo.   COMPARISON: CT abdomen and pelvis without contrast 14 October 2023; KUB 16 October 2023   ACCESSION NUMBER(S): [id number]   ORDERING CLINICIAN: JESSA PENA   TECHNIQUE: Frontal supine view of the abdomen   FINDINGS: Two gas-filled and mildly dilated upper abdominal small bowel loops are unchanged. Overall quantity of small bowel gas has decreased which seems reassuring   No interval increase in colonic gas   No pneumatosis       Persistent gas-filled small bowel dilation, but fewer gas-filled dilated small bowel loops, probably an improving ileus   MACRO: None   Signed by: Ranjeet Gil 10/19/2023 8:21 AM Dictation workstation:   [id number]    XR chest 1 view    Result Date: 10/19/2023  Interpreted By:  Ranjeet Gil, STUDY: XR CHEST 1 VIEW;  10/19/2023 5:51 am   INDICATION: Signs/Symptoms:f/u pna.   COMPARISON: Portable chest, 18 October 2023   ACCESSION NUMBER(S): [id number]   ORDERING CLINICIAN: JESSA PENA   TECHNIQUE: Single frontal view of the chest; Portable technique   FINDINGS: Endotracheal tube tip is quite close to the harvey, about 1 cm but unchanged   The enteric tube courses below the diaphragm, but the distal tip is cut off the bottom of the radiograph   Left sided central line is unchanged and well positioned   The cardiomediastinal silhouette is unchanged   Low lung volumes with exaggerated interstitial markings due to hypoinflation. Nonspecific patchy airspace opacities are unchanged    No new acute process such as large pleural effusion or pneumothorax has developed       No interval change   MACRO: None   Signed by: Ranjeet Gil 10/19/2023 8:17 AM Dictation workstation:   [id number]    XR chest 1 view    Result Date: 10/18/2023  Interpreted By:  Schoenberger, Joseph, STUDY: XR CHEST 1 VIEW;  10/18/2023 6:29 am   INDICATION: Signs/Symptoms:endotracheal tube verification.   COMPARISON: 10/17/2023   ACCESSION NUMBER(S): [id number]   ORDERING CLINICIAN: GAURANG STOKES   FINDINGS: The positioning of the endotracheal tube and nasogastric tube and left jugular venous infusion catheter is unchanged.       CARDIOMEDIASTINAL SILHOUETTE: The cardiac silhouette remains enlarged.   LUNGS: Right-sided perihilar hazy opacity persists but is somewhat improved from prior. Retrocardiac opacity and platelike atelectasis or scarring in the left lung base is unchanged.   ABDOMEN: No remarkable upper abdominal findings.   BONES: No acute osseous changes.       1.  The similar to prior other than slight improvement in the appearance of the right lung       MACRO: None   Signed by: Joseph Schoenberger 10/18/2023 8:10 AM Dictation workstation:   [id number]    XR chest 1 view    Result Date: 10/17/2023  Interpreted By:  Ranjeet Gil, STUDY: XR CHEST 1 VIEW;  10/17/2023 5:37 am   INDICATION: Signs/Symptoms:Pneumonia evaluation.   COMPARISON: Portable chest, 16 October 2023   ACCESSION NUMBER(S): [id number]   ORDERING CLINICIAN: KRISTINA MONTALVO   TECHNIQUE: Single frontal view of the chest; Portable technique   FINDINGS:   The endotracheal tube is well positioned   Left sided central line is unchanged and well positioned   Enteric tube unchanged in position with tip projecting over left lateral gastric body   Hazy bilateral airspace disease is unchanged   The curvilinear peripheral left mid to basilar opacity is scarring or atelectasis based on review of prior CT   No new acute process such as large pleural effusion or  pneumothorax has developed       Hazy and patchy bilateral airspace disease   MACRO: None   Signed by: Ranjeet Gil 10/17/2023 8:33 AM Dictation workstation:   [id number]    XR abdomen 1 view    Result Date: 10/16/2023  Interpreted By:  Schoenberger, Joseph, STUDY: XR ABDOMEN 1 VIEW;  10/16/2023 6:14 am   INDICATION: Signs/Symptoms:small bowel obstruction.   COMPARISON: 10/15/2023   ACCESSION NUMBER(S): [id number]   ORDERING CLINICIAN: MEAGHAN KATZ   FINDINGS: Persistent gaseous distention of small bowel loops with some gas in the rectum. Possible postoperative ileus. Greatest caliber small bowel loops is noted in the upper abdomen approximating 5.5 cm similar to a comparable 5.2 cm measurement previously. Limited evaluation of pneumoperitoneum on supine imaging, however no gross evidence of free air is noted.   Visualized lungs are clear.   Osseous structures demonstrate no acute bony changes.       1.  Possible persistent postoperative ileus. Mid small bowel obstruction not entirely excluded. Continued follow-up recommended.   MACRO: None   Signed by: Joseph Schoenberger 10/16/2023 8:57 AM Dictation workstation:   [id number]    XR chest 1 view    Result Date: 10/16/2023  Interpreted By:  Schoenberger, Joseph, STUDY: XR CHEST 1 VIEW;  10/16/2023 6:14 am   INDICATION: Signs/Symptoms:intubated.   COMPARISON: 10/15/2023   ACCESSION NUMBER(S): [id number]   ORDERING CLINICIAN: MEAGHAN KATZ   FINDINGS: Positioning of nasogastric tube, left jugular venous infusion catheter, and endotracheal tube unchanged.       CARDIOMEDIASTINAL SILHOUETTE: Cardiac silhouette remains somewhat enlarged.   LUNGS: Platelike atelectasis in the left lung base unchanged. Progressing retrocardiac opacity possibly atelectasis or airspace consolidation.   ABDOMEN: No remarkable upper abdominal findings.   BONES: No acute osseous changes.       1.  Progressing left basal opacity.       MACRO: None   Signed by: Joseph Schoenberger  10/16/2023 8:56 AM Dictation workstation:   [id number]    XR chest 1 view    Result Date: 10/16/2023  Interpreted By:  Schoenberger, Joseph, STUDY: XR CHEST 1 VIEW;  10/15/2023 5:42 am   INDICATION: Signs/Symptoms:c/f aspiration, on vent.   COMPARISON: 05/29/2022   ACCESSION NUMBER(S): [id number]   ORDERING CLINICIAN: JESSA PENA   FINDINGS: Endotracheal tube placed distal tip 3.9 cm above the harvey. Nasogastric tube placed distal tip gastric body. Stomach is distended.       CARDIOMEDIASTINAL SILHOUETTE: Cardiomediastinal silhouette is normal in size and configuration.   LUNGS: There is a new area of platelike atelectasis in the left lung base. No other new focal lung opacities. Limited hypoventilatory exam.   ABDOMEN: No remarkable upper abdominal findings.   BONES: No acute osseous changes.       1.  Satisfactory positioning of tubes and lines. 2. New platelike atelectasis in left lung base. 3. Limited exam.       MACRO: None   Signed by: Joseph Schoenberger 10/16/2023 8:42 AM Dictation workstation:   [id number]    XR abdomen 1 view    Result Date: 10/16/2023  Interpreted By:  Schoenberger, Joseph, STUDY: XR ABDOMEN 1 VIEW;  10/15/2023 5:42 am   INDICATION: Signs/Symptoms:post-op.   COMPARISON: 10/14/2023   ACCESSION NUMBER(S): [id number]   ORDERING CLINICIAN: JESSA PENA   FINDINGS: Persistent gaseous distention of multiple small bowel loops with some gas in the colon. Stomach is distended with nasogastric tube tip gastric body. Greatest caliber small bowel loops on this exam is 5.2 cm compared to 6.6 cm and there is less gas in bowel loops on this exam than the prior. Improving ileus is consideration. Limited evaluation of pneumoperitoneum on supine imaging, however no gross evidence of free air is noted.   Visualized lungs are clear.   Osseous structures demonstrate no acute bony changes.       1.  Likely improving postoperative ileus.   MACRO: None   Signed by: Joseph Schoenberger 10/16/2023  8:40 AM Dictation workstation:   [id number]    XR chest 1 view    Result Date: 10/15/2023  Interpreted By:  Juan Graves, STUDY: XR CHEST 1 VIEW;  10/15/2023 7:20 pm   INDICATION: Signs/Symptoms:line placement.   COMPARISON: Earlier on 10/15/2023   ACCESSION NUMBER(S): [id number]   ORDERING CLINICIAN: MEAGHAN KATZ   FINDINGS: There is interval placement of a left central venous catheter with tip terminates at the level of the right atrium. Endotracheal tube tip terminates approximately 2.9 cm superior to the harvey. Nasogastric tube tip terminates in the left upper abdomen level of the stomach. The cardiac silhouette is stable in size. There is limited inspiratory lung volumes. Stable band of opacity in the left midlung and left basilar consolidative/atelectatic opacity. No pneumothorax.       Interval placement of left central venous catheter with tip terminating at the level of the right atrium.   Limited inspiratory lung volumes with stable band of opacity in the left mid lung and medial left basilar consolidative/atelectatic opacity.   MACRO: None   Signed by: Juan Graves 10/15/2023 7:40 PM Dictation workstation:   [id number]    CT abdomen pelvis wo IV contrast    Result Date: 10/14/2023  Interpreted By:  Mana Price, STUDY: CT ABDOMEN PELVIS WO IV CONTRAST;  10/14/2023 1:25 pm   INDICATION: Signs/Symptoms:SBO postop appendectomy.   COMPARISON: 10/10/2023   ACCESSION NUMBER(S): [id number]   ORDERING CLINICIAN: DRU WYNN   TECHNIQUE: CT of the abdomen and pelvis was performed. No oral, no intravenous contrast.   FINDINGS: LOWER CHEST: There are bibasilar infiltrates.   ABDOMEN:   LIVER: There is no hepatic mass.   BILE DUCTS: There is no intrahepatic, common hepatic or common bile ductal dilatation.   GALLBLADDER: Contracted but otherwise unremarkable.   PANCREAS: The pancreas is unremarkable.   SPLEEN: The spleen is unremarkable. There is no splenic mass or splenomegaly.   ADRENAL GLANDS: The  adrenal glands are unremarkable.   KIDNEYS AND URETERS: The kidneys demonstrate no mass.  There is no intrarenal calculus or hydronephrosis. There is a Proctor catheter insufflated in a nondistended urinary bladder.   BOWEL: There are surgical clips in the right lower quadrant from appendectomy in the interval compared to 10/10/2023. There is haziness in the mesentery in the right lower quadrant from recent surgery. There is a small crescent of non walled-off ascitic fluid in the right lower quadrant. There is a small amount of ascites in the pelvis non walled-off. There is a nasogastric tube with the tip in the mid stomach. There is no gastric distention. There are markedly dilated loops of small bowel. There is collapsed distal small bowel and collapsed colon. This could represent a postoperative ileus but consider small-bowel obstruction. There are sigmoid diverticula with no diverticulitis.   VESSELS: The abdominal and pelvic vessels are unremarkable.   PERITONEUM/RETROPERITONEUM/LYMPH NODES: There is no retroperitoneal or pelvic adenopathy.  There is no ascites.   ABDOMINAL WALL: There is a periumbilical hernia with an area of opening measuring 5.8 cm in transverse dimension. There is herniation of a segment of nondilated non thickened small bowel. There is induration around the umbilicus from recent surgery. There is a small hematoma in the subcutaneous fat with hyperdense and hypodense fluid. This measures 3.0 x 1.8 cm. There is a dot of air which could be iatrogenic from prior laparoscopic surgery rather than abscess and infection..   BONE AND SOFT TISSUE: There is no acute osseous finding.   There is no soft tissue abnormality.       1. Interval appendectomy. Haziness right lower quadrant with a small crescent of fluid non walled-off. 2. Markedly dilated loops of small bowel with nondilated collapsed distal small bowel and collapsed colon. This is likely a postoperative ileus but still consider small-bowel  obstruction. Proctor catheter with the tip in the mid stomach. 3. Periumbilical hernia with a segment of nondilated non thickened small bowel. There is induration and a small amount of fluid in the fat around the umbilicus from recent surgery. There is a small hematoma in the periumbilical fat. There is a dot of air which could be iatrogenic from recent surgery rather than abscess and infection. 4. Proctor catheter insufflated in a nondistended urinary bladder. 5. Bibasilar pneumonia.   MACRO: None   Signed by: Mana Price 10/14/2023 1:34 PM Dictation workstation:   [id number]    XR abdomen 1 view    Result Date: 10/14/2023  Interpreted By:  Mana Price, STUDY: XR ABDOMEN 1 VIEW;  10/14/2023 8:15 am. 3 views.   INDICATION: Signs/Symptoms:Ileus.   COMPARISON: 10/12/2023   ACCESSION NUMBER(S): [id number]   ORDERING CLINICIAN: DRU WYNN   FINDINGS: There is a nasogastric tube with the tip in the proximal stomach. There is gastric decompression. There is progressive marked small bowel dilatation with loops of bowel dilated up to 6.6 cm. There is gas and stool in nondilated colon. Findings are consistent with a small-bowel obstruction.   There are no pathologic calcifications.   Visualized lungs are clear.   Osseous structures demonstrate no acute bony changes.         1. Nasogastric tube with the tip in the proximal stomach with gastric decompression. 2. Progressive marked small bowel dilatation consistent with small-bowel obstruction.     MACRO: None   Signed by: Mana Price 10/14/2023 8:55 AM Dictation workstation:   [id number]    XR abdomen 1 view    Result Date: 10/13/2023  Interpreted By:  Juan Graves, STUDY: XR ABDOMEN 1 VIEW;  10/12/2023 8:28 pm   INDICATION: Signs/Symptoms:verify NG placement.   COMPARISON: 10/12/2023   ACCESSION NUMBER(S): [id number]   ORDERING CLINICIAN: VAN BRYAN   FINDINGS: Nasogastric tube tip terminates in the left upper abdomen at level of the stomach. There is  cardiomegaly and persistent band of atelectasis or infiltrate in the left midlung. Gaseous distended bowel in the partially imaged abdomen.       Nasogastric tube tip terminates in the left upper abdomen at level of proximal stomach.   Cardiomegaly and stable band of atelectasis or infiltrate in the left mid lung.   Gaseous distended bowel in imaged upper abdomen; continue progress short-term progress is recommended for further evaluation.   MACRO: None   Signed by: Juan Graves 10/13/2023 1:36 AM Dictation workstation:   [id number]    XR abdomen 1 view    Result Date: 10/12/2023  Interpreted By:  Domingo Marks, STUDY: XR ABDOMEN 1 VIEW;  10/12/2023 6:55 pm   INDICATION: Signs/Symptoms:NG tube placement.   COMPARISON: 10/12/2023 at 4:46 p.m.   ACCESSION NUMBER(S): [id number]   ORDERING CLINICIAN: MEAGHAN CHARLTON   FINDINGS: Interval placement of a NG tube with its tip over the gastric body.   Dilatation of multiple small bowel loops as well as multiple colonic loops throughout the abdomen again seen. Bibasilar airspace disease present       1. NG tube tip projects over the gastric body 2. Redemonstration of bowel loop dilatation suggestive of underlying ileus versus obstruction.   MACRO: None   Signed by: Domingo Marks 10/12/2023 7:12 PM Dictation workstation:   [id number]    XR abdomen 1 view    Result Date: 10/12/2023  Interpreted By:  Mana Price, STUDY: XR ABDOMEN 1 VIEW;  10/12/2023 4:51 pm. 2 views.   INDICATION: Signs/Symptoms:r/o ileus.   COMPARISON: CT abdomen and pelvis 10/10/2023   ACCESSION NUMBER(S): [id number]   ORDERING CLINICIAN: MEAGHAN CHARLTON   FINDINGS: There is moderate small bowel and colonic distention, likely an ileus. There are surgical clips in the right lower quadrant from recent appendectomy.   There are no pathologic calcifications.   Visualized lungs are clear.   Osseous structures demonstrate no acute bony changes.         Moderate small bowel and colonic distention,  likely a postoperative ileus. Surgical clips right lower quadrant from recent appendectomy.   MACRO: None   Signed by: Mana Price 10/12/2023 4:56 PM Dictation workstation:   [id number]    CT angio chest for pulmonary embolism    Result Date: 10/12/2023  Interpreted By:  Ranjeet Gil, STUDY: CT ANGIO CHEST FOR PULMONARY EMBOLISM;  10/12/2023 4:56 am   INDICATION: Signs/Symptoms:Rule out pe.   COMPARISON: Portable chest 11 October 2023; CT abdomen and pelvis with contrast 10 October 2023   ACCESSION NUMBER(S): [id number]   ORDERING CLINICIAN: VAN BRYAN   TECHNIQUE: Pulmonary arterial phase CT chest after the uneventful administration of 75 mL IV contrast (Omnipaque 350).   Three dimensional maximum intensity projection (3-D MIPs) image/s were created on a separate dedicated workstation, reviewed and saved   FINDINGS: CARDIOVASCULAR:   Acute pulmonary embolism: Negative through the  segmental (third order) branch level. Subsegmental and more distal branches not well enough opacified to evaluate. Acute right heart strain:  Negative. No CT evidence of acute right heart strain Cardiac thrombus:  Negative; no obvious right heart or other cardiac thrombus is seen Pulmonary arteries ectasia:  Negative   Heart size:  Within normal limits Pericardial effusion:  Trace   Thoracic aortic aneurysm:  Negative Aortic dissection:  Negative   Heart failure change:  Negative.  No sign of interstitial or alveolar edema. Other:  n/a   NONVASCULAR MEDIASTINUM: Esophagus:  Grossly normal by CT Mediastinal Mass:  Negative Hiatal hernia:  None Other:  n/a   LYMPH NODES: No thoracic adenopathy   LUNGS / AIRSPACES / AIRWAYS:   LARGE AIRWAYS Filling defect: Negative Wall thickening: Negative Bronchiectasis: Negative Other: N/A   AIRSPACES Fibrosis: Negative Emphysema: Negative Consolidation: Negative Ground glass airspace disease: Negative Edema: Negative Nodule / Mass: Negative Other: Dependent atelectasis in both lower lobes, new  from two days ago. Additional bands of atelectasis in the left upper lobe also new   PLEURA: Effusion:  Both sides negative Pneumothorax:  Both sides negative Other:  n/a   CHEST WALL: Symmetric mild nonspecific bilateral gynecomastia   SKELETON: No acute or contributory abnormality   UPPER ABDOMEN: Fluid-filled, dilated small bowel loops with air-fluid levels new from CT abdomen and pelvis two days prior       SMALL BOWEL DILATION WITH MULTIPLE SMALL BOWEL AIR-FLUID LEVELS IN THE UPPER ABDOMEN, ALL NEW FROM CT TWO DAYS AGO   NO ACUTE DISEASE IN THE CHEST, ONLY SUBSTANTIAL BILATERAL LOWER LOBE DEPENDENT ATELECTASIS WHICH IS NEW FROM CT TWO DAYS PRIOR   NO AIRSPACE CONSOLIDATION OR GROUND-GLASS AIRSPACE DISEASE   NO EDEMA/FAILURE   NO ACUTE PULMONARY EMBOLISM THROUGH THE SEGMENTAL BRANCH LEVEL   NO AORTIC DISSECTION OR OTHER ACUTE THORACIC AORTIC FINDINGS   NO PLEURAL OR PERICARDIAL EFFUSION   NO PNEUMOTHORAX   MACRO: None   Signed by: Ranjeet Gil 10/12/2023 6:54 AM Dictation workstation:   [id number]    XR chest 1 view    Result Date: 10/11/2023  Interpreted By:  Chapo Cary, STUDY: XR CHEST 1 VIEW   INDICATION: Signs/Symptoms:hypoxemia.   COMPARISON: July 14, 2022   ACCESSION NUMBER(S): [id number]   ORDERING CLINICIAN: MEAGHAN CHARLTON   FINDINGS: No consolidation, effusion, edema, or pneumothorax. Low lung volumes with bibasilar atelectasis.       Low lung volumes with bibasilar atelectasis. No discrete consolidation seen.   Signed by: Chapo Cary 10/11/2023 6:18 PM Dictation workstation:   [id number]    ECG 12 lead    Result Date: 10/11/2023  Sinus tachycardia Inferior infarct (cited on or before 11-OCT-2023) Abnormal ECG When compared with ECG of 10-OCT-2023 13:57, Previous ECG has undetermined rhythm, needs review T wave inversion now evident in Anterior leads Confirmed by Carlo Jose ([id number]) on 10/11/2023 4:50:57 PM    CT abdomen pelvis w IV contrast    Result Date: 10/10/2023  STUDY: CT Abdomen and  Pelvis with IV Contrast; 10/10/2023 10:52 AM. INDICATION: Generalized abdominal pain. COMPARISON: CT AP 5/29/2022. ACCESSION NUMBER(S): [id number] ORDERING CLINICIAN: SHAI ERWIN TECHNIQUE: CT of the abdomen and pelvis was performed.  Contiguous axial images were obtained at 3 mm slice thickness through the abdomen and pelvis. Coronal and sagittal reconstructions at 3 mm slice thickness were performed.  Omnipaque 350 75 mL was administered intravenously.  FINDINGS: LOWER CHEST: No cardiomegaly.  No pericardial effusion.  Lung bases are clear.  ABDOMEN:  LIVER: No hepatomegaly.  Smooth surface contour.  Mild fatty infiltration of the liver.  BILE DUCTS: No intrahepatic or extrahepatic biliary ductal dilatation.  GALLBLADDER: The gallbladder is present without gallstones. STOMACH: No abnormalities identified.  PANCREAS: No masses or ductal dilatation.  SPLEEN: No splenomegaly or focal splenic lesion.  ADRENAL GLANDS: No thickening or nodules.  KIDNEYS AND URETERS: Kidneys are normal in size and location.  No renal or ureteral calculi.  PELVIS:  BLADDER: No abnormalities identified.  REPRODUCTIVE ORGANS: No abnormalities identified.  BOWEL: Appendix is fluid-filled and dilated measuring up to 1.6 cm with multiple intraluminal appendicoliths and mild periappendiceal inflammatory change. No extraluminal gas or abscess. Colonic diverticulosis without findings of diverticulitis  VESSELS: No abnormalities identified.  Abdominal aorta is normal in caliber.  PERITONEUM/RETROPERITONEUM/LYMPH NODES: No free fluid.  No pneumoperitoneum. No lymphadenopathy.  ABDOMINAL WALL: No abnormalities identified. SOFT TISSUES: No abnormalities identified.  BONES: No acute fracture or aggressive osseous lesion.    1. Findings compatible with acute uncomplicated appendicitis. No extraluminal gas or abscess. Recommend surgical conultation. 2. Mild hepatic steatosis. 3. Colonic diverticulosis without findings of diverticulitis. Findings  "discussed with and acknowledged by Ayden Ramires at 11:53 AM Signed by Faustino Hebert MD      Additional Labs:  SCVO2: No results found for: \"[id number]\"    This patient has a central line   Reason for the central line remaining today? Parenteral nutrition    This patient has a urinary catheter   Reason for the urinary catheter remaining today? critically ill patient who need accurate urinary output measurements    This patient is intubated   Reason for patient to remain intubated today? they are unable to protect their airway and they have inadequate gas-exchange without positive pressure             Assessment/Plan   Principal Problem:    Acute appendicitis with localized peritonitis  Active Problems:    Anxiety    Depression    Hyperlipidemia    Diabetes mellitus, type 2 (CMS/HCC)    Appendicitis, acute    Appendicitis    Acute appendicitis, unspecified acute appendicitis type    Small bowel obstruction (CMS/HCC)    POD10 from lap appy and hernia repair, POD6 from reexploration, EUGENIA, and repair of hernia.  Patient remains intubated for what appears to be aspiration pneumonia, wound infection, and severe agitation     Neuro:   #Bipolar 1 disorder,depression  #Anxiety & Depression  #Panic disorder  -hold all oral home psych meds for now given SBO, will restart when resumption of diet  -Propofol, precedex, weaned off ketamine  -will change depakote to 500mg BID for sedation due to limited supply of zyprexa, with zyprexa 2.5mg q6h prn  - Fentanyl ggt for incisional pain  -[facility] ICU  - restraints required to prevent inadvertent extubation as patient reaches for ETT when off, renewed restraints today     Pulm:   #Post-operative respiratory insufficiency  -2/2 aspiration pneumonia with mdr klebsiela, will change abx to meropenem, reset to day 2/7  -keep intubated today given hypoxemia and agitation  -20mg lasix for diuresis     CV:   -Normocardic and off pressors  -Continue to monitor     GI:   #Acute appendicitis with " localized peritonitis without perforation or gangrene  #SBO  -obstruction from an interloop adhesion and NOT from the hernia,  -s/p appendectomy 10/10/23  -NPO  -TPN for persistent lack of nutrition  -Still awaiting return of bowel function        :   -Replete elecrolytes  -TPN, no need for other IVF  -lasix 20mg for diuresis        Heme:   - H/H stable  - lovenox     Endo:   #DMII   -recently diagnosed  -q4hr accuchecks with SS coverage, has not required significant amount of insulin so will just keep SSI, may need more once TPN is initiated     ID:   -WBC 20->17K  -Intraabdominal collections unable to be drained from IR, will watch over weekend, if not improving will rescan early next week  - maki due to mdr klebsiela pneumonia, will follow up cultures from wound  -Pack WTD BID for wound     Lines:   -ETT  -Central line (needed for TPN) -> replaced to RIJ, will be documented in sepperate noted   -A-line  -Proctor  -all are needed due to critical nature of patient    I spent 45 minutes in the professional and overall care of this patient.    Norman Calloway MD  10/21/23

## 2023-10-21 NOTE — CARE PLAN
The patient's goals for the shift include Pain management    The clinical goals for the shift include Sedation management    Problem: Pain  Goal: My pain/discomfort is manageable  Outcome: Progressing     Problem: Skin  Goal: Decreased wound size/increased tissue granulation at next dressing change  Outcome: Progressing     Problem: Respiratory  Goal: Clear secretions with interventions this shift  Outcome: Progressing     Problem: Respiratory  Goal: Minimize anxiety/maximize coping throughout shift  Outcome: Progressing     Problem: Respiratory  Goal: Minimal/no exertional discomfort or dyspnea this shift  Outcome: Progressing     Problem: Respiratory  Goal: Tolerate pulmonary toileting this shift  Outcome: Progressing     Problem: Respiratory  Goal: Verbalize decreased shortness of breath this shift  Outcome: Progressing     Problem: Respiratory  Goal: Increase self care and/or family involvement in next 24 hours  Outcome: Progressing     Problem: Physical Restraint  Goal: I will require minimum level of restraint  Outcome: Progressing     Problem: Safety - Medical Restraint  Goal: Free from restraint(s) (Restraint for Interference with Medical Device)  Outcome: Progressing       Over the shift, the patient did not make progress toward the following goals. Barriers to progression include patient's inability to folllow commands and tolerate sedation vacation. Recommendations to address these barriers include continue to adjust sedation medications to tolerate ventilator and perform extubation trials .    Problem: Discharge Barriers  Goal: My discharge needs are met  Outcome: Not Progressing     Problem: Respiratory  Goal: Wean oxygen to maintain O2 saturation per order/standard this shift  Outcome: Not Progressing

## 2023-10-21 NOTE — PROGRESS NOTES
Patient is a 48 y.o. adult admitted on 10/10/2023  8:50 AM Patient remains on mechanical ventilator postop due to pneumonia with attendant hypoxia as well as significant agitation requiring multiple sedatives.     Interval History: No fevers overnight, hemodynamically stable, was tolerating decreased propofol overnight at 40mg/hr and decesnt oxygenation at 40%, WBC 17->20K.      Complaints: Intubated, sedated.     Scheduled Medications:   [Held by provider] busPIRone, 15 mg, oral, BID  calcium gluconate, 2 g, intravenous, Once  [Held by provider] docusate sodium, 100 mg, oral, BID  enoxaparin, 40 mg, subcutaneous, q24h  insulin lispro, 0-10 Units, subcutaneous, q4h  [Held by provider] insulin regular, 0-5 Units, subcutaneous, TID with meals  [Held by provider] lurasidone, 80 mg, oral, Daily  meropenem, 1 g, intravenous, q8h  OLANZapine, 5 mg, intramuscular, q6h  [Held by provider] pantoprazole, 40 mg, oral, Daily before breakfast  pantoprazole, 40 mg, intravenous, Daily  potassium chloride, 40 mEq, intravenous, Once  potassium phosphate, 21 mmol, intravenous, Once  [Held by provider] QUEtiapine, 300 mg, oral, BID  [Held by provider] simvastatin, 40 mg, oral, Nightly  sodium hypochlorite, , irrigation, BID  [Held by provider] traZODone, 300 mg, oral, Nightly  [Held by provider] venlafaxine XR, 150 mg, oral, Daily           Continuous Medications:   Adult Clinimix TPN, 70 mL/hr, Last Rate: 70 mL/hr (10/20/23 0042)  dexmedeTOMIDine, 0.1-1.5 mcg/kg/hr, Last Rate: 1.5 mcg/kg/hr (10/20/23 0826)  fentaNYL,  mcg/hr, Last Rate: 100 mcg/hr (10/20/23 0948)  propofol, 5-50 mcg/kg/min, Last Rate: 30 mcg/kg/min (10/20/23 1000)           PRN Medications:   PRN medications: alteplase, dextrose 10 % in water (D10W), dextrose, glucagon, HYDROmorphone, HYDROmorphone, ipratropium-albuteroL, naloxone, oxygen           Objective   Vitals:  Most Recent:      Vitals:     10/20/23 1137   BP:     Pulse:     Resp: 23   Temp:     SpO2:  94%         24hr Min/Max:  Temp  Min: 36.2 °C (97.2 °F)  Max: 37.3 °C (99.1 °F)  Pulse  Min: 78  Max: 96  Resp  Min: 0  Max: 37  SpO2  Min: 91 %  Max: 98 %        Physical Exam:   General appearance: NAD, intubated, sedated  Head: Normocephalic, without obvious abnormality  Eyes:conjunctivae/corneas clear. PERRL  Neck:  supple, symmetrical, trachea midline  Lungs:clear to auscultation bilaterally  Chest wall:  no tenderness  Heart: regular rate and rhythm, S1, S2 normal, no murmur, click, rub or gallop  Abdomen: Soft, ND, still erythematouns around wound but is stable, wound is open about 5cm with packing, fascia intact, no more drainage  Male genitalia:  normal  Extremities: 2+ edema  Pulses:2+ and symmetric  Skin: Skin color, texture, turgor normal.  No rashes or lesions  Neurologic: Intubated, sedated, moving extremities x4 spontaneously     Lab/Radiology/Diagnostic Review:        Results for orders placed or performed during the hospital encounter of 10/10/23 (from the past 24 hour(s))   POCT GLUCOSE   Result Value Ref Range     POCT Glucose 167 (H) 74 - 99 mg/dL   Tissue/Wound Culture/Smear     Specimen: Surgical Site Infection; Tissue/Biopsy   Result Value Ref Range     Gram Stain (3+) Moderate Polymorphonuclear leukocytes (A)       Gram Stain (3+) Moderate Gram negative bacilli (A)     Urinalysis with Reflex Microscopic   Result Value Ref Range     Color, Urine Yellow Straw, Yellow     Appearance, Urine Clear Clear     Specific Gravity, Urine 1.021 1.005 - 1.035     pH, Urine 6.0 5.0, 5.5, 6.0, 6.5, 7.0, 7.5, 8.0     Protein, Urine 100 (2+) (N) NEGATIVE mg/dL     Glucose, Urine NEGATIVE NEGATIVE mg/dL     Blood, Urine MODERATE (2+) (A) NEGATIVE     Ketones, Urine NEGATIVE NEGATIVE mg/dL     Bilirubin, Urine NEGATIVE NEGATIVE     Urobilinogen, Urine <2.0 <2.0 mg/dL     Nitrite, Urine NEGATIVE NEGATIVE     Leukocyte Esterase, Urine TRACE (A) NEGATIVE   Microscopic Only, Urine   Result Value Ref Range     WBC,  Urine 1-5 1-5, NONE /HPF     RBC, Urine >20 (A) NONE, 1-2, 3-5 /HPF     Bacteria, Urine 1+ (A) NONE SEEN /HPF     Hyaline Casts, Urine OCCASIONAL (A) NONE /LPF   Blood Culture     Specimen: Peripheral Venipuncture; Blood culture   Result Value Ref Range     Blood Culture Loaded on Instrument - Culture in progress     Blood Culture     Specimen: Peripheral Venipuncture; Blood culture   Result Value Ref Range     Blood Culture Loaded on Instrument - Culture in progress     ECG 12 Lead   Result Value Ref Range     Ventricular Rate 81 BPM     Atrial Rate 81 BPM     TX Interval 136 ms     QRS Duration 106 ms     QT Interval 378 ms     QTC Calculation(Bazett) 439 ms     P Axis 31 degrees     R Axis 10 degrees     T Axis 9 degrees     QRS Count 14 beats     Q Onset 225 ms     P Onset 157 ms     P Offset 208 ms     T Offset 414 ms     QTC Fredericia 417 ms   POCT GLUCOSE   Result Value Ref Range     POCT Glucose 155 (H) 74 - 99 mg/dL   Respiratory Culture/Smear     Specimen: Tracheal Aspirate; Fluid   Result Value Ref Range     Gram Stain (3+) Moderate Polymorphonuclear leukocytes (A)       Gram Stain (3+) Moderate Gram positive cocci (A)     POCT GLUCOSE   Result Value Ref Range     POCT Glucose 137 (H) 74 - 99 mg/dL   POCT GLUCOSE   Result Value Ref Range     POCT Glucose 148 (H) 74 - 99 mg/dL   POCT GLUCOSE   Result Value Ref Range     POCT Glucose 170 (H) 74 - 99 mg/dL   Blood Gas Arterial Full Panel   Result Value Ref Range     POCT pH, Arterial 7.51 (H) 7.38 - 7.42 pH     POCT pCO2, Arterial 35 (L) 38 - 42 mm Hg     POCT pO2, Arterial 72 (L) 85 - 95 mm Hg     POCT SO2, Arterial 97 94 - 100 %     POCT Oxy Hemoglobin, Arterial 95.0 94.0 - 98.0 %     POCT Hematocrit Calculated, Arterial 32.0 (L) 36.0 - 52.0 %     POCT Sodium, Arterial 139 136 - 145 mmol/L     POCT Potassium, Arterial 3.2 (L) 3.5 - 5.3 mmol/L     POCT Chloride, Arterial 109 (H) 98 - 107 mmol/L     POCT Ionized Calcium, Arterial 1.07 (L) 1.10 - 1.33 mmol/L      POCT Glucose, Arterial 185 (H) 74 - 99 mg/dL     POCT Lactate, Arterial 0.8 0.4 - 2.0 mmol/L     POCT Base Excess, Arterial 4.8 (H) -2.0 - 3.0 mmol/L     POCT HCO3 Calculated, Arterial 27.9 (H) 22.0 - 26.0 mmol/L     POCT Hemoglobin, Arterial 10.7 (L) 13.5 - 17.5 g/dL     POCT Anion Gap, Arterial 5 (L) 10 - 25 mmo/L     Patient Temperature         FiO2 40 %   CBC and Auto Differential   Result Value Ref Range     WBC 20.5 (H) 4.4 - 11.3 x10*3/uL     nRBC 0.0 0.0 - 0.0 /100 WBCs     RBC 2.77 (L) 4.00 - 5.90 x10*6/uL     Hemoglobin 8.1 (L) 12.0 - 17.5 g/dL     Hematocrit 24.5 (L) 36.0 - 52.0 %     MCV 88 80 - 100 fL     MCH 29.2 26.0 - 34.0 pg     MCHC 33.1 32.0 - 36.0 g/dL     RDW 13.9 11.5 - 14.5 %     Platelets 442 150 - 450 x10*3/uL     MPV 10.1 7.5 - 11.5 fL     Immature Granulocytes %, Automated 7.6 (H) 0.0 - 0.9 %     Immature Granulocytes Absolute, Automated 1.55 (H) 0.00 - 0.70 x10*3/uL   Comprehensive Metabolic Panel   Result Value Ref Range     Glucose 179 (H) 74 - 99 mg/dL     Sodium 141 136 - 145 mmol/L     Potassium 3.2 (L) 3.5 - 5.3 mmol/L     Chloride 106 98 - 107 mmol/L     Bicarbonate 28 21 - 32 mmol/L     Anion Gap 10 10 - 20 mmol/L     Urea Nitrogen 14 6 - 23 mg/dL     Creatinine 0.91 0.50 - 1.30 mg/dL     eGFR 77 >60 mL/min/1.73m*2     Calcium 7.6 (L) 8.6 - 10.3 mg/dL     Albumin 2.7 (L) 3.4 - 5.0 g/dL     Alkaline Phosphatase 73 33 - 120 U/L     Total Protein 6.1 (L) 6.4 - 8.2 g/dL     AST 20 9 - 39 U/L     Bilirubin, Total 1.0 0.0 - 1.2 mg/dL     ALT 14 7 - 52 U/L   Magnesium   Result Value Ref Range     Magnesium 1.92 1.60 - 2.40 mg/dL   Phosphorus   Result Value Ref Range     Phosphorus 2.6 2.5 - 4.9 mg/dL   Procalcitonin   Result Value Ref Range     Procalcitonin 0.18 (H) <=0.07 ng/mL   Manual Differential   Result Value Ref Range     Neutrophils %, Manual 79.0 40.0 - 80.0 %     Bands %, Manual 6.0 0.0 - 5.0 %     Lymphocytes %, Manual 7.0 13.0 - 44.0 %     Monocytes %, Manual 2.0 2.0 -  10.0 %     Eosinophils %, Manual 2.0 0.0 - 6.0 %     Basophils %, Manual 0.0 0.0 - 2.0 %     Metamyelocytes %, Manual 2.0 0.0 - 0.0 %     Myelocytes %, Manual 2.0 0.0 - 0.0 %     Seg Neutrophils Absolute, Manual 16.20 (H) 1.20 - 7.00 x10*3/uL     Bands Absolute, Manual 1.23 (H) 0.00 - 0.70 x10*3/uL     Lymphocytes Absolute, Manual 1.44 1.20 - 4.80 x10*3/uL     Monocytes Absolute, Manual 0.41 0.10 - 1.00 x10*3/uL     Eosinophils Absolute, Manual 0.41 0.00 - 0.70 x10*3/uL     Basophils Absolute, Manual 0.00 0.00 - 0.10 x10*3/uL     Metamyelocytes Absolute, Manual 0.41 0.00 - 0.00 x10*3/uL     Myelocytes Absolute, Manual 0.41 0.00 - 0.00 x10*3/uL     Total Cells Counted 100       Neutrophils Absolute, Manual 17.43 (H) 1.20 - 7.70 x10*3/uL     RBC Morphology See Below       Dohle Bodies Present       Toxic Granulation Present       Clumped Platelets Present     POCT GLUCOSE   Result Value Ref Range     POCT Glucose 156 (H) 74 - 99 mg/dL      XR chest 1 view     Result Date: 10/20/2023  Interpreted By:  Ranjeet Gil, STUDY: XR CHEST 1 VIEW;  10/20/2023 5:43 am   INDICATION: Signs/Symptoms:pna f/u, on vent.   COMPARISON: Portable chest, 19 October 2023   ACCESSION NUMBER(S): [id number]   ORDERING CLINICIAN: JESSA PENA   TECHNIQUE: Single frontal view of the chest; Portable technique   FINDINGS:   The endotracheal tube is well positioned   Left sided central line is unchanged and well positioned   Enteric tube has been retracted, tip now projects over proximal gastric body with about 10 cm purchase in the stomach   Chronic band of atelectasis or scarring at left lung base is unchanged   Again low lung volumes with exaggerated interstitial markings and patchy airspace opacities   No new acute process such as large pleural effusion or pneumothorax has developed        No interval change   MACRO: None   Signed by: Ranjeet Gil 10/20/2023 8:11 AM Dictation workstation:   [id number]     ECG 12 Lead     Result Date:  10/19/2023  Normal sinus rhythm Normal ECG No previous ECGs available Confirmed by Amilcar Lopez ([id number]) on 10/19/2023 3:23:25 PM        Assessment/Plan   Principal Problem:    Acute appendicitis with localized peritonitis  Active Problems:    Anxiety    Depression    Hyperlipidemia    Diabetes mellitus, type 2 (CMS/HCC)    Appendicitis, acute    Appendicitis    Acute appendicitis, unspecified acute appendicitis type    Small bowel obstruction (CMS/HCC)     POD9 from lap appy and hernia repair, POD5 from reexploration, EUGENIA, and repair of hernia.  Patient remains intubated for what appears to be aspiration pneumonia, wound infection, and severe agitation     Neuro:   #Bipolar 1 disorder,depression  #Anxiety & Depression  #Panic disorder  -hold all oral home psych meds for now given SBO, will restart when resumption of diet  -Propofol, precedex, weaned off ketamine  -Keep zyprexa at 5mg q6h, will try to wean propofol due to increasing triglycerides  - Fentanyl ggt for incisional pain  -[facility] ICU  - restraints required to prevent inadvertent extubation as patient reaches for ETT when off, renewed restraints today     Pulm:   #Post-operative respiratory insufficiency  -2/2 aspiration pneumonia with mdr klebsiela, will change abx to meropenem, reset to day 2/7  -keep intubated today given hypoxemia and agitation  -If improving in next day or two, will consider diuresis, but not with concern for sepsis     CV:   -Normocardic and off pressors  -Continue to monitor     GI:   #Acute appendicitis with localized peritonitis without perforation or gangrene  #SBO  -obstruction from an interloop adhesion and NOT from the hernia,  -s/p appendectomy 10/10/23  -NPO  -TPN for persistent lack of nutrition  -Still awaiting return of bowel function        :   -Replete elecrolytes  -TPN, no need for other IVF  -FU for resolved DYLAN        Heme:   - H/H stable  - lovenox     Endo:   #DMII   -recently diagnosed  -q4hr accuchecks with SS  coverage, has not required significant amount of insulin so will just keep SSI, may need more once TPN is initiated     ID:   -WBC now 20K  -CT CAP to ensure no other sources of infection  - maki due to mdr klebsiela pneumonia, will follow up cultures from wound  -Pack WTD BID for wound     Pulmonary comments:-Agree with the current treatment as ordered by ICU service.  Unfortunately they are unable to get this patient off the vent due to extreme agitation despite being on multiple psychiatric medications and IV       Mann Ricks MD

## 2023-10-21 NOTE — PROCEDURES
Central Venous Catheter Insertion Procedure Note        Indications:  vascular access, parenteral nutrition    Procedure Details   Due to the emergent nature of this procedure, consent was not able to be obtained    Maximum sterile technique was used including antiseptics, cap, gloves, gown, hand hygiene, mask, and sheet.    Under sterile conditions the right neck  was prepped with betadine and covered with a sterile drape. Local anesthesia was applied to the skin and subcutaneous tissues. A 22-gauge needle was used to identify the vein under ultrasound guidance. An 18-gauge needle was then inserted into the vein. A guide wire was then passed easily through the catheter. There were no arrhythmias. The catheter was then withdrawn. A 8.0 Icelandic  quadruple lumen  was then inserted into the vessel over the guide wire. The catheter was sutured into place.    Findings:  There were no changes to vital signs. Catheter was flushed with 20 cc NS. Patient did tolerate procedure well.    Recommendations:  CXR ordered with proper placement on scan, the left internal jugular line was subsequently removed.    Norman Calloway MD  10/21/23

## 2023-10-21 NOTE — PROGRESS NOTES
Victorino Lara is a 48 y.o. adult on day 9 of admission presenting with Acute appendicitis with localized peritonitis.      Subjective   Patient is a 48 y.o. adult admitted on 10/10/2023  8:50 AM with the following indication(s) for ICU care Aspiration pneumonia.      Interval History: No acute events overnight, afebrile, had some issues with sedation requiring increasing propofol to 50mcg/hr, WBC down from 20->17K, per nurse, left internal jugular line was pulled and not working despite in place on CXR     Complaints: Intubated, sedated.     Scheduled Medications:   [Held by provider] busPIRone, 15 mg, oral, BID  [Held by provider] docusate sodium, 100 mg, oral, BID  enoxaparin, 40 mg, subcutaneous, q24h  insulin lispro, 0-10 Units, subcutaneous, q4h  [Held by provider] insulin regular, 0-5 Units, subcutaneous, TID with meals  [Held by provider] lurasidone, 80 mg, oral, Daily  meropenem, 1 g, intravenous, q8h  [Held by provider] pantoprazole, 40 mg, oral, Daily before breakfast  pantoprazole, 40 mg, intravenous, Daily  potassium phosphate, 21 mmol, intravenous, Once  [Held by provider] QUEtiapine, 300 mg, oral, BID  [Held by provider] simvastatin, 40 mg, oral, Nightly  sodium hypochlorite, , irrigation, BID  [Held by provider] traZODone, 300 mg, oral, Nightly  valproate sodium, 500 mg, intravenous, q12h  [Held by provider] venlafaxine XR, 150 mg, oral, Daily           Continuous Medications:   Adult Clinimix TPN, 70 mL/hr, Last Rate: 70 mL/hr (10/21/23 0833)  dexmedeTOMIDine, 0.1-1.5 mcg/kg/hr, Last Rate: 1 mcg/kg/hr (10/21/23 1312)  fentaNYL,  mcg/hr, Last Rate: 125 mcg/hr (10/21/23 1311)  propofol, 5-50 mcg/kg/min, Last Rate: 50 mcg/kg/min (10/21/23 1449)           PRN Medications:   PRN medications: alteplase, dextrose 10 % in water (D10W), dextrose, glucagon, HYDROmorphone, HYDROmorphone, ipratropium-albuteroL, naloxone, OLANZapine, oxygen     Objective   Physical Exam:   General appearance: NAD, intubated,  sedated  Head: Normocephalic, without obvious abnormality  Eyes:conjunctivae/corneas clear. PERRL  Neck:  supple, symmetrical, trachea midline, left sided internal jugular line pulled out slightly  Lungs:clear to auscultation bilaterally  Chest wall:  no tenderness  Heart: regular rate and rhythm, S1, S2 normal, no murmur, click, rub or gallop  Abdomen: Soft, ND, still erythematouns around wound but is stable, wound is open about 5cm with packing, fascia intact, no more drainage  Male genitalia:  normal  Extremities: 2+ edema  Pulses:2+ and symmetric  Skin: Skin color, texture, turgor normal.  No rashes or lesions  Neurologic: Intubated, sedated, moving extremities x4 spontaneously  Last Recorded Vitals    /63 (BP Location: Left arm, Patient Position: Lying)   Pulse 68   Temp 37 °C (98.6 °F)   Resp 19   Wt 101 kg (223 lb 1.7 oz)   SpO2 98%   Intake/Output last 3 Shifts:    Intake/Output Summary (Last 24 hours) at 10/21/2023 1657  Last data filed at 10/21/2023 1531  Gross per 24 hour   Intake 3137.17 ml   Output 3075 ml   Net 62.17 ml       Admission Weight  Weight: 94.5 kg (208 lb 5.4 oz) (10/10/23 0831)    Daily Weight  10/21/23 : 101 kg (223 lb 1.7 oz)    Image Results        Assessment/Plan   Acute Respiratory Failure, post op  Aspiration Pneumonia      Principal Problem:    Acute appendicitis with localized peritonitis  Active Problems:    Anxiety    Depression    Hyperlipidemia    Diabetes mellitus, type 2 (CMS/HCC)    Appendicitis, acute    Appendicitis    Acute appendicitis, unspecified acute appendicitis type    Small bowel obstruction (CMS/HCC)    Pulmonary comments:-Patient unable to wean off vent postoperatively.  Patient has been in the range since October 14, 2023.  Between agitation and aspiration pneumonia patient unable to come off the vent.  Agree with daily attempts to wean off the vent as tolerated.  I agree with the rest of the treatment as being done by ICU service.  Pulmonary will  continue to monitor this patient with you.              Mann Ricks MD

## 2023-10-21 NOTE — CARE PLAN
The patient's goals for the shift include Pain management    The clinical goals for the shift include Patient will maintain CPOT less than 3 through end of shift.    Pt is progressing toward goals

## 2023-10-22 ENCOUNTER — APPOINTMENT (OUTPATIENT)
Dept: RADIOLOGY | Facility: HOSPITAL | Age: 48
End: 2023-10-22
Payer: COMMERCIAL

## 2023-10-22 LAB
ALBUMIN SERPL BCP-MCNC: 2.6 G/DL (ref 3.4–5)
ALP SERPL-CCNC: 71 U/L (ref 33–120)
ALT SERPL W P-5'-P-CCNC: 14 U/L (ref 7–52)
ANION GAP BLDA CALCULATED.4IONS-SCNC: 7 MMO/L (ref 10–25)
ANION GAP BLDA CALCULATED.4IONS-SCNC: 8 MMO/L (ref 10–25)
ANION GAP SERPL CALC-SCNC: 12 MMOL/L (ref 10–20)
ANION GAP SERPL CALC-SCNC: 9 MMOL/L (ref 10–20)
APPARATUS: ABNORMAL
AST SERPL W P-5'-P-CCNC: 21 U/L (ref 9–39)
BACTERIA SPEC RESP CULT: ABNORMAL
BACTERIA SPEC RESP CULT: ABNORMAL
BASE EXCESS BLDA CALC-SCNC: 1.7 MMOL/L (ref -2–3)
BASE EXCESS BLDA CALC-SCNC: 2.2 MMOL/L (ref -2–3)
BASOPHILS # BLD AUTO: 0.06 X10*3/UL (ref 0–0.1)
BASOPHILS NFR BLD AUTO: 0.4 %
BILIRUB SERPL-MCNC: 0.7 MG/DL (ref 0–1.2)
BODY TEMPERATURE: ABNORMAL
BODY TEMPERATURE: ABNORMAL
BUN SERPL-MCNC: 13 MG/DL (ref 6–23)
BUN SERPL-MCNC: 13 MG/DL (ref 6–23)
CA-I BLDA-SCNC: 1.07 MMOL/L (ref 1.1–1.33)
CA-I BLDA-SCNC: 1.1 MMOL/L (ref 1.1–1.33)
CALCIUM SERPL-MCNC: 7.8 MG/DL (ref 8.6–10.3)
CALCIUM SERPL-MCNC: 7.8 MG/DL (ref 8.6–10.3)
CHLORIDE BLDA-SCNC: 106 MMOL/L (ref 98–107)
CHLORIDE BLDA-SCNC: 107 MMOL/L (ref 98–107)
CHLORIDE SERPL-SCNC: 104 MMOL/L (ref 98–107)
CHLORIDE SERPL-SCNC: 104 MMOL/L (ref 98–107)
CO2 SERPL-SCNC: 25 MMOL/L (ref 21–32)
CO2 SERPL-SCNC: 26 MMOL/L (ref 21–32)
CREAT SERPL-MCNC: 0.69 MG/DL (ref 0.5–1.3)
CREAT SERPL-MCNC: 0.79 MG/DL (ref 0.5–1.3)
EOSINOPHIL # BLD AUTO: 0.37 X10*3/UL (ref 0–0.7)
EOSINOPHIL NFR BLD AUTO: 2.7 %
ERYTHROCYTE [DISTWIDTH] IN BLOOD BY AUTOMATED COUNT: 13.7 % (ref 11.5–14.5)
GFR SERPL CREATININE-BSD FRML MDRD: >90 ML/MIN/1.73M*2
GFR SERPL CREATININE-BSD FRML MDRD: >90 ML/MIN/1.73M*2
GLUCOSE BLD MANUAL STRIP-MCNC: 141 MG/DL (ref 74–99)
GLUCOSE BLD MANUAL STRIP-MCNC: 148 MG/DL (ref 74–99)
GLUCOSE BLD MANUAL STRIP-MCNC: 148 MG/DL (ref 74–99)
GLUCOSE BLD MANUAL STRIP-MCNC: 151 MG/DL (ref 74–99)
GLUCOSE BLD MANUAL STRIP-MCNC: 164 MG/DL (ref 74–99)
GLUCOSE BLDA-MCNC: 148 MG/DL (ref 74–99)
GLUCOSE BLDA-MCNC: 163 MG/DL (ref 74–99)
GLUCOSE SERPL-MCNC: 141 MG/DL (ref 74–99)
GLUCOSE SERPL-MCNC: 160 MG/DL (ref 74–99)
GRAM STN SPEC: ABNORMAL
GRAM STN SPEC: ABNORMAL
HCO3 BLDA-SCNC: 26.5 MMOL/L (ref 22–26)
HCO3 BLDA-SCNC: 26.6 MMOL/L (ref 22–26)
HCT VFR BLD AUTO: 24.1 % (ref 36–52)
HCT VFR BLD EST: 25 % (ref 36–52)
HCT VFR BLD EST: 30 % (ref 36–52)
HGB BLD-MCNC: 7.8 G/DL (ref 12–17.5)
HGB BLDA-MCNC: 10 G/DL (ref 13.5–17.5)
HGB BLDA-MCNC: 8.2 G/DL (ref 13.5–17.5)
IMM GRANULOCYTES # BLD AUTO: 0.65 X10*3/UL (ref 0–0.7)
IMM GRANULOCYTES NFR BLD AUTO: 4.7 % (ref 0–0.9)
INHALED O2 CONCENTRATION: 50 %
INHALED O2 CONCENTRATION: 50 %
LACTATE BLDA-SCNC: 0.7 MMOL/L (ref 0.4–2)
LACTATE BLDA-SCNC: 1.1 MMOL/L (ref 0.4–2)
LYMPHOCYTES # BLD AUTO: 2.15 X10*3/UL (ref 1.2–4.8)
LYMPHOCYTES NFR BLD AUTO: 15.4 %
MAGNESIUM SERPL-MCNC: 1.89 MG/DL (ref 1.6–2.4)
MCH RBC QN AUTO: 28.8 PG (ref 26–34)
MCHC RBC AUTO-ENTMCNC: 32.4 G/DL (ref 32–36)
MCV RBC AUTO: 89 FL (ref 80–100)
MONOCYTES # BLD AUTO: 1.1 X10*3/UL (ref 0.1–1)
MONOCYTES NFR BLD AUTO: 7.9 %
NEUTROPHILS # BLD AUTO: 9.61 X10*3/UL (ref 1.2–7.7)
NEUTROPHILS NFR BLD AUTO: 68.9 %
NRBC BLD-RTO: 0 /100 WBCS (ref 0–0)
OXYHGB MFR BLDA: 96.5 % (ref 94–98)
OXYHGB MFR BLDA: 98.4 % (ref 94–98)
PCO2 BLDA: 39 MM HG (ref 38–42)
PCO2 BLDA: 42 MM HG (ref 38–42)
PEEP CMH2O: 7 CM H2O
PEEP CMH2O: 7 CM H2O
PH BLDA: 7.41 PH (ref 7.38–7.42)
PH BLDA: 7.44 PH (ref 7.38–7.42)
PHOSPHATE SERPL-MCNC: 3.4 MG/DL (ref 2.5–4.9)
PLATELET # BLD AUTO: 513 X10*3/UL (ref 150–450)
PMV BLD AUTO: 10 FL (ref 7.5–11.5)
PO2 BLDA: 107 MM HG (ref 85–95)
PO2 BLDA: 81 MM HG (ref 85–95)
POTASSIUM BLDA-SCNC: 3.7 MMOL/L (ref 3.5–5.3)
POTASSIUM BLDA-SCNC: 4 MMOL/L (ref 3.5–5.3)
POTASSIUM SERPL-SCNC: 3.5 MMOL/L (ref 3.5–5.3)
POTASSIUM SERPL-SCNC: 3.8 MMOL/L (ref 3.5–5.3)
PROT SERPL-MCNC: 6.2 G/DL (ref 6.4–8.2)
RBC # BLD AUTO: 2.71 X10*6/UL (ref 4–5.9)
SAO2 % BLDA: 100 % (ref 94–100)
SAO2 % BLDA: 98 % (ref 94–100)
SODIUM BLDA-SCNC: 136 MMOL/L (ref 136–145)
SODIUM BLDA-SCNC: 138 MMOL/L (ref 136–145)
SODIUM SERPL-SCNC: 134 MMOL/L (ref 136–145)
SODIUM SERPL-SCNC: 138 MMOL/L (ref 136–145)
TIDAL VOLUME: 450 ML
TIDAL VOLUME: 450 ML
VENTILATOR MODE: ABNORMAL
VENTILATOR MODE: ABNORMAL
VENTILATOR RATE: 14 BPM
VENTILATOR RATE: 14 BPM
WBC # BLD AUTO: 13.9 X10*3/UL (ref 4.4–11.3)

## 2023-10-22 PROCEDURE — 96372 THER/PROPH/DIAG INJ SC/IM: CPT

## 2023-10-22 PROCEDURE — 84132 ASSAY OF SERUM POTASSIUM: CPT | Performed by: SURGERY

## 2023-10-22 PROCEDURE — 2500000004 HC RX 250 GENERAL PHARMACY W/ HCPCS (ALT 636 FOR OP/ED)

## 2023-10-22 PROCEDURE — C9113 INJ PANTOPRAZOLE SODIUM, VIA: HCPCS | Performed by: SURGERY

## 2023-10-22 PROCEDURE — 84132 ASSAY OF SERUM POTASSIUM: CPT

## 2023-10-22 PROCEDURE — S0166 INJ OLANZAPINE 2.5MG: HCPCS

## 2023-10-22 PROCEDURE — 96372 THER/PROPH/DIAG INJ SC/IM: CPT | Performed by: SURGERY

## 2023-10-22 PROCEDURE — 99291 CRITICAL CARE FIRST HOUR: CPT | Performed by: SURGERY

## 2023-10-22 PROCEDURE — 2500000005 HC RX 250 GENERAL PHARMACY W/O HCPCS

## 2023-10-22 PROCEDURE — 2500000001 HC RX 250 WO HCPCS SELF ADMINISTERED DRUGS (ALT 637 FOR MEDICARE OP): Performed by: SURGERY

## 2023-10-22 PROCEDURE — 71045 X-RAY EXAM CHEST 1 VIEW: CPT

## 2023-10-22 PROCEDURE — 83735 ASSAY OF MAGNESIUM: CPT

## 2023-10-22 PROCEDURE — 82947 ASSAY GLUCOSE BLOOD QUANT: CPT

## 2023-10-22 PROCEDURE — 85025 COMPLETE CBC W/AUTO DIFF WBC: CPT

## 2023-10-22 PROCEDURE — 2500000005 HC RX 250 GENERAL PHARMACY W/O HCPCS: Performed by: SURGERY

## 2023-10-22 PROCEDURE — 2500000004 HC RX 250 GENERAL PHARMACY W/ HCPCS (ALT 636 FOR OP/ED): Performed by: SURGERY

## 2023-10-22 PROCEDURE — 2020000001 HC ICU ROOM DAILY

## 2023-10-22 PROCEDURE — 37799 UNLISTED PX VASCULAR SURGERY: CPT | Performed by: SURGERY

## 2023-10-22 PROCEDURE — 94003 VENT MGMT INPAT SUBQ DAY: CPT

## 2023-10-22 PROCEDURE — 84100 ASSAY OF PHOSPHORUS: CPT

## 2023-10-22 PROCEDURE — 84132 ASSAY OF SERUM POTASSIUM: CPT | Performed by: HOSPITALIST

## 2023-10-22 PROCEDURE — 37799 UNLISTED PX VASCULAR SURGERY: CPT

## 2023-10-22 PROCEDURE — 71045 X-RAY EXAM CHEST 1 VIEW: CPT | Performed by: RADIOLOGY

## 2023-10-22 PROCEDURE — 2500000004 HC RX 250 GENERAL PHARMACY W/ HCPCS (ALT 636 FOR OP/ED): Performed by: NURSE PRACTITIONER

## 2023-10-22 RX ORDER — MAGNESIUM SULFATE HEPTAHYDRATE 40 MG/ML
4 INJECTION, SOLUTION INTRAVENOUS ONCE
Status: COMPLETED | OUTPATIENT
Start: 2023-10-22 | End: 2023-10-22

## 2023-10-22 RX ORDER — MIDAZOLAM HYDROCHLORIDE 1 MG/ML
.5-2 INJECTION, SOLUTION INTRAVENOUS CONTINUOUS
Status: DISCONTINUED | OUTPATIENT
Start: 2023-10-22 | End: 2023-10-26

## 2023-10-22 RX ORDER — FUROSEMIDE 10 MG/ML
20 INJECTION INTRAMUSCULAR; INTRAVENOUS ONCE
Status: COMPLETED | OUTPATIENT
Start: 2023-10-22 | End: 2023-10-22

## 2023-10-22 RX ORDER — CALCIUM GLUCONATE 20 MG/ML
2 INJECTION, SOLUTION INTRAVENOUS ONCE
Status: COMPLETED | OUTPATIENT
Start: 2023-10-22 | End: 2023-10-22

## 2023-10-22 RX ORDER — POTASSIUM CHLORIDE 29.8 MG/ML
40 INJECTION INTRAVENOUS ONCE
Status: COMPLETED | OUTPATIENT
Start: 2023-10-22 | End: 2023-10-22

## 2023-10-22 RX ADMIN — MAGNESIUM SULFATE HEPTAHYDRATE 4 G: 40 INJECTION, SOLUTION INTRAVENOUS at 09:54

## 2023-10-22 RX ADMIN — PROPOFOL 50 MCG/KG/MIN: 10 INJECTION, EMULSION INTRAVENOUS at 02:14

## 2023-10-22 RX ADMIN — DEXMEDETOMIDINE HYDROCHLORIDE 1.5 MCG/KG/HR: 4 INJECTION, SOLUTION INTRAVENOUS at 21:53

## 2023-10-22 RX ADMIN — DEXMEDETOMIDINE HYDROCHLORIDE 1.5 MCG/KG/HR: 4 INJECTION, SOLUTION INTRAVENOUS at 15:17

## 2023-10-22 RX ADMIN — DEXMEDETOMIDINE HYDROCHLORIDE 1.5 MCG/KG/HR: 4 INJECTION, SOLUTION INTRAVENOUS at 17:29

## 2023-10-22 RX ADMIN — POTASSIUM CHLORIDE 40 MEQ: 400 INJECTION, SOLUTION INTRAVENOUS at 02:25

## 2023-10-22 RX ADMIN — PANTOPRAZOLE SODIUM 40 MG: 40 INJECTION, POWDER, FOR SOLUTION INTRAVENOUS at 10:02

## 2023-10-22 RX ADMIN — VALPROATE SODIUM 500 MG: 100 INJECTION, SOLUTION INTRAVENOUS at 15:18

## 2023-10-22 RX ADMIN — DEXMEDETOMIDINE HYDROCHLORIDE 1.5 MCG/KG/HR: 4 INJECTION, SOLUTION INTRAVENOUS at 02:15

## 2023-10-22 RX ADMIN — INSULIN LISPRO 2 UNITS: 100 INJECTION, SOLUTION INTRAVENOUS; SUBCUTANEOUS at 22:00

## 2023-10-22 RX ADMIN — POTASSIUM CHLORIDE 40 MEQ: 29.8 INJECTION, SOLUTION INTRAVENOUS at 10:02

## 2023-10-22 RX ADMIN — Medication: at 08:31

## 2023-10-22 RX ADMIN — DEXMEDETOMIDINE HYDROCHLORIDE 1.5 MCG/KG/HR: 4 INJECTION, SOLUTION INTRAVENOUS at 12:14

## 2023-10-22 RX ADMIN — CALCIUM GLUCONATE 2 G: 20 INJECTION, SOLUTION INTRAVENOUS at 12:17

## 2023-10-22 RX ADMIN — ENOXAPARIN SODIUM 40 MG: 40 INJECTION SUBCUTANEOUS at 17:29

## 2023-10-22 RX ADMIN — PROPOFOL 50 MCG/KG/MIN: 10 INJECTION, EMULSION INTRAVENOUS at 12:13

## 2023-10-22 RX ADMIN — FUROSEMIDE 20 MG: 10 INJECTION, SOLUTION INTRAMUSCULAR; INTRAVENOUS at 10:03

## 2023-10-22 RX ADMIN — FENTANYL CITRATE 200 MCG/HR: 0.05 INJECTION, SOLUTION INTRAMUSCULAR; INTRAVENOUS at 12:23

## 2023-10-22 RX ADMIN — PROPOFOL 50 MCG/KG/MIN: 10 INJECTION, EMULSION INTRAVENOUS at 07:13

## 2023-10-22 RX ADMIN — DEXMEDETOMIDINE HYDROCHLORIDE 1.5 MCG/KG/HR: 4 INJECTION, SOLUTION INTRAVENOUS at 07:12

## 2023-10-22 RX ADMIN — FENTANYL CITRATE 200 MCG/HR: 0.05 INJECTION, SOLUTION INTRAMUSCULAR; INTRAVENOUS at 02:16

## 2023-10-22 RX ADMIN — HYOSCYAMINE SULFATE 473 ML: 16 SOLUTION at 10:03

## 2023-10-22 RX ADMIN — VALPROATE SODIUM 500 MG: 100 INJECTION, SOLUTION INTRAVENOUS at 21:55

## 2023-10-22 RX ADMIN — PROPOFOL 45 MCG/KG/MIN: 10 INJECTION, EMULSION INTRAVENOUS at 22:25

## 2023-10-22 RX ADMIN — FENTANYL CITRATE 200 MCG/HR: 0.05 INJECTION, SOLUTION INTRAMUSCULAR; INTRAVENOUS at 19:20

## 2023-10-22 RX ADMIN — MEROPENEM 1 G: 1 INJECTION, POWDER, FOR SOLUTION INTRAVENOUS at 12:14

## 2023-10-22 RX ADMIN — PROPOFOL 50 MCG/KG/MIN: 10 INJECTION, EMULSION INTRAVENOUS at 15:17

## 2023-10-22 RX ADMIN — VALPROATE SODIUM 500 MG: 100 INJECTION, SOLUTION INTRAVENOUS at 02:26

## 2023-10-22 RX ADMIN — FENTANYL CITRATE 200 MCG/HR: 0.05 INJECTION, SOLUTION INTRAMUSCULAR; INTRAVENOUS at 18:18

## 2023-10-22 RX ADMIN — ASCORBIC ACID, VITAMIN A PALMITATE, CHOLECALCIFEROL, THIAMINE HYDROCHLORIDE, RIBOFLAVIN-5 PHOSPHATE SODIUM, PYRIDOXINE HYDROCHLORIDE, NIACINAMIDE, DEXPANTHENOL, ALPHA-TOCOPHEROL ACETATE, VITAMIN K1, FOLIC ACID, BIOTIN, CYANOCOBALAMIN: 200; 3300; 200; 6; 3.6; 6; 40; 15; 10; 150; 600; 60; 5 INJECTION, SOLUTION INTRAVENOUS at 22:00

## 2023-10-22 RX ADMIN — FENTANYL CITRATE 200 MCG/HR: 0.05 INJECTION, SOLUTION INTRAMUSCULAR; INTRAVENOUS at 08:30

## 2023-10-22 RX ADMIN — HYOSCYAMINE SULFATE 473 ML: 16 SOLUTION at 21:57

## 2023-10-22 RX ADMIN — PROPOFOL 50 MCG/KG/MIN: 10 INJECTION, EMULSION INTRAVENOUS at 18:18

## 2023-10-22 RX ADMIN — DEXMEDETOMIDINE HYDROCHLORIDE 1.5 MCG/KG/HR: 4 INJECTION, SOLUTION INTRAVENOUS at 10:09

## 2023-10-22 RX ADMIN — HYOSCYAMINE SULFATE 473 ML: 16 SOLUTION at 02:23

## 2023-10-22 RX ADMIN — MIDAZOLAM HYDROCHLORIDE 0.5 MG/HR: 1 INJECTION, SOLUTION INTRAVENOUS at 15:20

## 2023-10-22 RX ADMIN — OLANZAPINE 2.5 MG: 10 INJECTION, POWDER, FOR SOLUTION INTRAMUSCULAR at 11:45

## 2023-10-22 RX ADMIN — Medication 50 MG: at 11:53

## 2023-10-22 RX ADMIN — PROPOFOL 50 MCG/KG/MIN: 10 INJECTION, EMULSION INTRAVENOUS at 08:30

## 2023-10-22 RX ADMIN — MEROPENEM 1 G: 1 INJECTION, POWDER, FOR SOLUTION INTRAVENOUS at 21:55

## 2023-10-22 ASSESSMENT — COGNITIVE AND FUNCTIONAL STATUS - GENERAL
MOBILITY SCORE: 6
MOVING TO AND FROM BED TO CHAIR: TOTAL
CLIMB 3 TO 5 STEPS WITH RAILING: TOTAL
TOILETING: TOTAL
TURNING FROM BACK TO SIDE WHILE IN FLAT BAD: TOTAL
STANDING UP FROM CHAIR USING ARMS: TOTAL
DRESSING REGULAR LOWER BODY CLOTHING: TOTAL
DAILY ACTIVITIY SCORE: 6
WALKING IN HOSPITAL ROOM: TOTAL
PERSONAL GROOMING: TOTAL
EATING MEALS: TOTAL
HELP NEEDED FOR BATHING: TOTAL
DRESSING REGULAR UPPER BODY CLOTHING: TOTAL
MOVING FROM LYING ON BACK TO SITTING ON SIDE OF FLAT BED WITH BEDRAILS: TOTAL

## 2023-10-22 NOTE — PROGRESS NOTES
Victorino Lara is a 48 y.o. adult on day 10 of admission presenting with Acute appendicitis with localized peritonitis.       Subjective   Patient is a 48 y.o. adult admitted on 10/10/2023  8:50 AM with the following indication(s) for ICU care Aspiration pneumonia.      Interval History: No acute events overnight, afebrile, had some issues with sedation requiring increasing propofol to 50mcg/hr, WBC down from 20->17K, per nurse, left internal jugular line was pulled and not working despite in place on CXR     Complaints: Intubated, sedated.     Scheduled Medications:   [Held by provider] busPIRone, 15 mg, oral, BID  [Held by provider] docusate sodium, 100 mg, oral, BID  enoxaparin, 40 mg, subcutaneous, q24h  insulin lispro, 0-10 Units, subcutaneous, q4h  [Held by provider] insulin regular, 0-5 Units, subcutaneous, TID with meals  [Held by provider] lurasidone, 80 mg, oral, Daily  meropenem, 1 g, intravenous, q8h  [Held by provider] pantoprazole, 40 mg, oral, Daily before breakfast  pantoprazole, 40 mg, intravenous, Daily  potassium phosphate, 21 mmol, intravenous, Once  [Held by provider] QUEtiapine, 300 mg, oral, BID  [Held by provider] simvastatin, 40 mg, oral, Nightly  sodium hypochlorite, , irrigation, BID  [Held by provider] traZODone, 300 mg, oral, Nightly  valproate sodium, 500 mg, intravenous, q12h  [Held by provider] venlafaxine XR, 150 mg, oral, Daily           Continuous Medications:   Adult Clinimix TPN, 70 mL/hr, Last Rate: 70 mL/hr (10/21/23 0833)  dexmedeTOMIDine, 0.1-1.5 mcg/kg/hr, Last Rate: 1 mcg/kg/hr (10/21/23 1312)  fentaNYL,  mcg/hr, Last Rate: 125 mcg/hr (10/21/23 1311)  propofol, 5-50 mcg/kg/min, Last Rate: 50 mcg/kg/min (10/21/23 1449)           PRN Medications:   PRN medications: alteplase, dextrose 10 % in water (D10W), dextrose, glucagon, HYDROmorphone, HYDROmorphone, ipratropium-albuteroL, naloxone, OLANZapine, oxygen           Objective   Physical Exam:   General appearance: NAD,  intubated, sedated  Head: Normocephalic, without obvious abnormality  Eyes:conjunctivae/corneas clear. PERRL  Neck:  supple, symmetrical, trachea midline, left sided internal jugular line pulled out slightly  Lungs:clear to auscultation bilaterally  Chest wall:  no tenderness  Heart: regular rate and rhythm, S1, S2 normal, no murmur, click, rub or gallop  Abdomen: Soft, ND, still erythematouns around wound but is stable, wound is open about 5cm with packing, fascia intact, no more drainage  Male genitalia:  normal  Extremities: 2+ edema  Pulses:2+ and symmetric  Skin: Skin color, texture, turgor normal.  No rashes or lesions  Neurologic: Intubated, sedated, moving extremities x4 spontaneously  Last Recorded Vitals     /63 (BP Location: Left arm, Patient Position: Lying)   Pulse 68   Temp 37 °C (98.6 °F)   Resp 19   Wt 101 kg (223 lb 1.7 oz)   SpO2 98%   Intake/Output last 3 Shifts:     Intake/Output Summary (Last 24 hours) at 10/21/2023 1657  Last data filed at 10/21/2023 1531      Gross per 24 hour   Intake 3137.17 ml   Output 3075 ml   Net 62.17 ml         Admission Weight  Weight: 94.5 kg (208 lb 5.4 oz) (10/10/23 0831)     Daily Weight  10/21/23 : 101 kg (223 lb 1.7 oz)     Image Results         Assessment/Plan   Acute Respiratory Failure, post op  Aspiration Pneumonia        Principal Problem:    Acute appendicitis with localized peritonitis  Active Problems:    Anxiety    Depression    Hyperlipidemia    Diabetes mellitus, type 2 (CMS/HCC)    Appendicitis, acute    Appendicitis    Acute appendicitis, unspecified acute appendicitis type    Small bowel obstruction (CMS/HCC)     Pulmonary comments:-Patient unable to wean off vent postoperatively.  Patient has been in the range since October 14, 2023.  Between agitation and aspiration pneumonia patient unable to come off the vent.  Agree with daily attempts to wean off the vent as tolerated.  I agree with the rest of the treatment as being done by ICU  service.  Pulmonary will continue to monitor this patient with you.         Mann Ricks MD

## 2023-10-22 NOTE — PROGRESS NOTES
Critical Care Daily Progress        Subjective   Patient is a 48 y.o. adult admitted on 10/10/2023  8:50 AM with the following indication(s) for ICU care aspiration pneumonia.     Interval History: Had more agitation overnight, required intermittent doses of ketamine, otherwise hemodynamically stable, WBC decreasing down to 14K.     Complaints: Intubated, sedated.     Scheduled Medications:   [Held by provider] busPIRone, 15 mg, oral, BID  calcium gluconate, 2 g, intravenous, Once  [Held by provider] docusate sodium, 100 mg, oral, BID  enoxaparin, 40 mg, subcutaneous, q24h  insulin lispro, 0-10 Units, subcutaneous, q4h  [Held by provider] insulin regular, 0-5 Units, subcutaneous, TID with meals  ketamine in NaCl, iso-osmotic, , ,   ketamine, 50 mg, intravenous, Once  [Held by provider] lurasidone, 80 mg, oral, Daily  magnesium sulfate, 4 g, intravenous, Once  meropenem, 1 g, intravenous, q8h  [Held by provider] pantoprazole, 40 mg, oral, Daily before breakfast  pantoprazole, 40 mg, intravenous, Daily  potassium chloride, 40 mEq, intravenous, Once  potassium phosphate, 21 mmol, intravenous, Once  [Held by provider] QUEtiapine, 300 mg, oral, BID  [Held by provider] simvastatin, 40 mg, oral, Nightly  sodium hypochlorite, , irrigation, BID  [Held by provider] traZODone, 300 mg, oral, Nightly  valproate sodium, 500 mg, intravenous, q12h  [Held by provider] venlafaxine XR, 150 mg, oral, Daily         Continuous Medications:   Adult Clinimix TPN, 70 mL/hr, Last Rate: Stopped (10/22/23 1009)  dexmedeTOMIDine, 0.1-1.5 mcg/kg/hr, Last Rate: 1.5 mcg/kg/hr (10/22/23 1009)  fentaNYL,  mcg/hr, Last Rate: 200 mcg/hr (10/22/23 0830)  propofol, 5-50 mcg/kg/min, Last Rate: 50 mcg/kg/min (10/22/23 0830)         PRN Medications:   PRN medications: alteplase, dextrose 10 % in water (D10W), dextrose, glucagon, HYDROmorphone, HYDROmorphone, ipratropium-albuteroL, ketamine in NaCl, iso-osmotic, naloxone, OLANZapine,  oxygen    Objective   Vitals:  Most Recent:  Vitals:    10/22/23 0831   BP:    Pulse:    Resp:    Temp:    SpO2: 97%       24hr Min/Max:  Temp  Min: 35.8 °C (96.4 °F)  Max: 36 °C (96.8 °F)  Pulse  Min: 66  Max: 88  Resp  Min: 17  Max: 36  SpO2  Min: 95 %  Max: 100 %    LDA:  CVC 10/21/23 Quadruple lumen Non-tunneled Right Internal jugular (Active)   Placement Date/Time: 10/21/23 1334   Hand Hygiene Performed Prior to CVC Insertion: Yes  Site Prep: Chlorhexidine   Site Prep Agent has Completely Dried Before Insertion: Yes  All 5 Sterile Barriers Used (Gloves, Gown, Cap, Mask, Large Sterile Drape):...   Number of days: 0       Arterial Line 10/15/23 Right Radial (Active)   Placement Date/Time: 10/15/23 1846   Earliest Known Present: 10/15/23  Orientation: Right  Location: Radial  Securement Method: Sutured;Transparent dressing  Number of Sutures Placed: 2   Number of days: 6       ETT  7.5 mm (Active)   Placement Date/Time: 10/14/23 1950   Placed by External Staff?: (c) Other (Comment)  ETT Type: ETT - single  Single Lumen Tube Size: 7.5 mm  Location: Oral   Number of days: 7       Urethral Catheter Straight-tip 16 Fr. (Active)   Placement Date/Time: 10/19/23 1245   Placed by: Johanna Green RN  Hand Hygiene Completed: Yes  Catheter Type: Straight-tip  Tube Size (Fr.): 16 Fr.  Catheter Balloon Size: 10 mL  Urine Returned: Yes   Number of days: 2       NG/OG/Feeding Tube Nasogastric 14 Fr Left nostril (Active)   Placement Date/Time: 10/12/23 1945   Hand Hygiene Completed: Yes  Type of Tube: NG/OG Tube  Tube Length: 55 cm  Tube Type: Nasogastric  NG/OG Tube Size: 14 Fr  Tube Location: Left nostril   Number of days: 9         Vent settings:  Vent Mode: Volume control/assist control  FiO2 (%):  [50 %] 50 %  S RR:  [14] 14  S VT:  [450 mL] 450 mL  PEEP/CPAP (cm H2O):  [7 cm H20] 7 cm H20  MAP (cm H2O):  [10-12] 10    Hemodynamic parameters for last 24 hours:       I/O:    Intake/Output Summary (Last 24 hours) at  10/22/2023 1053  Last data filed at 10/22/2023 1002  Gross per 24 hour   Intake 3169.74 ml   Output 3770 ml   Net -600.26 ml       Physical Exam:   General appearance: Ill but non-toxic, sedated, intubated  Head: Normocephalic, without obvious abnormality  Neck:  no JVD and supple, symmetrical, trachea midline  Lungs:clear to auscultation bilaterally  Chest wall:  no tenderness  Heart: regular rate and rhythm, S1, S2 normal, no murmur, click, rub or gallop  Abdomen: Soft, ND, NTTP, incision open wound that is clean, fascia intact, no drainage  Extremities: Mildly edematous  Pulses:2+ and symmetric  Skin: Skin color, texture, turgor normal.  No rashes or lesions  Neurologic: Alert and oriented X 3, normal strength and tone. Normal symmetric reflexes. Normal coordination and gait    Lab/Radiology/Diagnostic Review:  Results for orders placed or performed during the hospital encounter of 10/10/23 (from the past 24 hour(s))   POCT GLUCOSE   Result Value Ref Range    POCT Glucose 102 (H) 74 - 99 mg/dL   POCT GLUCOSE   Result Value Ref Range    POCT Glucose 158 (H) 74 - 99 mg/dL   POCT GLUCOSE   Result Value Ref Range    POCT Glucose 137 (H) 74 - 99 mg/dL   POCT GLUCOSE   Result Value Ref Range    POCT Glucose 148 (H) 74 - 99 mg/dL   CBC and Auto Differential   Result Value Ref Range    WBC 13.9 (H) 4.4 - 11.3 x10*3/uL    nRBC 0.0 0.0 - 0.0 /100 WBCs    RBC 2.71 (L) 4.00 - 5.90 x10*6/uL    Hemoglobin 7.8 (L) 12.0 - 17.5 g/dL    Hematocrit 24.1 (L) 36.0 - 52.0 %    MCV 89 80 - 100 fL    MCH 28.8 26.0 - 34.0 pg    MCHC 32.4 32.0 - 36.0 g/dL    RDW 13.7 11.5 - 14.5 %    Platelets 513 (H) 150 - 450 x10*3/uL    MPV 10.0 7.5 - 11.5 fL    Neutrophils % 68.9 40.0 - 80.0 %    Immature Granulocytes %, Automated 4.7 (H) 0.0 - 0.9 %    Lymphocytes % 15.4 13.0 - 44.0 %    Monocytes % 7.9 2.0 - 10.0 %    Eosinophils % 2.7 0.0 - 6.0 %    Basophils % 0.4 0.0 - 2.0 %    Neutrophils Absolute 9.61 (H) 1.20 - 7.70 x10*3/uL    Immature  Granulocytes Absolute, Automated 0.65 0.00 - 0.70 x10*3/uL    Lymphocytes Absolute 2.15 1.20 - 4.80 x10*3/uL    Monocytes Absolute 1.10 (H) 0.10 - 1.00 x10*3/uL    Eosinophils Absolute 0.37 0.00 - 0.70 x10*3/uL    Basophils Absolute 0.06 0.00 - 0.10 x10*3/uL   Phosphorus   Result Value Ref Range    Phosphorus 3.4 2.5 - 4.9 mg/dL   Magnesium   Result Value Ref Range    Magnesium 1.89 1.60 - 2.40 mg/dL   Comprehensive Metabolic Panel   Result Value Ref Range    Glucose 141 (H) 74 - 99 mg/dL    Sodium 138 136 - 145 mmol/L    Potassium 3.5 3.5 - 5.3 mmol/L    Chloride 104 98 - 107 mmol/L    Bicarbonate 26 21 - 32 mmol/L    Anion Gap 12 10 - 20 mmol/L    Urea Nitrogen 13 6 - 23 mg/dL    Creatinine 0.79 0.50 - 1.30 mg/dL    eGFR >90 >60 mL/min/1.73m*2    Calcium 7.8 (L) 8.6 - 10.3 mg/dL    Albumin 2.6 (L) 3.4 - 5.0 g/dL    Alkaline Phosphatase 71 33 - 120 U/L    Total Protein 6.2 (L) 6.4 - 8.2 g/dL    AST 21 9 - 39 U/L    Bilirubin, Total 0.7 0.0 - 1.2 mg/dL    ALT 14 7 - 52 U/L   Blood Gas Arterial Full Panel   Result Value Ref Range    POCT pH, Arterial 7.44 (H) 7.38 - 7.42 pH    POCT pCO2, Arterial 39 38 - 42 mm Hg    POCT pO2, Arterial 81 (L) 85 - 95 mm Hg    POCT SO2, Arterial 98 94 - 100 %    POCT Oxy Hemoglobin, Arterial 96.5 94.0 - 98.0 %    POCT Hematocrit Calculated, Arterial 25.0 (L) 36.0 - 52.0 %    POCT Sodium, Arterial 138 136 - 145 mmol/L    POCT Potassium, Arterial 3.7 3.5 - 5.3 mmol/L    POCT Chloride, Arterial 107 98 - 107 mmol/L    POCT Ionized Calcium, Arterial 1.07 (L) 1.10 - 1.33 mmol/L    POCT Glucose, Arterial 148 (H) 74 - 99 mg/dL    POCT Lactate, Arterial 1.1 0.4 - 2.0 mmol/L    POCT Base Excess, Arterial 2.2 -2.0 - 3.0 mmol/L    POCT HCO3 Calculated, Arterial 26.5 (H) 22.0 - 26.0 mmol/L    POCT Hemoglobin, Arterial 8.2 (L) 13.5 - 17.5 g/dL    POCT Anion Gap, Arterial 8 (L) 10 - 25 mmo/L    Patient Temperature      FiO2 50 %    Ventilator Mode A/C     Ventilator Rate 14 bpm    Tidal Volume 450 mL     Peep CHM2O 7.0 cm H2O   POCT GLUCOSE   Result Value Ref Range    POCT Glucose 164 (H) 74 - 99 mg/dL     XR chest 1 view    Result Date: 10/21/2023  Interpreted By:  Kavon Smiley, STUDY: XR CHEST 1 VIEW;  10/21/2023 2:15 pm   INDICATION: Signs/Symptoms:Central line placement.   COMPARISON: 10/21/2023 at 9:20 a.m..   ACCESSION NUMBER(S): [id number]   ORDERING CLINICIAN: MEAGHAN CHARLTON   FINDINGS: CARDIOMEDIASTINAL SILHOUETTE: A right jugular central line is now seen with tubing tip at the mid SVC level. Left jugular central line again seen with tubing tip at the brachiocephalic vein level near midline. Endotracheal tube tip projects 2 cm above the harvey level. NG tube extends to the stomach level with tip projecting in the left upper quadrant at the gastric body level. Cardiomegaly and aortic prominence is stable.   LUNGS: Inspiratory volume is low. Elevation of the right hemidiaphragm is present. Irregular interstitial thickening and patchy multifocal infiltrates greatest in the perihilar regions and left base are slightly more prominent from prior. Small pleural effusions not excluded. No pneumothorax is seen.   ABDOMEN: No remarkable upper abdominal findings.   BONES: Bones are stable.       1.  New right jugular central line with tubing tip at the mid SVC level. 2. Low inspiratory volume. Irregular interstitial thickening and patchy multifocal infiltrates greatest in the perihilar regions and left base slightly more prominent from prior which could be related to multifocal infection and/or edema.       MACRO: None.   Signed by: Kavon Smiley 10/21/2023 5:58 PM Dictation workstation:   [id number]    XR chest 1 view    Result Date: 10/21/2023  Interpreted By:  Kavon Smiley, STUDY: XR CHEST 1 VIEW;  10/21/2023 9:57 am   INDICATION: Signs/Symptoms:CVC PLACEMENT.   COMPARISON: 10/21/2023.   ACCESSION NUMBER(S): [id number]   ORDERING CLINICIAN: MEAGHAN CHARLTON   FINDINGS: CARDIOMEDIASTINAL SILHOUETTE:  Patient is slightly rotated to the left. Endotracheal tube tip projects 3 cm above the harvey level. There is a left jugular central line with tubing tip at the brachiocephalic vein level. Cardiomegaly mild aortic prominence is stable.   LUNGS: Inspiratory volume is low. Mild elevation of the right hemidiaphragm again seen. Persistent opacification in the left base retrocardiac region with air bronchograms is compatible with consolidation. Additional small bandlike region of subsegmental atelectasis or scarring in the left base lateral aspect again seen. Small left pleural effusion not fully excluded. No appreciable pneumothorax.   ABDOMEN: No remarkable upper abdominal findings.   BONES: Thoracic mild dextrocurvature may be exaggerated by positioning.       1.  Left base retrocardiac opacification again seen compatible with consolidation and possible volume loss. 2. Left basilar lateral aspect bandlike subsegmental atelectasis or scar similar to prior.       MACRO: None.   Signed by: Kavon Smiley 10/21/2023 12:26 PM Dictation workstation:   [id number]    XR chest 1 view    Result Date: 10/21/2023  Interpreted By:  Kavon Smiley, STUDY: XR CHEST 1 VIEW;  10/21/2023 5:30 am   INDICATION: Signs/Symptoms:hypoxia.   COMPARISON: 10/20/2023.   ACCESSION NUMBER(S): [id number]   ORDERING CLINICIAN: JESSA PENA   FINDINGS: CARDIOMEDIASTINAL SILHOUETTE: Patient is slightly rotated to the left. Endotracheal tube tip projects 2.5 cm above the harvey level. Left jugular central line has tip near the brachiocephalic/SVC junction level similar to prior. NG tube tip projects in the left upper quadrant at the gastric fundus level. Cardiomegaly is stable.   LUNGS: Inspiratory volume is low. There is mild elevation of the right hemidiaphragm. There is mild interstitial prominence slightly increased from prior. Left basilar retrocardiac consolidation is seen with air bronchograms. Left basilar bandlike subsegmental  atelectasis or scarring also present. Mild right basilar atelectasis present. No definite pleural effusion. No appreciable pneumothorax.   ABDOMEN: No remarkable upper abdominal findings.   BONES: Bones are stable.       1.  Low inspiratory volume. Left basilar retrocardiac consolidation as well as left basilar small bandlike region of subsegmental atelectasis or scar.       MACRO: None.   Signed by: Kavon Smiley 10/21/2023 12:24 PM Dictation workstation:   [id number]    CT chest abdomen pelvis w IV contrast    Result Date: 10/20/2023  Interpreted By:  Jesus Olguin, STUDY: CT CHEST ABDOMEN PELVIS W IV CONTRAST;  10/20/2023 10:30 am   INDICATION: Signs/Symptoms:sepsis, wound infection, pneumonia.   COMPARISON: Multiple chest radiographs, most recently 10/30/2023 CT abdomen and pelvis 10/10/2023   ACCESSION NUMBER(S): [id number]   ORDERING CLINICIAN: MEAGHAN CHARLTON   TECHNIQUE: CT of the chest, abdomen, and pelvis was performed.  Contiguous axial images were obtained at 3 mm slice thickness through the chest, abdomen and pelvis. Coronal and sagittal reconstructions at 3 mm slice thickness were performed. 75 ml of contrast Omnipaque 350 were administered intravenously without immediate complication.   FINDINGS: CHEST:   LUNG/PLEURA/LARGE AIRWAYS: Stable endotracheal tube with the tip approximately 3 cm from the harvey. Otherwise, central airways are patent without endobronchial lesions. Consolidative opacities in the lung bases, likely secondary to combination of pneumonia and atelectasis. Other multifocal ground-glass opacities, predominantly in the right upper lobe are also noted, likely osseous secondary to pneumonia. No pneumothorax. No large pleural effusion.   VESSELS: Aorta and pulmonary arteries are normal caliber.  No atherosclerotic changes of the aorta are identified.  No coronary artery calcifications are present.   HEART: Normal size.  No pericardial effusion   MEDIASTINUM AND DARYL: No  mediastinal, hilar or axillary lymphadenopathy is present.  The esophagus is nondilated and appears normal in entire length.   CHEST WALL AND LOWER NECK: The soft tissues of the chest wall demonstrate no gross abnormality. Partially imaged low-attenuation lesion medial left thyroid lobe.   ABDOMEN:   LIVER: The liver demonstrates homogeneous attenuation without evidence of focal liver lesions.   BILE DUCTS: The intrahepatic and extrahepatic ducts are not dilated.   GALLBLADDER: No calcified stones. No wall thickening.   PANCREAS: The pancreas appears unremarkable without evidence of ductal dilatation or masses.   SPLEEN: The spleen is normal in size without focal lesions.   ADRENAL GLANDS: Bilateral adrenal glands appear normal.   KIDNEYS AND URETERS: The kidneys are normal in size and enhance symmetrically.  No hydroureteronephrosis or nephroureterolithiasis is identified.   PELVIS:   BLADDER: Decompressed urinary bladder Proctor catheter in-situ.   REPRODUCTIVE ORGANS: No pelvic masses.   BOWEL: Feeding tube with tip in the proximal gastric body. Redemonstration of multiple dilated small bowel loops, measuring up to 3.3 cm. However, compatible with the CT from 10/14/2023 the degree of small-bowel distention has improved. No pneumoperitoneum. Postoperative changes appendectomy with interval resolution previously noted fluid within the appendectomy bed. Fluid-filled nondistended colonic loops.     VESSELS: There is no aneurysmal dilatation of the abdominal aorta. The IVC appears normal.   PERITONEUM/RETROPERITONEUM/LYMPH NODES: Small amount of multiloculated contiguous pelvic collections are noted. For example a 4.9 x 3.4 cm (series 201, image 178), with scattered foci of gas within the collection. Additionally, there is an adjacent contiguous collection which measures 5.3 x 3.9 cm (series 201, image 182). Moderate mesenteric soft tissue stranding, predominantly in the right lower quadrant, likely secondary to  postoperative changes. No abdominopelvic lymphadenopathy is present.   BONE AND SOFT TISSUE: No suspicious osseous lesions are identified.  Moderate ventral skin defect (series 201, image 156), with packing material in-situ and surgical staples along the anterior abdominal wall are noted related to postoperative changes.       CHEST: 1.  Consolidative opacities in the lung bases, with scattered ground-glass opacities as described above, likely secondary to a combination of atelectasis and pneumonia.   ABDOMEN-PELVIS: 1.  Interval postoperative changes appendectomy with contiguous multiloculated fluid collections within the right hemipelvis as noted above, slightly increased since prior. Foci of gas within 1 of the collections as described above may represent an abscess. However postoperative seromas can not be excluded. 2. Interval improvement in degree of small-bowel dilation, now measuring up to 3.3 cm, likely secondary to improving postoperative ileus. 3. Moderate size skin defect with surgical jaz.     Signed by: Jesus Olguin 10/20/2023 12:25 PM Dictation workstation:   [id number]    XR chest 1 view    Result Date: 10/20/2023  Interpreted By:  Ranjeet Gil, STUDY: XR CHEST 1 VIEW;  10/20/2023 5:43 am   INDICATION: Signs/Symptoms:pna f/u, on vent.   COMPARISON: Portable chest, 19 October 2023   ACCESSION NUMBER(S): [id number]   ORDERING CLINICIAN: JESSA PENA   TECHNIQUE: Single frontal view of the chest; Portable technique   FINDINGS:   The endotracheal tube is well positioned   Left sided central line is unchanged and well positioned   Enteric tube has been retracted, tip now projects over proximal gastric body with about 10 cm purchase in the stomach   Chronic band of atelectasis or scarring at left lung base is unchanged   Again low lung volumes with exaggerated interstitial markings and patchy airspace opacities   No new acute process such as large pleural effusion or pneumothorax has  developed       No interval change   MACRO: None   Signed by: Ranjeet Gil 10/20/2023 8:11 AM Dictation workstation:   [id number]    ECG 12 Lead    Result Date: 10/19/2023  Normal sinus rhythm Normal ECG No previous ECGs available Confirmed by Amilcar Lopez ([id number]) on 10/19/2023 3:23:25 PM    ECG 12 lead    Result Date: 10/19/2023  Normal sinus rhythm Normal ECG When compared with ECG of 11-OCT-2023 08:06, Vent. rate has decreased BY  61 BPM Questionable change in QRS duration Criteria for Inferior infarct are no longer Present Confirmed by Amilcar Lopez ([id number]) on 10/19/2023 2:04:13 PM    XR abdomen 1 view    Result Date: 10/19/2023  Interpreted By:  Ranjeet Gil, STUDY: XR ABDOMEN 1 VIEW;  10/19/2023 5:51 am   INDICATION: Signs/Symptoms:c/f sbo.   COMPARISON: CT abdomen and pelvis without contrast 14 October 2023; KUB 16 October 2023   ACCESSION NUMBER(S): [id number]   ORDERING CLINICIAN: JESSA PENA   TECHNIQUE: Frontal supine view of the abdomen   FINDINGS: Two gas-filled and mildly dilated upper abdominal small bowel loops are unchanged. Overall quantity of small bowel gas has decreased which seems reassuring   No interval increase in colonic gas   No pneumatosis       Persistent gas-filled small bowel dilation, but fewer gas-filled dilated small bowel loops, probably an improving ileus   MACRO: None   Signed by: Ranjeet Gil 10/19/2023 8:21 AM Dictation workstation:   [id number]    XR chest 1 view    Result Date: 10/19/2023  Interpreted By:  Ranjeet Gil, STUDY: XR CHEST 1 VIEW;  10/19/2023 5:51 am   INDICATION: Signs/Symptoms:f/u pna.   COMPARISON: Portable chest, 18 October 2023   ACCESSION NUMBER(S): [id number]   ORDERING CLINICIAN: JESSA PENA   TECHNIQUE: Single frontal view of the chest; Portable technique   FINDINGS: Endotracheal tube tip is quite close to the harvey, about 1 cm but unchanged   The enteric tube courses below the diaphragm, but the distal tip is cut off the bottom of  the radiograph   Left sided central line is unchanged and well positioned   The cardiomediastinal silhouette is unchanged   Low lung volumes with exaggerated interstitial markings due to hypoinflation. Nonspecific patchy airspace opacities are unchanged   No new acute process such as large pleural effusion or pneumothorax has developed       No interval change   MACRO: None   Signed by: Ranjeet Gil 10/19/2023 8:17 AM Dictation workstation:   [id number]    XR chest 1 view    Result Date: 10/18/2023  Interpreted By:  Schoenberger, Joseph, STUDY: XR CHEST 1 VIEW;  10/18/2023 6:29 am   INDICATION: Signs/Symptoms:endotracheal tube verification.   COMPARISON: 10/17/2023   ACCESSION NUMBER(S): [id number]   ORDERING CLINICIAN: GAURANG STOKES   FINDINGS: The positioning of the endotracheal tube and nasogastric tube and left jugular venous infusion catheter is unchanged.       CARDIOMEDIASTINAL SILHOUETTE: The cardiac silhouette remains enlarged.   LUNGS: Right-sided perihilar hazy opacity persists but is somewhat improved from prior. Retrocardiac opacity and platelike atelectasis or scarring in the left lung base is unchanged.   ABDOMEN: No remarkable upper abdominal findings.   BONES: No acute osseous changes.       1.  The similar to prior other than slight improvement in the appearance of the right lung       MACRO: None   Signed by: Joseph Schoenberger 10/18/2023 8:10 AM Dictation workstation:   [id number]    XR chest 1 view    Result Date: 10/17/2023  Interpreted By:  Ranjeet Gil, STUDY: XR CHEST 1 VIEW;  10/17/2023 5:37 am   INDICATION: Signs/Symptoms:Pneumonia evaluation.   COMPARISON: Portable chest, 16 October 2023   ACCESSION NUMBER(S): [id number]   ORDERING CLINICIAN: KRISTINA MONTALVO   TECHNIQUE: Single frontal view of the chest; Portable technique   FINDINGS:   The endotracheal tube is well positioned   Left sided central line is unchanged and well positioned   Enteric tube unchanged in position with tip  projecting over left lateral gastric body   Hazy bilateral airspace disease is unchanged   The curvilinear peripheral left mid to basilar opacity is scarring or atelectasis based on review of prior CT   No new acute process such as large pleural effusion or pneumothorax has developed       Hazy and patchy bilateral airspace disease   MACRO: None   Signed by: Ranjeet Gil 10/17/2023 8:33 AM Dictation workstation:   [id number]    XR abdomen 1 view    Result Date: 10/16/2023  Interpreted By:  Schoenberger, Joseph, STUDY: XR ABDOMEN 1 VIEW;  10/16/2023 6:14 am   INDICATION: Signs/Symptoms:small bowel obstruction.   COMPARISON: 10/15/2023   ACCESSION NUMBER(S): [id number]   ORDERING CLINICIAN: MEAGHAN KATZ   FINDINGS: Persistent gaseous distention of small bowel loops with some gas in the rectum. Possible postoperative ileus. Greatest caliber small bowel loops is noted in the upper abdomen approximating 5.5 cm similar to a comparable 5.2 cm measurement previously. Limited evaluation of pneumoperitoneum on supine imaging, however no gross evidence of free air is noted.   Visualized lungs are clear.   Osseous structures demonstrate no acute bony changes.       1.  Possible persistent postoperative ileus. Mid small bowel obstruction not entirely excluded. Continued follow-up recommended.   MACRO: None   Signed by: Joseph Schoenberger 10/16/2023 8:57 AM Dictation workstation:   [id number]    XR chest 1 view    Result Date: 10/16/2023  Interpreted By:  Schoenberger, Joseph, STUDY: XR CHEST 1 VIEW;  10/16/2023 6:14 am   INDICATION: Signs/Symptoms:intubated.   COMPARISON: 10/15/2023   ACCESSION NUMBER(S): [id number]   ORDERING CLINICIAN: MEAGHAN KATZ   FINDINGS: Positioning of nasogastric tube, left jugular venous infusion catheter, and endotracheal tube unchanged.       CARDIOMEDIASTINAL SILHOUETTE: Cardiac silhouette remains somewhat enlarged.   LUNGS: Platelike atelectasis in the left lung base unchanged.  Progressing retrocardiac opacity possibly atelectasis or airspace consolidation.   ABDOMEN: No remarkable upper abdominal findings.   BONES: No acute osseous changes.       1.  Progressing left basal opacity.       MACRO: None   Signed by: Joseph Schoenberger 10/16/2023 8:56 AM Dictation workstation:   [id number]    XR chest 1 view    Result Date: 10/16/2023  Interpreted By:  Schoenberger, Joseph, STUDY: XR CHEST 1 VIEW;  10/15/2023 5:42 am   INDICATION: Signs/Symptoms:c/f aspiration, on vent.   COMPARISON: 05/29/2022   ACCESSION NUMBER(S): [id number]   ORDERING CLINICIAN: JESSA PENA   FINDINGS: Endotracheal tube placed distal tip 3.9 cm above the harvey. Nasogastric tube placed distal tip gastric body. Stomach is distended.       CARDIOMEDIASTINAL SILHOUETTE: Cardiomediastinal silhouette is normal in size and configuration.   LUNGS: There is a new area of platelike atelectasis in the left lung base. No other new focal lung opacities. Limited hypoventilatory exam.   ABDOMEN: No remarkable upper abdominal findings.   BONES: No acute osseous changes.       1.  Satisfactory positioning of tubes and lines. 2. New platelike atelectasis in left lung base. 3. Limited exam.       MACRO: None   Signed by: Joseph Schoenberger 10/16/2023 8:42 AM Dictation workstation:   [id number]    XR abdomen 1 view    Result Date: 10/16/2023  Interpreted By:  Schoenberger, Joseph, STUDY: XR ABDOMEN 1 VIEW;  10/15/2023 5:42 am   INDICATION: Signs/Symptoms:post-op.   COMPARISON: 10/14/2023   ACCESSION NUMBER(S): [id number]   ORDERING CLINICIAN: JESSA PENA   FINDINGS: Persistent gaseous distention of multiple small bowel loops with some gas in the colon. Stomach is distended with nasogastric tube tip gastric body. Greatest caliber small bowel loops on this exam is 5.2 cm compared to 6.6 cm and there is less gas in bowel loops on this exam than the prior. Improving ileus is consideration. Limited evaluation of pneumoperitoneum  on supine imaging, however no gross evidence of free air is noted.   Visualized lungs are clear.   Osseous structures demonstrate no acute bony changes.       1.  Likely improving postoperative ileus.   MACRO: None   Signed by: Joseph Schoenberger 10/16/2023 8:40 AM Dictation workstation:   [id number]    XR chest 1 view    Result Date: 10/15/2023  Interpreted By:  Juan Graves, STUDY: XR CHEST 1 VIEW;  10/15/2023 7:20 pm   INDICATION: Signs/Symptoms:line placement.   COMPARISON: Earlier on 10/15/2023   ACCESSION NUMBER(S): [id number]   ORDERING CLINICIAN: MEAGHAN KATZ   FINDINGS: There is interval placement of a left central venous catheter with tip terminates at the level of the right atrium. Endotracheal tube tip terminates approximately 2.9 cm superior to the harvey. Nasogastric tube tip terminates in the left upper abdomen level of the stomach. The cardiac silhouette is stable in size. There is limited inspiratory lung volumes. Stable band of opacity in the left midlung and left basilar consolidative/atelectatic opacity. No pneumothorax.       Interval placement of left central venous catheter with tip terminating at the level of the right atrium.   Limited inspiratory lung volumes with stable band of opacity in the left mid lung and medial left basilar consolidative/atelectatic opacity.   MACRO: None   Signed by: Juan Graves 10/15/2023 7:40 PM Dictation workstation:   [id number]    CT abdomen pelvis wo IV contrast    Result Date: 10/14/2023  Interpreted By:  Mana Price, STUDY: CT ABDOMEN PELVIS WO IV CONTRAST;  10/14/2023 1:25 pm   INDICATION: Signs/Symptoms:SBO postop appendectomy.   COMPARISON: 10/10/2023   ACCESSION NUMBER(S): [id number]   ORDERING CLINICIAN: DRU WYNN   TECHNIQUE: CT of the abdomen and pelvis was performed. No oral, no intravenous contrast.   FINDINGS: LOWER CHEST: There are bibasilar infiltrates.   ABDOMEN:   LIVER: There is no hepatic mass.   BILE DUCTS: There is no  intrahepatic, common hepatic or common bile ductal dilatation.   GALLBLADDER: Contracted but otherwise unremarkable.   PANCREAS: The pancreas is unremarkable.   SPLEEN: The spleen is unremarkable. There is no splenic mass or splenomegaly.   ADRENAL GLANDS: The adrenal glands are unremarkable.   KIDNEYS AND URETERS: The kidneys demonstrate no mass.  There is no intrarenal calculus or hydronephrosis. There is a Proctor catheter insufflated in a nondistended urinary bladder.   BOWEL: There are surgical clips in the right lower quadrant from appendectomy in the interval compared to 10/10/2023. There is haziness in the mesentery in the right lower quadrant from recent surgery. There is a small crescent of non walled-off ascitic fluid in the right lower quadrant. There is a small amount of ascites in the pelvis non walled-off. There is a nasogastric tube with the tip in the mid stomach. There is no gastric distention. There are markedly dilated loops of small bowel. There is collapsed distal small bowel and collapsed colon. This could represent a postoperative ileus but consider small-bowel obstruction. There are sigmoid diverticula with no diverticulitis.   VESSELS: The abdominal and pelvic vessels are unremarkable.   PERITONEUM/RETROPERITONEUM/LYMPH NODES: There is no retroperitoneal or pelvic adenopathy.  There is no ascites.   ABDOMINAL WALL: There is a periumbilical hernia with an area of opening measuring 5.8 cm in transverse dimension. There is herniation of a segment of nondilated non thickened small bowel. There is induration around the umbilicus from recent surgery. There is a small hematoma in the subcutaneous fat with hyperdense and hypodense fluid. This measures 3.0 x 1.8 cm. There is a dot of air which could be iatrogenic from prior laparoscopic surgery rather than abscess and infection..   BONE AND SOFT TISSUE: There is no acute osseous finding.   There is no soft tissue abnormality.       1. Interval  appendectomy. Haziness right lower quadrant with a small crescent of fluid non walled-off. 2. Markedly dilated loops of small bowel with nondilated collapsed distal small bowel and collapsed colon. This is likely a postoperative ileus but still consider small-bowel obstruction. Proctor catheter with the tip in the mid stomach. 3. Periumbilical hernia with a segment of nondilated non thickened small bowel. There is induration and a small amount of fluid in the fat around the umbilicus from recent surgery. There is a small hematoma in the periumbilical fat. There is a dot of air which could be iatrogenic from recent surgery rather than abscess and infection. 4. Proctor catheter insufflated in a nondistended urinary bladder. 5. Bibasilar pneumonia.   MACRO: None   Signed by: Mana Price 10/14/2023 1:34 PM Dictation workstation:   [id number]    XR abdomen 1 view    Result Date: 10/14/2023  Interpreted By:  Mana Price, STUDY: XR ABDOMEN 1 VIEW;  10/14/2023 8:15 am. 3 views.   INDICATION: Signs/Symptoms:Ileus.   COMPARISON: 10/12/2023   ACCESSION NUMBER(S): [id number]   ORDERING CLINICIAN: DRU WYNN   FINDINGS: There is a nasogastric tube with the tip in the proximal stomach. There is gastric decompression. There is progressive marked small bowel dilatation with loops of bowel dilated up to 6.6 cm. There is gas and stool in nondilated colon. Findings are consistent with a small-bowel obstruction.   There are no pathologic calcifications.   Visualized lungs are clear.   Osseous structures demonstrate no acute bony changes.         1. Nasogastric tube with the tip in the proximal stomach with gastric decompression. 2. Progressive marked small bowel dilatation consistent with small-bowel obstruction.     MACRO: None   Signed by: Mana Price 10/14/2023 8:55 AM Dictation workstation:   [id number]    XR abdomen 1 view    Result Date: 10/13/2023  Interpreted By:  Juan Graves, STUDY: XR ABDOMEN 1 VIEW;   10/12/2023 8:28 pm   INDICATION: Signs/Symptoms:verify NG placement.   COMPARISON: 10/12/2023   ACCESSION NUMBER(S): [id number]   ORDERING CLINICIAN: VAN BRYAN   FINDINGS: Nasogastric tube tip terminates in the left upper abdomen at level of the stomach. There is cardiomegaly and persistent band of atelectasis or infiltrate in the left midlung. Gaseous distended bowel in the partially imaged abdomen.       Nasogastric tube tip terminates in the left upper abdomen at level of proximal stomach.   Cardiomegaly and stable band of atelectasis or infiltrate in the left mid lung.   Gaseous distended bowel in imaged upper abdomen; continue progress short-term progress is recommended for further evaluation.   MACRO: None   Signed by: Juan Graves 10/13/2023 1:36 AM Dictation workstation:   [id number]    XR abdomen 1 view    Result Date: 10/12/2023  Interpreted By:  Domingo Marks, STUDY: XR ABDOMEN 1 VIEW;  10/12/2023 6:55 pm   INDICATION: Signs/Symptoms:NG tube placement.   COMPARISON: 10/12/2023 at 4:46 p.m.   ACCESSION NUMBER(S): [id number]   ORDERING CLINICIAN: MEAGHAN CHARLTON   FINDINGS: Interval placement of a NG tube with its tip over the gastric body.   Dilatation of multiple small bowel loops as well as multiple colonic loops throughout the abdomen again seen. Bibasilar airspace disease present       1. NG tube tip projects over the gastric body 2. Redemonstration of bowel loop dilatation suggestive of underlying ileus versus obstruction.   MACRO: None   Signed by: Domingo Marks 10/12/2023 7:12 PM Dictation workstation:   [id number]    XR abdomen 1 view    Result Date: 10/12/2023  Interpreted By:  Mana Price, STUDY: XR ABDOMEN 1 VIEW;  10/12/2023 4:51 pm. 2 views.   INDICATION: Signs/Symptoms:r/o ileus.   COMPARISON: CT abdomen and pelvis 10/10/2023   ACCESSION NUMBER(S): [id number]   ORDERING CLINICIAN: MEAGHAN CHARLTON   FINDINGS: There is moderate small bowel and colonic distention, likely an  ileus. There are surgical clips in the right lower quadrant from recent appendectomy.   There are no pathologic calcifications.   Visualized lungs are clear.   Osseous structures demonstrate no acute bony changes.         Moderate small bowel and colonic distention, likely a postoperative ileus. Surgical clips right lower quadrant from recent appendectomy.   MACRO: None   Signed by: Mana Price 10/12/2023 4:56 PM Dictation workstation:   [id number]    CT angio chest for pulmonary embolism    Result Date: 10/12/2023  Interpreted By:  Ranjeet Gil, STUDY: CT ANGIO CHEST FOR PULMONARY EMBOLISM;  10/12/2023 4:56 am   INDICATION: Signs/Symptoms:Rule out pe.   COMPARISON: Portable chest 11 October 2023; CT abdomen and pelvis with contrast 10 October 2023   ACCESSION NUMBER(S): [id number]   ORDERING CLINICIAN: VAN BRYAN   TECHNIQUE: Pulmonary arterial phase CT chest after the uneventful administration of 75 mL IV contrast (Omnipaque 350).   Three dimensional maximum intensity projection (3-D MIPs) image/s were created on a separate dedicated workstation, reviewed and saved   FINDINGS: CARDIOVASCULAR:   Acute pulmonary embolism: Negative through the  segmental (third order) branch level. Subsegmental and more distal branches not well enough opacified to evaluate. Acute right heart strain:  Negative. No CT evidence of acute right heart strain Cardiac thrombus:  Negative; no obvious right heart or other cardiac thrombus is seen Pulmonary arteries ectasia:  Negative   Heart size:  Within normal limits Pericardial effusion:  Trace   Thoracic aortic aneurysm:  Negative Aortic dissection:  Negative   Heart failure change:  Negative.  No sign of interstitial or alveolar edema. Other:  n/a   NONVASCULAR MEDIASTINUM: Esophagus:  Grossly normal by CT Mediastinal Mass:  Negative Hiatal hernia:  None Other:  n/a   LYMPH NODES: No thoracic adenopathy   LUNGS / AIRSPACES / AIRWAYS:   LARGE AIRWAYS Filling defect: Negative Wall  thickening: Negative Bronchiectasis: Negative Other: N/A   AIRSPACES Fibrosis: Negative Emphysema: Negative Consolidation: Negative Ground glass airspace disease: Negative Edema: Negative Nodule / Mass: Negative Other: Dependent atelectasis in both lower lobes, new from two days ago. Additional bands of atelectasis in the left upper lobe also new   PLEURA: Effusion:  Both sides negative Pneumothorax:  Both sides negative Other:  n/a   CHEST WALL: Symmetric mild nonspecific bilateral gynecomastia   SKELETON: No acute or contributory abnormality   UPPER ABDOMEN: Fluid-filled, dilated small bowel loops with air-fluid levels new from CT abdomen and pelvis two days prior       SMALL BOWEL DILATION WITH MULTIPLE SMALL BOWEL AIR-FLUID LEVELS IN THE UPPER ABDOMEN, ALL NEW FROM CT TWO DAYS AGO   NO ACUTE DISEASE IN THE CHEST, ONLY SUBSTANTIAL BILATERAL LOWER LOBE DEPENDENT ATELECTASIS WHICH IS NEW FROM CT TWO DAYS PRIOR   NO AIRSPACE CONSOLIDATION OR GROUND-GLASS AIRSPACE DISEASE   NO EDEMA/FAILURE   NO ACUTE PULMONARY EMBOLISM THROUGH THE SEGMENTAL BRANCH LEVEL   NO AORTIC DISSECTION OR OTHER ACUTE THORACIC AORTIC FINDINGS   NO PLEURAL OR PERICARDIAL EFFUSION   NO PNEUMOTHORAX   MACRO: None   Signed by: Ranjeet Gil 10/12/2023 6:54 AM Dictation workstation:   [id number]    XR chest 1 view    Result Date: 10/11/2023  Interpreted By:  Chapo Cary, STUDY: XR CHEST 1 VIEW   INDICATION: Signs/Symptoms:hypoxemia.   COMPARISON: July 14, 2022   ACCESSION NUMBER(S): [id number]   ORDERING CLINICIAN: MEAGHAN CHARLTON   FINDINGS: No consolidation, effusion, edema, or pneumothorax. Low lung volumes with bibasilar atelectasis.       Low lung volumes with bibasilar atelectasis. No discrete consolidation seen.   Signed by: Chapo Cary 10/11/2023 6:18 PM Dictation workstation:   [id number]    ECG 12 lead    Result Date: 10/11/2023  Sinus tachycardia Inferior infarct (cited on or before 11-OCT-2023) Abnormal ECG When compared with ECG of  10-OCT-2023 13:57, Previous ECG has undetermined rhythm, needs review T wave inversion now evident in Anterior leads Confirmed by Carlo Jose ([id number]) on 10/11/2023 4:50:57 PM    CT abdomen pelvis w IV contrast    Result Date: 10/10/2023  STUDY: CT Abdomen and Pelvis with IV Contrast; 10/10/2023 10:52 AM. INDICATION: Generalized abdominal pain. COMPARISON: CT AP 5/29/2022. ACCESSION NUMBER(S): [id number] ORDERING CLINICIAN: SHAI ERWIN TECHNIQUE: CT of the abdomen and pelvis was performed.  Contiguous axial images were obtained at 3 mm slice thickness through the abdomen and pelvis. Coronal and sagittal reconstructions at 3 mm slice thickness were performed.  Omnipaque 350 75 mL was administered intravenously.  FINDINGS: LOWER CHEST: No cardiomegaly.  No pericardial effusion.  Lung bases are clear.  ABDOMEN:  LIVER: No hepatomegaly.  Smooth surface contour.  Mild fatty infiltration of the liver.  BILE DUCTS: No intrahepatic or extrahepatic biliary ductal dilatation.  GALLBLADDER: The gallbladder is present without gallstones. STOMACH: No abnormalities identified.  PANCREAS: No masses or ductal dilatation.  SPLEEN: No splenomegaly or focal splenic lesion.  ADRENAL GLANDS: No thickening or nodules.  KIDNEYS AND URETERS: Kidneys are normal in size and location.  No renal or ureteral calculi.  PELVIS:  BLADDER: No abnormalities identified.  REPRODUCTIVE ORGANS: No abnormalities identified.  BOWEL: Appendix is fluid-filled and dilated measuring up to 1.6 cm with multiple intraluminal appendicoliths and mild periappendiceal inflammatory change. No extraluminal gas or abscess. Colonic diverticulosis without findings of diverticulitis  VESSELS: No abnormalities identified.  Abdominal aorta is normal in caliber.  PERITONEUM/RETROPERITONEUM/LYMPH NODES: No free fluid.  No pneumoperitoneum. No lymphadenopathy.  ABDOMINAL WALL: No abnormalities identified. SOFT TISSUES: No abnormalities identified.  BONES: No acute  "fracture or aggressive osseous lesion.    1. Findings compatible with acute uncomplicated appendicitis. No extraluminal gas or abscess. Recommend surgical consultation. 2. Mild hepatic steatosis. 3. Colonic diverticulosis without findings of diverticulitis. Findings discussed with and acknowledged by Ayden Ramires at 11:53 AM Signed by Faustino Hebert MD      Additional Labs:  SCVO2: No results found for: \"B3LNDMMV\"        This patient is intubated   Reason for patient to remain intubated today? they are unable to protect their airway and they have inadequate gas-exchange without positive pressure             Assessment/Plan   Principal Problem:    Acute appendicitis with localized peritonitis  Active Problems:    Anxiety    Depression    Hyperlipidemia    Diabetes mellitus, type 2 (CMS/HCC)    Appendicitis, acute    Appendicitis    Acute appendicitis, unspecified acute appendicitis type    Small bowel obstruction (CMS/HCC)        POD11 from lap appy and hernia repair, POD7 from reexploration, EUGENIA, and repair of hernia.  Patient remains intubated for what appears to be aspiration pneumonia, wound infection, and severe agitation     Neuro:   #Bipolar 1 disorder,depression  #Anxiety & Depression  #Panic disorder  -hold all oral home psych meds for now given SBO, will restart when resumption of diet  -Propofol, precedex, weaned off ketamine  -will change depakote to 500mg BID for sedation due to limited supply of zyprexa, with zyprexa 2.5mg q6h prn, will touch base with [facility] for further recs  - Fentanyl ggt for incisional pain  -[facility] ICU  - restraints required to prevent inadvertent extubation as patient reaches for ETT when off, renewed restraints today     Pulm:   #Post-operative respiratory insufficiency  -2/2 aspiration pneumonia with mdr klebsiela, will change abx to meropenem, reset to day 3/7  -keep intubated today given hypoxemia and agitation  -20mg lasix for diuresis, goal -1L minimum diuresis     CV: "   -Normocardic and off pressors  -Continue to monitor     GI:   #Acute appendicitis with localized peritonitis without perforation or gangrene  #SBO  -obstruction from an interloop adhesion and NOT from the hernia,  -s/p appendectomy 10/10/23  -NPO  -TPN for persistent lack of nutrition  -Still awaiting return of bowel function        :   -Replete elecrolytes  -TPN, no need for other IVF  -lasix 20mg for diuresis        Heme:   - H/H stable  - lovenox     Endo:   #DMII   -recently diagnosed  -q4hr accuchecks with SS coverage, has not required significant amount of insulin so will just keep SSI, may need more once TPN is initiated     ID:   -WBC 20->17K->13K  -Intraabdominal collections unable to be drained from IR, will watch over weekend, if not improving will rescan early next week  - maki due to mdr klebsiela pneumonia, will follow up cultures from wound  -Pack WTD BID for wound     Lines:   -ETT  -Central line (needed for TPN) -> replaced to RIJ, will be documented in sepperate noted   -A-line  -Proctor  -all are needed due to critical nature of patient    I spent 41 minutes in the professional and overall care of this patient.  Norman Calloway MD  10/22/23     Yes - the patient is able to be screened

## 2023-10-22 NOTE — CARE PLAN
The patient's goals for the shift include Pain management    The clinical goals for the shift include Sedation management    Over the shift, the patient did not make progress toward the following goals. Barriers to progression include cognition and intubation. Recommendations to address these barriers include will consider extubation when pt is more stable.    Problem: Discharge Barriers  Goal: My discharge needs are met  Outcome: Not Progressing     Problem: Respiratory  Goal: Wean oxygen to maintain O2 saturation per order/standard this shift  Outcome: Not Progressing     Problem: Physical Restraint  Goal: I will require minimum level of restraint  Outcome: Not Progressing     Problem: Skin  Goal: Decreased wound size/increased tissue granulation at next dressing change  Outcome: Not Progressing     Problem: Safety - Medical Restraint  Goal: Free from restraint(s) (Restraint for Interference with Medical Device)  Outcome: Not Progressing

## 2023-10-23 ENCOUNTER — APPOINTMENT (OUTPATIENT)
Dept: RADIOLOGY | Facility: HOSPITAL | Age: 48
End: 2023-10-23
Payer: COMMERCIAL

## 2023-10-23 LAB
ALBUMIN SERPL BCP-MCNC: 2.6 G/DL (ref 3.4–5)
ALP SERPL-CCNC: 74 U/L (ref 33–120)
ALT SERPL W P-5'-P-CCNC: 13 U/L (ref 7–52)
ANION GAP BLDA CALCULATED.4IONS-SCNC: 8 MMO/L (ref 10–25)
ANION GAP SERPL CALC-SCNC: 9 MMOL/L (ref 10–20)
AST SERPL W P-5'-P-CCNC: 18 U/L (ref 9–39)
BACTERIA BLD CULT: NORMAL
BACTERIA BLD CULT: NORMAL
BASE EXCESS BLDA CALC-SCNC: 1.3 MMOL/L (ref -2–3)
BASOPHILS # BLD AUTO: 0.05 X10*3/UL (ref 0–0.1)
BASOPHILS NFR BLD AUTO: 0.4 %
BILIRUB SERPL-MCNC: 0.6 MG/DL (ref 0–1.2)
BODY TEMPERATURE: ABNORMAL
BUN SERPL-MCNC: 13 MG/DL (ref 6–23)
CA-I BLDA-SCNC: 1.12 MMOL/L (ref 1.1–1.33)
CALCIUM SERPL-MCNC: 7.6 MG/DL (ref 8.6–10.3)
CHLORIDE BLDA-SCNC: 107 MMOL/L (ref 98–107)
CHLORIDE SERPL-SCNC: 105 MMOL/L (ref 98–107)
CO2 SERPL-SCNC: 26 MMOL/L (ref 21–32)
CREAT SERPL-MCNC: 0.73 MG/DL (ref 0.5–1.3)
EOSINOPHIL # BLD AUTO: 0.28 X10*3/UL (ref 0–0.7)
EOSINOPHIL NFR BLD AUTO: 2.2 %
ERYTHROCYTE [DISTWIDTH] IN BLOOD BY AUTOMATED COUNT: 13.9 % (ref 11.5–14.5)
EST. AVERAGE GLUCOSE BLD GHB EST-MCNC: 157 MG/DL
GFR SERPL CREATININE-BSD FRML MDRD: >90 ML/MIN/1.73M*2
GLUCOSE BLD MANUAL STRIP-MCNC: 125 MG/DL (ref 74–99)
GLUCOSE BLD MANUAL STRIP-MCNC: 130 MG/DL (ref 74–99)
GLUCOSE BLD MANUAL STRIP-MCNC: 133 MG/DL (ref 74–99)
GLUCOSE BLD MANUAL STRIP-MCNC: 142 MG/DL (ref 74–99)
GLUCOSE BLD MANUAL STRIP-MCNC: 149 MG/DL (ref 74–99)
GLUCOSE BLD MANUAL STRIP-MCNC: 155 MG/DL (ref 74–99)
GLUCOSE BLD MANUAL STRIP-MCNC: 167 MG/DL (ref 74–99)
GLUCOSE BLDA-MCNC: 152 MG/DL (ref 74–99)
GLUCOSE SERPL-MCNC: 143 MG/DL (ref 74–99)
HBA1C MFR BLD: 7.1 %
HCO3 BLDA-SCNC: 26.6 MMOL/L (ref 22–26)
HCT VFR BLD AUTO: 23.8 % (ref 36–52)
HCT VFR BLD EST: 24 % (ref 36–52)
HGB BLD-MCNC: 7.7 G/DL (ref 12–17.5)
HGB BLDA-MCNC: 7.9 G/DL (ref 13.5–17.5)
IMM GRANULOCYTES # BLD AUTO: 0.58 X10*3/UL (ref 0–0.7)
IMM GRANULOCYTES NFR BLD AUTO: 4.6 % (ref 0–0.9)
INHALED O2 CONCENTRATION: 50 %
LACTATE BLDA-SCNC: 0.5 MMOL/L (ref 0.4–2)
LYMPHOCYTES # BLD AUTO: 2.11 X10*3/UL (ref 1.2–4.8)
LYMPHOCYTES NFR BLD AUTO: 16.7 %
MAGNESIUM SERPL-MCNC: 1.96 MG/DL (ref 1.6–2.4)
MCH RBC QN AUTO: 28.6 PG (ref 26–34)
MCHC RBC AUTO-ENTMCNC: 32.4 G/DL (ref 32–36)
MCV RBC AUTO: 89 FL (ref 80–100)
MONOCYTES # BLD AUTO: 1.18 X10*3/UL (ref 0.1–1)
MONOCYTES NFR BLD AUTO: 9.3 %
NEUTROPHILS # BLD AUTO: 8.46 X10*3/UL (ref 1.2–7.7)
NEUTROPHILS NFR BLD AUTO: 66.8 %
NRBC BLD-RTO: 0 /100 WBCS (ref 0–0)
OXYHGB MFR BLDA: 95.9 % (ref 94–98)
PCO2 BLDA: 45 MM HG (ref 38–42)
PEEP CMH2O: 7 CM H2O
PH BLDA: 7.38 PH (ref 7.38–7.42)
PHOSPHATE SERPL-MCNC: 3.8 MG/DL (ref 2.5–4.9)
PLATELET # BLD AUTO: 562 X10*3/UL (ref 150–450)
PMV BLD AUTO: 9.6 FL (ref 7.5–11.5)
PO2 BLDA: 73 MM HG (ref 85–95)
POTASSIUM BLDA-SCNC: 3.7 MMOL/L (ref 3.5–5.3)
POTASSIUM SERPL-SCNC: 3.5 MMOL/L (ref 3.5–5.3)
PROT SERPL-MCNC: 6.1 G/DL (ref 6.4–8.2)
RBC # BLD AUTO: 2.69 X10*6/UL (ref 4–5.9)
SAO2 % BLDA: 98 % (ref 94–100)
SODIUM BLDA-SCNC: 138 MMOL/L (ref 136–145)
SODIUM SERPL-SCNC: 136 MMOL/L (ref 136–145)
TIDAL VOLUME: 450 ML
VENTILATOR MODE: ABNORMAL
VENTILATOR RATE: 14 BPM
WBC # BLD AUTO: 12.7 X10*3/UL (ref 4.4–11.3)

## 2023-10-23 PROCEDURE — 84132 ASSAY OF SERUM POTASSIUM: CPT | Performed by: HOSPITALIST

## 2023-10-23 PROCEDURE — 2500000005 HC RX 250 GENERAL PHARMACY W/O HCPCS

## 2023-10-23 PROCEDURE — 84132 ASSAY OF SERUM POTASSIUM: CPT

## 2023-10-23 PROCEDURE — 71045 X-RAY EXAM CHEST 1 VIEW: CPT | Performed by: RADIOLOGY

## 2023-10-23 PROCEDURE — 2500000004 HC RX 250 GENERAL PHARMACY W/ HCPCS (ALT 636 FOR OP/ED): Performed by: SURGERY

## 2023-10-23 PROCEDURE — 96372 THER/PROPH/DIAG INJ SC/IM: CPT | Performed by: SURGERY

## 2023-10-23 PROCEDURE — 2500000004 HC RX 250 GENERAL PHARMACY W/ HCPCS (ALT 636 FOR OP/ED)

## 2023-10-23 PROCEDURE — 71045 X-RAY EXAM CHEST 1 VIEW: CPT

## 2023-10-23 PROCEDURE — 84100 ASSAY OF PHOSPHORUS: CPT

## 2023-10-23 PROCEDURE — 99291 CRITICAL CARE FIRST HOUR: CPT | Performed by: SURGERY

## 2023-10-23 PROCEDURE — 99024 POSTOP FOLLOW-UP VISIT: CPT | Performed by: SURGERY

## 2023-10-23 PROCEDURE — 83036 HEMOGLOBIN GLYCOSYLATED A1C: CPT | Mod: CMCLAB,ELYLAB

## 2023-10-23 PROCEDURE — 37799 UNLISTED PX VASCULAR SURGERY: CPT

## 2023-10-23 PROCEDURE — 94003 VENT MGMT INPAT SUBQ DAY: CPT

## 2023-10-23 PROCEDURE — 82947 ASSAY GLUCOSE BLOOD QUANT: CPT

## 2023-10-23 PROCEDURE — 2500000004 HC RX 250 GENERAL PHARMACY W/ HCPCS (ALT 636 FOR OP/ED): Performed by: NURSE PRACTITIONER

## 2023-10-23 PROCEDURE — 2500000001 HC RX 250 WO HCPCS SELF ADMINISTERED DRUGS (ALT 637 FOR MEDICARE OP)

## 2023-10-23 PROCEDURE — 83735 ASSAY OF MAGNESIUM: CPT

## 2023-10-23 PROCEDURE — 85025 COMPLETE CBC W/AUTO DIFF WBC: CPT

## 2023-10-23 PROCEDURE — 2020000001 HC ICU ROOM DAILY

## 2023-10-23 PROCEDURE — 80165 DIPROPYLACETIC ACID FREE: CPT | Performed by: SURGERY

## 2023-10-23 PROCEDURE — 2500000001 HC RX 250 WO HCPCS SELF ADMINISTERED DRUGS (ALT 637 FOR MEDICARE OP): Performed by: SURGERY

## 2023-10-23 PROCEDURE — C9113 INJ PANTOPRAZOLE SODIUM, VIA: HCPCS | Performed by: SURGERY

## 2023-10-23 PROCEDURE — 2500000005 HC RX 250 GENERAL PHARMACY W/O HCPCS: Performed by: SURGERY

## 2023-10-23 RX ORDER — METOPROLOL TARTRATE 1 MG/ML
INJECTION, SOLUTION INTRAVENOUS
Status: COMPLETED
Start: 2023-10-23 | End: 2023-10-23

## 2023-10-23 RX ORDER — BISACODYL 10 MG/1
10 SUPPOSITORY RECTAL DAILY
Status: DISCONTINUED | OUTPATIENT
Start: 2023-10-23 | End: 2023-10-27

## 2023-10-23 RX ORDER — FUROSEMIDE 10 MG/ML
40 INJECTION INTRAMUSCULAR; INTRAVENOUS ONCE
Status: COMPLETED | OUTPATIENT
Start: 2023-10-23 | End: 2023-10-23

## 2023-10-23 RX ORDER — METOPROLOL TARTRATE 1 MG/ML
5 INJECTION, SOLUTION INTRAVENOUS EVERY 6 HOURS PRN
Status: DISCONTINUED | OUTPATIENT
Start: 2023-10-23 | End: 2023-11-02 | Stop reason: HOSPADM

## 2023-10-23 RX ORDER — METOPROLOL TARTRATE 1 MG/ML
5 INJECTION, SOLUTION INTRAVENOUS ONCE
Status: COMPLETED | OUTPATIENT
Start: 2023-10-23 | End: 2023-10-23

## 2023-10-23 RX ADMIN — DEXMEDETOMIDINE HYDROCHLORIDE 1.5 MCG/KG/HR: 4 INJECTION, SOLUTION INTRAVENOUS at 03:13

## 2023-10-23 RX ADMIN — FENTANYL CITRATE 175 MCG/HR: 0.05 INJECTION, SOLUTION INTRAMUSCULAR; INTRAVENOUS at 03:14

## 2023-10-23 RX ADMIN — METOPROLOL TARTRATE 5 MG: 1 INJECTION, SOLUTION INTRAVENOUS at 13:33

## 2023-10-23 RX ADMIN — VALPROATE SODIUM 500 MG: 100 INJECTION, SOLUTION INTRAVENOUS at 12:56

## 2023-10-23 RX ADMIN — METOPROLOL TARTRATE 5 MG: 5 INJECTION INTRAVENOUS at 18:34

## 2023-10-23 RX ADMIN — PANTOPRAZOLE SODIUM 40 MG: 40 INJECTION, POWDER, FOR SOLUTION INTRAVENOUS at 08:43

## 2023-10-23 RX ADMIN — DEXMEDETOMIDINE HYDROCHLORIDE 1.5 MCG/KG/HR: 4 INJECTION, SOLUTION INTRAVENOUS at 00:00

## 2023-10-23 RX ADMIN — Medication 50 PERCENT: at 20:20

## 2023-10-23 RX ADMIN — PROPOFOL 40 MCG/KG/MIN: 10 INJECTION, EMULSION INTRAVENOUS at 08:44

## 2023-10-23 RX ADMIN — HYOSCYAMINE SULFATE 473 ML: 16 SOLUTION at 22:18

## 2023-10-23 RX ADMIN — DEXMEDETOMIDINE HYDROCHLORIDE 1 MCG/KG/HR: 4 INJECTION, SOLUTION INTRAVENOUS at 08:27

## 2023-10-23 RX ADMIN — PROPOFOL 40 MCG/KG/MIN: 10 INJECTION, EMULSION INTRAVENOUS at 22:18

## 2023-10-23 RX ADMIN — MEROPENEM 1 G: 1 INJECTION, POWDER, FOR SOLUTION INTRAVENOUS at 12:44

## 2023-10-23 RX ADMIN — INSULIN LISPRO 2 UNITS: 100 INJECTION, SOLUTION INTRAVENOUS; SUBCUTANEOUS at 05:43

## 2023-10-23 RX ADMIN — FENTANYL CITRATE 175 MCG/HR: 0.05 INJECTION, SOLUTION INTRAMUSCULAR; INTRAVENOUS at 06:41

## 2023-10-23 RX ADMIN — FUROSEMIDE 40 MG: 10 INJECTION, SOLUTION INTRAMUSCULAR; INTRAVENOUS at 10:59

## 2023-10-23 RX ADMIN — PROPOFOL 50 MCG/KG/MIN: 10 INJECTION, EMULSION INTRAVENOUS at 06:40

## 2023-10-23 RX ADMIN — PROPOFOL 50 MCG/KG/MIN: 10 INJECTION, EMULSION INTRAVENOUS at 03:15

## 2023-10-23 RX ADMIN — DEXMEDETOMIDINE HYDROCHLORIDE 1.5 MCG/KG/HR: 4 INJECTION, SOLUTION INTRAVENOUS at 06:40

## 2023-10-23 RX ADMIN — ENOXAPARIN SODIUM 40 MG: 40 INJECTION SUBCUTANEOUS at 18:02

## 2023-10-23 RX ADMIN — VALPROATE SODIUM 500 MG: 100 INJECTION, SOLUTION INTRAVENOUS at 20:14

## 2023-10-23 RX ADMIN — PROPOFOL 45 MCG/KG/MIN: 10 INJECTION, EMULSION INTRAVENOUS at 12:41

## 2023-10-23 RX ADMIN — MIDAZOLAM HYDROCHLORIDE 10.7 MG/HR: 1 INJECTION, SOLUTION INTRAVENOUS at 15:43

## 2023-10-23 RX ADMIN — VALPROATE SODIUM 500 MG: 100 INJECTION, SOLUTION INTRAVENOUS at 06:47

## 2023-10-23 RX ADMIN — FENTANYL CITRATE 175 MCG/HR: 0.05 INJECTION, SOLUTION INTRAMUSCULAR; INTRAVENOUS at 13:27

## 2023-10-23 RX ADMIN — INSULIN LISPRO 2 UNITS: 100 INJECTION, SOLUTION INTRAVENOUS; SUBCUTANEOUS at 20:20

## 2023-10-23 RX ADMIN — MEROPENEM 1 G: 1 INJECTION, POWDER, FOR SOLUTION INTRAVENOUS at 05:36

## 2023-10-23 RX ADMIN — PROPOFOL 40 MCG/KG/MIN: 10 INJECTION, EMULSION INTRAVENOUS at 18:01

## 2023-10-23 RX ADMIN — BISACODYL 10 MG: 10 SUPPOSITORY RECTAL at 10:59

## 2023-10-23 RX ADMIN — HYOSCYAMINE SULFATE 473 ML: 16 SOLUTION at 08:39

## 2023-10-23 RX ADMIN — METOPROLOL TARTRATE 5 MG: 5 INJECTION INTRAVENOUS at 13:33

## 2023-10-23 RX ADMIN — MEROPENEM 1 G: 1 INJECTION, POWDER, FOR SOLUTION INTRAVENOUS at 20:14

## 2023-10-23 ASSESSMENT — COGNITIVE AND FUNCTIONAL STATUS - GENERAL
MOBILITY SCORE: 6
CLIMB 3 TO 5 STEPS WITH RAILING: TOTAL
TURNING FROM BACK TO SIDE WHILE IN FLAT BAD: TOTAL
TURNING FROM BACK TO SIDE WHILE IN FLAT BAD: TOTAL
WALKING IN HOSPITAL ROOM: TOTAL
MOBILITY SCORE: 6
MOVING FROM LYING ON BACK TO SITTING ON SIDE OF FLAT BED WITH BEDRAILS: TOTAL
WALKING IN HOSPITAL ROOM: TOTAL
MOVING TO AND FROM BED TO CHAIR: TOTAL
MOVING TO AND FROM BED TO CHAIR: TOTAL
TURNING FROM BACK TO SIDE WHILE IN FLAT BAD: TOTAL
CLIMB 3 TO 5 STEPS WITH RAILING: TOTAL
TURNING FROM BACK TO SIDE WHILE IN FLAT BAD: TOTAL
CLIMB 3 TO 5 STEPS WITH RAILING: TOTAL
STANDING UP FROM CHAIR USING ARMS: TOTAL
MOVING FROM LYING ON BACK TO SITTING ON SIDE OF FLAT BED WITH BEDRAILS: TOTAL
STANDING UP FROM CHAIR USING ARMS: TOTAL
CLIMB 3 TO 5 STEPS WITH RAILING: TOTAL
TURNING FROM BACK TO SIDE WHILE IN FLAT BAD: TOTAL
WALKING IN HOSPITAL ROOM: TOTAL
MOVING FROM LYING ON BACK TO SITTING ON SIDE OF FLAT BED WITH BEDRAILS: TOTAL
STANDING UP FROM CHAIR USING ARMS: TOTAL
MOVING TO AND FROM BED TO CHAIR: TOTAL
STANDING UP FROM CHAIR USING ARMS: TOTAL
MOVING FROM LYING ON BACK TO SITTING ON SIDE OF FLAT BED WITH BEDRAILS: TOTAL
MOVING TO AND FROM BED TO CHAIR: TOTAL
MOBILITY SCORE: 6
WALKING IN HOSPITAL ROOM: TOTAL
CLIMB 3 TO 5 STEPS WITH RAILING: TOTAL
MOBILITY SCORE: 6
STANDING UP FROM CHAIR USING ARMS: TOTAL
WALKING IN HOSPITAL ROOM: TOTAL
MOVING FROM LYING ON BACK TO SITTING ON SIDE OF FLAT BED WITH BEDRAILS: TOTAL
MOVING TO AND FROM BED TO CHAIR: TOTAL

## 2023-10-23 ASSESSMENT — PAIN - FUNCTIONAL ASSESSMENT
PAIN_FUNCTIONAL_ASSESSMENT: CPOT (CRITICAL CARE PAIN OBSERVATION TOOL)

## 2023-10-23 ASSESSMENT — PAIN SCALES - GENERAL: PAINLEVEL_OUTOF10: 0 - NO PAIN

## 2023-10-23 NOTE — PROGRESS NOTES
Victorino Lara is a 48 y.o. adult on day 11 of admission presenting with Acute appendicitis with localized peritonitis.       Subjective   Patient is a 48 y.o. adult admitted on 10/10/2023  8:50 AM with the following indication(s) for ICU care Aspiration pneumonia.      Interval History: No acute events overnight, afebrile, had some issues with sedation requiring increasing propofol to 50mcg/hr, Remains on the Vent and unable to wean off Vent.  Complaints: Intubated, sedated.     Scheduled Medications:   [Held by provider] busPIRone, 15 mg, oral, BID  [Held by provider] docusate sodium, 100 mg, oral, BID  enoxaparin, 40 mg, subcutaneous, q24h  insulin lispro, 0-10 Units, subcutaneous, q4h  [Held by provider] insulin regular, 0-5 Units, subcutaneous, TID with meals  [Held by provider] lurasidone, 80 mg, oral, Daily  meropenem, 1 g, intravenous, q8h  [Held by provider] pantoprazole, 40 mg, oral, Daily before breakfast  pantoprazole, 40 mg, intravenous, Daily  potassium phosphate, 21 mmol, intravenous, Once  [Held by provider] QUEtiapine, 300 mg, oral, BID  [Held by provider] simvastatin, 40 mg, oral, Nightly  sodium hypochlorite, , irrigation, BID  [Held by provider] traZODone, 300 mg, oral, Nightly  valproate sodium, 500 mg, intravenous, q12h  [Held by provider] venlafaxine XR, 150 mg, oral, Daily           Continuous Medications:   Adult Clinimix TPN, 70 mL/hr, Last Rate: 70 mL/hr (10/21/23 0833)  dexmedeTOMIDine, 0.1-1.5 mcg/kg/hr, Last Rate: 1 mcg/kg/hr (10/21/23 1312)  fentaNYL,  mcg/hr, Last Rate: 125 mcg/hr (10/21/23 1311)  propofol, 5-50 mcg/kg/min, Last Rate: 50 mcg/kg/min (10/21/23 1449)           PRN Medications:   PRN medications: alteplase, dextrose 10 % in water (D10W), dextrose, glucagon, HYDROmorphone, HYDROmorphone, ipratropium-albuteroL, naloxone, OLANZapine, oxygen           Objective   Physical Exam:   General appearance: NAD, intubated, sedated  Head: Normocephalic, without obvious  abnormality  Eyes:conjunctivae/corneas clear. PERRL  Neck:  supple, symmetrical, trachea midline, left sided internal jugular line pulled out slightly  Lungs:clear to auscultation bilaterally  Chest wall:  no tenderness  Heart: regular rate and rhythm, S1, S2 normal, no murmur, click, rub or gallop  Abdomen: Soft, ND, still erythematouns around wound but is stable, wound is open about 5cm with packing, fascia intact, no more drainage  Male genitalia:  normal  Extremities: 2+ edema  Pulses:2+ and symmetric  Skin: Skin color, texture, turgor normal.  No rashes or lesions  Neurologic: Intubated, sedated, moving extremities x4 spontaneously  Last Recorded Vitals      Image Results      XR chest 1 view    Result Date: 10/23/2023  Interpreted By:  Schoenberger, Joseph, STUDY: XR CHEST 1 VIEW;  10/23/2023 5:48 am   INDICATION: Signs/Symptoms:pna f/u, on vent.   COMPARISON: 10/22/2023   ACCESSION NUMBER(S): [id number]   ORDERING CLINICIAN: JESSA PENA   FINDINGS: Limited hypoventilatory exam.   Endotracheal tube 0.2 cm above the harvey. Right jugular venous infusion catheter and nasogastric tube are unchanged.   CARDIOMEDIASTINAL SILHOUETTE: Cardiomediastinal silhouette is normal in size and configuration.   LUNGS: There is a new area of platelike atelectasis in the right lower lobe. Retrocardiac consolidative opacity is unchanged. Area of platelike atelectasis in the lingula or left lower lobe unchanged.   ABDOMEN: No remarkable upper abdominal findings.   BONES: No acute osseous changes.       1.  Hypoventilatory exam. New area of platelike atelectasis the right lung base. 2. Endotracheal tube may be slightly advanced although this could also be secondary to the decreased inspiratory effort on this exam. The tip on this radiograph is only 1.2 cm above the harvey.       MACRO: None   Signed by: Joseph Schoenberger 10/23/2023 7:16 AM Dictation workstation:   [id number]             Assessment/Plan   Acute Respiratory  Failure, post op  Aspiration Pneumonia        Principal Problem:    Acute appendicitis with localized peritonitis  Active Problems:    Anxiety    Depression    Hyperlipidemia    Diabetes mellitus, type 2 (CMS/HCC)    Appendicitis, acute    Appendicitis    Acute appendicitis, unspecified acute appendicitis type    Small bowel obstruction (CMS/[facility])     Pulmonary comments:-Patient unable to wean off vent postoperatively.  Patient has been in the range since October 14, 2023.  Between agitation and aspiration pneumonia patient unable to come off the vent.  Agree with daily attempts to wean off the vent as tolerated.  I agree with the rest of the treatment as being done by ICU service.  Pulmonary will continue to monitor this patient with you.         Mann Ricks MD

## 2023-10-23 NOTE — PROGRESS NOTES
"Victorino Lara is a 48 y.o. adult on day 11 of admission presenting with Acute appendicitis with localized peritonitis.    Subjective   Patient remains intubated and sedated. Ng to decompression. On tpn    No family at bedside       Objective     Physical Exam  Vitals reviewed.   HENT:      Head: Normocephalic and atraumatic.      Mouth/Throat:      Mouth: Mucous membranes are moist.      Comments: ETT in place  Eyes:      General: No scleral icterus.     Conjunctiva/sclera: Conjunctivae normal.      Pupils: Pupils are equal, round, and reactive to light.   Cardiovascular:      Rate and Rhythm: Normal rate and regular rhythm.      Pulses: Normal pulses.      Heart sounds: Normal heart sounds.   Abdominal:      General: Abdomen is flat. There is no distension.      Palpations: Abdomen is soft. There is no mass.      Tenderness: There is no abdominal tenderness.      Comments: Midline wound partially opened and packed   Genitourinary:     Comments: +hylton  Musculoskeletal:         General: No swelling.      Right lower leg: Edema present.      Left lower leg: Edema present.   Skin:     General: Skin is warm and dry.      Capillary Refill: Capillary refill takes less than 2 seconds.   Neurological:      Mental Status: He is disoriented.      Comments: gcs3t   Psychiatric:      Comments: Unable to assess, intubated, altered         Last Recorded Vitals  Blood pressure 100/63, pulse 92, temperature 37.2 °C (99 °F), resp. rate (!) 28, height 1.702 m (5' 7.01\"), weight 96.1 kg (211 lb 13.8 oz), SpO2 95 %.  Intake/Output last 3 Shifts:  I/O last 3 completed shifts:  In: 6689.4 (69.6 mL/kg) [I.V.:3367.9 (35 mL/kg); IV Piggyback:900]  Out: 5015 (52.2 mL/kg) [Urine:3665 (1.1 mL/kg/hr); Emesis/NG output:1350]  Weight: 96.1 kg     Relevant Results           This patient currently has cardiac telemetry ordered; if you would like to modify or discontinue the telemetry order, click here to go to the orders activity to " modify/discontinue the order.    This patient has a urinary catheter   Reason for the urinary catheter remaining today? critically ill patient who need accurate urinary output measurements    This patient is intubated   Reason for patient to remain intubated today? they are unable to protect their airway and they have inadequate gas-exchange without positive pressure             Assessment/Plan   Principal Problem:    Acute appendicitis with localized peritonitis  Active Problems:    Anxiety    Depression    Hyperlipidemia    Diabetes mellitus, type 2 (CMS/HCC)    Appendicitis, acute    Appendicitis    Acute appendicitis, unspecified acute appendicitis type    Small bowel obstruction (CMS/HCC)      Neuro - hypoactive delirium, encephalopathy. Bipolar disorder, anxiety, depression. Continue sedation - wean sedatives as able. Appreciate psychiatry input for assistance with antipsychotic agents. Fentanyl for analgesia.  Respiratory - acute hypoxemic respiratory failure - not appropriate for extubation due to ventilator needs and gcs. Will trial pressure support to assess respiratory effort.  Cardiac - monitor hr and bp. Continue arterial line for invasive bp monitoring.  GI - prolonged ileus after appendicitis. Continue ng decompression. Continue tpn.   - continue hylton for monitoring, critically ill patient.  Endo - diabetes - continue insulin, check A1c.  Heme - anemia - does not appear to need transfusion  ID - pneumonia - on meropenem.  MS - no acute issues  Skin - frequent repositioning and monitoring.   Ppx - lovenox, protonix    Wean sedation and trial for extubation when appropriate. Discussed with Dr. Calloway.        I spent 45 minutes in the professional and overall care of this patient. I discussed his care with the patient's surgeon Dr. Norman Calloway.      Yoan Hopper MD

## 2023-10-23 NOTE — CARE PLAN
The patient's goals for the shift include Pain management    The clinical goals for the shift include Sedation management    Over the shift, the patient did not make progress toward the following goals. Barriers to progression include . Recommendations to address these barriers include .    Problem: Physical Restraint  Goal: I will require minimum level of restraint  Outcome: Not Progressing     Problem: Safety - Medical Restraint  Goal: Free from restraint(s) (Restraint for Interference with Medical Device)  Outcome: Not Progressing

## 2023-10-23 NOTE — PROGRESS NOTES
Nutrition Progress Note    Pt continues to be intubated and sedated with NG to suction. Propofol at 26.2 ml/hr at time of ICU rounds, providing 692 kcal. TPN running at 60 ml/hr, providing 1440 ml total volume, 1022 kcal, 72g protein. With propofol and TPN, pt is receiving 1714 kcal. Continue Clinimix 5% amino acids, 15% dextrose at goal rate of 60 ml/hr. Secure chat sent to ICU intensivist to request order change from 70 ml/hr to 60 ml/hr to reflect goal rate on pump. Labs and meds reviewed.    Estimated Energy Needs: 7304-1324 kcal (22-25 kcal/kg IBW)   Estimated Protein Needs:  g (1.2-2 g/kg IBW)   Estimated Fluid Needs:  (1mL/kcal) or per MD

## 2023-10-23 NOTE — CONSULTS
Consults        Primary MD: Roxana Morris PA-C    Reason For Consult      Bilateral lower lobe pneumonia  Acute appendicitis with localized peritonitis    History Of Present Illness  Victorino Lara is a 48 y.o. male        Patient was admitted 10/1/2023    Taken to laparoscopic surgery for acute appendicitis localized peritonitis without perforation    Taken back to surgery 10/14/2023 for small bowel obstruction exploratory laparotomy lysis of adhesions repair of incisional hernia    Hospital course complicated by development of aspiration pneumonia   Has required intubation and mechanical ventilation  growing  Klebsiella pneumonia and MSSA from sputum switched to meropenem wound culture was identified    Klebsiella is resistant to Zosyn consented to third-generation cephalosporin      There is another culture from 10/19/2023 reported as surgical site tissue biopsy growing the same Klebsiella pneumonia and E. coli        CT scan from 10/20/2023 shows bilateral lower lobe consolidations          STUDY:  CT CHEST ABDOMEN PELVIS W IV CONTRAST; 10/20/2023 10:30 am  INDICATION:  Signs/Symptoms:sepsis, wound infection, pneumonia.  COMPARISON:  Multiple chest radiographs, most recently 10/30/2023 CT abdomen and  pelvis 10/10/2023  ACCESSION NUMBER(S):  [id number]  ORDERING CLINICIAN:  MEAGHAN CHARLTON  TECHNIQUE:  CT of the chest, abdomen, and pelvis was performed. Contiguous axial  images were obtained at 3 mm slice thickness through the chest,  abdomen and pelvis. Coronal and sagittal reconstructions at 3 mm  slice thickness were performed.  75 ml of contrast Omnipaque 350 were administered intravenously  without immediate complication.  FINDINGS:  CHEST:  LUNG/PLEURA/LARGE AIRWAYS:  Stable endotracheal tube with the tip approximately 3 cm from the  harvey. Otherwise, central airways are patent without endobronchial  lesions. Consolidative opacities in the lung bases, likely secondary  to combination of pneumonia  and atelectasis. Other multifocal  ground-glass opacities, predominantly in the right upper lobe are  also noted, likely osseous secondary to pneumonia. No pneumothorax.  No large pleural effusion.  VESSELS:  Aorta and pulmonary arteries are normal caliber. No atherosclerotic  changes of the aorta are identified. No coronary artery  calcifications are present.  HEART:  Normal size. No pericardial effusion  MEDIASTINUM AND DARYL:  No mediastinal, hilar or axillary lymphadenopathy is present. The  esophagus is nondilated and appears normal in entire length.  CHEST WALL AND LOWER NECK:  The soft tissues of the chest wall demonstrate no gross abnormality.  Partially imaged low-attenuation lesion medial left thyroid lobe.  ABDOMEN:  LIVER:  The liver demonstrates homogeneous attenuation without evidence of  focal liver lesions.  BILE DUCTS:  The intrahepatic and extrahepatic ducts are not dilated.  GALLBLADDER:  No calcified stones. No wall thickening.  PANCREAS:  The pancreas appears unremarkable without evidence of ductal  dilatation or masses.  SPLEEN:  The spleen is normal in size without focal lesions.  ADRENAL GLANDS:  Bilateral adrenal glands appear normal.  KIDNEYS AND URETERS:  The kidneys are normal in size and enhance symmetrically. No  hydroureteronephrosis or nephroureterolithiasis is identified.  PELVIS:  BLADDER:  Decompressed urinary bladder Proctor catheter in-situ.  REPRODUCTIVE ORGANS:  No pelvic masses.  BOWEL:  Feeding tube with tip in the proximal gastric body. Redemonstration  of multiple dilated small bowel loops, measuring up to 3.3 cm.  However, compatible with the CT from 10/14/2023 the degree of  small-bowel distention has improved. No pneumoperitoneum.  Postoperative changes appendectomy with interval resolution  previously noted fluid within the appendectomy bed. Fluid-filled  nondistended colonic loops.    VESSELS:  There is no aneurysmal dilatation of the abdominal aorta. The IVC  appears  normal.  PERITONEUM/RETROPERITONEUM/LYMPH NODES:  Small amount of multiloculated contiguous pelvic collections are  noted. For example a 4.9 x 3.4 cm (series 201, image 178), with  scattered foci of gas within the collection. Additionally, there is  an adjacent contiguous collection which measures 5.3 x 3.9 cm (series  201, image 182). Moderate mesenteric soft tissue stranding,  predominantly in the right lower quadrant, likely secondary to  postoperative changes. No abdominopelvic lymphadenopathy is present.  BONE AND SOFT TISSUE:  No suspicious osseous lesions are identified. Moderate ventral skin  defect (series 201, image 156), with packing material in-situ and  surgical staples along the anterior abdominal wall are noted related  to postoperative changes.  IMPRESSION:  CHEST:  1. Consolidative opacities in the lung bases, with scattered  ground-glass opacities as described above, likely secondary to a  combination of atelectasis and pneumonia.  ABDOMEN-PELVIS:  1. Interval postoperative changes appendectomy with contiguous  multiloculated fluid collections within the right hemipelvis as noted  above, slightly increased since prior. Foci of gas within 1 of the  collections as described above may represent an abscess. However  postoperative seromas can not be excluded.  2. Interval improvement in degree of small-bowel dilation, now  measuring up to 3.3 cm, likely secondary to improving postoperative  ileus.  3. Moderate size skin defect with surgical jaz.    Signed by: eJsus Olguin 10/20/2023 12:25 PM  Dictation workstation: [id number]        Past Medical History  He has a past medical history of Acute appendicitis with localized peritonitis (10/10/2023), Anxiety and depression, Bipolar 1 disorder, depressed (CMS/[facility]), Diabetes mellitus (CMS/[facility]), GERD (gastroesophageal reflux disease), HLD (hyperlipidemia), JOI (iron deficiency anemia), Obesity (BMI 30-39.9), Onychomycosis, Panic disorder, Retention of  urine, unspecified, Scrotal hematoma, and Umbilical hernia.    Surgical History  He has a past surgical history that includes Hernia repair; Cyst Removal (03/27/2014); Colonoscopy (2019); Esophagogastroduodenoscopy; Elbow surgery (Right); Scrotal surgery (02/2016); Appendectomy (10/10/2023); and Colonoscopy (02/23/2022).     Social History  He reports that he has been smoking cigarettes. He has never used smokeless tobacco. He reports that he does not currently use alcohol. He reports that he does not use drugs.      Family History  Family History   Problem Relation Name Age of Onset    Depression Mother       Allergies  Patient has no known allergies.     Immunization History   Administered Date(s) Administered    Pfizer Purple Cap SARS-CoV-2 08/31/2021, 09/21/2021     Review of Systems   Unable to perform ROS: Intubated        Physical Exam  Constitutional:       Appearance: He is obese. He is ill-appearing.   HENT:      Head: Normocephalic and atraumatic.   Eyes:      Extraocular Movements: Extraocular movements intact.      Pupils: Pupils are equal, round, and reactive to light.   Cardiovascular:      Heart sounds: Normal heart sounds. No murmur heard.  Pulmonary:      Effort: Pulmonary effort is normal. No respiratory distress.      Breath sounds: Normal breath sounds. No stridor. No wheezing or rales.   Abdominal:      General: There is distension.      Palpations: Abdomen is soft. There is no mass.      Tenderness: There is no abdominal tenderness. There is no guarding.      Comments: Midline wound is open and the center of the incision no significant odor erythema   Musculoskeletal:         General: No swelling or tenderness.      Cervical back: Normal range of motion. No rigidity or tenderness.   Lymphadenopathy:      Cervical: No cervical adenopathy.   Skin:     Coloration: Skin is not jaundiced.      Findings: No erythema.          Range of Vitals (last 24 hours)  Heart Rate:  []   Temp:  [35.8 °C  "(96.4 °F)-37.2 °C (99 °F)]   Resp:  [14-39]   SpO2:  [89 %-100 %]     Relevant Results  Results from last 72 hours   Lab Units 10/23/23  0357   WBC AUTO x10*3/uL 12.7*   HEMOGLOBIN g/dL 7.7*   HEMATOCRIT % 23.8*   PLATELETS AUTO x10*3/uL 562*   NEUTROS PCT AUTO % 66.8   LYMPHS PCT AUTO % 16.7   MONOS PCT AUTO % 9.3   EOS PCT AUTO % 2.2     Results from last 72 hours   Lab Units 10/23/23  0357   SODIUM mmol/L 136   POTASSIUM mmol/L 3.5   CHLORIDE mmol/L 105   CO2 mmol/L 26   BUN mg/dL 13   CREATININE mg/dL 0.73   GLUCOSE mg/dL 143*   CALCIUM mg/dL 7.6*   ANION GAP mmol/L 9*   EGFR mL/min/1.73m*2 >90     Results from last 72 hours   Lab Units 10/23/23  0357   ALK PHOS U/L 74   BILIRUBIN TOTAL mg/dL 0.6   PROTEIN TOTAL g/dL 6.1*   ALT U/L 13   AST U/L 18   ALBUMIN g/dL 2.6*     Estimated Creatinine Clearance (by C-G formula based on SCr of 0.73 mg/dL)  Female: 111 mL/min  Male: 125 mL/min  The patient&#39;s sex/gender information is inconclusive; review their information before proceeding.  CRP   Date/Time Value Ref Range Status   01/17/2020 10:00 PM 0.19 mg/dL Final     Comment:     REF VALUE  < 1.00     01/12/2020 06:29 AM 0.47 mg/dL Final     Comment:     REF VALUE  < 1.00       Sedimentation Rate   Date/Time Value Ref Range Status   01/17/2020 10:00 PM 5 0 - 15 mm/h Final   01/12/2020 06:29 AM 14 0 - 15 mm/h Final     No results found for: \"HIV1X2\", \"HIVCONF\", \"KFUMNA2GY\"  No results found for: \"HCVPCRQUANT\"  Cultures  Susceptibility data from last 14 days.  Collected Specimen Info Organism Amoxicillin/Clavulanate Ampicillin Ampicillin/Sulbactam Cefazolin Ceftriaxone Cefuroxime Ciprofloxacin Clindamycin Erythromycin Gentamicin Levofloxacin Oxacillin Piperacillin/Tazobactam   10/19/23 Fluid from Tracheal Aspirate Methicillin Susceptible Staphylococcus aureus (MSSA)        S S   S      Klebsiella pneumoniae/variicola R R R R S S S   S S  R   10/19/23 Tissue/Biopsy from Surgical Site Infection Klebsiella " pneumoniae/variicola R R R R S S S   S S  R     Escherichia coli S R R S   S   S S  S   10/16/23 Fluid from Tracheal Aspirate Klebsiella pneumoniae/variicola R R R R S I S   S S  R     Normal throat sharona                10/15/23 Swab from Anterior Nares Methicillin Susceptible Staphylococcus aureus (MSSA)                  Collected Specimen Info Organism Tetracycline Trimethoprim/Sulfamethoxazole Vancomycin   10/19/23 Fluid from Tracheal Aspirate Methicillin Susceptible Staphylococcus aureus (MSSA) S S S     Klebsiella pneumoniae/variicola  S    10/19/23 Tissue/Biopsy from Surgical Site Infection Klebsiella pneumoniae/variicola  S      Escherichia coli  S    10/16/23 Fluid from Tracheal Aspirate Klebsiella pneumoniae/variicola  S      Normal throat sharona      10/15/23 Swab from Anterior Nares Methicillin Susceptible Staphylococcus aureus (MSSA)             Assessment/Plan       Bilateral lower lobe pneumonia  Acute appendicitis with localized peritonitis  Postop wound infection        Organisms not particularly resistant though Klebsiella is resistant to Zosyn    Growing Klebsiella and MSSA from sputum Klebsiella and E. coli from wound    Maintain broad-spectrum coverage for abdomen still including anaerobic coverage    Patient has already been switched to meropenem which is a good choice to cover all organisms

## 2023-10-23 NOTE — PROGRESS NOTES
Remains intubated, FiO2 50% PEEP 7. On Propfol and Precedex. Intermittent Ketamine d/t agitation.    DC Planning TBD.

## 2023-10-23 NOTE — PROGRESS NOTES
"Victorino Lara is a 48 y.o. adult on day 11 of admission presenting with Acute appendicitis with localized peritonitis.    Subjective   Patient sedated/intubated in the ICU. Reported by RN that patient had 1000ml out NG overnight.       Objective     Physical Exam  Constitutional:       Appearance: He is ill-appearing.   HENT:      Head: Normocephalic.      Nose: Nose normal.      Comments: NG tube     Mouth/Throat:      Comments: Breathing tube in place  Eyes:      Conjunctiva/sclera: Conjunctivae normal.      Pupils: Pupils are equal, round, and reactive to light.   Cardiovascular:      Rate and Rhythm: Normal rate.   Pulmonary:      Breath sounds: Decreased breath sounds present.   Abdominal:      General: Bowel sounds are decreased. There is distension.      Palpations: Abdomen is soft.      Comments: Binder in place. Packing intact. No drainage   Skin:     General: Skin is warm.       Last Recorded Vitals  Blood pressure 100/63, pulse 72, temperature 36 °C (96.8 °F), resp. rate 20, height 1.702 m (5' 7.01\"), weight 96.1 kg (211 lb 13.8 oz), SpO2 97 %.  Intake/Output last 3 Shifts:  I/O last 3 completed shifts:  In: 6689.4 (69.6 mL/kg) [I.V.:3367.9 (35 mL/kg); IV Piggyback:900]  Out: 5015 (52.2 mL/kg) [Urine:3665 (1.1 mL/kg/hr); Emesis/NG output:1350]  Weight: 96.1 kg     Relevant Results  Lab Results   Component Value Date    WBC 12.7 (H) 10/23/2023    HGB 7.7 (L) 10/23/2023    HCT 23.8 (L) 10/23/2023    MCV 89 10/23/2023     (H) 10/23/2023      Lab Results   Component Value Date    GLUCOSE 143 (H) 10/23/2023    CALCIUM 7.6 (L) 10/23/2023     10/23/2023    K 3.5 10/23/2023    CO2 26 10/23/2023     10/23/2023    BUN 13 10/23/2023    CREATININE 0.73 10/23/2023      This patient has a central line   Reason for the central line remaining today? Parenteral medication      Assessment/Plan   Subjective  Patient sedated/intubated. ABD is distended and ABD binder in place. Packing in incision, clean no " drainage. Patient on multiple pressors. NG tube in place. RN reported 1000ml overnight.      Impression  POD 12 Appendectomy and hernia repair. POD 8 reexploration    Plan  NG/NPO/IVF/TPN  Nausea Control  Pain Control  Keep K+ 4.0-4.5 and Mag above 2  Continue care per primary team    Further recommendations per Dr. Calloway    Principal Problem:    Acute appendicitis with localized peritonitis  Active Problems:    Anxiety    Depression    Hyperlipidemia    Diabetes mellitus, type 2 (CMS/HCC)    Appendicitis, acute    Appendicitis    Acute appendicitis, unspecified acute appendicitis type    Small bowel obstruction (CMS/[facility])        Alisha Guerra, APRN-CNP    Agree with note above except where noted below    No acute overnight events.  White count continues to go down.  Still has significant NG tube output.  Still agitated.  Still on the vent.  Wound is continuing to improve.  Plan is to keep him n.p.o. with NG tube and TPN.  Hopefully with some diuresis continue antibiotics along with better neurologic and troll with assistance of psychiatry, he can get extubated sometime this week.    Norman Calloway MD  10/23/23  11:34 AM

## 2023-10-24 ENCOUNTER — APPOINTMENT (OUTPATIENT)
Dept: RADIOLOGY | Facility: HOSPITAL | Age: 48
End: 2023-10-24
Payer: COMMERCIAL

## 2023-10-24 LAB
ALBUMIN SERPL BCP-MCNC: 2.9 G/DL (ref 3.4–5)
ALP SERPL-CCNC: 90 U/L (ref 33–120)
ALT SERPL W P-5'-P-CCNC: 16 U/L (ref 7–52)
ANION GAP SERPL CALC-SCNC: 11 MMOL/L (ref 10–20)
AST SERPL W P-5'-P-CCNC: 25 U/L (ref 9–39)
BASOPHILS # BLD AUTO: 0.07 X10*3/UL (ref 0–0.1)
BASOPHILS NFR BLD AUTO: 0.4 %
BILIRUB SERPL-MCNC: 0.6 MG/DL (ref 0–1.2)
BUN SERPL-MCNC: 17 MG/DL (ref 6–23)
CALCIUM SERPL-MCNC: 8.1 MG/DL (ref 8.6–10.3)
CHLORIDE SERPL-SCNC: 100 MMOL/L (ref 98–107)
CO2 SERPL-SCNC: 30 MMOL/L (ref 21–32)
CREAT SERPL-MCNC: 0.77 MG/DL (ref 0.5–1.3)
EOSINOPHIL # BLD AUTO: 0.15 X10*3/UL (ref 0–0.7)
EOSINOPHIL NFR BLD AUTO: 0.9 %
ERYTHROCYTE [DISTWIDTH] IN BLOOD BY AUTOMATED COUNT: 13.8 % (ref 11.5–14.5)
GFR SERPL CREATININE-BSD FRML MDRD: >90 ML/MIN/1.73M*2
GLUCOSE BLD MANUAL STRIP-MCNC: 111 MG/DL (ref 74–99)
GLUCOSE BLD MANUAL STRIP-MCNC: 132 MG/DL (ref 74–99)
GLUCOSE BLD MANUAL STRIP-MCNC: 142 MG/DL (ref 74–99)
GLUCOSE SERPL-MCNC: 173 MG/DL (ref 74–99)
HCT VFR BLD AUTO: 27.2 % (ref 36–52)
HGB BLD-MCNC: 8.9 G/DL (ref 12–17.5)
IMM GRANULOCYTES # BLD AUTO: 0.46 X10*3/UL (ref 0–0.7)
IMM GRANULOCYTES NFR BLD AUTO: 2.8 % (ref 0–0.9)
LYMPHOCYTES # BLD AUTO: 1.92 X10*3/UL (ref 1.2–4.8)
LYMPHOCYTES NFR BLD AUTO: 11.7 %
MAGNESIUM SERPL-MCNC: 1.93 MG/DL (ref 1.6–2.4)
MCH RBC QN AUTO: 28.8 PG (ref 26–34)
MCHC RBC AUTO-ENTMCNC: 32.7 G/DL (ref 32–36)
MCV RBC AUTO: 88 FL (ref 80–100)
MONOCYTES # BLD AUTO: 1.65 X10*3/UL (ref 0.1–1)
MONOCYTES NFR BLD AUTO: 10 %
NEUTROPHILS # BLD AUTO: 12.19 X10*3/UL (ref 1.2–7.7)
NEUTROPHILS NFR BLD AUTO: 74.2 %
NRBC BLD-RTO: 0 /100 WBCS (ref 0–0)
PHOSPHATE SERPL-MCNC: 4.4 MG/DL (ref 2.5–4.9)
PLATELET # BLD AUTO: 745 X10*3/UL (ref 150–450)
PMV BLD AUTO: 9.6 FL (ref 7.5–11.5)
POTASSIUM SERPL-SCNC: 3.4 MMOL/L (ref 3.5–5.3)
PROT SERPL-MCNC: 7 G/DL (ref 6.4–8.2)
RBC # BLD AUTO: 3.09 X10*6/UL (ref 4–5.9)
SCAN RESULT: ABNORMAL
SODIUM SERPL-SCNC: 138 MMOL/L (ref 136–145)
VALPROATE FREE SERPL-MCNC: 1.5 UG/ML (ref 4–30)
WBC # BLD AUTO: 16.4 X10*3/UL (ref 4.4–11.3)

## 2023-10-24 PROCEDURE — 82947 ASSAY GLUCOSE BLOOD QUANT: CPT

## 2023-10-24 PROCEDURE — 2500000005 HC RX 250 GENERAL PHARMACY W/O HCPCS: Performed by: SURGERY

## 2023-10-24 PROCEDURE — 83735 ASSAY OF MAGNESIUM: CPT

## 2023-10-24 PROCEDURE — 2500000004 HC RX 250 GENERAL PHARMACY W/ HCPCS (ALT 636 FOR OP/ED)

## 2023-10-24 PROCEDURE — A9698 NON-RAD CONTRAST MATERIALNOC: HCPCS | Performed by: SURGERY

## 2023-10-24 PROCEDURE — 74177 CT ABD & PELVIS W/CONTRAST: CPT | Performed by: RADIOLOGY

## 2023-10-24 PROCEDURE — 80053 COMPREHEN METABOLIC PANEL: CPT

## 2023-10-24 PROCEDURE — 2500000004 HC RX 250 GENERAL PHARMACY W/ HCPCS (ALT 636 FOR OP/ED): Performed by: SURGERY

## 2023-10-24 PROCEDURE — 2550000001 HC RX 255 CONTRASTS: Performed by: SURGERY

## 2023-10-24 PROCEDURE — 2500000001 HC RX 250 WO HCPCS SELF ADMINISTERED DRUGS (ALT 637 FOR MEDICARE OP): Performed by: SURGERY

## 2023-10-24 PROCEDURE — 37799 UNLISTED PX VASCULAR SURGERY: CPT

## 2023-10-24 PROCEDURE — 2500000001 HC RX 250 WO HCPCS SELF ADMINISTERED DRUGS (ALT 637 FOR MEDICARE OP)

## 2023-10-24 PROCEDURE — 96372 THER/PROPH/DIAG INJ SC/IM: CPT | Performed by: SURGERY

## 2023-10-24 PROCEDURE — 84100 ASSAY OF PHOSPHORUS: CPT

## 2023-10-24 PROCEDURE — 85025 COMPLETE CBC W/AUTO DIFF WBC: CPT

## 2023-10-24 PROCEDURE — 2020000001 HC ICU ROOM DAILY

## 2023-10-24 PROCEDURE — 99291 CRITICAL CARE FIRST HOUR: CPT | Performed by: SURGERY

## 2023-10-24 PROCEDURE — 99024 POSTOP FOLLOW-UP VISIT: CPT | Performed by: SURGERY

## 2023-10-24 PROCEDURE — 71260 CT THORAX DX C+: CPT | Performed by: RADIOLOGY

## 2023-10-24 PROCEDURE — C9113 INJ PANTOPRAZOLE SODIUM, VIA: HCPCS | Performed by: SURGERY

## 2023-10-24 PROCEDURE — 94003 VENT MGMT INPAT SUBQ DAY: CPT

## 2023-10-24 PROCEDURE — 74177 CT ABD & PELVIS W/CONTRAST: CPT

## 2023-10-24 RX ORDER — POTASSIUM CHLORIDE 29.8 MG/ML
40 INJECTION INTRAVENOUS ONCE
Status: COMPLETED | OUTPATIENT
Start: 2023-10-24 | End: 2023-10-24

## 2023-10-24 RX ORDER — MAGNESIUM SULFATE HEPTAHYDRATE 40 MG/ML
2 INJECTION, SOLUTION INTRAVENOUS ONCE
Status: COMPLETED | OUTPATIENT
Start: 2023-10-24 | End: 2023-10-24

## 2023-10-24 RX ORDER — POTASSIUM CHLORIDE 14.9 MG/ML
20 INJECTION INTRAVENOUS ONCE
Status: COMPLETED | OUTPATIENT
Start: 2023-10-24 | End: 2023-10-24

## 2023-10-24 RX ORDER — FUROSEMIDE 10 MG/ML
40 INJECTION INTRAMUSCULAR; INTRAVENOUS ONCE
Status: COMPLETED | OUTPATIENT
Start: 2023-10-24 | End: 2023-10-24

## 2023-10-24 RX ADMIN — FENTANYL CITRATE 125 MCG/HR: 0.05 INJECTION, SOLUTION INTRAMUSCULAR; INTRAVENOUS at 02:08

## 2023-10-24 RX ADMIN — PROPOFOL 40 MCG/KG/MIN: 10 INJECTION, EMULSION INTRAVENOUS at 02:12

## 2023-10-24 RX ADMIN — MAGNESIUM SULFATE HEPTAHYDRATE 2 G: 40 INJECTION, SOLUTION INTRAVENOUS at 10:46

## 2023-10-24 RX ADMIN — PROPOFOL 40 MCG/KG/MIN: 10 INJECTION, EMULSION INTRAVENOUS at 18:00

## 2023-10-24 RX ADMIN — VALPROATE SODIUM 500 MG: 100 INJECTION, SOLUTION INTRAVENOUS at 20:15

## 2023-10-24 RX ADMIN — MIDAZOLAM HYDROCHLORIDE 6.5 MG/HR: 1 INJECTION, SOLUTION INTRAVENOUS at 18:00

## 2023-10-24 RX ADMIN — HYOSCYAMINE SULFATE 473 ML: 16 SOLUTION at 09:14

## 2023-10-24 RX ADMIN — PROPOFOL 40 MCG/KG/MIN: 10 INJECTION, EMULSION INTRAVENOUS at 07:20

## 2023-10-24 RX ADMIN — FENTANYL CITRATE 100 MCG/HR: 0.05 INJECTION, SOLUTION INTRAMUSCULAR; INTRAVENOUS at 12:56

## 2023-10-24 RX ADMIN — HYOSCYAMINE SULFATE 473 ML: 16 SOLUTION at 20:16

## 2023-10-24 RX ADMIN — PROPOFOL 40 MCG/KG/MIN: 10 INJECTION, EMULSION INTRAVENOUS at 23:44

## 2023-10-24 RX ADMIN — IOHEXOL 500 ML: 12 SOLUTION ORAL at 14:10

## 2023-10-24 RX ADMIN — IOHEXOL 75 ML: 350 INJECTION, SOLUTION INTRAVENOUS at 15:42

## 2023-10-24 RX ADMIN — BISACODYL 10 MG: 10 SUPPOSITORY RECTAL at 09:13

## 2023-10-24 RX ADMIN — ENOXAPARIN SODIUM 40 MG: 40 INJECTION SUBCUTANEOUS at 18:00

## 2023-10-24 RX ADMIN — ASCORBIC ACID, VITAMIN A PALMITATE, CHOLECALCIFEROL, THIAMINE HYDROCHLORIDE, RIBOFLAVIN-5 PHOSPHATE SODIUM, PYRIDOXINE HYDROCHLORIDE, NIACINAMIDE, DEXPANTHENOL, ALPHA-TOCOPHEROL ACETATE, VITAMIN K1, FOLIC ACID, BIOTIN, CYANOCOBALAMIN: 200; 3300; 200; 6; 3.6; 6; 40; 15; 10; 150; 600; 60; 5 INJECTION, SOLUTION INTRAVENOUS at 00:34

## 2023-10-24 RX ADMIN — MEROPENEM 1 G: 1 INJECTION, POWDER, FOR SOLUTION INTRAVENOUS at 12:56

## 2023-10-24 RX ADMIN — MEROPENEM 1 G: 1 INJECTION, POWDER, FOR SOLUTION INTRAVENOUS at 20:16

## 2023-10-24 RX ADMIN — PROPOFOL 40 MCG/KG/MIN: 10 INJECTION, EMULSION INTRAVENOUS at 14:39

## 2023-10-24 RX ADMIN — POTASSIUM CHLORIDE 40 MEQ: 29.8 INJECTION, SOLUTION INTRAVENOUS at 12:54

## 2023-10-24 RX ADMIN — POTASSIUM CHLORIDE 20 MEQ: 14.9 INJECTION, SOLUTION INTRAVENOUS at 10:46

## 2023-10-24 RX ADMIN — MIDAZOLAM HYDROCHLORIDE 8.6 MG/HR: 1 INJECTION, SOLUTION INTRAVENOUS at 02:07

## 2023-10-24 RX ADMIN — VALPROATE SODIUM 500 MG: 100 INJECTION, SOLUTION INTRAVENOUS at 05:32

## 2023-10-24 RX ADMIN — PANTOPRAZOLE SODIUM 40 MG: 40 INJECTION, POWDER, FOR SOLUTION INTRAVENOUS at 09:13

## 2023-10-24 RX ADMIN — FENTANYL CITRATE 100 MCG/HR: 0.05 INJECTION, SOLUTION INTRAMUSCULAR; INTRAVENOUS at 22:21

## 2023-10-24 RX ADMIN — VALPROATE SODIUM 500 MG: 100 INJECTION, SOLUTION INTRAVENOUS at 12:56

## 2023-10-24 RX ADMIN — PROPOFOL 40 MCG/KG/MIN: 10 INJECTION, EMULSION INTRAVENOUS at 10:46

## 2023-10-24 RX ADMIN — MEROPENEM 1 G: 1 INJECTION, POWDER, FOR SOLUTION INTRAVENOUS at 05:32

## 2023-10-24 RX ADMIN — FUROSEMIDE 40 MG: 10 INJECTION, SOLUTION INTRAMUSCULAR; INTRAVENOUS at 20:16

## 2023-10-24 ASSESSMENT — PAIN SCALES - GENERAL: PAINLEVEL_OUTOF10: 0 - NO PAIN

## 2023-10-24 NOTE — PROGRESS NOTES
"Victorino Lara is a 48 y.o. adult on day 12 of admission presenting with Acute appendicitis with localized peritonitis.    Subjective   Remains intubated. Breathing trial yesterday stopped after patient became tachycardic.    Ng remains to suction. On tpn. No family at bedside.       Objective     Physical Exam  Constitutional:       Comments: Intubated, mechanical ventilation ongoing   HENT:      Head: Normocephalic and atraumatic.      Mouth/Throat:      Comments: ETT in place  Eyes:      Conjunctiva/sclera: Conjunctivae normal.      Pupils: Pupils are equal, round, and reactive to light.   Cardiovascular:      Rate and Rhythm: Normal rate and regular rhythm.      Pulses: Normal pulses.      Heart sounds: Normal heart sounds.   Pulmonary:      Effort: Pulmonary effort is normal.      Breath sounds: Normal breath sounds.   Abdominal:      General: Abdomen is flat. There is distension.      Palpations: Abdomen is soft.   Musculoskeletal:      Right lower leg: Edema present.      Left lower leg: Edema present.   Skin:     General: Skin is warm and dry.      Coloration: Skin is not jaundiced.   Neurological:      Comments: gcs3t   Psychiatric:      Comments: Unable to assess due to intubation         Last Recorded Vitals  Blood pressure 100/63, pulse 74, temperature 36.1 °C (97 °F), temperature source Temporal, resp. rate 20, height 1.702 m (5' 7.01\"), weight 96.1 kg (211 lb 13.8 oz), SpO2 97 %.  Intake/Output last 3 Shifts:  I/O last 3 completed shifts:  In: 5551 (57.8 mL/kg) [I.V.:2647.4 (27.5 mL/kg); IV Piggyback:700]  Out: 4925 (51.2 mL/kg) [Urine:3375 (1 mL/kg/hr); Emesis/NG output:1550]  Weight: 96.1 kg     Relevant Results           This patient currently has cardiac telemetry ordered; if you would like to modify or discontinue the telemetry order, click here to go to the orders activity to modify/discontinue the order.      This patient is intubated   Reason for patient to remain intubated today? they have " inadequate gas-exchange without positive pressure             Assessment/Plan   Principal Problem:    Acute appendicitis with localized peritonitis  Active Problems:    Anxiety    Depression    Hyperlipidemia    Diabetes mellitus, type 2 (CMS/HCC)    Appendicitis, acute    Appendicitis    Acute appendicitis, unspecified acute appendicitis type    Small bowel obstruction (CMS/HCC)    Neuro - fentanyl for analgesia. On versed and propofol for sedation. Delirium, encephalopathy, bipolar. Will wean versed and attempt to arouse safely  Cardiac - monitor hr and bp  Respiratory - acute hypoxemic respiratory failure - wean mechanical ventilation as able. Breathing trial today. Continue antibiotics for pneumonia  GI - ileus - continue ng to suction. On tpn. Discussed with Dr. Calloway, general surgery - will evaluate for infectious source with ct chest/abd/pelvis with oral and iv contrast.  Gu - hypokalemia - replace.. volume overload - has required some diuresis, will hold for now as bp is lower this morning (102/52)  Heme - anemia, stable, no need for transfusion  ID - continue antibiotics for pneumonia and intraabdominal infection. Ct scans pending.  Endo - A1c improved since last year. Continue sliding scale insulin.  MS - offload pressure points  Ppx - protonix, lovenox    Continue icu care       I spent 39 minutes in the professional and overall care of this patient.      Yoan Hopper MD

## 2023-10-24 NOTE — PROGRESS NOTES
Primary MD: Roxana Morris PA-C    Reason For Consult  Bilateral lower lobe pneumonia  Acute appendicitis with localized peritonitis    History Of Present Illness  Victorino Lara is a 48 y.o. male              Patient was admitted 10/1/2023     Taken to laparoscopic surgery for acute appendicitis localized peritonitis without perforation     Taken back to surgery 10/14/2023 for small bowel obstruction exploratory laparotomy lysis of adhesions repair of incisional hernia     Hospital course complicated by development of aspiration pneumonia   Has required intubation and mechanical ventilation  growing  Klebsiella pneumonia and MSSA from sputum switched to meropenem wound culture was identified     Klebsiella is resistant to Zosyn consented to third-generation cephalosporin        There is another culture from 10/19/2023 reported as surgical site tissue biopsy growing the same Klebsiella pneumonia and E. coli           CT scan from 10/20/2023 shows bilateral lower lobe consolidations              STUDY:  CT CHEST ABDOMEN PELVIS W IV CONTRAST; 10/20/2023 10:30 am  INDICATION:  Signs/Symptoms:sepsis, wound infection, pneumonia.  COMPARISON:  Multiple chest radiographs, most recently 10/30/2023 CT abdomen and  pelvis 10/10/2023  ACCESSION NUMBER(S):  [id number]  ORDERING CLINICIAN:  MEAGHAN CHARLTON  TECHNIQUE:  CT of the chest, abdomen, and pelvis was performed. Contiguous axial  images were obtained at 3 mm slice thickness through the chest,  abdomen and pelvis. Coronal and sagittal reconstructions at 3 mm  slice thickness were performed.  75 ml of contrast Omnipaque 350 were administered intravenously  without immediate complication.  FINDINGS:  CHEST:  LUNG/PLEURA/LARGE AIRWAYS:  Stable endotracheal tube with the tip approximately 3 cm from the  harvey. Otherwise, central airways are patent without endobronchial  lesions. Consolidative opacities in the lung bases, likely secondary  to combination of  pneumonia and atelectasis. Other multifocal  ground-glass opacities, predominantly in the right upper lobe are  also noted, likely osseous secondary to pneumonia. No pneumothorax.  No large pleural effusion.  VESSELS:  Aorta and pulmonary arteries are normal caliber. No atherosclerotic  changes of the aorta are identified. No coronary artery  calcifications are present.  HEART:  Normal size. No pericardial effusion  MEDIASTINUM AND DARYL:  No mediastinal, hilar or axillary lymphadenopathy is present. The  esophagus is nondilated and appears normal in entire length.  CHEST WALL AND LOWER NECK:  The soft tissues of the chest wall demonstrate no gross abnormality.  Partially imaged low-attenuation lesion medial left thyroid lobe.  ABDOMEN:  LIVER:  The liver demonstrates homogeneous attenuation without evidence of  focal liver lesions.  BILE DUCTS:  The intrahepatic and extrahepatic ducts are not dilated.  GALLBLADDER:  No calcified stones. No wall thickening.  PANCREAS:  The pancreas appears unremarkable without evidence of ductal  dilatation or masses.  SPLEEN:  The spleen is normal in size without focal lesions.  ADRENAL GLANDS:  Bilateral adrenal glands appear normal.  KIDNEYS AND URETERS:  The kidneys are normal in size and enhance symmetrically. No  hydroureteronephrosis or nephroureterolithiasis is identified.  PELVIS:  BLADDER:  Decompressed urinary bladder Proctor catheter in-situ.  REPRODUCTIVE ORGANS:  No pelvic masses.  BOWEL:  Feeding tube with tip in the proximal gastric body. Redemonstration  of multiple dilated small bowel loops, measuring up to 3.3 cm.  However, compatible with the CT from 10/14/2023 the degree of  small-bowel distention has improved. No pneumoperitoneum.  Postoperative changes appendectomy with interval resolution  previously noted fluid within the appendectomy bed. Fluid-filled  nondistended colonic loops.    VESSELS:  There is no aneurysmal dilatation of the abdominal aorta. The  IVC  appears normal.  PERITONEUM/RETROPERITONEUM/LYMPH NODES:  Small amount of multiloculated contiguous pelvic collections are  noted. For example a 4.9 x 3.4 cm (series 201, image 178), with  scattered foci of gas within the collection. Additionally, there is  an adjacent contiguous collection which measures 5.3 x 3.9 cm (series  201, image 182). Moderate mesenteric soft tissue stranding,  predominantly in the right lower quadrant, likely secondary to  postoperative changes. No abdominopelvic lymphadenopathy is present.  BONE AND SOFT TISSUE:  No suspicious osseous lesions are identified. Moderate ventral skin  defect (series 201, image 156), with packing material in-situ and  surgical staples along the anterior abdominal wall are noted related  to postoperative changes.  IMPRESSION:  CHEST:  1. Consolidative opacities in the lung bases, with scattered  ground-glass opacities as described above, likely secondary to a  combination of atelectasis and pneumonia.  ABDOMEN-PELVIS:  1. Interval postoperative changes appendectomy with contiguous  multiloculated fluid collections within the right hemipelvis as noted  above, slightly increased since prior. Foci of gas within 1 of the  collections as described above may represent an abscess. However  postoperative seromas can not be excluded.  2. Interval improvement in degree of small-bowel dilation, now  measuring up to 3.3 cm, likely secondary to improving postoperative  ileus.  3. Moderate size skin defect with surgical jaz.    Signed by: Jesus Olguin 10/20/2023 12:25 PM  Dictation workstation: [id number]        No fever  Remains on vent  No pressors        Review of Systems   Unable to perform ROS: Intubated        Physical Exam  Cardiovascular:      Heart sounds: Normal heart sounds. No murmur heard.  Pulmonary:      Effort: No respiratory distress.      Breath sounds: No wheezing, rhonchi or rales.   Abdominal:      General: Abdomen is flat. Bowel sounds  "are normal. There is distension.      Palpations: Abdomen is soft.      Tenderness: There is no abdominal tenderness. There is no guarding.   Musculoskeletal:         General: No swelling.      Left lower leg: No edema.          Range of Vitals (last 24 hours)  Heart Rate:  []   Temp:  [36 °C (96.8 °F)-36.1 °C (97 °F)]   Resp:  [18-40]   Weight:  [96.1 kg (211 lb 13.8 oz)]   SpO2:  [91 %-100 %]     Relevant Results  Results from last 72 hours   Lab Units 10/24/23  0334   WBC AUTO x10*3/uL 16.4*   HEMOGLOBIN g/dL 8.9*   HEMATOCRIT % 27.2*   PLATELETS AUTO x10*3/uL 745*   NEUTROS PCT AUTO % 74.2   LYMPHS PCT AUTO % 11.7   MONOS PCT AUTO % 10.0   EOS PCT AUTO % 0.9     Results from last 72 hours   Lab Units 10/24/23  0334   SODIUM mmol/L 138   POTASSIUM mmol/L 3.4*   CHLORIDE mmol/L 100   CO2 mmol/L 30   BUN mg/dL 17   CREATININE mg/dL 0.77   GLUCOSE mg/dL 173*   CALCIUM mg/dL 8.1*   ANION GAP mmol/L 11   EGFR mL/min/1.73m*2 >90     Results from last 72 hours   Lab Units 10/24/23  0334   ALK PHOS U/L 90   BILIRUBIN TOTAL mg/dL 0.6   PROTEIN TOTAL g/dL 7.0   ALT U/L 16   AST U/L 25   ALBUMIN g/dL 2.9*     Estimated Creatinine Clearance (by C-G formula based on SCr of 0.77 mg/dL)  Female: 105.2 mL/min  Male: 125 mL/min  The patient&#39;s sex/gender information is inconclusive; review their information before proceeding.  CRP   Date/Time Value Ref Range Status   01/17/2020 10:00 PM 0.19 mg/dL Final     Comment:     REF VALUE  < 1.00     01/12/2020 06:29 AM 0.47 mg/dL Final     Comment:     REF VALUE  < 1.00       Sedimentation Rate   Date/Time Value Ref Range Status   01/17/2020 10:00 PM 5 0 - 15 mm/h Final   01/12/2020 06:29 AM 14 0 - 15 mm/h Final     No results found for: \"HIV1X2\", \"HIVCONF\", \"OGRJSX7NP\"  No results found for: \"HCVPCRQUANT\"  Cultures  Susceptibility data from last 14 days.  Collected Specimen Info Organism Amoxicillin/Clavulanate Ampicillin Ampicillin/Sulbactam Cefazolin Ceftriaxone Cefuroxime " Ciprofloxacin Clindamycin Erythromycin Gentamicin Levofloxacin Oxacillin Piperacillin/Tazobactam   10/19/23 Fluid from Tracheal Aspirate Methicillin Susceptible Staphylococcus aureus (MSSA)        S S   S      Klebsiella pneumoniae/variicola R R R R S S S   S S  R   10/19/23 Tissue/Biopsy from Surgical Site Infection Klebsiella pneumoniae/variicola R R R R S S S   S S  R     Escherichia coli S R R S   S   S S  S   10/16/23 Fluid from Tracheal Aspirate Klebsiella pneumoniae/variicola R R R R S I S   S S  R     Normal throat sharona                10/15/23 Swab from Anterior Nares Methicillin Susceptible Staphylococcus aureus (MSSA)                  Collected Specimen Info Organism Tetracycline Trimethoprim/Sulfamethoxazole Vancomycin   10/19/23 Fluid from Tracheal Aspirate Methicillin Susceptible Staphylococcus aureus (MSSA) S S S     Klebsiella pneumoniae/variicola  S    10/19/23 Tissue/Biopsy from Surgical Site Infection Klebsiella pneumoniae/variicola  S      Escherichia coli  S    10/16/23 Fluid from Tracheal Aspirate Klebsiella pneumoniae/variicola  S      Normal throat sharona      10/15/23 Swab from Anterior Nares Methicillin Susceptible Staphylococcus aureus (MSSA)             Assessment/Plan     Bilateral lower lobe pneumonia  Acute appendicitis with localized peritonitis  Postop wound infection           Organisms Klebsiella is resistant to Zosyn  Growing Klebsiella and MSSA from sputum Klebsiella and E. coli from wound     meropenem

## 2023-10-24 NOTE — CARE PLAN
The patient's goals for the shift include Pain management    The clinical goals for the shift include Have bowel movement    Over the shift, the patient did not make progress toward the following goals. Barriers to progression include ***. Recommendations to address these barriers include ***.    Problem: Safety - Medical Restraint  Goal: Free from restraint(s) (Restraint for Interference with Medical Device)  Outcome: Not Progressing

## 2023-10-24 NOTE — CARE PLAN
The patient's goals for the shift include Pain management    The clinical goals for the shift include Have bowel movement    Over the shift, the patient did not make progress toward the following goals. Barriers to progression include ***. Recommendations to address these barriers include ***.    Problem: Safety - Medical Restraint  Goal: Free from restraint(s) (Restraint for Interference with Medical Device)  10/24/2023 0514 by Sherie Oneill RN  Outcome: Not Progressing  10/24/2023 0514 by Sherie Oneill, RN  Outcome: Not Progressing

## 2023-10-24 NOTE — PROGRESS NOTES
Victorino Lara is a 48 y.o. adult on day 12  of admission presenting with Acute appendicitis with localized peritonitis.      Subjective   Patient is a 48 y.o. adult admitted on 10/10/2023  8:50 AM with the following indication(s) for ICU care Aspiration pneumonia.      Interval History: No acute events overnight, afebrile, had some issues with sedation requiring increasing propofol to 50mcg/hr  Complaints: Intubated, sedated.     Scheduled Medications:   [Held by provider] busPIRone, 15 mg, oral, BID  [Held by provider] docusate sodium, 100 mg, oral, BID  enoxaparin, 40 mg, subcutaneous, q24h  insulin lispro, 0-10 Units, subcutaneous, q4h  [Held by provider] insulin regular, 0-5 Units, subcutaneous, TID with meals  [Held by provider] lurasidone, 80 mg, oral, Daily  meropenem, 1 g, intravenous, q8h  [Held by provider] pantoprazole, 40 mg, oral, Daily before breakfast  pantoprazole, 40 mg, intravenous, Daily  potassium phosphate, 21 mmol, intravenous, Once  [Held by provider] QUEtiapine, 300 mg, oral, BID  [Held by provider] simvastatin, 40 mg, oral, Nightly  sodium hypochlorite, , irrigation, BID  [Held by provider] traZODone, 300 mg, oral, Nightly  valproate sodium, 500 mg, intravenous, q12h  [Held by provider] venlafaxine XR, 150 mg, oral, Daily           Continuous Medications:   Adult Clinimix TPN, 70 mL/hr, Last Rate: 70 mL/hr (10/21/23 0733)  dexmedeTOMIDine, 0.1-1.5 mcg/kg/hr, Last Rate: 1 mcg/kg/hr (10/21/23 1312)  fentaNYL,  mcg/hr, Last Rate: 125 mcg/hr (10/21/23 1311)  propofol, 5-50 mcg/kg/min, Last Rate: 50 mcg/kg/min (10/21/23 1449)           PRN Medications:   PRN medications: alteplase, dextrose 10 % in water (D10W), dextrose, glucagon, HYDROmorphone, HYDROmorphone, ipratropium-albuteroL, naloxone, OLANZapine, oxygen           Objective   Physical Exam:   General appearance: NAD, intubated, sedated  Head: Normocephalic, without obvious abnormality  Eyes:conjunctivae/corneas clear. PERRL  Neck:   supple, symmetrical, trachea midline, left sided internal jugular line pulled out slightly  Lungs:clear to auscultation bilaterally  Chest wall:  no tenderness  Heart: regular rate and rhythm, S1, S2 normal, no murmur, click, rub or gallop  Abdomen: Soft, ND, still erythematouns around wound but is stable, wound is open about 5cm with packing, fascia intact, no more drainage  Male genitalia:  normal  Extremities: 2+ edema  Pulses:2+ and symmetric  Skin: Skin color, texture, turgor normal.  No rashes or lesions  Neurologic: Intubated, sedated, moving extremities x4 spontaneously  Last Recorded Vitals        Assessment/Plan   Acute Respiratory Failure, post op  Aspiration Pneumonia        Principal Problem:    Acute appendicitis with localized peritonitis  Active Problems:    Anxiety    Depression    Hyperlipidemia    Diabetes mellitus, type 2 (CMS/HCC)    Appendicitis, acute    Appendicitis    Acute appendicitis, unspecified acute appendicitis type    Small bowel obstruction (CMS/HCC)     Neuro - fentanyl for analgesia. On versed and propofol for sedation. Delirium, encephalopathy, bipolar. Will wean versed and attempt to arouse safely  Cardiac - monitor hr and bp  Respiratory - acute hypoxemic respiratory failure - wean mechanical ventilation as able. Breathing trial today. Continue antibiotics for pneumonia  GI - ileus - continue ng to suction. On tpn. Discussed with Dr. Calloway, general surgery - will evaluate for infectious source with ct chest/abd/pelvis with oral and iv contrast.  Gu - hypokalemia - replace.. volume overload - has required some diuresis, will hold for now as bp is lower this morning (102/52)  Heme - anemia, stable, no need for transfusion  ID - continue antibiotics for pneumonia and intraabdominal infection. Ct scans pending.  Endo - A1c improved since last year. Continue sliding scale insulin.  MS - offload pressure points  Ppx - protonix, lovenox     Continue icu care    Pulmonary  comments:-Patient unable to wean off vent postoperatively.  Patient has been in the range since October 14, 2023.  Between agitation and aspiration pneumonia patient unable to come off the vent.  Agree with daily attempts to wean off the vent as tolerated.  I agree with the rest of the treatment as being done by ICU service.  Pulmonary will continue to monitor this patient with you.           Mann Ricks MD

## 2023-10-24 NOTE — CARE PLAN
The patient's goals for the shift include Pain management    The clinical goals for the shift include Have bowel movement    Over the shift, the patient did not make progress toward the following goals. Barriers to progression include ***. Recommendations to address these barriers include ***.    Problem: Safety - Medical Restraint  Goal: Free from restraint(s) (Restraint for Interference with Medical Device)  10/24/2023 0634 by Sherie Oneill RN  Outcome: Progressing  10/24/2023 0514 by Sherie Oneill RN  Outcome: Not Progressing  10/24/2023 0514 by Sherie Oneill RN  Outcome: Not Progressing

## 2023-10-24 NOTE — PROGRESS NOTES
"Victorino Lara is a 48 y.o. adult on day 12 of admission presenting with Acute appendicitis with localized peritonitis.    Subjective   No acute events overnight.  Patient is hemodynamically stable.  Well sedated on Versed, propofol, fentanyl.  Vent has FiO2 of 50% and oxygenating well.  Was -500 cc last night.  Unfortunately white cell count is up to 16,000 from 12,000.  750 cc output from NG tube over last 24 hours, potentially slowly resolving ileus.       Objective     Physical Exam  Gen: Intubated, sedated  Heart; RRR  Abd; Soft, ND, incision open 5cm with clean wound and minimal surrounding erythema, no active drainage  Last Recorded Vitals  Blood pressure 100/63, pulse 86, temperature 36.1 °C (97 °F), temperature source Temporal, resp. rate 20, height 1.702 m (5' 7.01\"), weight 96.1 kg (211 lb 13.8 oz), SpO2 96 %.  Intake/Output last 3 Shifts:  I/O last 3 completed shifts:  In: 5551 (57.8 mL/kg) [I.V.:2647.4 (27.5 mL/kg); IV Piggyback:700]  Out: 4925 (51.2 mL/kg) [Urine:3375 (1 mL/kg/hr); Emesis/NG output:1550]  Weight: 96.1 kg     Relevant Results           This patient currently has cardiac telemetry ordered; if you would like to modify or discontinue the telemetry order, click here to go to the orders activity to modify/discontinue the order.      This patient is intubated   Reason for patient to remain intubated today? they are unable to protect their airway and they have inadequate gas-exchange without positive pressure             Assessment/Plan   Principal Problem:    Acute appendicitis with localized peritonitis  Active Problems:    Anxiety    Depression    Hyperlipidemia    Diabetes mellitus, type 2 (CMS/HCC)    Appendicitis, acute    Appendicitis    Acute appendicitis, unspecified acute appendicitis type    Small bowel obstruction (CMS/HCC)    Impression  POD 13 Appendectomy and hernia repair. POD 9 reexploration, currently in the ICU for agitation, hypoxemia secondary to aspiration pneumonia, wound " infection, intra-abdominal fluid collection.  White cell count was going down but is now slowly increasing.  There is concern that the abscesses in his abdomen are increasing especially without full resolution of his ileus.  Discussed with ICU     Plan  NG/NPO/IVF/TPN  CT chest abdomen pelvis with p.o. and IV contrast to evaluate bowel and abscess.  Will touch base with IR again if the abscesses have increased in size.  Continue care for right now in ICU       I spent 36 minutes in the professional and overall care of this patient.      Norman Calloway MD  10/24/23

## 2023-10-25 LAB
ALBUMIN SERPL BCP-MCNC: 2.9 G/DL (ref 3.4–5)
ALP SERPL-CCNC: 92 U/L (ref 33–120)
ALT SERPL W P-5'-P-CCNC: 14 U/L (ref 7–52)
ANION GAP BLDA CALCULATED.4IONS-SCNC: 5 MMO/L (ref 10–25)
ANION GAP SERPL CALC-SCNC: 10 MMOL/L (ref 10–20)
AST SERPL W P-5'-P-CCNC: 22 U/L (ref 9–39)
BASE EXCESS BLDA CALC-SCNC: 8.2 MMOL/L (ref -2–3)
BASOPHILS # BLD AUTO: 0.06 X10*3/UL (ref 0–0.1)
BASOPHILS NFR BLD AUTO: 0.4 %
BILIRUB SERPL-MCNC: 0.5 MG/DL (ref 0–1.2)
BODY TEMPERATURE: ABNORMAL
BUN SERPL-MCNC: 15 MG/DL (ref 6–23)
CA-I BLDA-SCNC: 1.06 MMOL/L (ref 1.1–1.33)
CALCIUM SERPL-MCNC: 7.8 MG/DL (ref 8.6–10.3)
CHLORIDE BLDA-SCNC: 107 MMOL/L (ref 98–107)
CHLORIDE SERPL-SCNC: 100 MMOL/L (ref 98–107)
CO2 SERPL-SCNC: 31 MMOL/L (ref 21–32)
CREAT SERPL-MCNC: 0.6 MG/DL (ref 0.5–1.3)
CRITICAL CALL TIME: 341
CRITICAL CALLED BY: ABNORMAL
CRITICAL CALLED TO: ABNORMAL
CRITICAL READ BACK: ABNORMAL
EOSINOPHIL # BLD AUTO: 0.28 X10*3/UL (ref 0–0.7)
EOSINOPHIL NFR BLD AUTO: 2.1 %
ERYTHROCYTE [DISTWIDTH] IN BLOOD BY AUTOMATED COUNT: 13.6 % (ref 11.5–14.5)
GFR SERPL CREATININE-BSD FRML MDRD: >90 ML/MIN/1.73M*2
GLUCOSE BLD MANUAL STRIP-MCNC: 125 MG/DL (ref 74–99)
GLUCOSE BLD MANUAL STRIP-MCNC: 130 MG/DL (ref 74–99)
GLUCOSE BLD MANUAL STRIP-MCNC: 132 MG/DL (ref 74–99)
GLUCOSE BLD MANUAL STRIP-MCNC: 139 MG/DL (ref 74–99)
GLUCOSE BLD MANUAL STRIP-MCNC: 154 MG/DL (ref 74–99)
GLUCOSE BLDA-MCNC: 137 MG/DL (ref 74–99)
GLUCOSE SERPL-MCNC: 140 MG/DL (ref 74–99)
HCO3 BLDA-SCNC: 32.7 MMOL/L (ref 22–26)
HCT VFR BLD AUTO: 25.6 % (ref 36–52)
HCT VFR BLD EST: 16 % (ref 36–52)
HGB BLD-MCNC: 8.2 G/DL (ref 12–17.5)
HGB BLDA-MCNC: 5.4 G/DL (ref 13.5–17.5)
IMM GRANULOCYTES # BLD AUTO: 0.26 X10*3/UL (ref 0–0.7)
IMM GRANULOCYTES NFR BLD AUTO: 1.9 % (ref 0–0.9)
INHALED O2 CONCENTRATION: 50 %
LACTATE BLDA-SCNC: 1.7 MMOL/L (ref 0.4–2)
LYMPHOCYTES # BLD AUTO: 1.92 X10*3/UL (ref 1.2–4.8)
LYMPHOCYTES NFR BLD AUTO: 14.2 %
MAGNESIUM SERPL-MCNC: 2 MG/DL (ref 1.6–2.4)
MCH RBC QN AUTO: 28.4 PG (ref 26–34)
MCHC RBC AUTO-ENTMCNC: 32 G/DL (ref 32–36)
MCV RBC AUTO: 89 FL (ref 80–100)
MONOCYTES # BLD AUTO: 1.28 X10*3/UL (ref 0.1–1)
MONOCYTES NFR BLD AUTO: 9.5 %
NEUTROPHILS # BLD AUTO: 9.69 X10*3/UL (ref 1.2–7.7)
NEUTROPHILS NFR BLD AUTO: 71.9 %
NRBC BLD-RTO: 0 /100 WBCS (ref 0–0)
OXYHGB MFR BLDA: 97.3 % (ref 94–98)
PCO2 BLDA: 46 MM HG (ref 38–42)
PH BLDA: 7.46 PH (ref 7.38–7.42)
PHOSPHATE SERPL-MCNC: 3.2 MG/DL (ref 2.5–4.9)
PLATELET # BLD AUTO: 726 X10*3/UL (ref 150–450)
PMV BLD AUTO: 9.5 FL (ref 7.5–11.5)
PO2 BLDA: 99 MM HG (ref 85–95)
POTASSIUM BLDA-SCNC: 4.4 MMOL/L (ref 3.5–5.3)
POTASSIUM SERPL-SCNC: 3.8 MMOL/L (ref 3.5–5.3)
PROT SERPL-MCNC: 6.9 G/DL (ref 6.4–8.2)
RBC # BLD AUTO: 2.89 X10*6/UL (ref 4–5.9)
SAO2 % BLDA: 98 % (ref 94–100)
SODIUM BLDA-SCNC: 140 MMOL/L (ref 136–145)
SODIUM SERPL-SCNC: 137 MMOL/L (ref 136–145)
VENTILATOR MODE: ABNORMAL
WBC # BLD AUTO: 13.5 X10*3/UL (ref 4.4–11.3)

## 2023-10-25 PROCEDURE — 99024 POSTOP FOLLOW-UP VISIT: CPT | Performed by: SURGERY

## 2023-10-25 PROCEDURE — 84132 ASSAY OF SERUM POTASSIUM: CPT | Performed by: SURGERY

## 2023-10-25 PROCEDURE — 83735 ASSAY OF MAGNESIUM: CPT

## 2023-10-25 PROCEDURE — 94003 VENT MGMT INPAT SUBQ DAY: CPT

## 2023-10-25 PROCEDURE — 84132 ASSAY OF SERUM POTASSIUM: CPT

## 2023-10-25 PROCEDURE — 85025 COMPLETE CBC W/AUTO DIFF WBC: CPT

## 2023-10-25 PROCEDURE — 2500000001 HC RX 250 WO HCPCS SELF ADMINISTERED DRUGS (ALT 637 FOR MEDICARE OP)

## 2023-10-25 PROCEDURE — 2500000005 HC RX 250 GENERAL PHARMACY W/O HCPCS

## 2023-10-25 PROCEDURE — 2020000001 HC ICU ROOM DAILY

## 2023-10-25 PROCEDURE — 99291 CRITICAL CARE FIRST HOUR: CPT | Performed by: SURGERY

## 2023-10-25 PROCEDURE — 96372 THER/PROPH/DIAG INJ SC/IM: CPT | Performed by: SURGERY

## 2023-10-25 PROCEDURE — 3E0G76Z INTRODUCTION OF NUTRITIONAL SUBSTANCE INTO UPPER GI, VIA NATURAL OR ARTIFICIAL OPENING: ICD-10-PCS | Performed by: SURGERY

## 2023-10-25 PROCEDURE — 2500000005 HC RX 250 GENERAL PHARMACY W/O HCPCS: Performed by: SURGERY

## 2023-10-25 PROCEDURE — 84075 ASSAY ALKALINE PHOSPHATASE: CPT

## 2023-10-25 PROCEDURE — 2500000004 HC RX 250 GENERAL PHARMACY W/ HCPCS (ALT 636 FOR OP/ED): Performed by: SURGERY

## 2023-10-25 PROCEDURE — 2500000001 HC RX 250 WO HCPCS SELF ADMINISTERED DRUGS (ALT 637 FOR MEDICARE OP): Performed by: SURGERY

## 2023-10-25 PROCEDURE — 82947 ASSAY GLUCOSE BLOOD QUANT: CPT

## 2023-10-25 PROCEDURE — C9113 INJ PANTOPRAZOLE SODIUM, VIA: HCPCS | Performed by: SURGERY

## 2023-10-25 PROCEDURE — 2500000004 HC RX 250 GENERAL PHARMACY W/ HCPCS (ALT 636 FOR OP/ED)

## 2023-10-25 PROCEDURE — 37799 UNLISTED PX VASCULAR SURGERY: CPT

## 2023-10-25 PROCEDURE — 84100 ASSAY OF PHOSPHORUS: CPT

## 2023-10-25 RX ORDER — POTASSIUM CHLORIDE 1.5 G/1.58G
20 POWDER, FOR SOLUTION ORAL ONCE
Status: COMPLETED | OUTPATIENT
Start: 2023-10-25 | End: 2023-10-25

## 2023-10-25 RX ADMIN — FENTANYL CITRATE 100 MCG/HR: 0.05 INJECTION, SOLUTION INTRAMUSCULAR; INTRAVENOUS at 22:39

## 2023-10-25 RX ADMIN — ASCORBIC ACID, VITAMIN A PALMITATE, CHOLECALCIFEROL, THIAMINE HYDROCHLORIDE, RIBOFLAVIN-5 PHOSPHATE SODIUM, PYRIDOXINE HYDROCHLORIDE, NIACINAMIDE, DEXPANTHENOL, ALPHA-TOCOPHEROL ACETATE, VITAMIN K1, FOLIC ACID, BIOTIN, CYANOCOBALAMIN: 200; 3300; 200; 6; 3.6; 6; 40; 15; 10; 150; 600; 60; 5 INJECTION, SOLUTION INTRAVENOUS at 03:19

## 2023-10-25 RX ADMIN — VALPROATE SODIUM 500 MG: 100 INJECTION, SOLUTION INTRAVENOUS at 13:36

## 2023-10-25 RX ADMIN — METOPROLOL TARTRATE 5 MG: 5 INJECTION INTRAVENOUS at 15:27

## 2023-10-25 RX ADMIN — ENOXAPARIN SODIUM 40 MG: 40 INJECTION SUBCUTANEOUS at 18:00

## 2023-10-25 RX ADMIN — HYOSCYAMINE SULFATE 473 ML: 16 SOLUTION at 08:19

## 2023-10-25 RX ADMIN — PROPOFOL 35 MCG/KG/MIN: 10 INJECTION, EMULSION INTRAVENOUS at 18:04

## 2023-10-25 RX ADMIN — VALPROATE SODIUM 500 MG: 100 INJECTION, SOLUTION INTRAVENOUS at 05:21

## 2023-10-25 RX ADMIN — VALPROATE SODIUM 500 MG: 100 INJECTION, SOLUTION INTRAVENOUS at 20:46

## 2023-10-25 RX ADMIN — PANTOPRAZOLE SODIUM 40 MG: 40 INJECTION, POWDER, FOR SOLUTION INTRAVENOUS at 08:17

## 2023-10-25 RX ADMIN — HYOSCYAMINE SULFATE 473 ML: 16 SOLUTION at 20:48

## 2023-10-25 RX ADMIN — FENTANYL CITRATE 125 MCG/HR: 0.05 INJECTION, SOLUTION INTRAMUSCULAR; INTRAVENOUS at 06:05

## 2023-10-25 RX ADMIN — MEROPENEM 1 G: 1 INJECTION, POWDER, FOR SOLUTION INTRAVENOUS at 05:21

## 2023-10-25 RX ADMIN — INSULIN LISPRO 2 UNITS: 100 INJECTION, SOLUTION INTRAVENOUS; SUBCUTANEOUS at 16:21

## 2023-10-25 RX ADMIN — MEROPENEM 1 G: 1 INJECTION, POWDER, FOR SOLUTION INTRAVENOUS at 20:46

## 2023-10-25 RX ADMIN — PROPOFOL 35 MCG/KG/MIN: 10 INJECTION, EMULSION INTRAVENOUS at 07:00

## 2023-10-25 RX ADMIN — MIDAZOLAM HYDROCHLORIDE 8.1 MG/HR: 1 INJECTION, SOLUTION INTRAVENOUS at 05:22

## 2023-10-25 RX ADMIN — BISACODYL 10 MG: 10 SUPPOSITORY RECTAL at 08:17

## 2023-10-25 RX ADMIN — PROPOFOL 25 MCG/KG/MIN: 10 INJECTION, EMULSION INTRAVENOUS at 13:40

## 2023-10-25 RX ADMIN — MEROPENEM 1 G: 1 INJECTION, POWDER, FOR SOLUTION INTRAVENOUS at 13:36

## 2023-10-25 RX ADMIN — PROPOFOL 40 MCG/KG/MIN: 10 INJECTION, EMULSION INTRAVENOUS at 22:38

## 2023-10-25 RX ADMIN — PROPOFOL 40 MCG/KG/MIN: 10 INJECTION, EMULSION INTRAVENOUS at 02:35

## 2023-10-25 RX ADMIN — POTASSIUM CHLORIDE 20 MEQ: 1.5 POWDER, FOR SOLUTION ORAL at 11:32

## 2023-10-25 RX ADMIN — FENTANYL CITRATE 100 MCG/HR: 0.05 INJECTION, SOLUTION INTRAMUSCULAR; INTRAVENOUS at 16:21

## 2023-10-25 ASSESSMENT — PAIN SCALES - GENERAL: PAINLEVEL_OUTOF10: 0 - NO PAIN

## 2023-10-25 NOTE — PROGRESS NOTES
"Victorino Lara is a 48 y.o. adult on day 13 of admission presenting with Acute appendicitis with localized peritonitis.    Subjective   No acute events overnight. Remains intubated and sedated.       Objective     Physical Exam  Constitutional:       Appearance: Normal appearance. He is normal weight.      Comments: Intubated, with mechanical ventilation ongoing   HENT:      Head: Normocephalic and atraumatic.      Nose: Nose normal.      Mouth/Throat:      Mouth: Mucous membranes are moist.   Eyes:      Conjunctiva/sclera: Conjunctivae normal.      Pupils: Pupils are equal, round, and reactive to light.   Cardiovascular:      Rate and Rhythm: Normal rate and regular rhythm.      Pulses: Normal pulses.      Heart sounds: Normal heart sounds.   Pulmonary:      Breath sounds: Normal breath sounds. No wheezing.   Abdominal:      General: Abdomen is flat. There is no distension.      Palpations: Abdomen is soft.      Tenderness: There is no abdominal tenderness.   Musculoskeletal:      Right lower leg: No edema.      Left lower leg: No edema.   Skin:     General: Skin is warm and dry.      Capillary Refill: Capillary refill takes less than 2 seconds.      Coloration: Skin is not jaundiced.   Neurological:      General: No focal deficit present.      Mental Status: He is oriented to person, place, and time. Mental status is at baseline.   Psychiatric:         Mood and Affect: Mood normal.         Behavior: Behavior normal.         Thought Content: Thought content normal.         Judgment: Judgment normal.         Last Recorded Vitals  Blood pressure 100/63, pulse (!) 126, temperature 36.2 °C (97.2 °F), temperature source Temporal, resp. rate (!) 33, height 1.702 m (5' 7.01\"), weight 94.9 kg (209 lb 3.5 oz), SpO2 95 %.  Intake/Output last 3 Shifts:  I/O last 3 completed shifts:  In: 5065.2 (53.4 mL/kg) [I.V.:1859.6 (19.6 mL/kg); IV Piggyback:895]  Out: 4800 (50.6 mL/kg) [Urine:3300 (1 mL/kg/hr); Emesis/NG " output:1500]  Weight: 94.9 kg     Relevant Results           This patient currently has cardiac telemetry ordered; if you would like to modify or discontinue the telemetry order, click here to go to the orders activity to modify/discontinue the order.                 Assessment/Plan   Principal Problem:    Acute appendicitis with localized peritonitis  Active Problems:    Anxiety    Depression    Hyperlipidemia    Diabetes mellitus, type 2 (CMS/HCC)    Appendicitis, acute    Appendicitis    Acute appendicitis, unspecified acute appendicitis type    Small bowel obstruction (CMS/HCC)    Neuro - encephalopathy, delirium, bipolar disorder - wean sedation. Versed and propofol continuously. Continue fentanyl for analgesia  Cardiac - monitor hr and bp  Respiratory - acute hypoxemic respiratory failure - continue mechanical ventilation as needed, wean for spontaneous breathing trial today. Patient is not ready to be extubated due to inadequate gas exchange.  GI - ileus - has resolved, trial tube feeds  Gu - continue hylton for monitoring uop in critically ill patient. Hyperkalemia - replace  Heme - anemia, no indication for transfusion  Endo - sliding scale insulin   ID - continue abx for pneumonia  Ppx - ppi, lovenox       I spent 38 minutes in the professional and overall care of this patient.      Yoan Hopper MD

## 2023-10-25 NOTE — CONSULTS
Nutrition Follow-up Note    Name: Victorino Lara ROOM: 09/09-A  Nurse:No care team member to display    AGE: 48 y.o.  GENDER: adult MRN: [id number]  Code: Full Code          Objective      Principal Problem:    Acute appendicitis with localized peritonitis  Active Problems:    Anxiety    Depression    Hyperlipidemia    Diabetes mellitus, type 2 (CMS/HCC)    Appendicitis, acute    Appendicitis    Acute appendicitis, unspecified acute appendicitis type    Small bowel obstruction (CMS/HCC)       PMH:   Past Medical History:   Diagnosis Date    Acute appendicitis with localized peritonitis 10/10/2023    Anxiety and depression     Bipolar 1 disorder, depressed (CMS/[facility])     Diabetes mellitus (CMS/HCC)     type II, diagnosed mid 2023    GERD (gastroesophageal reflux disease)     HLD (hyperlipidemia)     JOI (iron deficiency anemia)     Obesity (BMI 30-39.9)     Onychomycosis     Panic disorder     Retention of urine, unspecified     Inability to urinate    Scrotal hematoma     Umbilical hernia        Allergies: Patient has no known allergies.     Dietary Orders (From admission, onward)       Start     Ordered    10/25/23 0955  Enteral feeding with NPO 10; 30; Every 4 hours  Diet effective now        Question Answer Comment   Tube feeding formula: Osmolite 1.5    Tube feeding continuous rate (mL/hr): 10    Tube feeding flush (mL): 30    Flush frequency: Every 4 hours        10/25/23 0955                    Relevant Medications: insulin, protonix, potassium phosphate infusion,     Results from last 7 days   Lab Units 10/25/23  0319   GLUCOSE mg/dL 140*   SODIUM mmol/L 137   POTASSIUM mmol/L 3.8   CHLORIDE mmol/L 100   CO2 mmol/L 31   BUN mg/dL 15   CREATININE mg/dL 0.60   EGFR mL/min/1.73m*2 >90   CALCIUM mg/dL 7.8*   PHOSPHORUS mg/dL 3.2   MAGNESIUM mg/dL 2.00       Results from last 7 days   Lab Units 10/25/23  0822 10/25/23  0302 10/25/23  0029 10/24/23  1111 10/24/23  0758 10/24/23  0337 10/23/23  2355 10/23/23  2011   POCT  "GLUCOSE mg/dL 132* 132* 139* 132* 142* 111* 125* 155*       Anthropometrics:  Height: 170.2 cm (5' 7.01\")  Weight: 94.9 kg (209 lb 3.5 oz)  BMI (Calculated): 32.76    Ideal Body Weight: 67.3kg    Current admission last 5 weights:  Weight         10/20/2023  0000 10/21/2023  0500 10/22/2023  0200 10/24/2023  0400 10/25/2023  0605    Weight: 97 kg (213 lb 13.5 oz) 101 kg (223 lb 1.7 oz) 96.1 kg (211 lb 13.8 oz) 96.1 kg (211 lb 13.8 oz) 94.9 kg (209 lb 3.5 oz)             Previous weight records:  Daily Weight  10/25/23 : 94.9 kg (209 lb 3.5 oz)    Weight history/weight changes: Limited wt histories in EMR. 10/20/22: 100.7 kg; 11/30/22: 99.8 kg; 10/10/23: 94.5 kg; 10/16/23: 93.9 kg ; 10/20/23: 97 kg; 10/25/23: 94.9 kg    Interpretation of weight loss: No significant wt loss     Subjective    Nutrition and diet history: deferred d/t intubated and sedated, no family at bedside    Energy Intake:  (NPO)  Food and Nutrient History: Per MD note, plans to start trickle feeds. Osmolite 1.5 at 10 ml/hr ordered with water flush of 30 ml Q 4 hours. Propofol at time of rounds at 14.9 ml/hr, providing 393 kcal. Clinimix 5% amino acids, 15% dextrose with goal rate of 60 ml/hr. This provides 1440 ml total volume, 1022 kcal, 72g protein. Do not recommend lipids d/t use of propofol.    Reported GI Symptoms: distended abdomen    Last bowel movement documented: 10/24/23    Assessment   Comparative Standards (Nutrient Needs):  Estimated Energy Needs: 0306-3934 kcal (22-25 kcal/kg IBW)   Estimated Protein Needs:  g (1.2-2 g/kg IBW)   Estimated Fluid Needs:  (1mL/kcal) or per MD    Nutrition Focused Physical Findings:   Deferred: Yes - constraints of critical care    Skin: incisions (please see nursing/wound notes for further details)  Edema: generalized nonpitting (see nursing flowsheets for further details)    Critical-Care Pain Observation Score:  [3-5]      Nutrition Diagnosis:  Patient has Malnutrition Diagnosis: No    Patient " has Nutrition Diagnosis: Yes  Diagnosis Status (1): Ongoing  Nutrition Diagnosis 1: Inadequate oral intake  Related to (1): altered GI function and acute illness requiring intubation  As Evidenced by (1): NPO, plans to continue TPN and start trickle TF     Goals:  Maintains stable weight, Labs WNL, BG within desirable range ( mg/dl), Tolerates tube feed, and Tolerates parenteral nutrition    Follow up progress towards goals:   Progressing  Additional comments: TPN and propofol meeting >75% of estimated nutritional needs      Plan    Intervention:  Coordination of Care: ICU interdisciplinary rounds, RN, secure chat to ICU intensivist     Recommendation(s):   Continue NPO  Continue TPN of Clinimix 5% amino acids, 15% dextrose with goal rate of 60 ml/hr  Trial trickle feeds of Osmolite 1.5 at 10 ml/hr, this provides 360 kcal, 15g protein, 183 ml free water. Water flush of 30 ml Q 4 hours  Do not recommend lipids d/t currently on propofol  Reweigh daily    Education: Not appropriate at this time    Monitoring and Evaluation:   Nutrition support tolerance, Labs, Weights, and GI symptoms         Follow Up  Time Spent (min): 45 minutes  Last Date of Nutrition Visit: 10/25/23  Nutrition Follow-Up Needed?: 3-5 days

## 2023-10-25 NOTE — PROGRESS NOTES
Primary MD: Roxana Morris PA-C    Reason For Consult  Bilateral lower lobe pneumonia  Acute appendicitis with localized peritonitis    History Of Present Illness  Victorino Lara is a 48 y.o. male              Patient was admitted 10/1/2023     Taken to laparoscopic surgery for acute appendicitis localized peritonitis without perforation     Taken back to surgery 10/14/2023 for small bowel obstruction exploratory laparotomy lysis of adhesions repair of incisional hernia     Hospital course complicated by development of aspiration pneumonia   Has required intubation and mechanical ventilation  growing  Klebsiella pneumonia and MSSA from sputum switched to meropenem wound culture was identified     Klebsiella is resistant to Zosyn consented to third-generation cephalosporin        There is another culture from 10/19/2023 reported as surgical site tissue biopsy growing the same Klebsiella pneumonia and E. coli           CT scan from 10/20/2023 shows bilateral lower lobe consolidations              STUDY:  CT CHEST ABDOMEN PELVIS W IV CONTRAST; 10/20/2023 10:30 am  INDICATION:  Signs/Symptoms:sepsis, wound infection, pneumonia.  COMPARISON:  Multiple chest radiographs, most recently 10/30/2023 CT abdomen and  pelvis 10/10/2023  ACCESSION NUMBER(S):  [id number]  ORDERING CLINICIAN:  MEAGHAN CHARLTON  TECHNIQUE:  CT of the chest, abdomen, and pelvis was performed. Contiguous axial  images were obtained at 3 mm slice thickness through the chest,  abdomen and pelvis. Coronal and sagittal reconstructions at 3 mm  slice thickness were performed.  75 ml of contrast Omnipaque 350 were administered intravenously  without immediate complication.  FINDINGS:  CHEST:  LUNG/PLEURA/LARGE AIRWAYS:  Stable endotracheal tube with the tip approximately 3 cm from the  harvey. Otherwise, central airways are patent without endobronchial  lesions. Consolidative opacities in the lung bases, likely secondary  to combination of  pneumonia and atelectasis. Other multifocal  ground-glass opacities, predominantly in the right upper lobe are  also noted, likely osseous secondary to pneumonia. No pneumothorax.  No large pleural effusion.  VESSELS:  Aorta and pulmonary arteries are normal caliber. No atherosclerotic  changes of the aorta are identified. No coronary artery  calcifications are present.  HEART:  Normal size. No pericardial effusion  MEDIASTINUM AND DARYL:  No mediastinal, hilar or axillary lymphadenopathy is present. The  esophagus is nondilated and appears normal in entire length.  CHEST WALL AND LOWER NECK:  The soft tissues of the chest wall demonstrate no gross abnormality.  Partially imaged low-attenuation lesion medial left thyroid lobe.  ABDOMEN:  LIVER:  The liver demonstrates homogeneous attenuation without evidence of  focal liver lesions.  BILE DUCTS:  The intrahepatic and extrahepatic ducts are not dilated.  GALLBLADDER:  No calcified stones. No wall thickening.  PANCREAS:  The pancreas appears unremarkable without evidence of ductal  dilatation or masses.  SPLEEN:  The spleen is normal in size without focal lesions.  ADRENAL GLANDS:  Bilateral adrenal glands appear normal.  KIDNEYS AND URETERS:  The kidneys are normal in size and enhance symmetrically. No  hydroureteronephrosis or nephroureterolithiasis is identified.  PELVIS:  BLADDER:  Decompressed urinary bladder Proctor catheter in-situ.  REPRODUCTIVE ORGANS:  No pelvic masses.  BOWEL:  Feeding tube with tip in the proximal gastric body. Redemonstration  of multiple dilated small bowel loops, measuring up to 3.3 cm.  However, compatible with the CT from 10/14/2023 the degree of  small-bowel distention has improved. No pneumoperitoneum.  Postoperative changes appendectomy with interval resolution  previously noted fluid within the appendectomy bed. Fluid-filled  nondistended colonic loops.    VESSELS:  There is no aneurysmal dilatation of the abdominal aorta. The  IVC  appears normal.  PERITONEUM/RETROPERITONEUM/LYMPH NODES:  Small amount of multiloculated contiguous pelvic collections are  noted. For example a 4.9 x 3.4 cm (series 201, image 178), with  scattered foci of gas within the collection. Additionally, there is  an adjacent contiguous collection which measures 5.3 x 3.9 cm (series  201, image 182). Moderate mesenteric soft tissue stranding,  predominantly in the right lower quadrant, likely secondary to  postoperative changes. No abdominopelvic lymphadenopathy is present.  BONE AND SOFT TISSUE:  No suspicious osseous lesions are identified. Moderate ventral skin  defect (series 201, image 156), with packing material in-situ and  surgical staples along the anterior abdominal wall are noted related  to postoperative changes.  IMPRESSION:  CHEST:  1. Consolidative opacities in the lung bases, with scattered  ground-glass opacities as described above, likely secondary to a  combination of atelectasis and pneumonia.  ABDOMEN-PELVIS:  1. Interval postoperative changes appendectomy with contiguous  multiloculated fluid collections within the right hemipelvis as noted  above, slightly increased since prior. Foci of gas within 1 of the  collections as described above may represent an abscess. However  postoperative seromas can not be excluded.  2. Interval improvement in degree of small-bowel dilation, now  measuring up to 3.3 cm, likely secondary to improving postoperative  ileus.  3. Moderate size skin defect with surgical jaz.    Signed by: Jesus Olguin 10/20/2023 12:25 PM  Dictation workstation: [id number]        No fever  Remains on vent  No pressors        Review of Systems   Unable to perform ROS: Intubated        Physical Exam  Constitutional:       Appearance: He is not ill-appearing or diaphoretic.   Cardiovascular:      Heart sounds: Normal heart sounds. No murmur heard.  Pulmonary:      Effort: No respiratory distress.      Breath sounds: No wheezing,  "rhonchi or rales.   Abdominal:      General: Abdomen is flat. Bowel sounds are normal. There is distension.      Palpations: Abdomen is soft.      Tenderness: There is no abdominal tenderness. There is no guarding.   Musculoskeletal:         General: No swelling.      Left lower leg: No edema.   Skin:     Coloration: Skin is not jaundiced.      Findings: No erythema.          Range of Vitals (last 24 hours)  Heart Rate:  []   Temp:  [36.2 °C (97.2 °F)-36.9 °C (98.4 °F)]   Resp:  [16-35]   Weight:  [94.9 kg (209 lb 3.5 oz)]   SpO2:  [93 %-100 %]     Relevant Results  Results from last 72 hours   Lab Units 10/25/23  0319   WBC AUTO x10*3/uL 13.5*   HEMOGLOBIN g/dL 8.2*   HEMATOCRIT % 25.6*   PLATELETS AUTO x10*3/uL 726*   NEUTROS PCT AUTO % 71.9   LYMPHS PCT AUTO % 14.2   MONOS PCT AUTO % 9.5   EOS PCT AUTO % 2.1       Results from last 72 hours   Lab Units 10/25/23  0319   SODIUM mmol/L 137   POTASSIUM mmol/L 3.8   CHLORIDE mmol/L 100   CO2 mmol/L 31   BUN mg/dL 15   CREATININE mg/dL 0.60   GLUCOSE mg/dL 140*   CALCIUM mg/dL 7.8*   ANION GAP mmol/L 10   EGFR mL/min/1.73m*2 >90       Results from last 72 hours   Lab Units 10/25/23  0319   ALK PHOS U/L 92   BILIRUBIN TOTAL mg/dL 0.5   PROTEIN TOTAL g/dL 6.9   ALT U/L 14   AST U/L 22   ALBUMIN g/dL 2.9*       Estimated Creatinine Clearance (by C-G formula based on SCr of 0.6 mg/dL)  Female: 125 mL/min  Male: 125 mL/min  The patient&#39;s sex/gender information is inconclusive; review their information before proceeding.  CRP   Date/Time Value Ref Range Status   01/17/2020 10:00 PM 0.19 mg/dL Final     Comment:     REF VALUE  < 1.00     01/12/2020 06:29 AM 0.47 mg/dL Final     Comment:     REF VALUE  < 1.00       Sedimentation Rate   Date/Time Value Ref Range Status   01/17/2020 10:00 PM 5 0 - 15 mm/h Final   01/12/2020 06:29 AM 14 0 - 15 mm/h Final     No results found for: \"HIV1X2\", \"HIVCONF\", \"KFZKLY7SM\"  No results found for: " "\"HCVPCRQUANT\"  Cultures  Susceptibility data from last 14 days.  Collected Specimen Info Organism Amoxicillin/Clavulanate Ampicillin Ampicillin/Sulbactam Cefazolin Ceftriaxone Cefuroxime Ciprofloxacin Clindamycin Erythromycin Gentamicin Levofloxacin Oxacillin Piperacillin/Tazobactam   10/19/23 Fluid from Tracheal Aspirate Methicillin Susceptible Staphylococcus aureus (MSSA)        S S   S      Klebsiella pneumoniae/variicola R R R R S S S   S S  R   10/19/23 Tissue/Biopsy from Surgical Site Infection Klebsiella pneumoniae/variicola R R R R S S S   S S  R     Escherichia coli S R R S   S   S S  S   10/16/23 Fluid from Tracheal Aspirate Klebsiella pneumoniae/variicola R R R R S I S   S S  R     Normal throat sharona                10/15/23 Swab from Anterior Nares Methicillin Susceptible Staphylococcus aureus (MSSA)                  Collected Specimen Info Organism Tetracycline Trimethoprim/Sulfamethoxazole Vancomycin   10/19/23 Fluid from Tracheal Aspirate Methicillin Susceptible Staphylococcus aureus (MSSA) S S S     Klebsiella pneumoniae/variicola  S    10/19/23 Tissue/Biopsy from Surgical Site Infection Klebsiella pneumoniae/variicola  S      Escherichia coli  S    10/16/23 Fluid from Tracheal Aspirate Klebsiella pneumoniae/variicola  S      Normal throat sharona      10/15/23 Swab from Anterior Nares Methicillin Susceptible Staphylococcus aureus (MSSA)               Assessment/Plan     Bilateral lower lobe pneumonia  Acute appendicitis with localized peritonitis  Postop wound infection           Organisms Klebsiella is resistant to Zosyn  Growing Klebsiella and MSSA from sputum Klebsiella and E. coli from wound     meropenem              "

## 2023-10-25 NOTE — PROGRESS NOTES
"Victorino Lara is a 48 y.o. adult on day 13 of admission presenting with Acute appendicitis with localized peritonitis.    Subjective   No acute events overnight.  White cell count went down to 13,000.  CT scan did not show any significant change since the one performed on Friday, and of anything with decreased in size.  Patient had a bowel movement yesterday.  the fluid collections have still having about 1 L out of NG tube over 24-hour period.        Objective     Physical Exam  Gen: Intubated, sedated  Heart: RRR  Abd; Soft, ND, no longer erythematous, 5cm open incision with fascia intact, no drainage  Last Recorded Vitals  Blood pressure 100/63, pulse (!) 114, temperature 36.2 °C (97.2 °F), temperature source Temporal, resp. rate 25, height 1.702 m (5' 7.01\"), weight 94.9 kg (209 lb 3.5 oz), SpO2 95 %.  Intake/Output last 3 Shifts:  I/O last 3 completed shifts:  In: 5065.2 (53.4 mL/kg) [I.V.:1859.6 (19.6 mL/kg); IV Piggyback:895]  Out: 4800 (50.6 mL/kg) [Urine:3300 (1 mL/kg/hr); Emesis/NG output:1500]  Weight: 94.9 kg     Relevant Results           This patient currently has cardiac telemetry ordered; if you would like to modify or discontinue the telemetry order, click here to go to the orders activity to modify/discontinue the order.  This patient has a central line   Reason for the central line remaining today? Parenteral nutrition    This patient has a urinary catheter   Reason for the urinary catheter remaining today? critically ill patient who need accurate urinary output measurements               Assessment/Plan   Principal Problem:    Acute appendicitis with localized peritonitis  Active Problems:    Anxiety    Depression    Hyperlipidemia    Diabetes mellitus, type 2 (CMS/HCC)    Appendicitis, acute    Appendicitis    Acute appendicitis, unspecified acute appendicitis type    Small bowel obstruction (CMS/HCC)    POD 14 Appendectomy and hernia repair. POD 10 reexploration, currently in the ICU for " agitation, hypoxemia secondary to aspiration pneumonia, wound infection, intra-abdominal fluid collection.  WBC improving, now having bowel function   Plan  Cont. TPN  Ok to start oral meds and trickle feeds as tolerated  Would continue enema/suppository  Cont. Abx for now per ID (may need longer course with undrained intraabdominal infection)  Wean vent as tolerated, hopeful patient will get extubated in next few days and avoid trach  Cont. Care per ICU       I spent 35 minutes in the professional and overall care of this patient.      Norman Calloway MD  9:50 AM

## 2023-10-25 NOTE — PROGRESS NOTES
Victorino Lara is a 48 y.o. adult on day 12  of admission presenting with Acute appendicitis with localized peritonitis.      Subjective   Patient is a 48 y.o. adult admitted on 10/10/2023  8:50 AM with the following indication(s) for ICU care Aspiration pneumonia.      Interval History: No acute events overnight, afebrile, had some issues with sedation requiring increasing propofol to 50mcg/hr  Complaints: Intubated, sedated.     Scheduled Medications:   [Held by provider] busPIRone, 15 mg, oral, BID  [Held by provider] docusate sodium, 100 mg, oral, BID  enoxaparin, 40 mg, subcutaneous, q24h  insulin lispro, 0-10 Units, subcutaneous, q4h  [Held by provider] insulin regular, 0-5 Units, subcutaneous, TID with meals  [Held by provider] lurasidone, 80 mg, oral, Daily  meropenem, 1 g, intravenous, q8h  [Held by provider] pantoprazole, 40 mg, oral, Daily before breakfast  pantoprazole, 40 mg, intravenous, Daily  potassium phosphate, 21 mmol, intravenous, Once  [Held by provider] QUEtiapine, 300 mg, oral, BID  [Held by provider] simvastatin, 40 mg, oral, Nightly  sodium hypochlorite, , irrigation, BID  [Held by provider] traZODone, 300 mg, oral, Nightly  valproate sodium, 500 mg, intravenous, q12h  [Held by provider] venlafaxine XR, 150 mg, oral, Daily           Continuous Medications:   Adult Clinimix TPN, 70 mL/hr, Last Rate: 70 mL/hr (10/21/23 2433)  dexmedeTOMIDine, 0.1-1.5 mcg/kg/hr, Last Rate: 1 mcg/kg/hr (10/21/23 1312)  fentaNYL,  mcg/hr, Last Rate: 125 mcg/hr (10/21/23 1311)  propofol, 5-50 mcg/kg/min, Last Rate: 50 mcg/kg/min (10/21/23 1449)           PRN Medications:   PRN medications: alteplase, dextrose 10 % in water (D10W), dextrose, glucagon, HYDROmorphone, HYDROmorphone, ipratropium-albuteroL, naloxone, OLANZapine, oxygen           Objective   Physical Exam:   General appearance: NAD, intubated, sedated  Head: Normocephalic, without obvious abnormality  Eyes:conjunctivae/corneas clear. PERRL  Neck:   supple, symmetrical, trachea midline, left sided internal jugular line pulled out slightly  Lungs:clear to auscultation bilaterally  Chest wall:  no tenderness  Heart: regular rate and rhythm, S1, S2 normal, no murmur, click, rub or gallop  Abdomen: Soft, ND, still erythematouns around wound but is stable, wound is open about 5cm with packing, fascia intact, no more drainage  Male genitalia:  normal  Extremities: 2+ edema  Pulses:2+ and symmetric  Skin: Skin color, texture, turgor normal.  No rashes or lesions  Neurologic: Intubated, sedated, moving extremities x4 spontaneously  Last Recorded Vitals        Assessment/Plan   Acute Respiratory Failure, post op  Aspiration Pneumonia        Principal Problem:    Acute appendicitis with localized peritonitis  Active Problems:    Anxiety    Depression    Hyperlipidemia    Diabetes mellitus, type 2 (CMS/HCC)    Appendicitis, acute    Appendicitis    Acute appendicitis, unspecified acute appendicitis type    Small bowel obstruction (CMS/HCC)     Neuro - fentanyl for analgesia. On versed and propofol for sedation. Delirium, encephalopathy, bipolar. Will wean versed and attempt to arouse safely  Cardiac - monitor hr and bp  Respiratory - acute hypoxemic respiratory failure - wean mechanical ventilation as able. Breathing trial today. Continue antibiotics for pneumonia  GI - ileus - continue ng to suction. On tpn. Discussed with Dr. Calloway, general surgery - will evaluate for infectious source with ct chest/abd/pelvis with oral and iv contrast.  Gu - hypokalemia - replace.. volume overload - has required some diuresis, will hold for now as bp is lower this morning (102/52)  Heme - anemia, stable, no need for transfusion  ID - continue antibiotics for pneumonia and intraabdominal infection. Ct scans pending.  Endo - A1c improved since last year. Continue sliding scale insulin.  MS - offload pressure points  Ppx - protonix, lovenox     Continue icu care    Pulmonary  comments:-Patient unable to wean off vent postoperatively.  Patient has been in the range since October 14, 2023.  Between agitation and aspiration pneumonia patient unable to come off the vent.  Agree with daily attempts to wean off the vent as tolerated.  I agree with the rest of the treatment as being done by ICU service.  Pulmonary will continue to monitor this patient with you.           Mann Ricks MD

## 2023-10-25 NOTE — PROGRESS NOTES
10/25/23 6359   Discharge Planning   Living Arrangements Spouse/significant other   Support Systems Spouse/significant other   Assistance Needed unable to assess   Type of Residence Private residence   Patient expects to be discharged to: TBD     Pt continues to require ventilator support.  Plan for SBT today. TPN for nutrition.  NGT   Pt will need therapy evaluations once extubated to assist with determining dc planning needs.  TBD at this time.

## 2023-10-26 ENCOUNTER — APPOINTMENT (OUTPATIENT)
Dept: RADIOLOGY | Facility: HOSPITAL | Age: 48
End: 2023-10-26
Payer: COMMERCIAL

## 2023-10-26 LAB
ALBUMIN SERPL BCP-MCNC: 2.9 G/DL (ref 3.4–5)
ALP SERPL-CCNC: 115 U/L (ref 33–120)
ALT SERPL W P-5'-P-CCNC: 18 U/L (ref 7–52)
ANION GAP BLDA CALCULATED.4IONS-SCNC: 3 MMO/L (ref 10–25)
ANION GAP BLDA CALCULATED.4IONS-SCNC: 5 MMO/L (ref 10–25)
ANION GAP BLDA CALCULATED.4IONS-SCNC: 8 MMO/L (ref 10–25)
ANION GAP SERPL CALC-SCNC: 11 MMOL/L (ref 10–20)
AST SERPL W P-5'-P-CCNC: 30 U/L (ref 9–39)
BASE EXCESS BLDA CALC-SCNC: 4.7 MMOL/L (ref -2–3)
BASE EXCESS BLDA CALC-SCNC: 5.6 MMOL/L (ref -2–3)
BASE EXCESS BLDA CALC-SCNC: 9 MMOL/L (ref -2–3)
BASOPHILS # BLD AUTO: 0.12 X10*3/UL (ref 0–0.1)
BASOPHILS NFR BLD AUTO: 1 %
BILIRUB SERPL-MCNC: 0.5 MG/DL (ref 0–1.2)
BODY TEMPERATURE: ABNORMAL
BUN SERPL-MCNC: 13 MG/DL (ref 6–23)
CA-I BLDA-SCNC: 1.1 MMOL/L (ref 1.1–1.33)
CA-I BLDA-SCNC: 1.16 MMOL/L (ref 1.1–1.33)
CA-I BLDA-SCNC: 1.2 MMOL/L (ref 1.1–1.33)
CALCIUM SERPL-MCNC: 8.5 MG/DL (ref 8.6–10.3)
CHLORIDE BLDA-SCNC: 107 MMOL/L (ref 98–107)
CHLORIDE BLDA-SCNC: 107 MMOL/L (ref 98–107)
CHLORIDE BLDA-SCNC: 109 MMOL/L (ref 98–107)
CHLORIDE SERPL-SCNC: 102 MMOL/L (ref 98–107)
CO2 SERPL-SCNC: 30 MMOL/L (ref 21–32)
CREAT SERPL-MCNC: 0.67 MG/DL (ref 0.5–1.3)
CRITICAL CALL TIME: 1001
CRITICAL CALLED BY: ABNORMAL
CRITICAL CALLED TO: ABNORMAL
CRITICAL READ BACK: ABNORMAL
EOSINOPHIL # BLD AUTO: 0.33 X10*3/UL (ref 0–0.7)
EOSINOPHIL NFR BLD AUTO: 2.8 %
ERYTHROCYTE [DISTWIDTH] IN BLOOD BY AUTOMATED COUNT: 13.4 % (ref 11.5–14.5)
GFR SERPL CREATININE-BSD FRML MDRD: >90 ML/MIN/1.73M*2
GLUCOSE BLD MANUAL STRIP-MCNC: 120 MG/DL (ref 74–99)
GLUCOSE BLD MANUAL STRIP-MCNC: 122 MG/DL (ref 74–99)
GLUCOSE BLD MANUAL STRIP-MCNC: 124 MG/DL (ref 74–99)
GLUCOSE BLD MANUAL STRIP-MCNC: 133 MG/DL (ref 74–99)
GLUCOSE BLD MANUAL STRIP-MCNC: 135 MG/DL (ref 74–99)
GLUCOSE BLD MANUAL STRIP-MCNC: 140 MG/DL (ref 74–99)
GLUCOSE BLD MANUAL STRIP-MCNC: 140 MG/DL (ref 74–99)
GLUCOSE BLD MANUAL STRIP-MCNC: 159 MG/DL (ref 74–99)
GLUCOSE BLDA-MCNC: 137 MG/DL (ref 74–99)
GLUCOSE BLDA-MCNC: 153 MG/DL (ref 74–99)
GLUCOSE BLDA-MCNC: 163 MG/DL (ref 74–99)
GLUCOSE SERPL-MCNC: 138 MG/DL (ref 74–99)
HCO3 BLDA-SCNC: 29.8 MMOL/L (ref 22–26)
HCO3 BLDA-SCNC: 29.9 MMOL/L (ref 22–26)
HCO3 BLDA-SCNC: 34.5 MMOL/L (ref 22–26)
HCT VFR BLD AUTO: 26.1 % (ref 36–52)
HCT VFR BLD EST: 16 % (ref 36–52)
HCT VFR BLD EST: 28 % (ref 36–52)
HCT VFR BLD EST: 29 % (ref 36–52)
HGB BLD-MCNC: 8.3 G/DL (ref 12–17.5)
HGB BLDA-MCNC: 5.4 G/DL (ref 13.5–17.5)
HGB BLDA-MCNC: 9.4 G/DL (ref 13.5–17.5)
HGB BLDA-MCNC: 9.6 G/DL (ref 13.5–17.5)
IMM GRANULOCYTES # BLD AUTO: 0.24 X10*3/UL (ref 0–0.7)
IMM GRANULOCYTES NFR BLD AUTO: 2 % (ref 0–0.9)
INHALED O2 CONCENTRATION: 40 %
LACTATE BLDA-SCNC: 1.8 MMOL/L (ref 0.4–2)
LACTATE BLDA-SCNC: 2 MMOL/L (ref 0.4–2)
LACTATE BLDA-SCNC: 2.7 MMOL/L (ref 0.4–2)
LYMPHOCYTES # BLD AUTO: 2.99 X10*3/UL (ref 1.2–4.8)
LYMPHOCYTES NFR BLD AUTO: 25.4 %
MAGNESIUM SERPL-MCNC: 1.99 MG/DL (ref 1.6–2.4)
MCH RBC QN AUTO: 28.3 PG (ref 26–34)
MCHC RBC AUTO-ENTMCNC: 31.8 G/DL (ref 32–36)
MCV RBC AUTO: 89 FL (ref 80–100)
MONOCYTES # BLD AUTO: 1.31 X10*3/UL (ref 0.1–1)
MONOCYTES NFR BLD AUTO: 11.1 %
NEUTROPHILS # BLD AUTO: 6.76 X10*3/UL (ref 1.2–7.7)
NEUTROPHILS NFR BLD AUTO: 57.7 %
NRBC BLD-RTO: 0 /100 WBCS (ref 0–0)
OXYHGB MFR BLDA: 95.6 % (ref 94–98)
OXYHGB MFR BLDA: 96.6 % (ref 94–98)
OXYHGB MFR BLDA: 97.2 % (ref 94–98)
PCO2 BLDA: 42 MM HG (ref 38–42)
PCO2 BLDA: 46 MM HG (ref 38–42)
PCO2 BLDA: 52 MM HG (ref 38–42)
PEEP CMH2O: 5 CM H2O
PEEP CMH2O: 5 CM H2O
PH BLDA: 7.42 PH (ref 7.38–7.42)
PH BLDA: 7.43 PH (ref 7.38–7.42)
PH BLDA: 7.46 PH (ref 7.38–7.42)
PHOSPHATE SERPL-MCNC: 3.1 MG/DL (ref 2.5–4.9)
PLATELET # BLD AUTO: 828 X10*3/UL (ref 150–450)
PMV BLD AUTO: 9.5 FL (ref 7.5–11.5)
PO2 BLDA: 118 MM HG (ref 85–95)
PO2 BLDA: 158 MM HG (ref 85–95)
PO2 BLDA: 80 MM HG (ref 85–95)
POTASSIUM BLDA-SCNC: 4.6 MMOL/L (ref 3.5–5.3)
POTASSIUM BLDA-SCNC: 4.7 MMOL/L (ref 3.5–5.3)
POTASSIUM BLDA-SCNC: 5.1 MMOL/L (ref 3.5–5.3)
POTASSIUM SERPL-SCNC: 3.7 MMOL/L (ref 3.5–5.3)
PRESSURE SUPPORT: 12 CM H2O
PRESSURE SUPPORT: 12 CM H2O
PROT SERPL-MCNC: 7 G/DL (ref 6.4–8.2)
RBC # BLD AUTO: 2.93 X10*6/UL (ref 4–5.9)
SAO2 % BLDA: 100 % (ref 94–100)
SAO2 % BLDA: 98 % (ref 94–100)
SAO2 % BLDA: 99 % (ref 94–100)
SODIUM BLDA-SCNC: 139 MMOL/L (ref 136–145)
SODIUM BLDA-SCNC: 140 MMOL/L (ref 136–145)
SODIUM BLDA-SCNC: 140 MMOL/L (ref 136–145)
SODIUM SERPL-SCNC: 139 MMOL/L (ref 136–145)
VENTILATOR MODE: ABNORMAL
VENTILATOR MODE: ABNORMAL
WBC # BLD AUTO: 11.8 X10*3/UL (ref 4.4–11.3)

## 2023-10-26 PROCEDURE — 71045 X-RAY EXAM CHEST 1 VIEW: CPT | Performed by: RADIOLOGY

## 2023-10-26 PROCEDURE — 85025 COMPLETE CBC W/AUTO DIFF WBC: CPT

## 2023-10-26 PROCEDURE — 2500000004 HC RX 250 GENERAL PHARMACY W/ HCPCS (ALT 636 FOR OP/ED)

## 2023-10-26 PROCEDURE — 96372 THER/PROPH/DIAG INJ SC/IM: CPT | Performed by: SURGERY

## 2023-10-26 PROCEDURE — 71045 X-RAY EXAM CHEST 1 VIEW: CPT

## 2023-10-26 PROCEDURE — C9113 INJ PANTOPRAZOLE SODIUM, VIA: HCPCS | Performed by: SURGERY

## 2023-10-26 PROCEDURE — 2500000004 HC RX 250 GENERAL PHARMACY W/ HCPCS (ALT 636 FOR OP/ED): Performed by: SURGERY

## 2023-10-26 PROCEDURE — 99024 POSTOP FOLLOW-UP VISIT: CPT | Performed by: SURGERY

## 2023-10-26 PROCEDURE — 37799 UNLISTED PX VASCULAR SURGERY: CPT

## 2023-10-26 PROCEDURE — 84100 ASSAY OF PHOSPHORUS: CPT

## 2023-10-26 PROCEDURE — 36600 WITHDRAWAL OF ARTERIAL BLOOD: CPT

## 2023-10-26 PROCEDURE — 2020000001 HC ICU ROOM DAILY

## 2023-10-26 PROCEDURE — 99291 CRITICAL CARE FIRST HOUR: CPT | Performed by: SURGERY

## 2023-10-26 PROCEDURE — 2500000005 HC RX 250 GENERAL PHARMACY W/O HCPCS: Performed by: SURGERY

## 2023-10-26 PROCEDURE — 99232 SBSQ HOSP IP/OBS MODERATE 35: CPT | Performed by: PSYCHIATRY & NEUROLOGY

## 2023-10-26 PROCEDURE — 2500000004 HC RX 250 GENERAL PHARMACY W/ HCPCS (ALT 636 FOR OP/ED): Performed by: NURSE PRACTITIONER

## 2023-10-26 PROCEDURE — 94003 VENT MGMT INPAT SUBQ DAY: CPT

## 2023-10-26 PROCEDURE — 83735 ASSAY OF MAGNESIUM: CPT

## 2023-10-26 PROCEDURE — 84132 ASSAY OF SERUM POTASSIUM: CPT | Performed by: SURGERY

## 2023-10-26 PROCEDURE — 80053 COMPREHEN METABOLIC PANEL: CPT

## 2023-10-26 PROCEDURE — 82947 ASSAY GLUCOSE BLOOD QUANT: CPT

## 2023-10-26 PROCEDURE — 2500000001 HC RX 250 WO HCPCS SELF ADMINISTERED DRUGS (ALT 637 FOR MEDICARE OP): Performed by: SURGERY

## 2023-10-26 PROCEDURE — 2500000001 HC RX 250 WO HCPCS SELF ADMINISTERED DRUGS (ALT 637 FOR MEDICARE OP)

## 2023-10-26 RX ORDER — DEXMEDETOMIDINE HYDROCHLORIDE 4 UG/ML
.2-1.5 INJECTION, SOLUTION INTRAVENOUS CONTINUOUS
Status: DISCONTINUED | OUTPATIENT
Start: 2023-10-26 | End: 2023-10-29

## 2023-10-26 RX ORDER — QUETIAPINE FUMARATE 25 MG/1
50 TABLET, FILM COATED ORAL NIGHTLY
Status: DISCONTINUED | OUTPATIENT
Start: 2023-10-26 | End: 2023-10-27

## 2023-10-26 RX ORDER — LORAZEPAM 2 MG/ML
1 INJECTION INTRAMUSCULAR EVERY 4 HOURS PRN
Status: DISCONTINUED | OUTPATIENT
Start: 2023-10-26 | End: 2023-10-27

## 2023-10-26 RX ORDER — MORPHINE SULFATE 4 MG/ML
4 INJECTION, SOLUTION INTRAMUSCULAR; INTRAVENOUS EVERY 4 HOURS PRN
Status: DISCONTINUED | OUTPATIENT
Start: 2023-10-26 | End: 2023-10-27

## 2023-10-26 RX ORDER — POTASSIUM CHLORIDE 14.9 MG/ML
20 INJECTION INTRAVENOUS ONCE
Status: COMPLETED | OUTPATIENT
Start: 2023-10-26 | End: 2023-10-26

## 2023-10-26 RX ORDER — QUETIAPINE FUMARATE 25 MG/1
50 TABLET, FILM COATED ORAL 2 TIMES DAILY
Status: CANCELLED | OUTPATIENT
Start: 2023-10-26

## 2023-10-26 RX ORDER — MAGNESIUM SULFATE HEPTAHYDRATE 40 MG/ML
2 INJECTION, SOLUTION INTRAVENOUS ONCE
Status: COMPLETED | OUTPATIENT
Start: 2023-10-26 | End: 2023-10-26

## 2023-10-26 RX ADMIN — LORAZEPAM 1 MG: 2 INJECTION INTRAMUSCULAR; INTRAVENOUS at 18:31

## 2023-10-26 RX ADMIN — VALPROATE SODIUM 500 MG: 100 INJECTION, SOLUTION INTRAVENOUS at 05:16

## 2023-10-26 RX ADMIN — MORPHINE SULFATE 4 MG: 4 INJECTION, SOLUTION INTRAMUSCULAR; INTRAVENOUS at 16:24

## 2023-10-26 RX ADMIN — DEXMEDETOMIDINE HYDROCHLORIDE 0.95 MCG/KG/HR: 4 INJECTION, SOLUTION INTRAVENOUS at 16:13

## 2023-10-26 RX ADMIN — ENOXAPARIN SODIUM 40 MG: 40 INJECTION SUBCUTANEOUS at 18:55

## 2023-10-26 RX ADMIN — MORPHINE SULFATE 4 MG: 4 INJECTION, SOLUTION INTRAMUSCULAR; INTRAVENOUS at 12:16

## 2023-10-26 RX ADMIN — ASCORBIC ACID, VITAMIN A PALMITATE, CHOLECALCIFEROL, THIAMINE HYDROCHLORIDE, RIBOFLAVIN-5 PHOSPHATE SODIUM, PYRIDOXINE HYDROCHLORIDE, NIACINAMIDE, DEXPANTHENOL, ALPHA-TOCOPHEROL ACETATE, VITAMIN K1, FOLIC ACID, BIOTIN, CYANOCOBALAMIN: 200; 3300; 200; 6; 3.6; 6; 40; 15; 10; 150; 600; 60; 5 INJECTION, SOLUTION INTRAVENOUS at 04:35

## 2023-10-26 RX ADMIN — PROPOFOL 40 MCG/KG/MIN: 10 INJECTION, EMULSION INTRAVENOUS at 04:54

## 2023-10-26 RX ADMIN — MAGNESIUM SULFATE HEPTAHYDRATE 2 G: 40 INJECTION, SOLUTION INTRAVENOUS at 05:17

## 2023-10-26 RX ADMIN — HYOSCYAMINE SULFATE 473 ML: 16 SOLUTION at 23:05

## 2023-10-26 RX ADMIN — MEROPENEM 1 G: 1 INJECTION, POWDER, FOR SOLUTION INTRAVENOUS at 05:16

## 2023-10-26 RX ADMIN — PROPOFOL 50 MCG/KG/MIN: 10 INJECTION, EMULSION INTRAVENOUS at 01:32

## 2023-10-26 RX ADMIN — BISACODYL 10 MG: 10 SUPPOSITORY RECTAL at 09:32

## 2023-10-26 RX ADMIN — SIMVASTATIN 40 MG: 40 TABLET, FILM COATED ORAL at 21:21

## 2023-10-26 RX ADMIN — INSULIN LISPRO 2 UNITS: 100 INJECTION, SOLUTION INTRAVENOUS; SUBCUTANEOUS at 16:25

## 2023-10-26 RX ADMIN — MEROPENEM 1 G: 1 INJECTION, POWDER, FOR SOLUTION INTRAVENOUS at 14:17

## 2023-10-26 RX ADMIN — MEROPENEM 1 G: 1 INJECTION, POWDER, FOR SOLUTION INTRAVENOUS at 21:21

## 2023-10-26 RX ADMIN — MIDAZOLAM HYDROCHLORIDE 3.1 MG/HR: 1 INJECTION, SOLUTION INTRAVENOUS at 04:33

## 2023-10-26 RX ADMIN — LORAZEPAM 1 MG: 2 INJECTION INTRAMUSCULAR; INTRAVENOUS at 14:04

## 2023-10-26 RX ADMIN — PANTOPRAZOLE SODIUM 40 MG: 40 INJECTION, POWDER, FOR SOLUTION INTRAVENOUS at 09:32

## 2023-10-26 RX ADMIN — QUETIAPINE FUMARATE 50 MG: 25 TABLET, FILM COATED ORAL at 21:21

## 2023-10-26 RX ADMIN — DEXMEDETOMIDINE HYDROCHLORIDE 1.5 MCG/KG/HR: 4 INJECTION, SOLUTION INTRAVENOUS at 19:19

## 2023-10-26 RX ADMIN — DEXMEDETOMIDINE HYDROCHLORIDE 0.2 MCG/KG/HR: 4 INJECTION, SOLUTION INTRAVENOUS at 09:15

## 2023-10-26 RX ADMIN — DEXMEDETOMIDINE HYDROCHLORIDE 1.5 MCG/KG/HR: 4 INJECTION, SOLUTION INTRAVENOUS at 21:28

## 2023-10-26 RX ADMIN — POTASSIUM CHLORIDE 20 MEQ: 14.9 INJECTION, SOLUTION INTRAVENOUS at 05:17

## 2023-10-26 ASSESSMENT — ENCOUNTER SYMPTOMS
CARDIOVASCULAR NEGATIVE: 1
GASTROINTESTINAL NEGATIVE: 1
CONFUSION: 1
RESPIRATORY NEGATIVE: 1

## 2023-10-26 ASSESSMENT — PAIN SCALES - GENERAL
PAINLEVEL_OUTOF10: 8
PAINLEVEL_OUTOF10: 0 - NO PAIN
PAINLEVEL_OUTOF10: 7

## 2023-10-26 NOTE — CARE PLAN
The patient's goals for the shift include Pain management    The clinical goals for the shift include Tolerate tube feeding

## 2023-10-26 NOTE — PROGRESS NOTES
"Victorino Lara is a 48 y.o. adult on day 14 of admission presenting with Acute appendicitis with localized peritonitis.    Subjective   Events overnight.  Patient's white count continues to decrease.  Had pressure support ventilation yesterday and tolerated this some degree although still agitated.  Tolerating trickle tube feeds.  No bowel movement.       Objective     Physical Exam    Last Recorded Vitals  Blood pressure 100/63, pulse (!) 124, temperature 36.6 °C (97.9 °F), temperature source Temporal, resp. rate 24, height 1.702 m (5' 7.01\"), weight 95.5 kg (210 lb 8.6 oz), SpO2 95 %.  Intake/Output last 3 Shifts:  I/O last 3 completed shifts:  In: 4928.4 (51.6 mL/kg) [I.V.:1451.5 (15.2 mL/kg); NG/GT:343; IV Piggyback:750]  Out: 5900 (61.8 mL/kg) [Urine:4300 (1.3 mL/kg/hr); Emesis/NG output:1600]  Weight: 95.5 kg     Relevant Results           This patient currently has cardiac telemetry ordered; if you would like to modify or discontinue the telemetry order, click here to go to the orders activity to modify/discontinue the order.      This patient is intubated   Reason for patient to remain intubated today? they are planned for extubation trial later today/tonight    Gen: Intubated, sedated  Abd: Soft, distended, 5cm defect in place with fascia intact             Assessment/Plan   Principal Problem:    Acute appendicitis with localized peritonitis  Active Problems:    Anxiety    Depression    Hyperlipidemia    Diabetes mellitus, type 2 (CMS/HCC)    Appendicitis, acute    Appendicitis    Acute appendicitis, unspecified acute appendicitis type    Small bowel obstruction (CMS/HCC)    POD 15 Appendectomy and hernia repair. POD 11 reexploration, currently in the ICU for agitation, hypoxemia secondary to aspiration pneumonia, wound infection, intra-abdominal fluid collection.  WBC improving, now having bowel function  Plan  Cont. TPN, wean if able to get TF to goal  Ok for bowel regimen and suppository  Cont. Abx for now " for intraabdominal infection, will likely need 2 weeks for it  Wean to extubate as tolerated  Would continue enema/suppository  Cont. Care per ICU       I spent 35 minutes in the professional and overall care of this patient.      Norman Calloway MD  10/26/23  10:03 AM

## 2023-10-26 NOTE — SIGNIFICANT EVENT
Decision to extubate    Patient has been doing well on spontaneous breathing trial    He is following commands - nodding and moving extremities on my evaluation.    RSBI 63  NIF -50      Will extubate this am, plan for supplemental oxygen.    Discussed with Bull LAU and Anastasia NEUMANN at bedside.    Will need continued precedex due to agitation and previous use of sedation. Prn analgesia and anxiolytics available.  Tube feeds held.    Yoan Hopper MD

## 2023-10-26 NOTE — PROGRESS NOTES
"Victorino Lara is a 48 y.o. adult on day 14 of admission presenting with Acute appendicitis with localized peritonitis.    Subjective   Patient extubated earlier this morning. Fiance at bedside  He denies pain. Resting, on precedex       Objective     Physical Exam  Vitals reviewed.   Constitutional:       General: He is not in acute distress.     Appearance: Normal appearance. He is normal weight. He is not ill-appearing or toxic-appearing.   HENT:      Head: Normocephalic and atraumatic.      Nose: Nose normal.      Comments: NG in place     Mouth/Throat:      Mouth: Mucous membranes are moist.      Pharynx: Oropharynx is clear.   Eyes:      Extraocular Movements: Extraocular movements intact.      Conjunctiva/sclera: Conjunctivae normal.      Pupils: Pupils are equal, round, and reactive to light.   Abdominal:      General: Abdomen is flat. There is no distension.      Palpations: Abdomen is soft.      Tenderness: There is no abdominal tenderness.      Comments: Wound with clean base and edges, no surrounding erythema   Musculoskeletal:      Right lower leg: No edema.      Left lower leg: No edema.   Skin:     General: Skin is warm and dry.      Capillary Refill: Capillary refill takes less than 2 seconds.      Coloration: Skin is not jaundiced.   Neurological:      General: No focal deficit present.   Psychiatric:      Comments: Unable to fully assess psychiatric state, patient is altered and drowsy, weaning off sedation         Last Recorded Vitals  Blood pressure 100/63, pulse 110, temperature 36.6 °C (97.9 °F), temperature source Temporal, resp. rate 25, height 1.702 m (5' 7.01\"), weight 95.5 kg (210 lb 8.6 oz), SpO2 99 %.  Intake/Output last 3 Shifts:  I/O last 3 completed shifts:  In: 4928.4 (51.6 mL/kg) [I.V.:1451.5 (15.2 mL/kg); NG/GT:343; IV Piggyback:750]  Out: 5900 (61.8 mL/kg) [Urine:4300 (1.3 mL/kg/hr); Emesis/NG output:1600]  Weight: 95.5 kg     Relevant Results           This patient currently has " cardiac telemetry ordered; if you would like to modify or discontinue the telemetry order, click here to go to the orders activity to modify/discontinue the order.                 Assessment/Plan   Principal Problem:    Acute appendicitis with localized peritonitis  Active Problems:    Anxiety    Depression    Hyperlipidemia    Diabetes mellitus, type 2 (CMS/HCC)    Appendicitis, acute    Appendicitis    Acute appendicitis, unspecified acute appendicitis type    Small bowel obstruction (CMS/HCC)    Neuro - delirium, bipolar disorder, encephalopathy - mental status has been improving, he is talking, answering some questions. Continue precedex as needed. Ativan available prn for benzo withdrawal symptoms. Continue to monitor closely in icu setting. Appreciate Dr. Cotton's input, will resume seroquel nightly  Respiratory - pneumonia  - continue antibiotics, doing well after extubation.  Cardiac - monitor hr and bp  GI - ileus has resolved. Advance tube feeds. Keep ng in place, pending speech evaluation.   - monitor kidney function. Dc hylton.  Heme - no indication for blood transfusion  Endo - diabetes - monitor glucose, sliding scale insulin  ID - continue antibiotics for pneumonia  Skin - midline wound packed per general surgery    Ppx - lovenox, ppt    Dispo - continue ICU care for agitation and sedation treatments and monitoring.    I spoke with his fiance at bedside and answered all questions  Discussed patient's care with Dr. Calloway, general surgery         I spent 41 minutes in the professional and overall care of this patient.      Yoan Hopper MD

## 2023-10-26 NOTE — PROGRESS NOTES
Primary MD: Roxana Morris PA-C    Reason For Consult  Bilateral lower lobe pneumonia  Acute appendicitis with localized peritonitis    History Of Present Illness  Victorino Lara is a 48 y.o. male              Patient was admitted 10/1/2023     Taken to laparoscopic surgery for acute appendicitis localized peritonitis without perforation     Taken back to surgery 10/14/2023 for small bowel obstruction exploratory laparotomy lysis of adhesions repair of incisional hernia     Hospital course complicated by development of aspiration pneumonia   Has required intubation and mechanical ventilation  growing  Klebsiella pneumonia and MSSA from sputum switched to meropenem wound culture was identified     Klebsiella is resistant to Zosyn consented to third-generation cephalosporin        There is another culture from 10/19/2023 reported as surgical site tissue biopsy growing the same Klebsiella pneumonia and E. coli           CT scan from 10/20/2023 shows bilateral lower lobe consolidations              STUDY:  CT CHEST ABDOMEN PELVIS W IV CONTRAST; 10/20/2023 10:30 am  INDICATION:  Signs/Symptoms:sepsis, wound infection, pneumonia.  COMPARISON:  Multiple chest radiographs, most recently 10/30/2023 CT abdomen and  pelvis 10/10/2023  ACCESSION NUMBER(S):  [id number]  ORDERING CLINICIAN:  MEAGHAN CHARLTON  TECHNIQUE:  CT of the chest, abdomen, and pelvis was performed. Contiguous axial  images were obtained at 3 mm slice thickness through the chest,  abdomen and pelvis. Coronal and sagittal reconstructions at 3 mm  slice thickness were performed.  75 ml of contrast Omnipaque 350 were administered intravenously  without immediate complication.  FINDINGS:  CHEST:  LUNG/PLEURA/LARGE AIRWAYS:  Stable endotracheal tube with the tip approximately 3 cm from the  harvey. Otherwise, central airways are patent without endobronchial  lesions. Consolidative opacities in the lung bases, likely secondary  to combination of  pneumonia and atelectasis. Other multifocal  ground-glass opacities, predominantly in the right upper lobe are  also noted, likely osseous secondary to pneumonia. No pneumothorax.  No large pleural effusion.  VESSELS:  Aorta and pulmonary arteries are normal caliber. No atherosclerotic  changes of the aorta are identified. No coronary artery  calcifications are present.  HEART:  Normal size. No pericardial effusion  MEDIASTINUM AND DARYL:  No mediastinal, hilar or axillary lymphadenopathy is present. The  esophagus is nondilated and appears normal in entire length.  CHEST WALL AND LOWER NECK:  The soft tissues of the chest wall demonstrate no gross abnormality.  Partially imaged low-attenuation lesion medial left thyroid lobe.  ABDOMEN:  LIVER:  The liver demonstrates homogeneous attenuation without evidence of  focal liver lesions.  BILE DUCTS:  The intrahepatic and extrahepatic ducts are not dilated.  GALLBLADDER:  No calcified stones. No wall thickening.  PANCREAS:  The pancreas appears unremarkable without evidence of ductal  dilatation or masses.  SPLEEN:  The spleen is normal in size without focal lesions.  ADRENAL GLANDS:  Bilateral adrenal glands appear normal.  KIDNEYS AND URETERS:  The kidneys are normal in size and enhance symmetrically. No  hydroureteronephrosis or nephroureterolithiasis is identified.  PELVIS:  BLADDER:  Decompressed urinary bladder Proctor catheter in-situ.  REPRODUCTIVE ORGANS:  No pelvic masses.  BOWEL:  Feeding tube with tip in the proximal gastric body. Redemonstration  of multiple dilated small bowel loops, measuring up to 3.3 cm.  However, compatible with the CT from 10/14/2023 the degree of  small-bowel distention has improved. No pneumoperitoneum.  Postoperative changes appendectomy with interval resolution  previously noted fluid within the appendectomy bed. Fluid-filled  nondistended colonic loops.    VESSELS:  There is no aneurysmal dilatation of the abdominal aorta. The  IVC  appears normal.  PERITONEUM/RETROPERITONEUM/LYMPH NODES:  Small amount of multiloculated contiguous pelvic collections are  noted. For example a 4.9 x 3.4 cm (series 201, image 178), with  scattered foci of gas within the collection. Additionally, there is  an adjacent contiguous collection which measures 5.3 x 3.9 cm (series  201, image 182). Moderate mesenteric soft tissue stranding,  predominantly in the right lower quadrant, likely secondary to  postoperative changes. No abdominopelvic lymphadenopathy is present.  BONE AND SOFT TISSUE:  No suspicious osseous lesions are identified. Moderate ventral skin  defect (series 201, image 156), with packing material in-situ and  surgical staples along the anterior abdominal wall are noted related  to postoperative changes.  IMPRESSION:  CHEST:  1. Consolidative opacities in the lung bases, with scattered  ground-glass opacities as described above, likely secondary to a  combination of atelectasis and pneumonia.  ABDOMEN-PELVIS:  1. Interval postoperative changes appendectomy with contiguous  multiloculated fluid collections within the right hemipelvis as noted  above, slightly increased since prior. Foci of gas within 1 of the  collections as described above may represent an abscess. However  postoperative seromas can not be excluded.  2. Interval improvement in degree of small-bowel dilation, now  measuring up to 3.3 cm, likely secondary to improving postoperative  ileus.  3. Moderate size skin defect with surgical jaz.    Signed by: Jesus Olguin 10/20/2023 12:25 PM  Dictation workstation: [id number]        No fever  extubated  No pressors  Opens eyes follows some commands      Review of Systems   Respiratory: Negative.     Cardiovascular: Negative.    Gastrointestinal: Negative.    Psychiatric/Behavioral:  Positive for confusion.         Physical Exam  Constitutional:       Appearance: He is not ill-appearing or diaphoretic.   Cardiovascular:      Heart  sounds: Normal heart sounds. No murmur heard.  Pulmonary:      Effort: No respiratory distress.      Breath sounds: No wheezing, rhonchi or rales.   Abdominal:      General: Abdomen is flat. Bowel sounds are normal. There is no distension.      Palpations: Abdomen is soft. There is no mass.      Tenderness: There is no abdominal tenderness. There is no guarding or rebound.   Musculoskeletal:         General: No swelling.      Left lower leg: No edema.   Skin:     Coloration: Skin is not jaundiced.      Findings: No erythema.   Neurological:      Mental Status: He is disoriented.          Range of Vitals (last 24 hours)  Heart Rate:  [102-126]   Temp:  [36.4 °C (97.5 °F)-36.6 °C (97.9 °F)]   Resp:  [10-28]   Weight:  [95.5 kg (210 lb 8.6 oz)]   SpO2:  [95 %-100 %]     Relevant Results  Results from last 72 hours   Lab Units 10/26/23  0332   WBC AUTO x10*3/uL 11.8*   HEMOGLOBIN g/dL 8.3*   HEMATOCRIT % 26.1*   PLATELETS AUTO x10*3/uL 828*   NEUTROS PCT AUTO % 57.7   LYMPHS PCT AUTO % 25.4   MONOS PCT AUTO % 11.1   EOS PCT AUTO % 2.8       Results from last 72 hours   Lab Units 10/26/23  0332   SODIUM mmol/L 139   POTASSIUM mmol/L 3.7   CHLORIDE mmol/L 102   CO2 mmol/L 30   BUN mg/dL 13   CREATININE mg/dL 0.67   GLUCOSE mg/dL 138*   CALCIUM mg/dL 8.5*   ANION GAP mmol/L 11   EGFR mL/min/1.73m*2 >90       Results from last 72 hours   Lab Units 10/26/23  0332   ALK PHOS U/L 115   BILIRUBIN TOTAL mg/dL 0.5   PROTEIN TOTAL g/dL 7.0   ALT U/L 18   AST U/L 30   ALBUMIN g/dL 2.9*       Estimated Creatinine Clearance (by C-G formula based on SCr of 0.67 mg/dL)  Female: 120.6 mL/min  Male: 125 mL/min  The patient&#39;s sex/gender information is inconclusive; review their information before proceeding.  CRP   Date/Time Value Ref Range Status   01/17/2020 10:00 PM 0.19 mg/dL Final     Comment:     REF VALUE  < 1.00     01/12/2020 06:29 AM 0.47 mg/dL Final     Comment:     REF VALUE  < 1.00       Sedimentation Rate   Date/Time  "Value Ref Range Status   01/17/2020 10:00 PM 5 0 - 15 mm/h Final   01/12/2020 06:29 AM 14 0 - 15 mm/h Final     No results found for: \"HIV1X2\", \"HIVCONF\", \"JAKPXT8PX\"  No results found for: \"HCVPCRQUANT\"  Cultures  Susceptibility data from last 14 days.  Collected Specimen Info Organism Amoxicillin/Clavulanate Ampicillin Ampicillin/Sulbactam Cefazolin Ceftriaxone Cefuroxime Ciprofloxacin Clindamycin Erythromycin Gentamicin Levofloxacin Oxacillin Piperacillin/Tazobactam   10/19/23 Fluid from Tracheal Aspirate Methicillin Susceptible Staphylococcus aureus (MSSA)        S S   S      Klebsiella pneumoniae/variicola R R R R S S S   S S  R   10/19/23 Tissue/Biopsy from Surgical Site Infection Klebsiella pneumoniae/variicola R R R R S S S   S S  R     Escherichia coli S R R S   S   S S  S   10/16/23 Fluid from Tracheal Aspirate Klebsiella pneumoniae/variicola R R R R S I S   S S  R     Normal throat sharona                10/15/23 Swab from Anterior Nares Methicillin Susceptible Staphylococcus aureus (MSSA)                  Collected Specimen Info Organism Tetracycline Trimethoprim/Sulfamethoxazole Vancomycin   10/19/23 Fluid from Tracheal Aspirate Methicillin Susceptible Staphylococcus aureus (MSSA) S S S     Klebsiella pneumoniae/variicola  S    10/19/23 Tissue/Biopsy from Surgical Site Infection Klebsiella pneumoniae/variicola  S      Escherichia coli  S    10/16/23 Fluid from Tracheal Aspirate Klebsiella pneumoniae/variicola  S      Normal throat sharona      10/15/23 Swab from Anterior Nares Methicillin Susceptible Staphylococcus aureus (MSSA)               Assessment/Plan     Bilateral lower lobe pneumonia  Acute appendicitis with localized peritonitis  Postop wound infection     White count decreasing     Organisms Klebsiella is resistant to Zosyn  Growing Klebsiella and MSSA from sputum Klebsiella and E. coli from wound     meropenem              "

## 2023-10-26 NOTE — SIGNIFICANT EVENT
Patient remains intubated. He has acute hypoxemic respiratory failure after aspiration event requiring prolonged mechanical ventilation  He has required high doses of analgesia and sedation to maintain intubation. His respiratory status has improved and he has been tolerating spontaneous breathing trial since yesterday.  He has maintained great tidal volumes and continued to breathe at acceptable rates during weaning of sedation.    Due to prolonged use of benzodiazepines, he is at risk for benzodiazepine dependence and withdrawal symptoms. Will plan to treat with prn or scheduled benzodiazepines based on course.     Anticipate he will need precedex for agitation.    Discussed with Anastasia NEUMANN and Bull LAU.    Likely wean to extubate this AM.    Yoan Hopper MD

## 2023-10-26 NOTE — PROGRESS NOTES
Nutrition Progress Note    Per MD note, ileus resolved. Spoke to Dr. Calloway, plans to increase TF as tolerated to goal rate. On SBT trial at time of ICU rounds. ICU intensivist aware of note for TF recommendations s/p extubation.     Continues with order for TPN 5% amino acids, 15% dextrose with goal rate of 60 ml/hr (This provides 1440 ml total volume, 1022 kcal, 72g protein). Per ICU rounds, plans for possible extubation today with continuation of NG tube for feedings s/p extubation.    Once extubated, recommend Osmolite 1.5 with advancement to goal rate of 45 ml/hr as tolerated until able to tolerate >50% of meals. Flush of 100 ml Q 4 hours or per MD. TF at new goal rate provides 1620 kcal, 68g protein, 823 ml free water. This meets 100% of kcal needs and >75% of protein needs.    If unable to extubate today, will need readjustment in TF goal rate to prevent overfeeding.    Once tolerating TF at goal or consuming >50% of meals, discontinue TPN.    Ideal Body Weight: 67.3kg   Estimated nutritional needs:  Estimated Energy Needs: 0883-4084 kcal (22-25 kcal/kg IBW)   Estimated Protein Needs:  g (1.2-2 g/kg IBW)   Estimated Fluid Needs:  (1mL/kcal) or per MD

## 2023-10-26 NOTE — PROGRESS NOTES
"Victorino Lara is a 48 y.o. adult on day 14 of admission presenting with acute appendicitis with localized peritonitis.    Subjective   Patient longer exhibiting agitation, patient extubated today.  Alert and oriented x2.    Objective     MSE  General: Appropriately groomed and dressed.  Appearance: Disheveled  Attitude: Calm, cooperative.  Behavior: Poor eye contact.  Motor activity: No agitation or retardation.  Speech: Patient extubated today, soft speech pattern  Mood: Depressed  Affect: Flat  Thought process: Patient mildly disoriented due to recent extubation  Thought content: Does not endorse suicidal or homicidal ideation, no delusions elicited.  Thought perception: Did not endorse auditory or visual hallucinations.  Cognition: Alert, oriented x2  Insight: Fair  Judgment: Impaired    Last Recorded Vitals  /63 (BP Location: Left arm, Patient Position: Lying)   Pulse 110   Temp 36.6 °C (97.9 °F) (Temporal)   Resp 25   Ht 1.702 m (5' 7.01\")   Wt 95.5 kg (210 lb 8.6 oz)   SpO2 99%   BMI 32.97 kg/m²      Relevant Results  Scheduled medications  bisacodyl, 10 mg, rectal, Daily  [Held by provider] busPIRone, 15 mg, oral, BID  [Held by provider] docusate sodium, 100 mg, oral, BID  enoxaparin, 40 mg, subcutaneous, q24h  insulin lispro, 0-10 Units, subcutaneous, q4h  [Held by provider] insulin regular, 0-5 Units, subcutaneous, TID with meals  [Held by provider] lurasidone, 80 mg, oral, Daily  meropenem, 1 g, intravenous, q8h  [Held by provider] pantoprazole, 40 mg, oral, Daily before breakfast  pantoprazole, 40 mg, intravenous, Daily  QUEtiapine, 50 mg, oral, Nightly  simvastatin, 40 mg, oral, Nightly  sodium hypochlorite, , irrigation, BID  [Held by provider] traZODone, 300 mg, oral, Nightly  [Held by provider] venlafaxine XR, 150 mg, oral, Daily      Continuous medications  Adult Clinimix TPN, 60 mL/hr, Last Rate: 60 mL/hr (10/26/23 0435)  dexmedeTOMIDine, 0.2-1.5 mcg/kg/hr, Last Rate: 0.25 mcg/kg/hr " (10/26/23 1100)      PRN medications  PRN medications: alteplase, dextrose 10 % in water (D10W), dextrose, glucagon, ipratropium-albuteroL, LORazepam, metoprolol, morphine, naloxone, oxygen    Results for orders placed or performed during the hospital encounter of 10/10/23 (from the past 24 hour(s))   Blood Gas Arterial Full Panel   Result Value Ref Range    POCT pH, Arterial 7.43 (H) 7.38 - 7.42 pH    POCT pCO2, Arterial 52 (H) 38 - 42 mm Hg    POCT pO2, Arterial 158 (H) 85 - 95 mm Hg    POCT SO2, Arterial 100 94 - 100 %    POCT Oxy Hemoglobin, Arterial 95.6 94.0 - 98.0 %    POCT Hematocrit Calculated, Arterial 28.0 (L) 36.0 - 52.0 %    POCT Sodium, Arterial 140 136 - 145 mmol/L    POCT Potassium, Arterial 4.6 3.5 - 5.3 mmol/L    POCT Chloride, Arterial 107 98 - 107 mmol/L    POCT Ionized Calcium, Arterial 1.10 1.10 - 1.33 mmol/L    POCT Glucose, Arterial 153 (H) 74 - 99 mg/dL    POCT Lactate, Arterial 1.8 0.4 - 2.0 mmol/L    POCT Base Excess, Arterial 9.0 (H) -2.0 - 3.0 mmol/L    POCT HCO3 Calculated, Arterial 34.5 (H) 22.0 - 26.0 mmol/L    POCT Hemoglobin, Arterial 9.4 (L) 13.5 - 17.5 g/dL    POCT Anion Gap, Arterial 3 (L) 10 - 25 mmo/L    Patient Temperature      FiO2 40 %   POCT GLUCOSE   Result Value Ref Range    POCT Glucose 154 (H) 74 - 99 mg/dL   POCT GLUCOSE   Result Value Ref Range    POCT Glucose 132 (H) 74 - 99 mg/dL   POCT GLUCOSE   Result Value Ref Range    POCT Glucose 130 (H) 74 - 99 mg/dL   CBC and Auto Differential   Result Value Ref Range    WBC 11.8 (H) 4.4 - 11.3 x10*3/uL    nRBC 0.0 0.0 - 0.0 /100 WBCs    RBC 2.93 (L) 4.00 - 5.90 x10*6/uL    Hemoglobin 8.3 (L) 12.0 - 17.5 g/dL    Hematocrit 26.1 (L) 36.0 - 52.0 %    MCV 89 80 - 100 fL    MCH 28.3 26.0 - 34.0 pg    MCHC 31.8 (L) 32.0 - 36.0 g/dL    RDW 13.4 11.5 - 14.5 %    Platelets 828 (H) 150 - 450 x10*3/uL    MPV 9.5 7.5 - 11.5 fL    Neutrophils % 57.7 40.0 - 80.0 %    Immature Granulocytes %, Automated 2.0 (H) 0.0 - 0.9 %    Lymphocytes %  25.4 13.0 - 44.0 %    Monocytes % 11.1 2.0 - 10.0 %    Eosinophils % 2.8 0.0 - 6.0 %    Basophils % 1.0 0.0 - 2.0 %    Neutrophils Absolute 6.76 1.20 - 7.70 x10*3/uL    Immature Granulocytes Absolute, Automated 0.24 0.00 - 0.70 x10*3/uL    Lymphocytes Absolute 2.99 1.20 - 4.80 x10*3/uL    Monocytes Absolute 1.31 (H) 0.10 - 1.00 x10*3/uL    Eosinophils Absolute 0.33 0.00 - 0.70 x10*3/uL    Basophils Absolute 0.12 (H) 0.00 - 0.10 x10*3/uL   Phosphorus   Result Value Ref Range    Phosphorus 3.1 2.5 - 4.9 mg/dL   Magnesium   Result Value Ref Range    Magnesium 1.99 1.60 - 2.40 mg/dL   Comprehensive Metabolic Panel   Result Value Ref Range    Glucose 138 (H) 74 - 99 mg/dL    Sodium 139 136 - 145 mmol/L    Potassium 3.7 3.5 - 5.3 mmol/L    Chloride 102 98 - 107 mmol/L    Bicarbonate 30 21 - 32 mmol/L    Anion Gap 11 10 - 20 mmol/L    Urea Nitrogen 13 6 - 23 mg/dL    Creatinine 0.67 0.50 - 1.30 mg/dL    eGFR >90 >60 mL/min/1.73m*2    Calcium 8.5 (L) 8.6 - 10.3 mg/dL    Albumin 2.9 (L) 3.4 - 5.0 g/dL    Alkaline Phosphatase 115 33 - 120 U/L    Total Protein 7.0 6.4 - 8.2 g/dL    AST 30 9 - 39 U/L    Bilirubin, Total 0.5 0.0 - 1.2 mg/dL    ALT 18 7 - 52 U/L   POCT GLUCOSE   Result Value Ref Range    POCT Glucose 133 (H) 74 - 99 mg/dL   POCT GLUCOSE   Result Value Ref Range    POCT Glucose 120 (H) 74 - 99 mg/dL   Blood Gas Arterial Full Panel   Result Value Ref Range    POCT pH, Arterial 7.42 7.38 - 7.42 pH    POCT pCO2, Arterial 46 (H) 38 - 42 mm Hg    POCT pO2, Arterial 80 (L) 85 - 95 mm Hg    POCT SO2, Arterial 99 94 - 100 %    POCT Oxy Hemoglobin, Arterial 97.2 94.0 - 98.0 %    POCT Hematocrit Calculated, Arterial 29.0 (L) 36.0 - 52.0 %    POCT Sodium, Arterial 140 136 - 145 mmol/L    POCT Potassium, Arterial 4.7 3.5 - 5.3 mmol/L    POCT Chloride, Arterial 107 98 - 107 mmol/L    POCT Ionized Calcium, Arterial 1.16 1.10 - 1.33 mmol/L    POCT Glucose, Arterial 137 (H) 74 - 99 mg/dL    POCT Lactate, Arterial 2.7 (H) 0.4 -  2.0 mmol/L    POCT Base Excess, Arterial 4.7 (H) -2.0 - 3.0 mmol/L    POCT HCO3 Calculated, Arterial 29.8 (H) 22.0 - 26.0 mmol/L    POCT Hemoglobin, Arterial 9.6 (L) 13.5 - 17.5 g/dL    POCT Anion Gap, Arterial 8 (L) 10 - 25 mmo/L    Patient Temperature      FiO2 40 %    Ventilator Mode CPAP     Peep CHM2O 5.0 cm H2O    Pressure Support 12 cm H2O   Blood Gas Arterial Full Panel   Result Value Ref Range    POCT pH, Arterial 7.46 (H) 7.38 - 7.42 pH    POCT pCO2, Arterial 42 38 - 42 mm Hg    POCT pO2, Arterial 118 (H) 85 - 95 mm Hg    POCT SO2, Arterial 98 94 - 100 %    POCT Oxy Hemoglobin, Arterial 96.6 94.0 - 98.0 %    POCT Hematocrit Calculated, Arterial 16.0 (L) 36.0 - 52.0 %    POCT Sodium, Arterial 139 136 - 145 mmol/L    POCT Potassium, Arterial 5.1 3.5 - 5.3 mmol/L    POCT Chloride, Arterial 109 (H) 98 - 107 mmol/L    POCT Ionized Calcium, Arterial 1.20 1.10 - 1.33 mmol/L    POCT Glucose, Arterial 163 (H) 74 - 99 mg/dL    POCT Lactate, Arterial 2.0 0.4 - 2.0 mmol/L    POCT Base Excess, Arterial 5.6 (H) -2.0 - 3.0 mmol/L    POCT HCO3 Calculated, Arterial 29.9 (H) 22.0 - 26.0 mmol/L    POCT Hemoglobin, Arterial 5.4 (LL) 13.5 - 17.5 g/dL    POCT Anion Gap, Arterial 5 (L) 10 - 25 mmo/L    Patient Temperature      FiO2 40 %    Ventilator Mode CPAP     Peep CHM2O 5.0 cm H2O    Pressure Support 12 cm H2O    Critical Called By KRISHNA MARIE     Critical Called To BALJIT LAU     Critical Call Time 1001.0000     Critical Read Back Y         Assessment/Plan   Diagnosis:  Bipolar 1 disorder, anxiety    Patient is a 48-year-old male with past psych history of bipolar 1 disorder, anxiety and past medical history of acute appendicitis with localized peritonitis, type 2 diabetes, GERD, hyperlipidemia, obesity, and iron deficiency anemia.  Patient initially presented to the ED with abdominal pain.  Patient had prior abdominal hernia repair.  Patient found to have acute appendicitis with localized peritonitis without rupture.  He was  taken to OR for laparoscopic appendectomy.  After surgery patient developed ileus.  Exploratory laparotomy was conducted, lysis of adhesions and repair of incisional hernia were completed.  Patient transferred to ICU, placed on mechanical ventilation for acute postop respiratory insufficiency.    Psych initially consulted as patient experiencing severe agitation and delirium.  Patient extubated today and doing much better.  Patient calm and cooperative.  Patient alert and oriented x2.  Patient still lethargic due to recent extubation.  Patient home medications include Seroquel 300 mg oral daily at bedtime, Latuda 80 mg oral daily, BuSpar 30 mg oral twice daily, Effexor 150 mg oral daily, and trazodone 300 mg oral daily at bedtime.  Patient was unable to take these oral medications due to being intubated for the past 2 weeks.  With this in mind patient will need to be restarted on medications and slowly titrated to therapeutic dose.  For now we will restart patient's Seroquel at 50 mg oral daily at bedtime, this will be the most important medication for mood stabilization and we can address other medications as patient continues to improve.  Explained this treatment plan to patient, and he stated understanding.    Impression:     Labs and Chart: reviewed  Case discussed with treatment team members  Medication:       -Start Seroquel 50 mg oral daily at bedtime       -Continue Ativan for agitation and management of withdrawal          symptoms       -Hold trazodone, Effexor, BuSpar, and Latuda       -Discontinue Zyprexa         Medication Consent  Medication Consent: risks, benefits, side effects reviewed for all ordered meds and patient expressed understanding and consent obtained    Ata Hernandez, NIKITA-CNP

## 2023-10-26 NOTE — PROGRESS NOTES
Victorino Lara is a 48 y.o. adult on day 12  of admission presenting with Acute appendicitis with localized peritonitis.      Subjective   Patient is a 48 y.o. adult admitted on 10/10/2023  8:50 AM with the following indication(s) for ICU care Aspiration pneumonia.      Interval History: And is currently extubated.  He is confused but does follow commands.  Significant other at the bedside.  He has to be restrained for agitation and has a bedside sitter    Scheduled Medications:   [Held by provider] busPIRone, 15 mg, oral, BID  [Held by provider] docusate sodium, 100 mg, oral, BID  enoxaparin, 40 mg, subcutaneous, q24h  insulin lispro, 0-10 Units, subcutaneous, q4h  [Held by provider] insulin regular, 0-5 Units, subcutaneous, TID with meals  [Held by provider] lurasidone, 80 mg, oral, Daily  meropenem, 1 g, intravenous, q8h  [Held by provider] pantoprazole, 40 mg, oral, Daily before breakfast  pantoprazole, 40 mg, intravenous, Daily  potassium phosphate, 21 mmol, intravenous, Once  [Held by provider] QUEtiapine, 300 mg, oral, BID  [Held by provider] simvastatin, 40 mg, oral, Nightly  sodium hypochlorite, , irrigation, BID  [Held by provider] traZODone, 300 mg, oral, Nightly  valproate sodium, 500 mg, intravenous, q12h  [Held by provider] venlafaxine XR, 150 mg, oral, Daily           Continuous Medications:   Adult Clinimix TPN, 70 mL/hr, Last Rate: 70 mL/hr (10/21/23 1919)  dexmedeTOMIDine, 0.1-1.5 mcg/kg/hr, Last Rate: 1 mcg/kg/hr (10/21/23 1312)  fentaNYL,  mcg/hr, Last Rate: 125 mcg/hr (10/21/23 1311)  propofol, 5-50 mcg/kg/min, Last Rate: 50 mcg/kg/min (10/21/23 1449)           PRN Medications:   PRN medications: alteplase, dextrose 10 % in water (D10W), dextrose, glucagon, HYDROmorphone, HYDROmorphone, ipratropium-albuteroL, naloxone, OLANZapine, oxygen           Objective   Physical Exam:   General appearance: Alert, awake off the ventilator.  Head: Normocephalic, without obvious  abnormality  Eyes:conjunctivae/corneas clear. PERRL  Neck:  supple, symmetrical, trachea midline, left sided internal jugular line pulled out slightly  Lungs:clear to auscultation bilaterally  Chest wall:  no tenderness  Heart: regular rate and rhythm, S1, S2 normal, no murmur, click, rub or gallop  Abdomen: Soft, ND, still erythematouns around wound but is stable, wound is open about 5cm with packing, fascia intact, no more drainage  Male genitalia:  normal  Extremities: 2+ edema  Pulses:2+ and symmetric  Skin: Skin color, texture, turgor normal.  No rashes or lesions  Neurologic: Awake, agitated, follows commands       Assessment/Plan   Acute Respiratory Failure, post op  Aspiration Pneumonia        Principal Problem:    Acute appendicitis with localized peritonitis  Active Problems:    Anxiety    Depression    Hyperlipidemia    Diabetes mellitus, type 2 (CMS/HCC)    Appendicitis, acute    Appendicitis    Acute appendicitis, unspecified acute appendicitis type    Small bowel obstruction (CMS/HCC)     Neuro - Alert, awake and of the ventilator and somewhat confused and agitated  Cardiac - monitor hr and bp  Respiratory - acute hypoxemic respiratory failure - wean mechanical ventilation as able. Breathing trial today. Continue antibiotics for pneumonia  GI - ileus - continue ng to suction. On tpn. Discussed with Dr. Calloway, general surgery - will evaluate for infectious source with ct chest/abd/pelvis with oral and iv contrast.  Gu - hypokalemia - replace.. volume overload - has required some diuresis, will hold for now as bp is lower this morning (102/52)  Heme - anemia, stable, no need for transfusion  ID - continue antibiotics for pneumonia and intraabdominal infection. Ct scans pending.  Endo - A1c improved since last year. Continue sliding scale insulin.  MS - offload pressure points  Ppx - protonix, lovenox     Continue icu care    Pulmonary comments:-Agree with current treatment as ordered by ICU service.  Continues  to need ICU stay at this point.  Pulmonary will continue to monitor this patient with you.           Mann Ricks MD

## 2023-10-26 NOTE — PROGRESS NOTES
Pt has been successfully weaned and extubated.   Per review, Pt continues to require IV antibiotics Meropenum.  Wound care for midline incision . NGT with TF and TPN at present time.  Will request therapy orders once pt able to participate to assist with determining dc planning needs.  TBD at present time.  Anticipate SNF vs LTAC pending needs.      Olena Hoyos RN

## 2023-10-27 ENCOUNTER — APPOINTMENT (OUTPATIENT)
Dept: RADIOLOGY | Facility: HOSPITAL | Age: 48
End: 2023-10-27
Payer: COMMERCIAL

## 2023-10-27 LAB
ALBUMIN SERPL BCP-MCNC: 2.9 G/DL (ref 3.4–5)
ALP SERPL-CCNC: 112 U/L (ref 33–120)
ALT SERPL W P-5'-P-CCNC: 21 U/L (ref 7–52)
ANION GAP SERPL CALC-SCNC: 13 MMOL/L (ref 10–20)
AST SERPL W P-5'-P-CCNC: 29 U/L (ref 9–39)
BASOPHILS # BLD AUTO: 0.11 X10*3/UL (ref 0–0.1)
BASOPHILS NFR BLD AUTO: 0.9 %
BILIRUB SERPL-MCNC: 0.5 MG/DL (ref 0–1.2)
BUN SERPL-MCNC: 16 MG/DL (ref 6–23)
CALCIUM SERPL-MCNC: 8.2 MG/DL (ref 8.6–10.3)
CHLORIDE SERPL-SCNC: 103 MMOL/L (ref 98–107)
CO2 SERPL-SCNC: 27 MMOL/L (ref 21–32)
CREAT SERPL-MCNC: 0.63 MG/DL (ref 0.5–1.3)
EOSINOPHIL # BLD AUTO: 0.29 X10*3/UL (ref 0–0.7)
EOSINOPHIL NFR BLD AUTO: 2.4 %
ERYTHROCYTE [DISTWIDTH] IN BLOOD BY AUTOMATED COUNT: 12.6 % (ref 11.5–14.5)
GFR SERPL CREATININE-BSD FRML MDRD: >90 ML/MIN/1.73M*2
GLUCOSE BLD MANUAL STRIP-MCNC: 120 MG/DL (ref 74–99)
GLUCOSE BLD MANUAL STRIP-MCNC: 127 MG/DL (ref 74–99)
GLUCOSE BLD MANUAL STRIP-MCNC: 131 MG/DL (ref 74–99)
GLUCOSE BLD MANUAL STRIP-MCNC: 142 MG/DL (ref 74–99)
GLUCOSE BLD MANUAL STRIP-MCNC: 155 MG/DL (ref 74–99)
GLUCOSE SERPL-MCNC: 147 MG/DL (ref 74–99)
HCT VFR BLD AUTO: 24.8 % (ref 36–52)
HGB BLD-MCNC: 8 G/DL (ref 12–17.5)
IMM GRANULOCYTES # BLD AUTO: 0.15 X10*3/UL (ref 0–0.7)
IMM GRANULOCYTES NFR BLD AUTO: 1.2 % (ref 0–0.9)
LACTATE BLDV-SCNC: 1.2 MMOL/L (ref 0.4–2)
LYMPHOCYTES # BLD AUTO: 3.31 X10*3/UL (ref 1.2–4.8)
LYMPHOCYTES NFR BLD AUTO: 27.1 %
MAGNESIUM SERPL-MCNC: 1.95 MG/DL (ref 1.6–2.4)
MCH RBC QN AUTO: 28.3 PG (ref 26–34)
MCHC RBC AUTO-ENTMCNC: 32.3 G/DL (ref 32–36)
MCV RBC AUTO: 88 FL (ref 80–100)
MONOCYTES # BLD AUTO: 1.18 X10*3/UL (ref 0.1–1)
MONOCYTES NFR BLD AUTO: 9.6 %
NEUTROPHILS # BLD AUTO: 7.19 X10*3/UL (ref 1.2–7.7)
NEUTROPHILS NFR BLD AUTO: 58.8 %
NRBC BLD-RTO: 0 /100 WBCS (ref 0–0)
PHOSPHATE SERPL-MCNC: 2.8 MG/DL (ref 2.5–4.9)
PLATELET # BLD AUTO: 791 X10*3/UL (ref 150–450)
PMV BLD AUTO: 9.5 FL (ref 7.5–11.5)
POTASSIUM SERPL-SCNC: 4 MMOL/L (ref 3.5–5.3)
PROT SERPL-MCNC: 6.6 G/DL (ref 6.4–8.2)
RBC # BLD AUTO: 2.83 X10*6/UL (ref 4–5.9)
SODIUM SERPL-SCNC: 139 MMOL/L (ref 136–145)
WBC # BLD AUTO: 12.2 X10*3/UL (ref 4.4–11.3)

## 2023-10-27 PROCEDURE — 37799 UNLISTED PX VASCULAR SURGERY: CPT | Performed by: SURGERY

## 2023-10-27 PROCEDURE — 99291 CRITICAL CARE FIRST HOUR: CPT | Performed by: SURGERY

## 2023-10-27 PROCEDURE — 2500000004 HC RX 250 GENERAL PHARMACY W/ HCPCS (ALT 636 FOR OP/ED)

## 2023-10-27 PROCEDURE — 96372 THER/PROPH/DIAG INJ SC/IM: CPT | Performed by: SURGERY

## 2023-10-27 PROCEDURE — 2020000001 HC ICU ROOM DAILY

## 2023-10-27 PROCEDURE — 99024 POSTOP FOLLOW-UP VISIT: CPT | Performed by: SURGERY

## 2023-10-27 PROCEDURE — 80053 COMPREHEN METABOLIC PANEL: CPT

## 2023-10-27 PROCEDURE — C9113 INJ PANTOPRAZOLE SODIUM, VIA: HCPCS | Performed by: SURGERY

## 2023-10-27 PROCEDURE — 85025 COMPLETE CBC W/AUTO DIFF WBC: CPT

## 2023-10-27 PROCEDURE — 2500000004 HC RX 250 GENERAL PHARMACY W/ HCPCS (ALT 636 FOR OP/ED): Performed by: SURGERY

## 2023-10-27 PROCEDURE — 2500000001 HC RX 250 WO HCPCS SELF ADMINISTERED DRUGS (ALT 637 FOR MEDICARE OP)

## 2023-10-27 PROCEDURE — 83605 ASSAY OF LACTIC ACID: CPT

## 2023-10-27 PROCEDURE — 2500000001 HC RX 250 WO HCPCS SELF ADMINISTERED DRUGS (ALT 637 FOR MEDICARE OP): Performed by: PSYCHIATRY & NEUROLOGY

## 2023-10-27 PROCEDURE — 2500000001 HC RX 250 WO HCPCS SELF ADMINISTERED DRUGS (ALT 637 FOR MEDICARE OP): Performed by: SURGERY

## 2023-10-27 PROCEDURE — 2500000004 HC RX 250 GENERAL PHARMACY W/ HCPCS (ALT 636 FOR OP/ED): Performed by: PSYCHIATRY & NEUROLOGY

## 2023-10-27 PROCEDURE — 82947 ASSAY GLUCOSE BLOOD QUANT: CPT

## 2023-10-27 PROCEDURE — 99232 SBSQ HOSP IP/OBS MODERATE 35: CPT | Performed by: PSYCHIATRY & NEUROLOGY

## 2023-10-27 PROCEDURE — 71045 X-RAY EXAM CHEST 1 VIEW: CPT

## 2023-10-27 PROCEDURE — 71045 X-RAY EXAM CHEST 1 VIEW: CPT | Performed by: RADIOLOGY

## 2023-10-27 PROCEDURE — 83735 ASSAY OF MAGNESIUM: CPT

## 2023-10-27 PROCEDURE — 84100 ASSAY OF PHOSPHORUS: CPT

## 2023-10-27 RX ORDER — MORPHINE SULFATE 2 MG/ML
2 INJECTION, SOLUTION INTRAMUSCULAR; INTRAVENOUS ONCE
Status: DISCONTINUED | OUTPATIENT
Start: 2023-10-27 | End: 2023-10-28

## 2023-10-27 RX ORDER — ACETAMINOPHEN 160 MG/5ML
650 SOLUTION ORAL EVERY 6 HOURS PRN
Status: DISCONTINUED | OUTPATIENT
Start: 2023-10-27 | End: 2023-11-02 | Stop reason: HOSPADM

## 2023-10-27 RX ORDER — LURASIDONE HYDROCHLORIDE 40 MG/1
20 TABLET, FILM COATED ORAL DAILY
Status: DISCONTINUED | OUTPATIENT
Start: 2023-10-27 | End: 2023-10-28

## 2023-10-27 RX ORDER — MAGNESIUM SULFATE HEPTAHYDRATE 40 MG/ML
2 INJECTION, SOLUTION INTRAVENOUS ONCE
Status: COMPLETED | OUTPATIENT
Start: 2023-10-27 | End: 2023-10-27

## 2023-10-27 RX ORDER — MORPHINE SULFATE 2 MG/ML
2 INJECTION, SOLUTION INTRAMUSCULAR; INTRAVENOUS EVERY 4 HOURS PRN
Status: DISCONTINUED | OUTPATIENT
Start: 2023-10-27 | End: 2023-11-02 | Stop reason: HOSPADM

## 2023-10-27 RX ORDER — QUETIAPINE FUMARATE 100 MG/1
100 TABLET, FILM COATED ORAL NIGHTLY
Status: DISCONTINUED | OUTPATIENT
Start: 2023-10-27 | End: 2023-10-30

## 2023-10-27 RX ADMIN — DEXMEDETOMIDINE HYDROCHLORIDE 0.85 MCG/KG/HR: 4 INJECTION, SOLUTION INTRAVENOUS at 09:09

## 2023-10-27 RX ADMIN — PANTOPRAZOLE SODIUM 40 MG: 40 INJECTION, POWDER, FOR SOLUTION INTRAVENOUS at 09:08

## 2023-10-27 RX ADMIN — MEROPENEM 1 G: 1 INJECTION, POWDER, FOR SOLUTION INTRAVENOUS at 04:22

## 2023-10-27 RX ADMIN — MEROPENEM 1 G: 1 INJECTION, POWDER, FOR SOLUTION INTRAVENOUS at 20:04

## 2023-10-27 RX ADMIN — DEXMEDETOMIDINE HYDROCHLORIDE 1.5 MCG/KG/HR: 4 INJECTION, SOLUTION INTRAVENOUS at 03:29

## 2023-10-27 RX ADMIN — QUETIAPINE FUMARATE 100 MG: 100 TABLET, FILM COATED ORAL at 20:04

## 2023-10-27 RX ADMIN — MEROPENEM 1 G: 1 INJECTION, POWDER, FOR SOLUTION INTRAVENOUS at 14:01

## 2023-10-27 RX ADMIN — BISACODYL 10 MG: 10 SUPPOSITORY RECTAL at 09:08

## 2023-10-27 RX ADMIN — DEXMEDETOMIDINE HYDROCHLORIDE 0.8 MCG/KG/HR: 4 INJECTION, SOLUTION INTRAVENOUS at 14:01

## 2023-10-27 RX ADMIN — ENOXAPARIN SODIUM 40 MG: 40 INJECTION SUBCUTANEOUS at 18:25

## 2023-10-27 RX ADMIN — SIMVASTATIN 40 MG: 40 TABLET, FILM COATED ORAL at 20:04

## 2023-10-27 RX ADMIN — DEXMEDETOMIDINE HYDROCHLORIDE 1.5 MCG/KG/HR: 4 INJECTION, SOLUTION INTRAVENOUS at 00:17

## 2023-10-27 RX ADMIN — MORPHINE SULFATE 2 MG: 4 INJECTION, SOLUTION INTRAMUSCULAR; INTRAVENOUS at 01:39

## 2023-10-27 RX ADMIN — HYOSCYAMINE SULFATE 473 ML: 16 SOLUTION at 20:04

## 2023-10-27 RX ADMIN — MAGNESIUM SULFATE HEPTAHYDRATE 2 G: 40 INJECTION, SOLUTION INTRAVENOUS at 06:41

## 2023-10-27 RX ADMIN — INSULIN LISPRO 2 UNITS: 100 INJECTION, SOLUTION INTRAVENOUS; SUBCUTANEOUS at 04:20

## 2023-10-27 RX ADMIN — DEXMEDETOMIDINE HYDROCHLORIDE 0.8 MCG/KG/HR: 4 INJECTION, SOLUTION INTRAVENOUS at 18:27

## 2023-10-27 RX ADMIN — DEXMEDETOMIDINE HYDROCHLORIDE 1.5 MCG/KG/HR: 4 INJECTION, SOLUTION INTRAVENOUS at 06:41

## 2023-10-27 RX ADMIN — LURASIDONE HYDROCHLORIDE 20 MG: 40 TABLET, FILM COATED ORAL at 09:08

## 2023-10-27 RX ADMIN — HYOSCYAMINE SULFATE 473 ML: 16 SOLUTION at 09:06

## 2023-10-27 RX ADMIN — MORPHINE SULFATE 2 MG: 2 INJECTION, SOLUTION INTRAMUSCULAR; INTRAVENOUS at 17:20

## 2023-10-27 ASSESSMENT — COGNITIVE AND FUNCTIONAL STATUS - GENERAL
MOBILITY SCORE: 6
STANDING UP FROM CHAIR USING ARMS: TOTAL
MOVING FROM LYING ON BACK TO SITTING ON SIDE OF FLAT BED WITH BEDRAILS: TOTAL
TURNING FROM BACK TO SIDE WHILE IN FLAT BAD: TOTAL
MOVING TO AND FROM BED TO CHAIR: TOTAL
HELP NEEDED FOR BATHING: TOTAL
DRESSING REGULAR LOWER BODY CLOTHING: TOTAL
DAILY ACTIVITIY SCORE: 6
DRESSING REGULAR UPPER BODY CLOTHING: TOTAL
PERSONAL GROOMING: TOTAL
TOILETING: TOTAL
WALKING IN HOSPITAL ROOM: TOTAL
EATING MEALS: TOTAL
CLIMB 3 TO 5 STEPS WITH RAILING: TOTAL

## 2023-10-27 ASSESSMENT — PAIN - FUNCTIONAL ASSESSMENT
PAIN_FUNCTIONAL_ASSESSMENT: 0-10

## 2023-10-27 ASSESSMENT — ENCOUNTER SYMPTOMS
CONFUSION: 1
CARDIOVASCULAR NEGATIVE: 1
GASTROINTESTINAL NEGATIVE: 1
RESPIRATORY NEGATIVE: 1

## 2023-10-27 ASSESSMENT — PAIN SCALES - GENERAL
PAINLEVEL_OUTOF10: 5 - MODERATE PAIN
PAINLEVEL_OUTOF10: 10 - WORST POSSIBLE PAIN
PAINLEVEL_OUTOF10: 0 - NO PAIN
PAINLEVEL_OUTOF10: 5 - MODERATE PAIN
PAINLEVEL_OUTOF10: 3
PAINLEVEL_OUTOF10: 4

## 2023-10-27 NOTE — PROGRESS NOTES
"Victorino Lara is a 48 y.o. adult on day 15 of admission presenting with patient is tolerating medications well.   Labs Reviewed.  Vitals Reviewed.   Nursing Notes Reviewed.   No EPS, TD, vitals stable.      MSE: Patient was drowsy and not oriented to time, place, but was oriented to himself and hence person and situation. Patient is in hospital gown and did not appear to be in acute distress.. Patient is resigned on approach. Recent and remote memory poor. Memory registration and recall poor. Attention and concentration restricted. Speech low in volume and sow in rate.  Poor eye contact likely secondary to sedation. Thought process poverty of thoughts. Impaired associations. Limited fund of knowledge. Mood ok affect flat. Patient did not endorse any delusions. Patient denied any auditory visual hallucinations. Patient has denied any active suicidal  ideations and is future oriented.  Patient has limited insight, limited judgment and poor impulse control.     Musculoskeletal: Bedridden and has gait could not be assessed, no Parkinsonism, no Dystonia, no Akathisia, no TD. Psychomotor activity within normal limits.  Diagnosis:     Assessment and Plan:     Patient is not at imminent risk of harm to self or others and does not need inpatient psychiatric care.  Latuda restarted at 20 mg once a day with his tube feeding, Seroquel increased to a dose of 100 mg at bedtime  Continue with current treatment and medication adjustments. Patient is agreeable with continued medication management and adjustments discussed.     One of the foremost reason for patient's agitation is likely his acute pain which will need to be taken into consideration    Last Recorded Vitals  /63 (BP Location: Left arm, Patient Position: Lying)   Pulse 70   Temp 36.6 °C (97.9 °F) (Temporal)   Resp 18   Ht 1.702 m (5' 7.01\")   Wt 95.5 kg (210 lb 8.6 oz)   SpO2 99%   BMI 32.97 kg/m²      Recent Results (from the past 24 hour(s))   POCT GLUCOSE "    Collection Time: 10/26/23  7:50 AM   Result Value Ref Range    POCT Glucose 120 (H) 74 - 99 mg/dL   Blood Gas Arterial Full Panel    Collection Time: 10/26/23  8:56 AM   Result Value Ref Range    POCT pH, Arterial 7.42 7.38 - 7.42 pH    POCT pCO2, Arterial 46 (H) 38 - 42 mm Hg    POCT pO2, Arterial 80 (L) 85 - 95 mm Hg    POCT SO2, Arterial 99 94 - 100 %    POCT Oxy Hemoglobin, Arterial 97.2 94.0 - 98.0 %    POCT Hematocrit Calculated, Arterial 29.0 (L) 36.0 - 52.0 %    POCT Sodium, Arterial 140 136 - 145 mmol/L    POCT Potassium, Arterial 4.7 3.5 - 5.3 mmol/L    POCT Chloride, Arterial 107 98 - 107 mmol/L    POCT Ionized Calcium, Arterial 1.16 1.10 - 1.33 mmol/L    POCT Glucose, Arterial 137 (H) 74 - 99 mg/dL    POCT Lactate, Arterial 2.7 (H) 0.4 - 2.0 mmol/L    POCT Base Excess, Arterial 4.7 (H) -2.0 - 3.0 mmol/L    POCT HCO3 Calculated, Arterial 29.8 (H) 22.0 - 26.0 mmol/L    POCT Hemoglobin, Arterial 9.6 (L) 13.5 - 17.5 g/dL    POCT Anion Gap, Arterial 8 (L) 10 - 25 mmo/L    Patient Temperature      FiO2 40 %    Ventilator Mode CPAP     Peep CHM2O 5.0 cm H2O    Pressure Support 12 cm H2O   Blood Gas Arterial Full Panel    Collection Time: 10/26/23  9:47 AM   Result Value Ref Range    POCT pH, Arterial 7.46 (H) 7.38 - 7.42 pH    POCT pCO2, Arterial 42 38 - 42 mm Hg    POCT pO2, Arterial 118 (H) 85 - 95 mm Hg    POCT SO2, Arterial 98 94 - 100 %    POCT Oxy Hemoglobin, Arterial 96.6 94.0 - 98.0 %    POCT Hematocrit Calculated, Arterial 16.0 (L) 36.0 - 52.0 %    POCT Sodium, Arterial 139 136 - 145 mmol/L    POCT Potassium, Arterial 5.1 3.5 - 5.3 mmol/L    POCT Chloride, Arterial 109 (H) 98 - 107 mmol/L    POCT Ionized Calcium, Arterial 1.20 1.10 - 1.33 mmol/L    POCT Glucose, Arterial 163 (H) 74 - 99 mg/dL    POCT Lactate, Arterial 2.0 0.4 - 2.0 mmol/L    POCT Base Excess, Arterial 5.6 (H) -2.0 - 3.0 mmol/L    POCT HCO3 Calculated, Arterial 29.9 (H) 22.0 - 26.0 mmol/L    POCT Hemoglobin, Arterial 5.4 (LL) 13.5 -  17.5 g/dL    POCT Anion Gap, Arterial 5 (L) 10 - 25 mmo/L    Patient Temperature      FiO2 40 %    Ventilator Mode CPAP     Peep CHM2O 5.0 cm H2O    Pressure Support 12 cm H2O    Critical Called By KRISHNA MARIE     Critical Called To BALJIT LAU     Critical Call Time 1001.0000     Critical Read Back Y    POCT GLUCOSE    Collection Time: 10/26/23  4:22 PM   Result Value Ref Range    POCT Glucose 159 (H) 74 - 99 mg/dL   POCT GLUCOSE    Collection Time: 10/26/23  9:12 PM   Result Value Ref Range    POCT Glucose 122 (H) 74 - 99 mg/dL   POCT GLUCOSE    Collection Time: 10/26/23 11:39 PM   Result Value Ref Range    POCT Glucose 135 (H) 74 - 99 mg/dL   Phosphorus    Collection Time: 10/27/23  3:50 AM   Result Value Ref Range    Phosphorus 2.8 2.5 - 4.9 mg/dL   Magnesium    Collection Time: 10/27/23  3:50 AM   Result Value Ref Range    Magnesium 1.95 1.60 - 2.40 mg/dL   Comprehensive Metabolic Panel    Collection Time: 10/27/23  3:50 AM   Result Value Ref Range    Glucose 147 (H) 74 - 99 mg/dL    Sodium 139 136 - 145 mmol/L    Potassium 4.0 3.5 - 5.3 mmol/L    Chloride 103 98 - 107 mmol/L    Bicarbonate 27 21 - 32 mmol/L    Anion Gap 13 10 - 20 mmol/L    Urea Nitrogen 16 6 - 23 mg/dL    Creatinine 0.63 0.50 - 1.30 mg/dL    eGFR >90 >60 mL/min/1.73m*2    Calcium 8.2 (L) 8.6 - 10.3 mg/dL    Albumin 2.9 (L) 3.4 - 5.0 g/dL    Alkaline Phosphatase 112 33 - 120 U/L    Total Protein 6.6 6.4 - 8.2 g/dL    AST 29 9 - 39 U/L    Bilirubin, Total 0.5 0.0 - 1.2 mg/dL    ALT 21 7 - 52 U/L   CBC and Auto Differential    Collection Time: 10/27/23  3:50 AM   Result Value Ref Range    WBC 12.2 (H) 4.4 - 11.3 x10*3/uL    nRBC 0.0 0.0 - 0.0 /100 WBCs    RBC 2.83 (L) 4.00 - 5.90 x10*6/uL    Hemoglobin 8.0 (L) 12.0 - 17.5 g/dL    Hematocrit 24.8 (L) 36.0 - 52.0 %    MCV 88 80 - 100 fL    MCH 28.3 26.0 - 34.0 pg    MCHC 32.3 32.0 - 36.0 g/dL    RDW 12.6 11.5 - 14.5 %    Platelets 791 (H) 150 - 450 x10*3/uL    MPV 9.5 7.5 - 11.5 fL    Neutrophils % 58.8  40.0 - 80.0 %    Immature Granulocytes %, Automated 1.2 (H) 0.0 - 0.9 %    Lymphocytes % 27.1 13.0 - 44.0 %    Monocytes % 9.6 2.0 - 10.0 %    Eosinophils % 2.4 0.0 - 6.0 %    Basophils % 0.9 0.0 - 2.0 %    Neutrophils Absolute 7.19 1.20 - 7.70 x10*3/uL    Immature Granulocytes Absolute, Automated 0.15 0.00 - 0.70 x10*3/uL    Lymphocytes Absolute 3.31 1.20 - 4.80 x10*3/uL    Monocytes Absolute 1.18 (H) 0.10 - 1.00 x10*3/uL    Eosinophils Absolute 0.29 0.00 - 0.70 x10*3/uL    Basophils Absolute 0.11 (H) 0.00 - 0.10 x10*3/uL   POCT GLUCOSE    Collection Time: 10/27/23  4:19 AM   Result Value Ref Range    POCT Glucose 155 (H) 74 - 99 mg/dL          Current Facility-Administered Medications:     Adult Clinimix TPN, 60 mL/hr, intravenous, Continuous, Yoan Hopper MD, Stopped at 10/27/23 0435    alteplase (Cathflo Activase) injection 2 mg, 2 mg, intra-catheter, PRN, Amina Ritter APRN-CNP    bisacodyl (Dulcolax) suppository 10 mg, 10 mg, rectal, Daily, NIKITA Kaye-CNP, 10 mg at 10/26/23 0932    [Held by provider] busPIRone (Buspar) tablet 15 mg, 15 mg, oral, BID, Norman Calloway MD, 15 mg at 10/14/23 0842    dexmedeTOMIDine in NS (Precedex) 400 mcg in 100 mL (4 mcg/mL) infusion, 0.2-1.5 mcg/kg/hr, intravenous, Continuous, PEPITO Doyle, Last Rate: 35.8 mL/hr at 10/27/23 0641, 1.5 mcg/kg/hr at 10/27/23 0641    dextrose 10 % in water (D10W) infusion, 0.3 g/kg/hr, intravenous, Once PRN, Norman Calloway MD    dextrose 50 % injection 25 g, 25 g, intravenous, q15 min PRN, Norman Calloway MD    [Held by provider] docusate sodium (Colace) capsule 100 mg, 100 mg, oral, BID, Norman Calloway MD, 100 mg at 10/14/23 0842    enoxaparin (Lovenox) syringe 40 mg, 40 mg, subcutaneous, q24h, Norman Calloway MD, 40 mg at 10/26/23 1855    glucagon (Glucagen) injection 1 mg, 1 mg, intramuscular, q15 min PRN, Norman Calloway MD    insulin lispro (HumaLOG) injection 0-10 Units, 0-10 Units, subcutaneous,  q4h, Norman Calloway MD, 2 Units at 10/27/23 0420    [Held by provider] insulin regular (HumuLIN R) injection 0-5 Units, 0-5 Units, subcutaneous, TID with meals, Norman Calloway MD, 1 Units at 10/14/23 1224    ipratropium-albuteroL (Duo-Neb) 0.5-2.5 mg/3 mL nebulizer solution 3 mL, 3 mL, nebulization, q4h PRN, Norman Calloway MD    LORazepam (Ativan) injection 1 mg, 1 mg, intravenous, q4h PRN, Yoan Hopper MD, 1 mg at 10/26/23 1831    lurasidone (Latuda) tablet 20 mg, 20 mg, oral, Daily, Maik Cotton MD    magnesium sulfate IV 2 g, 2 g, intravenous, Once, PEPITO Buckley, DNP, Last Rate: 50 mL/hr at 10/27/23 0641, 2 g at 10/27/23 0641    meropenem (Merrem) in sodium chloride 0.9 % 100 mL IV 1 g, 1 g, intravenous, q8h, PEPITO Doyle, Stopped at 10/27/23 0452    metoprolol tartrate (Lopressor) injection 5 mg, 5 mg, intravenous, q6h PRN, PEPITO Doyle, 5 mg at 10/25/23 1527    morphine injection 2 mg, 2 mg, intravenous, Once, PEPITO Buckley, DNP    morphine injection 4 mg, 4 mg, intravenous, q4h PRN, Yoan Hopper MD, 2 mg at 10/27/23 0139    naloxone (Narcan) injection 0.2 mg, 0.2 mg, intravenous, q5 min PRN, Norman Calloway MD    oxygen (O2) therapy, , inhalation, Continuous PRN - O2/gases, Yoan Hopper MD, 4 L/min at 10/26/23 1032    [Held by provider] pantoprazole (ProtoNix) EC tablet 40 mg, 40 mg, oral, Daily before breakfast, Norman Calloway MD, 40 mg at 10/12/23 0640    pantoprazole (ProtoNix) injection 40 mg, 40 mg, intravenous, Daily, Norman Calloway MD, 40 mg at 10/26/23 0932    QUEtiapine (SEROquel) tablet 100 mg, 100 mg, oral, Nightly, Maik Cotton MD    simvastatin (Zocor) tablet 40 mg, 40 mg, oral, Nightly, Norman Calloway MD, 40 mg at 10/26/23 2121    sodium hypochlorite (Dakin's HALF-Strength) 0.25 % external solution, , irrigation, BID, Norman Calloway MD, 473 mL at 10/26/23 2308    [Held by provider] traZODone (Desyrel) tablet 300 mg,  300 mg, oral, Nightly, Norman Calloway MD, 300 mg at 10/13/23 2028    [Held by provider] venlafaxine XR (Effexor-XR) 24 hr capsule 150 mg, 150 mg, oral, Daily, Norman Calloway MD, 150 mg at 10/14/23 0842       Maik Cotton MD

## 2023-10-27 NOTE — CARE PLAN
The patient's goals for the shift include Pain management    The clinical goals for the shift include maintain a patent airway, maintain a safe environment.

## 2023-10-27 NOTE — PROGRESS NOTES
"Victorino Lara is a 48 y.o. adult on day 15 of admission presenting with Acute appendicitis with localized peritonitis.    Subjective   Extubated yesterday remain extubated overnight.  Tolerating tube feeds and having bowel movements.  Did pull out NG tube but replaced at bedside, awaiting KUB to confirm placement.       Objective     Physical Exam  Gen: NAD but confused  Heart: RRR  Lungs: CTAB  Abd; Soft, , ND, incision 5cm open with fascia intact, no erythema, no drainage  Last Recorded Vitals  Blood pressure 100/63, pulse 76, temperature 36.6 °C (97.9 °F), temperature source Temporal, resp. rate 10, height 1.702 m (5' 7.01\"), weight 95.5 kg (210 lb 8.6 oz), SpO2 100 %.  Intake/Output last 3 Shifts:  I/O last 3 completed shifts:  In: 4251.5 (44.5 mL/kg) [I.V.:844.6 (8.8 mL/kg); NG/GT:495; IV Piggyback:528]  Out: 1675 (17.5 mL/kg) [Urine:1675 (0.5 mL/kg/hr)]  Weight: 95.5 kg     Relevant Results           This patient currently has cardiac telemetry ordered; if you would like to modify or discontinue the telemetry order, click here to go to the orders activity to modify/discontinue the order.  This patient has a central line   Reason for the central line remaining today? Parenteral nutrition                 Assessment/Plan   Principal Problem:    Acute appendicitis with localized peritonitis  Active Problems:    Anxiety    Depression    Hyperlipidemia    Diabetes mellitus, type 2 (CMS/HCC)    Appendicitis, acute    Appendicitis    Acute appendicitis, unspecified acute appendicitis type    Small bowel obstruction (CMS/HCC)    POD 16 Appendectomy and hernia repair. POD 12 reexploration, currently in the ICU for agitation, extubated and slowly improving   Plan  Cont. TPN, wean if able to get TF to goal  Ok for bowel regimen and suppository  Cont. Abx for now for intraabdominal infection, will likely need 2 weeks for it  Keep extubated  Increase dosage of PO psych meds per psych  Would continue enema/suppository  Cont. " Care per ICU       I spent 31 minutes in the professional and overall care of this patient.      Norman Calloway MD  9:46 AM  10/27/23

## 2023-10-27 NOTE — PROGRESS NOTES
Victorino Lara is a 48 y.o. adult on day 15 of admission presenting with Acute appendicitis with localized peritonitis.    Subjective   No acute events overnight  Patient was extubated yesterday and has been confused, impulsive and agitated. Tolerating precedex drip and has been redirectable. Denies any pain or complaints during my exam this morning.  No family at bedside this morning. His significant other visits most evenings.       Objective     Physical Exam  Vitals reviewed. Exam conducted with a chaperone present.   Constitutional:       General: He is not in acute distress.     Appearance: Normal appearance. He is normal weight. He is not ill-appearing or toxic-appearing.   HENT:      Head: Normocephalic and atraumatic.      Nose:      Comments: +NG     Mouth/Throat:      Mouth: Mucous membranes are moist.      Pharynx: Oropharynx is clear.   Eyes:      General: No scleral icterus.     Conjunctiva/sclera: Conjunctivae normal.      Pupils: Pupils are equal, round, and reactive to light.   Cardiovascular:      Rate and Rhythm: Normal rate and regular rhythm.      Pulses: Normal pulses.      Heart sounds: Normal heart sounds.   Pulmonary:      Effort: Pulmonary effort is normal. No respiratory distress.      Breath sounds: Normal breath sounds. No wheezing.   Abdominal:      General: Abdomen is flat. There is no distension.      Palpations: Abdomen is soft.      Tenderness: There is no abdominal tenderness.   Musculoskeletal:      Right lower leg: Edema present.      Left lower leg: Edema present.      Comments: Trace pedal edema bilaterally   Skin:     General: Skin is warm and dry.      Coloration: Skin is not jaundiced.   Neurological:      General: No focal deficit present.      Mental Status: He is disoriented.   Psychiatric:      Comments: Confused to recent events. Responsive during conversation.         Last Recorded Vitals  Blood pressure 100/63, pulse 70, temperature 36.6 °C (97.9 °F), temperature source  "Temporal, resp. rate 18, height 1.702 m (5' 7.01\"), weight 95.5 kg (210 lb 8.6 oz), SpO2 99 %.  Intake/Output last 3 Shifts:  I/O last 3 completed shifts:  In: 4251.5 (44.5 mL/kg) [I.V.:844.6 (8.8 mL/kg); NG/GT:495; IV Piggyback:528]  Out: 1675 (17.5 mL/kg) [Urine:1675 (0.5 mL/kg/hr)]  Weight: 95.5 kg     Relevant Results           This patient currently has cardiac telemetry ordered; if you would like to modify or discontinue the telemetry order, click here to go to the orders activity to modify/discontinue the order.  This patient has a central line   Reason for the central line remaining today? Line unnecessary, will be removed today                 Assessment/Plan   Principal Problem:    Acute appendicitis with localized peritonitis  Active Problems:    Anxiety    Depression    Hyperlipidemia    Diabetes mellitus, type 2 (CMS/HCC)    Appendicitis, acute    Appendicitis    Acute appendicitis, unspecified acute appendicitis type    Small bowel obstruction (CMS/HCC)    Neuro - delirium, encephalopathy, bipolar - continue precedex for agitation and safety while patient is recovering from multiple neurologic insults (prolonged sedation, delirium, septic encephalopathy). Minimize benzodiazepine use, for withdrawal symptoms. Continue gentle reorientation and encouragement. Sitter at bedside.  Respiratory - pneumonia - continue antibiotics. Supplemental oxygen as needed.   Cardiac - monitor hr and bp. DC arterial line today.  GI - continue tube feeds via ng tube. Patient pulled NG this morning and it was replaced. Continue tpn until tolerating tube feeds at goal.   - hylton has been removed. Monitor kidney function.  Heme - anemia - no indication for blood transfusion  Endo - diabetes - sliding scale insulin  ID - abx for pneumonia  Ppx - lovenox for dvt ppx, ppi for ulcer ppx.    Dispo- continue icu care and monitoring. Patient's neurologic status and need for precedex prevent transfer to floor.       I spent 38 " minutes in the professional and overall care of this patient.      Yoan Hopper MD

## 2023-10-27 NOTE — PROGRESS NOTES
Victorino Lara is a 48 y.o. adult on day 15  of admission presenting with Acute appendicitis with localized peritonitis.      Subjective   Patient is a 48 y.o. adult admitted on 10/10/2023  8:50 AM with the following indication(s) for ICU care Aspiration pneumonia.      Interval History: And is currently extubated.  He is confused but does follow commands.  Significant other at the bedside.  He has to be restrained for agitation and has a bedside sitter    Scheduled Medications:   [Held by provider] busPIRone, 15 mg, oral, BID  [Held by provider] docusate sodium, 100 mg, oral, BID  enoxaparin, 40 mg, subcutaneous, q24h  insulin lispro, 0-10 Units, subcutaneous, q4h  [Held by provider] insulin regular, 0-5 Units, subcutaneous, TID with meals  [Held by provider] lurasidone, 80 mg, oral, Daily  meropenem, 1 g, intravenous, q8h  [Held by provider] pantoprazole, 40 mg, oral, Daily before breakfast  pantoprazole, 40 mg, intravenous, Daily  potassium phosphate, 21 mmol, intravenous, Once  [Held by provider] QUEtiapine, 300 mg, oral, BID  [Held by provider] simvastatin, 40 mg, oral, Nightly  sodium hypochlorite, , irrigation, BID  [Held by provider] traZODone, 300 mg, oral, Nightly  valproate sodium, 500 mg, intravenous, q12h  [Held by provider] venlafaxine XR, 150 mg, oral, Daily           Continuous Medications:   Adult Clinimix TPN, 70 mL/hr, Last Rate: 70 mL/hr (10/21/23 0809)  dexmedeTOMIDine, 0.1-1.5 mcg/kg/hr, Last Rate: 1 mcg/kg/hr (10/21/23 1312)  fentaNYL,  mcg/hr, Last Rate: 125 mcg/hr (10/21/23 1311)  propofol, 5-50 mcg/kg/min, Last Rate: 50 mcg/kg/min (10/21/23 1449)           PRN Medications:   PRN medications: alteplase, dextrose 10 % in water (D10W), dextrose, glucagon, HYDROmorphone, HYDROmorphone, ipratropium-albuteroL, naloxone, OLANZapine, oxygen           Objective   Physical Exam:   General appearance: Alert, awake off the ventilator.  Head: Normocephalic, without obvious  abnormality  Eyes:conjunctivae/corneas clear. PERRL  Neck:  supple, symmetrical, trachea midline, left sided internal jugular line pulled out slightly  Lungs:clear to auscultation bilaterally  Chest wall:  no tenderness  Heart: regular rate and rhythm, S1, S2 normal, no murmur, click, rub or gallop  Abdomen: Soft, ND, still erythematouns around wound but is stable, wound is open about 5cm with packing, fascia intact, no more drainage  Male genitalia:  normal  Extremities: 2+ edema  Pulses:2+ and symmetric  Skin: Skin color, texture, turgor normal.  No rashes or lesions  Neurologic: Awake, agitated, follows commands       Assessment/Plan   Acute Respiratory Failure, post op  Aspiration Pneumonia        Principal Problem:    Acute appendicitis with localized peritonitis  Active Problems:    Anxiety    Depression    Hyperlipidemia    Diabetes mellitus, type 2 (CMS/HCC)    Appendicitis, acute    Appendicitis    Acute appendicitis, unspecified acute appendicitis type    Small bowel obstruction (CMS/HCC)     Neuro - Alert, awake and of the ventilator and somewhat confused and agitated  Cardiac - monitor hr and bp  Respiratory - acute hypoxemic respiratory failure - wean mechanical ventilation as able. Breathing trial today. Continue antibiotics for pneumonia  GI - ileus - continue ng to suction. On tpn. Discussed with Dr. Calloway, general surgery - will evaluate for infectious source with ct chest/abd/pelvis with oral and iv contrast.  Gu - hypokalemia - replace.. volume overload - has required some diuresis, will hold for now as bp is lower this morning (102/52)  Heme - anemia, stable, no need for transfusion  ID - continue antibiotics for pneumonia and intraabdominal infection. Ct scans pending.  Endo - A1c improved since last year. Continue sliding scale insulin.  MS - offload pressure points  Ppx - protonix, lovenox     Continue icu care    Pulmonary comments:-Agree with current treatment as ordered by ICU service.  Continues  to need ICU stay at this point.  Pulmonary will continue to monitor this patient with you.           Mann Ricks MD

## 2023-10-27 NOTE — PROGRESS NOTES
Primary MD: Roxana Morris PA-C    Reason For Consult  Bilateral lower lobe pneumonia  Acute appendicitis with localized peritonitis    History Of Present Illness  Victorino Lara is a 48 y.o. male              Patient was admitted 10/1/2023     Taken to laparoscopic surgery for acute appendicitis localized peritonitis without perforation     Taken back to surgery 10/14/2023 for small bowel obstruction exploratory laparotomy lysis of adhesions repair of incisional hernia     Hospital course complicated by development of aspiration pneumonia   Has required intubation and mechanical ventilation  growing  Klebsiella pneumonia and MSSA from sputum switched to meropenem wound culture was identified     Klebsiella is resistant to Zosyn consented to third-generation cephalosporin        There is another culture from 10/19/2023 reported as surgical site tissue biopsy growing the same Klebsiella pneumonia and E. coli           CT scan from 10/20/2023 shows bilateral lower lobe consolidations            No fever  extubated  No pressors  Opens eyes follows some commands  confused     Review of Systems   Respiratory: Negative.     Cardiovascular: Negative.    Gastrointestinal: Negative.    Psychiatric/Behavioral:  Positive for confusion.         Physical Exam  Constitutional:       Appearance: He is not ill-appearing or diaphoretic.   Cardiovascular:      Heart sounds: Normal heart sounds. No murmur heard.  Pulmonary:      Effort: No respiratory distress.      Breath sounds: No wheezing, rhonchi or rales.   Abdominal:      General: Abdomen is flat. Bowel sounds are normal. There is no distension.      Palpations: Abdomen is soft. There is no mass.      Tenderness: There is no abdominal tenderness. There is no guarding or rebound.   Musculoskeletal:         General: No swelling.      Left lower leg: No edema.   Skin:     Coloration: Skin is not jaundiced.      Findings: No erythema.   Neurological:      Mental  "Status: He is disoriented.          Range of Vitals (last 24 hours)  Heart Rate:  [54-98]   Temp:  [36.5 °C (97.7 °F)-36.7 °C (98.1 °F)]   Resp:  [10-32]   BP: (127)/(72)   SpO2:  [92 %-100 %]     Relevant Results  Results from last 72 hours   Lab Units 10/27/23  0350   WBC AUTO x10*3/uL 12.2*   HEMOGLOBIN g/dL 8.0*   HEMATOCRIT % 24.8*   PLATELETS AUTO x10*3/uL 791*   NEUTROS PCT AUTO % 58.8   LYMPHS PCT AUTO % 27.1   MONOS PCT AUTO % 9.6   EOS PCT AUTO % 2.4       Results from last 72 hours   Lab Units 10/27/23  0350   SODIUM mmol/L 139   POTASSIUM mmol/L 4.0   CHLORIDE mmol/L 103   CO2 mmol/L 27   BUN mg/dL 16   CREATININE mg/dL 0.63   GLUCOSE mg/dL 147*   CALCIUM mg/dL 8.2*   ANION GAP mmol/L 13   EGFR mL/min/1.73m*2 >90       Results from last 72 hours   Lab Units 10/27/23  0350   ALK PHOS U/L 112   BILIRUBIN TOTAL mg/dL 0.5   PROTEIN TOTAL g/dL 6.6   ALT U/L 21   AST U/L 29   ALBUMIN g/dL 2.9*       Estimated Creatinine Clearance (by C-G formula based on SCr of 0.63 mg/dL)  Female: 125 mL/min  Male: 125 mL/min  The patient&#39;s sex/gender information is inconclusive; review their information before proceeding.  CRP   Date/Time Value Ref Range Status   01/17/2020 10:00 PM 0.19 mg/dL Final     Comment:     REF VALUE  < 1.00     01/12/2020 06:29 AM 0.47 mg/dL Final     Comment:     REF VALUE  < 1.00       Sedimentation Rate   Date/Time Value Ref Range Status   01/17/2020 10:00 PM 5 0 - 15 mm/h Final   01/12/2020 06:29 AM 14 0 - 15 mm/h Final     No results found for: \"HIV1X2\", \"HIVCONF\", \"HIGXML1IG\"  No results found for: \"HCVPCRQUANT\"  Cultures  Susceptibility data from last 14 days.  Collected Specimen Info Organism Amoxicillin/Clavulanate Ampicillin Ampicillin/Sulbactam Cefazolin Ceftriaxone Cefuroxime Ciprofloxacin Clindamycin Erythromycin Gentamicin Levofloxacin Oxacillin Piperacillin/Tazobactam   10/19/23 Fluid from Tracheal Aspirate Methicillin Susceptible Staphylococcus aureus (MSSA)        S S   S      " Klebsiella pneumoniae/variicola R R R R S S S   S S  R   10/19/23 Tissue/Biopsy from Surgical Site Infection Klebsiella pneumoniae/variicola R R R R S S S   S S  R     Escherichia coli S R R S   S   S S  S   10/16/23 Fluid from Tracheal Aspirate Klebsiella pneumoniae/variicola R R R R S I S   S S  R     Normal throat sharona                10/15/23 Swab from Anterior Nares Methicillin Susceptible Staphylococcus aureus (MSSA)                  Collected Specimen Info Organism Tetracycline Trimethoprim/Sulfamethoxazole Vancomycin   10/19/23 Fluid from Tracheal Aspirate Methicillin Susceptible Staphylococcus aureus (MSSA) S S S     Klebsiella pneumoniae/variicola  S    10/19/23 Tissue/Biopsy from Surgical Site Infection Klebsiella pneumoniae/variicola  S      Escherichia coli  S    10/16/23 Fluid from Tracheal Aspirate Klebsiella pneumoniae/variicola  S      Normal throat sharona      10/15/23 Swab from Anterior Nares Methicillin Susceptible Staphylococcus aureus (MSSA)               Assessment/Plan     Bilateral lower lobe pneumonia  Acute appendicitis with localized peritonitis  Postop wound infection       Growing Klebsiella and MSSA from sputum Klebsiella and E. coli from wound     meropenem

## 2023-10-28 LAB
ALBUMIN SERPL BCP-MCNC: 2.8 G/DL (ref 3.4–5)
ALP SERPL-CCNC: 111 U/L (ref 33–120)
ALT SERPL W P-5'-P-CCNC: 22 U/L (ref 7–52)
ANION GAP SERPL CALC-SCNC: 10 MMOL/L (ref 10–20)
AST SERPL W P-5'-P-CCNC: 25 U/L (ref 9–39)
BASOPHILS # BLD AUTO: 0.12 X10*3/UL (ref 0–0.1)
BASOPHILS NFR BLD AUTO: 1 %
BILIRUB SERPL-MCNC: 0.4 MG/DL (ref 0–1.2)
BUN SERPL-MCNC: 15 MG/DL (ref 6–23)
CALCIUM SERPL-MCNC: 7.9 MG/DL (ref 8.6–10.3)
CHLORIDE SERPL-SCNC: 104 MMOL/L (ref 98–107)
CO2 SERPL-SCNC: 26 MMOL/L (ref 21–32)
CREAT SERPL-MCNC: 0.66 MG/DL (ref 0.5–1.3)
EOSINOPHIL # BLD AUTO: 0.31 X10*3/UL (ref 0–0.7)
EOSINOPHIL NFR BLD AUTO: 2.6 %
ERYTHROCYTE [DISTWIDTH] IN BLOOD BY AUTOMATED COUNT: 12.8 % (ref 11.5–14.5)
GFR SERPL CREATININE-BSD FRML MDRD: >90 ML/MIN/1.73M*2
GLUCOSE BLD MANUAL STRIP-MCNC: 107 MG/DL (ref 74–99)
GLUCOSE BLD MANUAL STRIP-MCNC: 116 MG/DL (ref 74–99)
GLUCOSE BLD MANUAL STRIP-MCNC: 125 MG/DL (ref 74–99)
GLUCOSE BLD MANUAL STRIP-MCNC: 128 MG/DL (ref 74–99)
GLUCOSE BLD MANUAL STRIP-MCNC: 134 MG/DL (ref 74–99)
GLUCOSE BLD MANUAL STRIP-MCNC: 137 MG/DL (ref 74–99)
GLUCOSE BLD MANUAL STRIP-MCNC: 140 MG/DL (ref 74–99)
GLUCOSE SERPL-MCNC: 133 MG/DL (ref 74–99)
HCT VFR BLD AUTO: 25.8 % (ref 36–52)
HGB BLD-MCNC: 8.3 G/DL (ref 12–17.5)
IMM GRANULOCYTES # BLD AUTO: 0.16 X10*3/UL (ref 0–0.7)
IMM GRANULOCYTES NFR BLD AUTO: 1.3 % (ref 0–0.9)
LYMPHOCYTES # BLD AUTO: 3.78 X10*3/UL (ref 1.2–4.8)
LYMPHOCYTES NFR BLD AUTO: 31.8 %
MAGNESIUM SERPL-MCNC: 2.05 MG/DL (ref 1.6–2.4)
MCH RBC QN AUTO: 28.1 PG (ref 26–34)
MCHC RBC AUTO-ENTMCNC: 32.2 G/DL (ref 32–36)
MCV RBC AUTO: 88 FL (ref 80–100)
MONOCYTES # BLD AUTO: 1.07 X10*3/UL (ref 0.1–1)
MONOCYTES NFR BLD AUTO: 9 %
NEUTROPHILS # BLD AUTO: 6.44 X10*3/UL (ref 1.2–7.7)
NEUTROPHILS NFR BLD AUTO: 54.3 %
NRBC BLD-RTO: 0 /100 WBCS (ref 0–0)
PHOSPHATE SERPL-MCNC: 2.9 MG/DL (ref 2.5–4.9)
PLATELET # BLD AUTO: 750 X10*3/UL (ref 150–450)
PMV BLD AUTO: 9.4 FL (ref 7.5–11.5)
POTASSIUM SERPL-SCNC: 3.6 MMOL/L (ref 3.5–5.3)
PROT SERPL-MCNC: 6.5 G/DL (ref 6.4–8.2)
RBC # BLD AUTO: 2.95 X10*6/UL (ref 4–5.9)
SODIUM SERPL-SCNC: 136 MMOL/L (ref 136–145)
WBC # BLD AUTO: 11.9 X10*3/UL (ref 4.4–11.3)

## 2023-10-28 PROCEDURE — 2020000001 HC ICU ROOM DAILY

## 2023-10-28 PROCEDURE — 2500000004 HC RX 250 GENERAL PHARMACY W/ HCPCS (ALT 636 FOR OP/ED)

## 2023-10-28 PROCEDURE — 96372 THER/PROPH/DIAG INJ SC/IM: CPT | Performed by: SURGERY

## 2023-10-28 PROCEDURE — 2500000001 HC RX 250 WO HCPCS SELF ADMINISTERED DRUGS (ALT 637 FOR MEDICARE OP): Performed by: SURGERY

## 2023-10-28 PROCEDURE — 2500000004 HC RX 250 GENERAL PHARMACY W/ HCPCS (ALT 636 FOR OP/ED): Performed by: SURGERY

## 2023-10-28 PROCEDURE — 99291 CRITICAL CARE FIRST HOUR: CPT | Performed by: SURGERY

## 2023-10-28 PROCEDURE — 82947 ASSAY GLUCOSE BLOOD QUANT: CPT

## 2023-10-28 PROCEDURE — C9113 INJ PANTOPRAZOLE SODIUM, VIA: HCPCS | Performed by: SURGERY

## 2023-10-28 PROCEDURE — 83735 ASSAY OF MAGNESIUM: CPT

## 2023-10-28 PROCEDURE — 80053 COMPREHEN METABOLIC PANEL: CPT

## 2023-10-28 PROCEDURE — 2500000001 HC RX 250 WO HCPCS SELF ADMINISTERED DRUGS (ALT 637 FOR MEDICARE OP): Performed by: PSYCHIATRY & NEUROLOGY

## 2023-10-28 PROCEDURE — 2500000001 HC RX 250 WO HCPCS SELF ADMINISTERED DRUGS (ALT 637 FOR MEDICARE OP)

## 2023-10-28 PROCEDURE — 2500000004 HC RX 250 GENERAL PHARMACY W/ HCPCS (ALT 636 FOR OP/ED): Performed by: PSYCHIATRY & NEUROLOGY

## 2023-10-28 PROCEDURE — 84100 ASSAY OF PHOSPHORUS: CPT

## 2023-10-28 PROCEDURE — 85025 COMPLETE CBC W/AUTO DIFF WBC: CPT

## 2023-10-28 RX ORDER — LURASIDONE HYDROCHLORIDE 40 MG/1
20 TABLET, FILM COATED ORAL NIGHTLY
Status: DISCONTINUED | OUTPATIENT
Start: 2023-10-29 | End: 2023-10-30

## 2023-10-28 RX ORDER — DOCUSATE SODIUM 50 MG/5ML
100 LIQUID ORAL DAILY
Status: DISCONTINUED | OUTPATIENT
Start: 2023-10-28 | End: 2023-10-29

## 2023-10-28 RX ORDER — TAMSULOSIN HYDROCHLORIDE 0.4 MG/1
0.4 CAPSULE ORAL
Status: DISCONTINUED | OUTPATIENT
Start: 2023-10-28 | End: 2023-11-02 | Stop reason: HOSPADM

## 2023-10-28 RX ADMIN — HYOSCYAMINE SULFATE 473 ML: 16 SOLUTION at 15:34

## 2023-10-28 RX ADMIN — MORPHINE SULFATE 2 MG: 2 INJECTION, SOLUTION INTRAMUSCULAR; INTRAVENOUS at 20:36

## 2023-10-28 RX ADMIN — BUSPIRONE HYDROCHLORIDE 15 MG: 5 TABLET ORAL at 20:55

## 2023-10-28 RX ADMIN — PANTOPRAZOLE SODIUM 40 MG: 40 INJECTION, POWDER, FOR SOLUTION INTRAVENOUS at 08:10

## 2023-10-28 RX ADMIN — DEXMEDETOMIDINE HYDROCHLORIDE 0.85 MCG/KG/HR: 4 INJECTION, SOLUTION INTRAVENOUS at 00:14

## 2023-10-28 RX ADMIN — QUETIAPINE FUMARATE 100 MG: 100 TABLET, FILM COATED ORAL at 20:35

## 2023-10-28 RX ADMIN — ACETAMINOPHEN 650 MG: 160 SOLUTION ORAL at 12:22

## 2023-10-28 RX ADMIN — MORPHINE SULFATE 2 MG: 2 INJECTION, SOLUTION INTRAMUSCULAR; INTRAVENOUS at 02:55

## 2023-10-28 RX ADMIN — HYOSCYAMINE SULFATE 473 ML: 16 SOLUTION at 22:33

## 2023-10-28 RX ADMIN — DOCUSATE SODIUM LIQUID 100 MG: 100 LIQUID ORAL at 12:22

## 2023-10-28 RX ADMIN — MEROPENEM 1 G: 1 INJECTION, POWDER, FOR SOLUTION INTRAVENOUS at 20:35

## 2023-10-28 RX ADMIN — ACETAMINOPHEN 650 MG: 160 SOLUTION ORAL at 23:17

## 2023-10-28 RX ADMIN — MORPHINE SULFATE 2 MG: 2 INJECTION, SOLUTION INTRAMUSCULAR; INTRAVENOUS at 12:22

## 2023-10-28 RX ADMIN — MEROPENEM 1 G: 1 INJECTION, POWDER, FOR SOLUTION INTRAVENOUS at 12:22

## 2023-10-28 RX ADMIN — ENOXAPARIN SODIUM 40 MG: 40 INJECTION SUBCUTANEOUS at 18:29

## 2023-10-28 RX ADMIN — MORPHINE SULFATE 2 MG: 2 INJECTION, SOLUTION INTRAMUSCULAR; INTRAVENOUS at 16:46

## 2023-10-28 RX ADMIN — DEXMEDETOMIDINE HYDROCHLORIDE 0.64 MCG/KG/HR: 4 INJECTION, SOLUTION INTRAVENOUS at 11:57

## 2023-10-28 RX ADMIN — DEXMEDETOMIDINE HYDROCHLORIDE 0.48 MCG/KG/HR: 4 INJECTION, SOLUTION INTRAVENOUS at 18:29

## 2023-10-28 RX ADMIN — DEXMEDETOMIDINE HYDROCHLORIDE 0.85 MCG/KG/HR: 4 INJECTION, SOLUTION INTRAVENOUS at 05:33

## 2023-10-28 RX ADMIN — LURASIDONE HYDROCHLORIDE 20 MG: 40 TABLET, FILM COATED ORAL at 08:10

## 2023-10-28 RX ADMIN — MORPHINE SULFATE 2 MG: 2 INJECTION, SOLUTION INTRAMUSCULAR; INTRAVENOUS at 08:10

## 2023-10-28 RX ADMIN — MEROPENEM 1 G: 1 INJECTION, POWDER, FOR SOLUTION INTRAVENOUS at 04:40

## 2023-10-28 RX ADMIN — ACETAMINOPHEN 650 MG: 160 SOLUTION ORAL at 04:39

## 2023-10-28 RX ADMIN — SIMVASTATIN 40 MG: 40 TABLET, FILM COATED ORAL at 20:35

## 2023-10-28 RX ADMIN — TAMSULOSIN HYDROCHLORIDE 0.4 MG: 0.4 CAPSULE ORAL at 12:22

## 2023-10-28 ASSESSMENT — PAIN - FUNCTIONAL ASSESSMENT
PAIN_FUNCTIONAL_ASSESSMENT: 0-10

## 2023-10-28 ASSESSMENT — COGNITIVE AND FUNCTIONAL STATUS - GENERAL
WALKING IN HOSPITAL ROOM: TOTAL
EATING MEALS: TOTAL
PERSONAL GROOMING: TOTAL
DRESSING REGULAR LOWER BODY CLOTHING: TOTAL
DRESSING REGULAR UPPER BODY CLOTHING: TOTAL
TURNING FROM BACK TO SIDE WHILE IN FLAT BAD: TOTAL
MOBILITY SCORE: 6
TOILETING: TOTAL
MOVING TO AND FROM BED TO CHAIR: TOTAL
STANDING UP FROM CHAIR USING ARMS: TOTAL
CLIMB 3 TO 5 STEPS WITH RAILING: TOTAL
MOVING FROM LYING ON BACK TO SITTING ON SIDE OF FLAT BED WITH BEDRAILS: TOTAL
HELP NEEDED FOR BATHING: TOTAL
DAILY ACTIVITIY SCORE: 6

## 2023-10-28 ASSESSMENT — PAIN SCALES - GENERAL
PAINLEVEL_OUTOF10: 2
PAINLEVEL_OUTOF10: 10 - WORST POSSIBLE PAIN
PAINLEVEL_OUTOF10: 3
PAINLEVEL_OUTOF10: 6
PAINLEVEL_OUTOF10: 6
PAINLEVEL_OUTOF10: 0 - NO PAIN
PAINLEVEL_OUTOF10: 6
PAINLEVEL_OUTOF10: 6

## 2023-10-28 ASSESSMENT — ENCOUNTER SYMPTOMS
ABDOMINAL PAIN: 1
FEVER: 0
FATIGUE: 0
CHILLS: 0
DIAPHORESIS: 0
CONFUSION: 1
RESPIRATORY NEGATIVE: 1
CARDIOVASCULAR NEGATIVE: 1

## 2023-10-28 ASSESSMENT — PAIN DESCRIPTION - DESCRIPTORS
DESCRIPTORS: ACHING;DISCOMFORT
DESCRIPTORS: ACHING;DISCOMFORT

## 2023-10-28 NOTE — NURSING NOTE
Patient transport via tele to [facility]. Patient belongings remain with patient. Oxygen and heparin attached to patient.

## 2023-10-28 NOTE — PROGRESS NOTES
"Victorino Lara is a 48 y.o. adult on day 16 of admission presenting with Acute appendicitis with localized peritonitis.    Subjective   Resting in bed. Slept well overnight. Reporting some abdominal pain, mostly in the upper abdomen and midline    Sitter at bedside.  Has been tolerating precedex drip overnight.       Objective     Physical Exam  Vitals and nursing note reviewed.   Constitutional:       General: He is not in acute distress.     Appearance: Normal appearance. He is not toxic-appearing.   HENT:      Head: Normocephalic and atraumatic.      Nose: Nose normal.      Comments: NG in place     Mouth/Throat:      Mouth: Mucous membranes are moist.      Pharynx: Oropharynx is clear.   Eyes:      General: No scleral icterus.     Extraocular Movements: Extraocular movements intact.      Conjunctiva/sclera: Conjunctivae normal.      Pupils: Pupils are equal, round, and reactive to light.   Cardiovascular:      Rate and Rhythm: Normal rate and regular rhythm.      Pulses: Normal pulses.      Heart sounds: Normal heart sounds.   Pulmonary:      Effort: Pulmonary effort is normal. No respiratory distress.      Breath sounds: Normal breath sounds. No wheezing.   Abdominal:      General: Abdomen is flat. There is no distension.      Palpations: Abdomen is soft.      Tenderness: There is no abdominal tenderness.   Musculoskeletal:      Right lower leg: No edema.      Left lower leg: No edema.   Skin:     General: Skin is warm and dry.      Capillary Refill: Capillary refill takes less than 2 seconds.   Neurological:      General: No focal deficit present.      Mental Status: He is alert.      Comments: Confused of recent events   Psychiatric:      Comments: Drowsy, calm. Not able to demonstrate insight or judgement       Last Recorded Vitals  Blood pressure 110/62, pulse 78, temperature 36.6 °C (97.9 °F), temperature source Temporal, resp. rate 22, height 1.702 m (5' 7.01\"), weight 94.8 kg (208 lb 15.9 oz), SpO2 97 " %.  Intake/Output last 3 Shifts:  I/O last 3 completed shifts:  In: 5559.3 (58.6 mL/kg) [I.V.:1454.3 (15.3 mL/kg); NG/GT:1639; IV Piggyback:528]  Out: 2425 (25.6 mL/kg) [Urine:2425 (0.7 mL/kg/hr)]  Weight: 94.8 kg     Relevant Results           This patient currently has cardiac telemetry ordered; if you would like to modify or discontinue the telemetry order, click here to go to the orders activity to modify/discontinue the order.                 Assessment/Plan   Principal Problem:    Acute appendicitis with localized peritonitis  Active Problems:    Anxiety    Depression    Hyperlipidemia    Diabetes mellitus, type 2 (CMS/HCC)    Appendicitis, acute    Appendicitis    Acute appendicitis, unspecified acute appendicitis type    Small bowel obstruction (CMS/HCC)    Neuro - delirium, encephalopathy, bipolar disorder - doing well on precedex drip, transitioning off of high dose sedatives. Continue prn acetaminophen and morphine for postoperative pain.  Respiratory - pneumonia - complete antibiotics. Doing well on room air  Cardiac - monitor hr and bp  GI - ileus has resolved. On tube feeds at goal, ok to dc tpn.   - urinary retention -  hylton replaced, add flomax.  Heme - anemia - no indication for transfusion  Endo - diabetes - sliding scale insulin  ID - abx for pneumonia  Ppx - lovenox for dvt ppx, protonix for stress ulcer ppx    Dispo - continue icu admission for monitoring and treatment        I spent 39 minutes in the professional and overall care of this patient.      Yoan Hopper MD

## 2023-10-28 NOTE — PROGRESS NOTES
Victorino Lara is a 48 y.o. adult on day 15  of admission presenting with Acute appendicitis with localized peritonitis.      Subjective   Patient is a 48 y.o. adult admitted on 10/10/2023  8:50 AM with the following indication(s) for ICU care Aspiration pneumonia.      Interval History: He  is currently extubated.  He is confused but does follow commands.  Significant other at the bedside.  He has to be restrained for agitation and has a bedside sitter    Scheduled Medications:   [Held by provider] busPIRone, 15 mg, oral, BID  [Held by provider] docusate sodium, 100 mg, oral, BID  enoxaparin, 40 mg, subcutaneous, q24h  insulin lispro, 0-10 Units, subcutaneous, q4h  [Held by provider] insulin regular, 0-5 Units, subcutaneous, TID with meals  [Held by provider] lurasidone, 80 mg, oral, Daily  meropenem, 1 g, intravenous, q8h  [Held by provider] pantoprazole, 40 mg, oral, Daily before breakfast  pantoprazole, 40 mg, intravenous, Daily  potassium phosphate, 21 mmol, intravenous, Once  [Held by provider] QUEtiapine, 300 mg, oral, BID  [Held by provider] simvastatin, 40 mg, oral, Nightly  sodium hypochlorite, , irrigation, BID  [Held by provider] traZODone, 300 mg, oral, Nightly  valproate sodium, 500 mg, intravenous, q12h  [Held by provider] venlafaxine XR, 150 mg, oral, Daily           Continuous Medications:   Adult Clinimix TPN, 70 mL/hr, Last Rate: 70 mL/hr (10/21/23 9198)  dexmedeTOMIDine, 0.1-1.5 mcg/kg/hr, Last Rate: 1 mcg/kg/hr (10/21/23 1312)  fentaNYL,  mcg/hr, Last Rate: 125 mcg/hr (10/21/23 1311)  propofol, 5-50 mcg/kg/min, Last Rate: 50 mcg/kg/min (10/21/23 1449)           PRN Medications:   PRN medications: alteplase, dextrose 10 % in water (D10W), dextrose, glucagon, HYDROmorphone, HYDROmorphone, ipratropium-albuteroL, naloxone, OLANZapine, oxygen           Objective   Physical Exam:   General appearance: Alert, awake off the ventilator.  Head: Normocephalic, without obvious  abnormality  Eyes:conjunctivae/corneas clear. PERRL  Neck:  supple, symmetrical, trachea midline, left sided internal jugular line pulled out slightly  Lungs:clear to auscultation bilaterally  Chest wall:  no tenderness  Heart: regular rate and rhythm, S1, S2 normal, no murmur, click, rub or gallop  Abdomen: Soft, ND, still erythematouns around wound but is stable, wound is open about 5cm with packing, fascia intact, no more drainage  Male genitalia:  normal  Extremities: 2+ edema  Pulses:2+ and symmetric  Skin: Skin color, texture, turgor normal.  No rashes or lesions  Neurologic: Awake, agitated, follows commands       Assessment/Plan   Acute Respiratory Failure, post op  Aspiration Pneumonia        Principal Problem:    Acute appendicitis with localized peritonitis  Active Problems:    Anxiety    Depression    Hyperlipidemia    Diabetes mellitus, type 2 (CMS/HCC)    Appendicitis, acute    Appendicitis    Acute appendicitis, unspecified acute appendicitis type    Small bowel obstruction (CMS/HCC)     Neuro - Alert, awake and of the ventilator and somewhat confused and agitated  Cardiac - monitor hr and bp  Respiratory - acute hypoxemic respiratory failure - wean mechanical ventilation as able. Breathing trial today. Continue antibiotics for pneumonia  GI - ileus - continue ng to suction. On tpn. Discussed with Dr. Calloway, general surgery - will evaluate for infectious source with ct chest/abd/pelvis with oral and iv contrast.  Gu - hypokalemia - replace.. volume overload - has required some diuresis, will hold for now as bp is lower this morning (102/52)  Heme - anemia, stable, no need for transfusion  ID - continue antibiotics for pneumonia and intraabdominal infection. Ct scans pending.  Endo - A1c improved since last year. Continue sliding scale insulin.  MS - offload pressure points  Ppx - protonix, lovenox     Continue icu care    Pulmonary comments:-Agree with current treatment as ordered by ICU service.  Continues  to need ICU stay at this point.  Pulmonary will continue to monitor this patient with you.           Mann Ricks MD

## 2023-10-28 NOTE — PROGRESS NOTES
Primary MD: Roxana Morris PA-C    Reason For Consult  Bilateral lower lobe pneumonia  Acute appendicitis with localized peritonitis    History Of Present Illness  Victorino Lara is a 48 y.o. male              Patient was admitted 10/1/2023     Taken to laparoscopic surgery for acute appendicitis localized peritonitis without perforation     Taken back to surgery 10/14/2023 for small bowel obstruction exploratory laparotomy lysis of adhesions repair of incisional hernia     Hospital course complicated by development of aspiration pneumonia   Has required intubation and mechanical ventilation  growing  Klebsiella pneumonia and MSSA from sputum switched to meropenem wound culture was identified     Klebsiella is resistant to Zosyn consented to third-generation cephalosporin        There is another culture from 10/19/2023 reported as surgical site tissue biopsy growing the same Klebsiella pneumonia and E. coli           CT scan from 10/20/2023 shows bilateral lower lobe consolidations            No fever  extubated  No pressors  Opens eyes follows some commands      Review of Systems   Constitutional:  Negative for chills, diaphoresis, fatigue and fever.   Respiratory: Negative.     Cardiovascular: Negative.    Gastrointestinal:  Positive for abdominal pain.   Psychiatric/Behavioral:  Positive for confusion.         Physical Exam  Constitutional:       Appearance: He is not ill-appearing or diaphoretic.   Cardiovascular:      Heart sounds: Normal heart sounds. No murmur heard.  Pulmonary:      Effort: No respiratory distress.      Breath sounds: No wheezing, rhonchi or rales.   Abdominal:      General: Abdomen is flat. Bowel sounds are normal. There is no distension.      Palpations: Abdomen is soft. There is no mass.      Tenderness: There is no abdominal tenderness. There is no guarding or rebound.   Musculoskeletal:         General: No swelling.      Left lower leg: No edema.   Skin:     Coloration:  "Skin is not jaundiced.      Findings: No erythema.   Neurological:      Mental Status: He is disoriented.          Range of Vitals (last 24 hours)  Heart Rate:  [60-84]   Temp:  [36.2 °C (97.2 °F)-36.7 °C (98.1 °F)]   Resp:  [10-25]   BP: ()/(58-81)   Weight:  [94.8 kg (208 lb 15.9 oz)]   SpO2:  [95 %-100 %]     Relevant Results  Results from last 72 hours   Lab Units 10/28/23  0335   WBC AUTO x10*3/uL 11.9*   HEMOGLOBIN g/dL 8.3*   HEMATOCRIT % 25.8*   PLATELETS AUTO x10*3/uL 750*   NEUTROS PCT AUTO % 54.3   LYMPHS PCT AUTO % 31.8   MONOS PCT AUTO % 9.0   EOS PCT AUTO % 2.6       Results from last 72 hours   Lab Units 10/28/23  0335   SODIUM mmol/L 136   POTASSIUM mmol/L 3.6   CHLORIDE mmol/L 104   CO2 mmol/L 26   BUN mg/dL 15   CREATININE mg/dL 0.66   GLUCOSE mg/dL 133*   CALCIUM mg/dL 7.9*   ANION GAP mmol/L 10   EGFR mL/min/1.73m*2 >90       Results from last 72 hours   Lab Units 10/28/23  0335   ALK PHOS U/L 111   BILIRUBIN TOTAL mg/dL 0.4   PROTEIN TOTAL g/dL 6.5   ALT U/L 22   AST U/L 25   ALBUMIN g/dL 2.8*       Estimated Creatinine Clearance (by C-G formula based on SCr of 0.66 mg/dL)  Female: 121.9 mL/min  Male: 125 mL/min  The patient&#39;s sex/gender information is inconclusive; review their information before proceeding.  CRP   Date/Time Value Ref Range Status   01/17/2020 10:00 PM 0.19 mg/dL Final     Comment:     REF VALUE  < 1.00     01/12/2020 06:29 AM 0.47 mg/dL Final     Comment:     REF VALUE  < 1.00       Sedimentation Rate   Date/Time Value Ref Range Status   01/17/2020 10:00 PM 5 0 - 15 mm/h Final   01/12/2020 06:29 AM 14 0 - 15 mm/h Final     No results found for: \"HIV1X2\", \"HIVCONF\", \"[id number]\"  No results found for: \"HCVPCRQUANT\"  Cultures  Susceptibility data from last 14 days.  Collected Specimen Info Organism Amoxicillin/Clavulanate Ampicillin Ampicillin/Sulbactam Cefazolin Ceftriaxone Cefuroxime Ciprofloxacin Clindamycin Erythromycin Gentamicin Levofloxacin Oxacillin " Piperacillin/Tazobactam   10/19/23 Fluid from Tracheal Aspirate Methicillin Susceptible Staphylococcus aureus (MSSA)        S S   S      Klebsiella pneumoniae/variicola R R R R S S S   S S  R   10/19/23 Tissue/Biopsy from Surgical Site Infection Klebsiella pneumoniae/variicola R R R R S S S   S S  R     Escherichia coli S R R S   S   S S  S   10/16/23 Fluid from Tracheal Aspirate Klebsiella pneumoniae/variicola R R R R S I S   S S  R     Normal throat sharona                10/15/23 Swab from Anterior Nares Methicillin Susceptible Staphylococcus aureus (MSSA)                  Collected Specimen Info Organism Tetracycline Trimethoprim/Sulfamethoxazole Vancomycin   10/19/23 Fluid from Tracheal Aspirate Methicillin Susceptible Staphylococcus aureus (MSSA) S S S     Klebsiella pneumoniae/variicola  S    10/19/23 Tissue/Biopsy from Surgical Site Infection Klebsiella pneumoniae/variicola  S      Escherichia coli  S    10/16/23 Fluid from Tracheal Aspirate Klebsiella pneumoniae/variicola  S      Normal throat sharona      10/15/23 Swab from Anterior Nares Methicillin Susceptible Staphylococcus aureus (MSSA)               Assessment/Plan     Bilateral lower lobe pneumonia  Acute appendicitis with localized peritonitis  Postop wound infection       Growing Klebsiella and MSSA from sputum Klebsiella and E. coli from wound     meropenem

## 2023-10-28 NOTE — PROGRESS NOTES
Assessment/Plan   Doing well clinically; now that bowel function has returned, will work to transition to oral feed; appreciate ICU team help     Subjective   No new issues, less agitated, following commands, denies abdominal pain, having flatus and stool      Temp:  [36.6 °C (97.9 °F)-36.7 °C (98.1 °F)] 36.6 °C (97.9 °F)  Heart Rate:  [64-84] 70  Resp:  [10-25] 19  BP: ()/(58-81) 133/70  Arterial Line BP 1: (132-142)/(58-60) 142/60  I/O last 3 completed shifts:  In: 5559.3 (58.6 mL/kg) [I.V.:1454.3 (15.3 mL/kg); NG/GT:1639; IV Piggyback:528]  Out: 2425 (25.6 mL/kg) [Urine:2425 (0.7 mL/kg/hr)]  Weight: 94.8 kg   I/O this shift:  In: 100 [IV Piggyback:100]  Out: 501 [Urine:500; Stool:1]    Objective   Objective  Gen: no acute distress  Abd: soft, non-distended, minimal tenderness; dressing over incision  Principal Problem:    Acute appendicitis with localized peritonitis  Active Problems:    Anxiety    Depression    Hyperlipidemia    Diabetes mellitus, type 2 (CMS/HCC)    Appendicitis, acute    Appendicitis    Acute appendicitis, unspecified acute appendicitis type    Small bowel obstruction (CMS/HCC)

## 2023-10-29 ENCOUNTER — APPOINTMENT (OUTPATIENT)
Dept: RADIOLOGY | Facility: HOSPITAL | Age: 48
End: 2023-10-29
Payer: COMMERCIAL

## 2023-10-29 LAB
ALBUMIN SERPL BCP-MCNC: 3 G/DL (ref 3.4–5)
ALP SERPL-CCNC: 118 U/L (ref 33–120)
ALT SERPL W P-5'-P-CCNC: 22 U/L (ref 7–52)
ANION GAP SERPL CALC-SCNC: 13 MMOL/L (ref 10–20)
AST SERPL W P-5'-P-CCNC: 22 U/L (ref 9–39)
BASOPHILS # BLD AUTO: 0.12 X10*3/UL (ref 0–0.1)
BASOPHILS NFR BLD AUTO: 1 %
BILIRUB SERPL-MCNC: 0.4 MG/DL (ref 0–1.2)
BUN SERPL-MCNC: 13 MG/DL (ref 6–23)
CALCIUM SERPL-MCNC: 8.2 MG/DL (ref 8.6–10.3)
CHLORIDE SERPL-SCNC: 102 MMOL/L (ref 98–107)
CO2 SERPL-SCNC: 25 MMOL/L (ref 21–32)
CREAT SERPL-MCNC: 0.7 MG/DL (ref 0.5–1.3)
EOSINOPHIL # BLD AUTO: 0.27 X10*3/UL (ref 0–0.7)
EOSINOPHIL NFR BLD AUTO: 2.3 %
ERYTHROCYTE [DISTWIDTH] IN BLOOD BY AUTOMATED COUNT: 12.9 % (ref 11.5–14.5)
GFR SERPL CREATININE-BSD FRML MDRD: >90 ML/MIN/1.73M*2
GLUCOSE BLD MANUAL STRIP-MCNC: 109 MG/DL (ref 74–99)
GLUCOSE BLD MANUAL STRIP-MCNC: 112 MG/DL (ref 74–99)
GLUCOSE BLD MANUAL STRIP-MCNC: 119 MG/DL (ref 74–99)
GLUCOSE BLD MANUAL STRIP-MCNC: 134 MG/DL (ref 74–99)
GLUCOSE SERPL-MCNC: 133 MG/DL (ref 74–99)
HCT VFR BLD AUTO: 26.4 % (ref 36–52)
HGB BLD-MCNC: 8.7 G/DL (ref 12–17.5)
IMM GRANULOCYTES # BLD AUTO: 0.1 X10*3/UL (ref 0–0.7)
IMM GRANULOCYTES NFR BLD AUTO: 0.9 % (ref 0–0.9)
LYMPHOCYTES # BLD AUTO: 3.1 X10*3/UL (ref 1.2–4.8)
LYMPHOCYTES NFR BLD AUTO: 27 %
MAGNESIUM SERPL-MCNC: 1.82 MG/DL (ref 1.6–2.4)
MCH RBC QN AUTO: 28.5 PG (ref 26–34)
MCHC RBC AUTO-ENTMCNC: 33 G/DL (ref 32–36)
MCV RBC AUTO: 87 FL (ref 80–100)
MONOCYTES # BLD AUTO: 0.96 X10*3/UL (ref 0.1–1)
MONOCYTES NFR BLD AUTO: 8.3 %
NEUTROPHILS # BLD AUTO: 6.95 X10*3/UL (ref 1.2–7.7)
NEUTROPHILS NFR BLD AUTO: 60.5 %
NRBC BLD-RTO: 0 /100 WBCS (ref 0–0)
PHOSPHATE SERPL-MCNC: 3 MG/DL (ref 2.5–4.9)
PLATELET # BLD AUTO: 698 X10*3/UL (ref 150–450)
PMV BLD AUTO: 9.4 FL (ref 7.5–11.5)
POTASSIUM SERPL-SCNC: 3.6 MMOL/L (ref 3.5–5.3)
PROT SERPL-MCNC: 6.6 G/DL (ref 6.4–8.2)
RBC # BLD AUTO: 3.05 X10*6/UL (ref 4–5.9)
SODIUM SERPL-SCNC: 136 MMOL/L (ref 136–145)
WBC # BLD AUTO: 11.5 X10*3/UL (ref 4.4–11.3)

## 2023-10-29 PROCEDURE — 2500000004 HC RX 250 GENERAL PHARMACY W/ HCPCS (ALT 636 FOR OP/ED): Performed by: HOSPITALIST

## 2023-10-29 PROCEDURE — C9113 INJ PANTOPRAZOLE SODIUM, VIA: HCPCS | Performed by: SURGERY

## 2023-10-29 PROCEDURE — 85025 COMPLETE CBC W/AUTO DIFF WBC: CPT

## 2023-10-29 PROCEDURE — 83735 ASSAY OF MAGNESIUM: CPT

## 2023-10-29 PROCEDURE — 2020000001 HC ICU ROOM DAILY

## 2023-10-29 PROCEDURE — 71045 X-RAY EXAM CHEST 1 VIEW: CPT

## 2023-10-29 PROCEDURE — 37799 UNLISTED PX VASCULAR SURGERY: CPT

## 2023-10-29 PROCEDURE — 2500000001 HC RX 250 WO HCPCS SELF ADMINISTERED DRUGS (ALT 637 FOR MEDICARE OP)

## 2023-10-29 PROCEDURE — 2500000004 HC RX 250 GENERAL PHARMACY W/ HCPCS (ALT 636 FOR OP/ED): Performed by: SURGERY

## 2023-10-29 PROCEDURE — 2500000001 HC RX 250 WO HCPCS SELF ADMINISTERED DRUGS (ALT 637 FOR MEDICARE OP): Performed by: SURGERY

## 2023-10-29 PROCEDURE — 99291 CRITICAL CARE FIRST HOUR: CPT | Performed by: SURGERY

## 2023-10-29 PROCEDURE — 80053 COMPREHEN METABOLIC PANEL: CPT

## 2023-10-29 PROCEDURE — 96372 THER/PROPH/DIAG INJ SC/IM: CPT | Performed by: SURGERY

## 2023-10-29 PROCEDURE — 2500000001 HC RX 250 WO HCPCS SELF ADMINISTERED DRUGS (ALT 637 FOR MEDICARE OP): Performed by: HOSPITALIST

## 2023-10-29 PROCEDURE — 82947 ASSAY GLUCOSE BLOOD QUANT: CPT

## 2023-10-29 PROCEDURE — 2500000004 HC RX 250 GENERAL PHARMACY W/ HCPCS (ALT 636 FOR OP/ED)

## 2023-10-29 PROCEDURE — 84100 ASSAY OF PHOSPHORUS: CPT

## 2023-10-29 PROCEDURE — 71045 X-RAY EXAM CHEST 1 VIEW: CPT | Performed by: RADIOLOGY

## 2023-10-29 PROCEDURE — 2500000004 HC RX 250 GENERAL PHARMACY W/ HCPCS (ALT 636 FOR OP/ED): Performed by: PSYCHIATRY & NEUROLOGY

## 2023-10-29 RX ORDER — KETOROLAC TROMETHAMINE 30 MG/ML
30 INJECTION, SOLUTION INTRAMUSCULAR; INTRAVENOUS EVERY 6 HOURS PRN
Status: DISPENSED | OUTPATIENT
Start: 2023-10-29 | End: 2023-11-01

## 2023-10-29 RX ORDER — SIMETHICONE 80 MG
40 TABLET,CHEWABLE ORAL 4 TIMES DAILY PRN
Status: DISCONTINUED | OUTPATIENT
Start: 2023-10-29 | End: 2023-10-29

## 2023-10-29 RX ORDER — MAGNESIUM SULFATE HEPTAHYDRATE 40 MG/ML
2 INJECTION, SOLUTION INTRAVENOUS ONCE
Status: COMPLETED | OUTPATIENT
Start: 2023-10-29 | End: 2023-10-29

## 2023-10-29 RX ORDER — DEXTROMETHORPHAN/PSEUDOEPHED 2.5-7.5/.8
40 DROPS ORAL EVERY 4 HOURS PRN
Status: DISCONTINUED | OUTPATIENT
Start: 2023-10-29 | End: 2023-11-02 | Stop reason: HOSPADM

## 2023-10-29 RX ORDER — KETOROLAC TROMETHAMINE 30 MG/ML
30 INJECTION, SOLUTION INTRAMUSCULAR; INTRAVENOUS ONCE
Status: COMPLETED | OUTPATIENT
Start: 2023-10-29 | End: 2023-10-29

## 2023-10-29 RX ORDER — POTASSIUM CHLORIDE 29.8 MG/ML
40 INJECTION INTRAVENOUS ONCE
Status: COMPLETED | OUTPATIENT
Start: 2023-10-29 | End: 2023-10-29

## 2023-10-29 RX ADMIN — ACETAMINOPHEN 650 MG: 160 SOLUTION ORAL at 09:04

## 2023-10-29 RX ADMIN — MEROPENEM 1 G: 1 INJECTION, POWDER, FOR SOLUTION INTRAVENOUS at 13:49

## 2023-10-29 RX ADMIN — HYOSCYAMINE SULFATE 473 ML: 16 SOLUTION at 09:27

## 2023-10-29 RX ADMIN — KETOROLAC TROMETHAMINE 30 MG: 30 INJECTION, SOLUTION INTRAMUSCULAR at 03:56

## 2023-10-29 RX ADMIN — KETOROLAC TROMETHAMINE 30 MG: 30 INJECTION, SOLUTION INTRAMUSCULAR at 15:11

## 2023-10-29 RX ADMIN — DOCUSATE SODIUM LIQUID 100 MG: 100 LIQUID ORAL at 09:04

## 2023-10-29 RX ADMIN — MORPHINE SULFATE 2 MG: 2 INJECTION, SOLUTION INTRAMUSCULAR; INTRAVENOUS at 12:20

## 2023-10-29 RX ADMIN — QUETIAPINE FUMARATE 100 MG: 100 TABLET, FILM COATED ORAL at 20:51

## 2023-10-29 RX ADMIN — SIMETHICONE 40 MG: 20 EMULSION ORAL at 18:00

## 2023-10-29 RX ADMIN — MAGNESIUM SULFATE HEPTAHYDRATE 2 G: 40 INJECTION, SOLUTION INTRAVENOUS at 11:50

## 2023-10-29 RX ADMIN — BUSPIRONE HYDROCHLORIDE 15 MG: 5 TABLET ORAL at 20:51

## 2023-10-29 RX ADMIN — MORPHINE SULFATE 2 MG: 2 INJECTION, SOLUTION INTRAMUSCULAR; INTRAVENOUS at 05:24

## 2023-10-29 RX ADMIN — MORPHINE SULFATE 2 MG: 2 INJECTION, SOLUTION INTRAMUSCULAR; INTRAVENOUS at 00:29

## 2023-10-29 RX ADMIN — POTASSIUM CHLORIDE 40 MEQ: 29.8 INJECTION, SOLUTION INTRAVENOUS at 07:49

## 2023-10-29 RX ADMIN — PANTOPRAZOLE SODIUM 40 MG: 40 INJECTION, POWDER, FOR SOLUTION INTRAVENOUS at 09:04

## 2023-10-29 RX ADMIN — LURASIDONE HYDROCHLORIDE 20 MG: 40 TABLET, FILM COATED ORAL at 20:51

## 2023-10-29 RX ADMIN — ACETAMINOPHEN 650 MG: 160 SOLUTION ORAL at 15:11

## 2023-10-29 RX ADMIN — VENLAFAXINE HYDROCHLORIDE 150 MG: 150 CAPSULE, EXTENDED RELEASE ORAL at 11:55

## 2023-10-29 RX ADMIN — TAMSULOSIN HYDROCHLORIDE 0.4 MG: 0.4 CAPSULE ORAL at 06:07

## 2023-10-29 RX ADMIN — SIMVASTATIN 40 MG: 40 TABLET, FILM COATED ORAL at 20:50

## 2023-10-29 RX ADMIN — TRAZODONE HYDROCHLORIDE 300 MG: 150 TABLET ORAL at 20:51

## 2023-10-29 RX ADMIN — BUSPIRONE HYDROCHLORIDE 15 MG: 5 TABLET ORAL at 09:04

## 2023-10-29 RX ADMIN — MEROPENEM 1 G: 1 INJECTION, POWDER, FOR SOLUTION INTRAVENOUS at 20:53

## 2023-10-29 RX ADMIN — ENOXAPARIN SODIUM 40 MG: 40 INJECTION SUBCUTANEOUS at 17:35

## 2023-10-29 RX ADMIN — KETOROLAC TROMETHAMINE 30 MG: 30 INJECTION, SOLUTION INTRAMUSCULAR at 09:04

## 2023-10-29 RX ADMIN — MEROPENEM 1 G: 1 INJECTION, POWDER, FOR SOLUTION INTRAVENOUS at 05:24

## 2023-10-29 RX ADMIN — KETOROLAC TROMETHAMINE 30 MG: 30 INJECTION, SOLUTION INTRAMUSCULAR at 20:50

## 2023-10-29 RX ADMIN — HYOSCYAMINE SULFATE 473 ML: 16 SOLUTION at 22:57

## 2023-10-29 ASSESSMENT — COGNITIVE AND FUNCTIONAL STATUS - GENERAL
DRESSING REGULAR LOWER BODY CLOTHING: A LOT
EATING MEALS: TOTAL
WALKING IN HOSPITAL ROOM: A LOT
CLIMB 3 TO 5 STEPS WITH RAILING: TOTAL
PERSONAL GROOMING: A LITTLE
MOVING TO AND FROM BED TO CHAIR: TOTAL
MOBILITY SCORE: 11
HELP NEEDED FOR BATHING: A LOT
MOBILITY SCORE: 14
DAILY ACTIVITIY SCORE: 13
STANDING UP FROM CHAIR USING ARMS: TOTAL
DRESSING REGULAR UPPER BODY CLOTHING: A LOT
TURNING FROM BACK TO SIDE WHILE IN FLAT BAD: A LITTLE
MOVING TO AND FROM BED TO CHAIR: A LOT
TURNING FROM BACK TO SIDE WHILE IN FLAT BAD: A LITTLE
WALKING IN HOSPITAL ROOM: TOTAL
TOILETING: A LITTLE
STANDING UP FROM CHAIR USING ARMS: A LOT
CLIMB 3 TO 5 STEPS WITH RAILING: TOTAL

## 2023-10-29 ASSESSMENT — ENCOUNTER SYMPTOMS
CHILLS: 0
ABDOMINAL PAIN: 1
RESPIRATORY NEGATIVE: 1
DIAPHORESIS: 0
FEVER: 0
CARDIOVASCULAR NEGATIVE: 1
CONFUSION: 1
FATIGUE: 0

## 2023-10-29 ASSESSMENT — PAIN SCALES - GENERAL
PAINLEVEL_OUTOF10: 3
PAINLEVEL_OUTOF10: 6
PAINLEVEL_OUTOF10: 5 - MODERATE PAIN
PAINLEVEL_OUTOF10: 6
PAINLEVEL_OUTOF10: 5 - MODERATE PAIN
PAINLEVEL_OUTOF10: 6
PAINLEVEL_OUTOF10: 4
PAINLEVEL_OUTOF10: 3
PAINLEVEL_OUTOF10: 4

## 2023-10-29 ASSESSMENT — PAIN DESCRIPTION - DESCRIPTORS
DESCRIPTORS: SORE;TENDER
DESCRIPTORS: SORE;TENDER;SHARP
DESCRIPTORS: SORE;TENDER
DESCRIPTORS: ACHING;CRAMPING;DULL;DISCOMFORT
DESCRIPTORS: SHARP;SORE

## 2023-10-29 ASSESSMENT — PAIN - FUNCTIONAL ASSESSMENT
PAIN_FUNCTIONAL_ASSESSMENT: 0-10

## 2023-10-29 NOTE — CARE PLAN
The patient's goals for the shift include Pain management    The clinical goals for the shift include Patient will maintain SpO2 above 95% on room air by end of shift.      Problem: Fall/Injury  Goal: Verbalize understanding of personal risk factors for fall in the hospital  10/29/2023 1840 by Susana Clements RN  Outcome: Progressing  10/29/2023 1839 by Susana Clements RN  Outcome: Progressing  10/29/2023 1836 by Susana Clements RN  Outcome: Progressing     Problem: Pain  Goal: My pain/discomfort is manageable  10/29/2023 1840 by Susana Clements RN  Outcome: Progressing  10/29/2023 1839 by Susana Clements RN  Outcome: Progressing  10/29/2023 1836 by Susana Clements RN  Outcome: Progressing     Problem: Discharge Barriers  Goal: My discharge needs are met  10/29/2023 1840 by Susana Clements RN  Outcome: Progressing  10/29/2023 1839 by Susana Clements RN  Outcome: Progressing  10/29/2023 1836 by Susana Clements RN  Outcome: Progressing     Problem: Skin  Goal: Decreased wound size/increased tissue granulation at next dressing change  10/29/2023 1840 by Susana Celments RN  Outcome: Progressing  10/29/2023 1839 by Susana Clements RN  Outcome: Progressing  10/29/2023 1836 by Susana Clements RN  Outcome: Progressing  Goal: Turns in bed with improved pain control throughout the shift  10/29/2023 1840 by Susana Clements RN  Outcome: Progressing  10/29/2023 1839 by Susana Clements RN  Outcome: Progressing  10/29/2023 1836 by Susana Clements RN  Outcome: Progressing     Problem: Respiratory  Goal: Clear secretions with interventions this shift  10/29/2023 1840 by Susana Clements RN  Outcome: Progressing  10/29/2023 1839 by Susana Clements RN  Outcome: Progressing  10/29/2023 1836 by Susana Clements RN  Outcome: Progressing  Goal: Minimize anxiety/maximize coping throughout shift  10/29/2023 1840 by Susana Clements RN  Outcome: Progressing  10/29/2023 1839 by Susana Clements RN  Outcome:  Progressing  10/29/2023 1836 by Susana Clements RN  Outcome: Progressing  Goal: Minimal/no exertional discomfort or dyspnea this shift  10/29/2023 1840 by Susana Clements RN  Outcome: Progressing  10/29/2023 1839 by Susana Clements RN  Outcome: Progressing  10/29/2023 1836 by Susana Clements RN  Outcome: Progressing  Goal: Verbalize decreased shortness of breath this shift  10/29/2023 1840 by Susana Clements RN  Outcome: Progressing  10/29/2023 1839 by Susana Clements RN  Outcome: Progressing  10/29/2023 1836 by Susana Clements RN  Outcome: Progressing  Goal: Wean oxygen to maintain O2 saturation per order/standard this shift  10/29/2023 1840 by Susana Clements RN  Outcome: Met  10/29/2023 1839 by Susana Clements RN  Outcome: Progressing  10/29/2023 1836 by Susana Clements RN  Outcome: Progressing  Goal: Increase self care and/or family involvement in next 24 hours  10/29/2023 1840 by Susana Clements RN  Outcome: Progressing  10/29/2023 1839 by Susana Clements RN  Outcome: Progressing  10/29/2023 1836 by Susana Clements RN  Outcome: Progressing     Problem: Safety - Medical Restraint  Goal: Free from restraint(s) (Restraint for Interference with Medical Device)  Outcome: Met     Problem: Diabetes  Goal: Achieve decreasing blood glucose levels by end of shift  10/29/2023 1840 by Susana Clements RN  Outcome: Progressing  10/29/2023 1839 by Susana Clements RN  Outcome: Progressing  10/29/2023 1836 by Susana Clements RN  Outcome: Progressing  Goal: Increase stability of blood glucose readings by end of shift  10/29/2023 1840 by Susana Clements RN  Outcome: Met  10/29/2023 1839 by Susana Clements RN  Outcome: Progressing  10/29/2023 1836 by Susana Clements RN  Outcome: Progressing  Goal: Maintain electrolyte levels within acceptable range throughout shift  10/29/2023 1840 by Susana Clements RN  Outcome: Progressing  10/29/2023 1839 by Susana Clements RN  Outcome:  Progressing  10/29/2023 1836 by Susana Clements RN  Outcome: Progressing  Goal: Maintain glucose levels >70mg/dl to <250mg/dl throughout shift  10/29/2023 1840 by Susana Clements RN  Outcome: Progressing  10/29/2023 1839 by Susana Clements RN  Outcome: Progressing  10/29/2023 1836 by Susana Clements RN  Outcome: Progressing  Goal: No changes in neurological exam by end of shift  10/29/2023 1840 by Susana Clements RN  Outcome: Progressing  10/29/2023 1839 by Susana Clements RN  Outcome: Progressing  10/29/2023 1836 by Susana Clements RN  Outcome: Progressing  Goal: Learn about and adhere to nutrition recommendations by end of shift  10/29/2023 1840 by Susana Clements RN  Outcome: Progressing  10/29/2023 1839 by Susana Clements RN  Outcome: Progressing  10/29/2023 1836 by Susana Clements RN  Outcome: Progressing  Goal: Vital signs within normal range for age by end of shift  10/29/2023 1840 by Susana Clements RN  Outcome: Met  10/29/2023 1839 by Susana Clements RN  Outcome: Progressing  10/29/2023 1836 by Susana Clements RN  Outcome: Progressing  Goal: Increase self care and/or family involovement by end of shift  10/29/2023 1840 by Susana Clements RN  Outcome: Progressing  10/29/2023 1839 by Susana Clements RN  Outcome: Progressing  10/29/2023 1836 by Susana Clements RN  Outcome: Progressing  Goal: Receive DSME education by end of shift  10/29/2023 1840 by Susana Clements RN  Outcome: Progressing  10/29/2023 1839 by Susana Clements RN  Outcome: Progressing  10/29/2023 1836 by Susana Clements RN  Outcome: Progressing     Problem: Knowledge Deficit  Goal: Patient/family/caregiver demonstrates understanding of disease process, treatment plan, medications, and discharge instructions  10/29/2023 1840 by Susana Clements RN  Outcome: Progressing  10/29/2023 1839 by Susana Clements RN  Outcome: Progressing  10/29/2023 1836 by Susana Clements RN  Outcome: Progressing     Problem:  Pain  Goal: Turns in bed with improved pain control throughout the shift  10/29/2023 1840 by Susana Clements RN  Outcome: Progressing  10/29/2023 1839 by Susana Clements RN  Outcome: Progressing  10/29/2023 1836 by Susana Clements RN  Outcome: Progressing  Goal: Takes deep breaths with improved pain control throughout the shift  10/29/2023 1840 by Susana Clements RN  Outcome: Progressing  10/29/2023 1839 by Susana Clements RN  Outcome: Progressing  10/29/2023 1836 by Susana Clements RN  Outcome: Progressing  Goal: Walks with improved pain control throughout the shift  10/29/2023 1840 by Susana Clements RN  Outcome: Progressing  10/29/2023 1839 by Susana Clements RN  Outcome: Progressing  10/29/2023 1836 by Susana Clements RN  Outcome: Progressing  Goal: Performs ADL's with improved pain control throughout shift  10/29/2023 1840 by Susana Clements RN  Outcome: Progressing  10/29/2023 1839 by Susana Clements RN  Outcome: Progressing  10/29/2023 1836 by Susana Clements RN  Outcome: Progressing  Goal: Participates in PT with improved pain control throughout the shift  10/29/2023 1840 by Susana Clements RN  Outcome: Progressing  10/29/2023 1839 by Susana Clements RN  Outcome: Progressing  10/29/2023 1836 by Susana Clements RN  Outcome: Progressing  Goal: Free from opioid side effects throughout the shift  10/29/2023 1840 by Susana Clements RN  Outcome: Progressing  10/29/2023 1839 by Susana Clements RN  Outcome: Progressing  10/29/2023 1836 by Susana Clements RN  Outcome: Progressing  Goal: Free from acute confusion related to pain meds throughout the shift  10/29/2023 1840 by Susana Clements RN  Outcome: Progressing  10/29/2023 1839 by Susana Clements RN  Outcome: Progressing  10/29/2023 1836 by Susana Clements RN  Outcome: Progressing     Problem: Pain - Adult  Goal: Verbalizes/displays adequate comfort level or baseline comfort level  10/29/2023 1840 by Susana Clements,  RN  Outcome: Progressing  10/29/2023 1839 by Susana Clements RN  Outcome: Progressing  10/29/2023 1836 by Susana Clements RN  Outcome: Progressing     Problem: Discharge Planning  Goal: Discharge to home or other facility with appropriate resources  10/29/2023 1840 by Susana Clements RN  Outcome: Progressing  10/29/2023 1839 by Susana Clements RN  Outcome: Progressing  10/29/2023 1836 by Susana Clements RN  Outcome: Progressing     Problem: Chronic Conditions and Co-morbidities  Goal: Patient's chronic conditions and co-morbidity symptoms are monitored and maintained or improved  10/29/2023 1840 by Susana Clements RN  Outcome: Progressing  10/29/2023 1839 by Susana Clements RN  Outcome: Progressing  10/29/2023 1836 by Susana Clements RN  Outcome: Progressing     Over the shift, the patient did not make progress toward the following goals. The patient has continued to have improved mentation. Barriers to progression include cognitive limits and healing of surgical wound. Recommendations to address these barriers include working on improving cognition and adding PT and OT therapies to care team.

## 2023-10-29 NOTE — PROGRESS NOTES
Victorino Lara is a 48 y.o. adult on day 16  of admission presenting with Acute appendicitis with localized peritonitis.      Subjective   Patient is a 48 y.o. adult admitted on 10/10/2023  8:50 AM with the following indication(s) for ICU care Aspiration pneumonia.      Interval History: He  is currently extubated.  He is a lot less confused and is more talkative. significant other at the bedside.  He is no longer restraint for agitation.  Still has a central line in NG tube and a Proctor in place  Denies any shortness of breath.  Scheduled Medications:   [Held by provider] busPIRone, 15 mg, oral, BID  [Held by provider] docusate sodium, 100 mg, oral, BID  enoxaparin, 40 mg, subcutaneous, q24h  insulin lispro, 0-10 Units, subcutaneous, q4h  [Held by provider] insulin regular, 0-5 Units, subcutaneous, TID with meals  [Held by provider] lurasidone, 80 mg, oral, Daily  meropenem, 1 g, intravenous, q8h  [Held by provider] pantoprazole, 40 mg, oral, Daily before breakfast  pantoprazole, 40 mg, intravenous, Daily  potassium phosphate, 21 mmol, intravenous, Once  [Held by provider] QUEtiapine, 300 mg, oral, BID  [Held by provider] simvastatin, 40 mg, oral, Nightly  sodium hypochlorite, , irrigation, BID  [Held by provider] traZODone, 300 mg, oral, Nightly  valproate sodium, 500 mg, intravenous, q12h  [Held by provider] venlafaxine XR, 150 mg, oral, Daily           Continuous Medications:   Adult Clinimix TPN, 70 mL/hr, Last Rate: 70 mL/hr (10/21/23 0833)  dexmedeTOMIDine, 0.1-1.5 mcg/kg/hr, Last Rate: 1 mcg/kg/hr (10/21/23 1312)  fentaNYL,  mcg/hr, Last Rate: 125 mcg/hr (10/21/23 1311)  propofol, 5-50 mcg/kg/min, Last Rate: 50 mcg/kg/min (10/21/23 1449)           PRN Medications:   PRN medications: alteplase, dextrose 10 % in water (D10W), dextrose, glucagon, HYDROmorphone, HYDROmorphone, ipratropium-albuteroL, naloxone, OLANZapine, oxygen           Objective   Physical Exam:   General appearance: Alert, awake off the  ventilator.  Head: Normocephalic, without obvious abnormality  Eyes:conjunctivae/corneas clear. PERRL  Neck:  supple, symmetrical, trachea midline, left sided internal jugular line pulled out slightly  Lungs:clear to auscultation bilaterally  Chest wall:  no tenderness  Heart: regular rate and rhythm, S1, S2 normal, no murmur, click, rub or gallop  Abdomen: Soft, ND, still erythematouns around wound but is stable, wound is open about 5cm with packing, fascia intact, no more drainage  Male genitalia:  normal  Extremities: 2+ edema  Pulses:2+ and symmetric  Skin: Skin color, texture, turgor normal.  No rashes or lesions  Neurologic: Awake, agitated, follows commands       Assessment/Plan   Acute Respiratory Failure, post op  Aspiration Pneumonia        Principal Problem:    Acute appendicitis with localized peritonitis  Active Problems:    Anxiety    Depression    Hyperlipidemia    Diabetes mellitus, type 2 (CMS/HCC)    Appendicitis, acute    Appendicitis    Acute appendicitis, unspecified acute appendicitis type    Small bowel obstruction (CMS/HCC)     Neuro - Alert, awake and of the ventilator and somewhat confused and agitated  Cardiac - monitor hr and bp  Respiratory - acute hypoxemic respiratory failure - wean mechanical ventilation as able. Breathing trial today. Continue antibiotics for pneumonia  GI - ileus - continue ng to suction. On tpn. Discussed with Dr. Calloway, general surgery - will evaluate for infectious source with ct chest/abd/pelvis with oral and iv contrast.  Gu - hypokalemia - replace.. volume overload - has required some diuresis, will hold for now as bp is lower this morning (102/52)  Heme - anemia, stable, no need for transfusion  ID - continue antibiotics for pneumonia and intraabdominal infection. .  Endo - A1c improved since last year. Continue sliding scale insulin.  MS - offload pressure points  Ppx - protonix, lovenox     Continue icu care    Pulmonary comments:-Agree with current  treatment as ordered by ICU service. Continues  to need ICU stay at this point.  Pulmonary will continue to monitor this patient with you.           Mann Ricks MD

## 2023-10-29 NOTE — PROGRESS NOTES
Victorino Lara is a 48 y.o. adult on day 17 of admission presenting with Acute appendicitis with localized peritonitis.    Subjective   Resting in bed. No acute events overnight.  Has been tolerating precedex drip for anxiolysis.    Reporting midline abdominal pain. No nausea/vomiting. Tolerating tube feeds.  Denies shortness of breath, no chest pain.       Objective     Physical Exam  Vitals and nursing note reviewed.   Constitutional:       General: He is not in acute distress.     Appearance: Normal appearance. He is normal weight. He is not toxic-appearing.   HENT:      Head: Normocephalic and atraumatic.      Nose:      Comments: +NG     Mouth/Throat:      Mouth: Mucous membranes are moist.      Pharynx: Oropharynx is clear.   Eyes:      General: No scleral icterus.     Extraocular Movements: Extraocular movements intact.      Conjunctiva/sclera: Conjunctivae normal.      Pupils: Pupils are equal, round, and reactive to light.   Cardiovascular:      Rate and Rhythm: Normal rate and regular rhythm.      Pulses: Normal pulses.      Heart sounds: Normal heart sounds.   Pulmonary:      Effort: Pulmonary effort is normal. No respiratory distress.      Breath sounds: Normal breath sounds. No wheezing.   Abdominal:      General: Abdomen is flat. There is no distension.      Palpations: Abdomen is soft.      Tenderness: There is no abdominal tenderness.   Genitourinary:     Comments: +hylton  Musculoskeletal:      Right lower leg: No edema.      Left lower leg: No edema.   Skin:     General: Skin is warm and dry.      Coloration: Skin is not jaundiced.   Neurological:      General: No focal deficit present.      Mental Status: He is alert and oriented to person, place, and time. Mental status is at baseline.   Psychiatric:         Mood and Affect: Mood normal.         Behavior: Behavior normal.         Thought Content: Thought content normal.         Judgment: Judgment normal.         Last Recorded Vitals  Blood pressure  "131/73, pulse 82, temperature 36.5 °C (97.7 °F), temperature source Temporal, resp. rate 20, height 1.702 m (5' 7.01\"), weight 94.8 kg (208 lb 15.9 oz), SpO2 97 %.  Intake/Output last 3 Shifts:  I/O last 3 completed shifts:  In: 3922.5 (41.4 mL/kg) [I.V.:1026.5 (10.8 mL/kg); NG/GT:2396; IV Piggyback:500]  Out: 3059 (32.3 mL/kg) [Urine:3058 (0.9 mL/kg/hr); Stool:1]  Weight: 94.8 kg     Relevant Results           This patient currently has cardiac telemetry ordered; if you would like to modify or discontinue the telemetry order, click here to go to the orders activity to modify/discontinue the order.  This patient has a central line   Reason for the central line remaining today? Line unnecessary, will be removed today    This patient has a urinary catheter   Reason for the urinary catheter remaining today? Acute urinary retention             Assessment/Plan   Principal Problem:    Acute appendicitis with localized peritonitis  Active Problems:    Anxiety    Depression    Hyperlipidemia    Diabetes mellitus, type 2 (CMS/HCC)    Appendicitis, acute    Appendicitis    Acute appendicitis, unspecified acute appendicitis type    Small bowel obstruction (CMS/HCC)    Neuro - agitation, anxiety, encephalopathy, delirium - mental status has been returning to baseline. Weaning precedex and will likely DC precedex today. Transition towards psych medications. Multimodal analgesia.   Respiratory - pneumonia - continue antibiotics until stop date 11/2. On room air. Continuous pulse oximetry  Cardio - monitor hr and bp.  GI - tolerating tube feeds. Swallow evaluation pending.    - acute urinary retention - hylton placed. Maintain hylton. On flomax  Heme - anemia - no indication for transfusion  Endo - diabetes - sliding scale insulin  MS - debility - physical therapy, may need acute rehab after hospital  Skin - midline wound - packing per general surgery    Ppx - lovenox for vte ppx, ppi for ulcer ppx    Dispo - continue icu " admission and monitoring.         I spent 38 minutes in the professional and overall care of this patient.      Yoan Hopper MD

## 2023-10-29 NOTE — PROGRESS NOTES
Primary MD: Roxana Morris PA-C    Reason For Consult  Bilateral lower lobe pneumonia  Acute appendicitis with localized peritonitis    History Of Present Illness  Victorino Lara is a 48 y.o. male              Patient was admitted 10/1/2023     Taken to laparoscopic surgery for acute appendicitis localized peritonitis without perforation     Taken back to surgery 10/14/2023 for small bowel obstruction exploratory laparotomy lysis of adhesions repair of incisional hernia     Hospital course complicated by development of aspiration pneumonia   Has required intubation and mechanical ventilation  growing  Klebsiella pneumonia and MSSA from sputum switched to meropenem wound culture was identified     Klebsiella is resistant to Zosyn consented to third-generation cephalosporin        There is another culture from 10/19/2023 reported as surgical site tissue biopsy growing the same Klebsiella pneumonia and E. coli           CT scan from 10/20/2023 shows bilateral lower lobe consolidations            No fever  extubated  No pressors  Opens eyes follows some commands      Review of Systems   Constitutional:  Negative for chills, diaphoresis, fatigue and fever.   Respiratory: Negative.     Cardiovascular: Negative.    Gastrointestinal:  Positive for abdominal pain.   Psychiatric/Behavioral:  Positive for confusion.         Physical Exam  Constitutional:       Appearance: He is not ill-appearing or diaphoretic.   Cardiovascular:      Heart sounds: Normal heart sounds. No murmur heard.  Pulmonary:      Effort: No respiratory distress.      Breath sounds: No wheezing, rhonchi or rales.   Abdominal:      General: Abdomen is flat. Bowel sounds are normal. There is no distension.      Palpations: Abdomen is soft. There is no mass.      Tenderness: There is no abdominal tenderness. There is no guarding or rebound.   Musculoskeletal:         General: No swelling.      Left lower leg: No edema.   Skin:     Coloration:  "Skin is not jaundiced.      Findings: No erythema.   Neurological:      Mental Status: He is disoriented.          Range of Vitals (last 24 hours)  Heart Rate:  [66-90]   Temp:  [36.2 °C (97.2 °F)-36.6 °C (97.9 °F)]   Resp:  [12-26]   BP: (106-158)/(64-88)   SpO2:  [94 %-99 %]     Relevant Results  Results from last 72 hours   Lab Units 10/29/23  0643   WBC AUTO x10*3/uL 11.5*   HEMOGLOBIN g/dL 8.7*   HEMATOCRIT % 26.4*   PLATELETS AUTO x10*3/uL 698*   NEUTROS PCT AUTO % 60.5   LYMPHS PCT AUTO % 27.0   MONOS PCT AUTO % 8.3   EOS PCT AUTO % 2.3       Results from last 72 hours   Lab Units 10/29/23  0643   SODIUM mmol/L 136   POTASSIUM mmol/L 3.6   CHLORIDE mmol/L 102   CO2 mmol/L 25   BUN mg/dL 13   CREATININE mg/dL 0.70   GLUCOSE mg/dL 133*   CALCIUM mg/dL 8.2*   ANION GAP mmol/L 13   EGFR mL/min/1.73m*2 >90       Results from last 72 hours   Lab Units 10/29/23  0643   ALK PHOS U/L 118   BILIRUBIN TOTAL mg/dL 0.4   PROTEIN TOTAL g/dL 6.6   ALT U/L 22   AST U/L 22   ALBUMIN g/dL 3.0*       Estimated Creatinine Clearance (by C-G formula based on SCr of 0.7 mg/dL)  Female: 115 mL/min  Male: 125 mL/min  The patient&#39;s sex/gender information is inconclusive; review their information before proceeding.  CRP   Date/Time Value Ref Range Status   01/17/2020 10:00 PM 0.19 mg/dL Final     Comment:     REF VALUE  < 1.00     01/12/2020 06:29 AM 0.47 mg/dL Final     Comment:     REF VALUE  < 1.00       Sedimentation Rate   Date/Time Value Ref Range Status   01/17/2020 10:00 PM 5 0 - 15 mm/h Final   01/12/2020 06:29 AM 14 0 - 15 mm/h Final     No results found for: \"HIV1X2\", \"HIVCONF\", \"UCTWWU9DS\"  No results found for: \"HCVPCRQUANT\"  Cultures  Susceptibility data from last 14 days.  Collected Specimen Info Organism Amoxicillin/Clavulanate Ampicillin Ampicillin/Sulbactam Cefazolin Ceftriaxone Cefuroxime Ciprofloxacin Clindamycin Erythromycin Gentamicin Levofloxacin Oxacillin Piperacillin/Tazobactam   10/19/23 Fluid from " Tracheal Aspirate Methicillin Susceptible Staphylococcus aureus (MSSA)        S S   S      Klebsiella pneumoniae/variicola R R R R S S S   S S  R   10/19/23 Tissue/Biopsy from Surgical Site Infection Klebsiella pneumoniae/variicola R R R R S S S   S S  R     Escherichia coli S R R S   S   S S  S   10/16/23 Fluid from Tracheal Aspirate Klebsiella pneumoniae/variicola R R R R S I S   S S  R     Normal throat sharona                10/15/23 Swab from Anterior Nares Methicillin Susceptible Staphylococcus aureus (MSSA)                  Collected Specimen Info Organism Tetracycline Trimethoprim/Sulfamethoxazole Vancomycin   10/19/23 Fluid from Tracheal Aspirate Methicillin Susceptible Staphylococcus aureus (MSSA) S S S     Klebsiella pneumoniae/variicola  S    10/19/23 Tissue/Biopsy from Surgical Site Infection Klebsiella pneumoniae/variicola  S      Escherichia coli  S    10/16/23 Fluid from Tracheal Aspirate Klebsiella pneumoniae/variicola  S      Normal throat sharona      10/15/23 Swab from Anterior Nares Methicillin Susceptible Staphylococcus aureus (MSSA)               Assessment/Plan     Bilateral lower lobe pneumonia  Acute appendicitis with localized peritonitis  Postop wound infection       Growing Klebsiella and MSSA from sputum Klebsiella and E. coli from wound     meropenem

## 2023-10-30 LAB
ALBUMIN SERPL BCP-MCNC: 3.2 G/DL (ref 3.4–5)
ALP SERPL-CCNC: 138 U/L (ref 33–120)
ALT SERPL W P-5'-P-CCNC: 20 U/L (ref 7–52)
ANION GAP SERPL CALC-SCNC: 11 MMOL/L (ref 10–20)
AST SERPL W P-5'-P-CCNC: 17 U/L (ref 9–39)
BASOPHILS # BLD AUTO: 0.1 X10*3/UL (ref 0–0.1)
BASOPHILS NFR BLD AUTO: 0.8 %
BILIRUB SERPL-MCNC: 0.5 MG/DL (ref 0–1.2)
BUN SERPL-MCNC: 12 MG/DL (ref 6–23)
CALCIUM SERPL-MCNC: 8 MG/DL (ref 8.6–10.3)
CHLORIDE SERPL-SCNC: 104 MMOL/L (ref 98–107)
CO2 SERPL-SCNC: 24 MMOL/L (ref 21–32)
CREAT SERPL-MCNC: 0.69 MG/DL (ref 0.5–1.3)
EOSINOPHIL # BLD AUTO: 0.18 X10*3/UL (ref 0–0.7)
EOSINOPHIL NFR BLD AUTO: 1.4 %
ERYTHROCYTE [DISTWIDTH] IN BLOOD BY AUTOMATED COUNT: 12.9 % (ref 11.5–14.5)
GFR SERPL CREATININE-BSD FRML MDRD: >90 ML/MIN/1.73M*2
GLUCOSE BLD MANUAL STRIP-MCNC: 111 MG/DL (ref 74–99)
GLUCOSE BLD MANUAL STRIP-MCNC: 114 MG/DL (ref 74–99)
GLUCOSE BLD MANUAL STRIP-MCNC: 123 MG/DL (ref 74–99)
GLUCOSE BLD MANUAL STRIP-MCNC: 124 MG/DL (ref 74–99)
GLUCOSE BLD MANUAL STRIP-MCNC: 153 MG/DL (ref 74–99)
GLUCOSE SERPL-MCNC: 123 MG/DL (ref 74–99)
HCT VFR BLD AUTO: 27.1 % (ref 36–52)
HGB BLD-MCNC: 8.9 G/DL (ref 12–17.5)
IMM GRANULOCYTES # BLD AUTO: 0.12 X10*3/UL (ref 0–0.7)
IMM GRANULOCYTES NFR BLD AUTO: 0.9 % (ref 0–0.9)
LYMPHOCYTES # BLD AUTO: 2.77 X10*3/UL (ref 1.2–4.8)
LYMPHOCYTES NFR BLD AUTO: 21.2 %
MAGNESIUM SERPL-MCNC: 2.13 MG/DL (ref 1.6–2.4)
MCH RBC QN AUTO: 28 PG (ref 26–34)
MCHC RBC AUTO-ENTMCNC: 32.8 G/DL (ref 32–36)
MCV RBC AUTO: 85 FL (ref 80–100)
MONOCYTES # BLD AUTO: 1.04 X10*3/UL (ref 0.1–1)
MONOCYTES NFR BLD AUTO: 8 %
NEUTROPHILS # BLD AUTO: 8.87 X10*3/UL (ref 1.2–7.7)
NEUTROPHILS NFR BLD AUTO: 67.7 %
NRBC BLD-RTO: 0 /100 WBCS (ref 0–0)
PHOSPHATE SERPL-MCNC: 3 MG/DL (ref 2.5–4.9)
PLATELET # BLD AUTO: 735 X10*3/UL (ref 150–450)
PMV BLD AUTO: 9.8 FL (ref 7.5–11.5)
POTASSIUM SERPL-SCNC: 3.7 MMOL/L (ref 3.5–5.3)
PROT SERPL-MCNC: 6.9 G/DL (ref 6.4–8.2)
RBC # BLD AUTO: 3.18 X10*6/UL (ref 4–5.9)
SODIUM SERPL-SCNC: 135 MMOL/L (ref 136–145)
WBC # BLD AUTO: 13.1 X10*3/UL (ref 4.4–11.3)

## 2023-10-30 PROCEDURE — 83735 ASSAY OF MAGNESIUM: CPT

## 2023-10-30 PROCEDURE — 2500000004 HC RX 250 GENERAL PHARMACY W/ HCPCS (ALT 636 FOR OP/ED): Performed by: NURSE PRACTITIONER

## 2023-10-30 PROCEDURE — 99024 POSTOP FOLLOW-UP VISIT: CPT | Performed by: SURGERY

## 2023-10-30 PROCEDURE — 2500000004 HC RX 250 GENERAL PHARMACY W/ HCPCS (ALT 636 FOR OP/ED)

## 2023-10-30 PROCEDURE — 2500000004 HC RX 250 GENERAL PHARMACY W/ HCPCS (ALT 636 FOR OP/ED): Performed by: SURGERY

## 2023-10-30 PROCEDURE — 99232 SBSQ HOSP IP/OBS MODERATE 35: CPT | Performed by: PSYCHIATRY & NEUROLOGY

## 2023-10-30 PROCEDURE — 2500000001 HC RX 250 WO HCPCS SELF ADMINISTERED DRUGS (ALT 637 FOR MEDICARE OP): Performed by: SURGERY

## 2023-10-30 PROCEDURE — 82947 ASSAY GLUCOSE BLOOD QUANT: CPT

## 2023-10-30 PROCEDURE — 2500000001 HC RX 250 WO HCPCS SELF ADMINISTERED DRUGS (ALT 637 FOR MEDICARE OP): Performed by: NURSE PRACTITIONER

## 2023-10-30 PROCEDURE — 99291 CRITICAL CARE FIRST HOUR: CPT | Performed by: NURSE PRACTITIONER

## 2023-10-30 PROCEDURE — 37799 UNLISTED PX VASCULAR SURGERY: CPT

## 2023-10-30 PROCEDURE — C9113 INJ PANTOPRAZOLE SODIUM, VIA: HCPCS | Performed by: SURGERY

## 2023-10-30 PROCEDURE — 2500000004 HC RX 250 GENERAL PHARMACY W/ HCPCS (ALT 636 FOR OP/ED): Performed by: HOSPITALIST

## 2023-10-30 PROCEDURE — 80053 COMPREHEN METABOLIC PANEL: CPT

## 2023-10-30 PROCEDURE — 92610 EVALUATE SWALLOWING FUNCTION: CPT | Mod: GN

## 2023-10-30 PROCEDURE — 85025 COMPLETE CBC W/AUTO DIFF WBC: CPT

## 2023-10-30 PROCEDURE — 84100 ASSAY OF PHOSPHORUS: CPT

## 2023-10-30 PROCEDURE — 1200000002 HC GENERAL ROOM WITH TELEMETRY DAILY

## 2023-10-30 RX ORDER — MORPHINE SULFATE 2 MG/ML
2 INJECTION, SOLUTION INTRAMUSCULAR; INTRAVENOUS ONCE
Status: COMPLETED | OUTPATIENT
Start: 2023-10-30 | End: 2023-10-30

## 2023-10-30 RX ORDER — TRAZODONE HYDROCHLORIDE 50 MG/1
50 TABLET ORAL NIGHTLY
Status: DISCONTINUED | OUTPATIENT
Start: 2023-10-30 | End: 2023-11-02 | Stop reason: HOSPADM

## 2023-10-30 RX ORDER — INSULIN LISPRO 100 [IU]/ML
0-5 INJECTION, SOLUTION INTRAVENOUS; SUBCUTANEOUS
Status: DISCONTINUED | OUTPATIENT
Start: 2023-10-30 | End: 2023-11-02 | Stop reason: HOSPADM

## 2023-10-30 RX ORDER — LURASIDONE HYDROCHLORIDE 40 MG/1
40 TABLET, FILM COATED ORAL NIGHTLY
Status: DISCONTINUED | OUTPATIENT
Start: 2023-10-30 | End: 2023-11-02 | Stop reason: HOSPADM

## 2023-10-30 RX ADMIN — BUSPIRONE HYDROCHLORIDE 15 MG: 5 TABLET ORAL at 20:40

## 2023-10-30 RX ADMIN — MORPHINE SULFATE 2 MG: 2 INJECTION, SOLUTION INTRAMUSCULAR; INTRAVENOUS at 01:49

## 2023-10-30 RX ADMIN — QUETIAPINE FUMARATE 150 MG: 100 TABLET, FILM COATED ORAL at 20:40

## 2023-10-30 RX ADMIN — SIMVASTATIN 40 MG: 40 TABLET, FILM COATED ORAL at 20:41

## 2023-10-30 RX ADMIN — MEROPENEM 1 G: 1 INJECTION, POWDER, FOR SOLUTION INTRAVENOUS at 20:39

## 2023-10-30 RX ADMIN — BUSPIRONE HYDROCHLORIDE 15 MG: 5 TABLET ORAL at 08:34

## 2023-10-30 RX ADMIN — LURASIDONE HYDROCHLORIDE 40 MG: 40 TABLET, FILM COATED ORAL at 21:31

## 2023-10-30 RX ADMIN — INSULIN LISPRO 2 UNITS: 100 INJECTION, SOLUTION INTRAVENOUS; SUBCUTANEOUS at 08:26

## 2023-10-30 RX ADMIN — KETOROLAC TROMETHAMINE 30 MG: 30 INJECTION, SOLUTION INTRAMUSCULAR at 14:13

## 2023-10-30 RX ADMIN — HYOSCYAMINE SULFATE 473 ML: 16 SOLUTION at 08:45

## 2023-10-30 RX ADMIN — MORPHINE SULFATE 2 MG: 2 INJECTION, SOLUTION INTRAMUSCULAR; INTRAVENOUS at 06:57

## 2023-10-30 RX ADMIN — MORPHINE SULFATE 2 MG: 2 INJECTION, SOLUTION INTRAMUSCULAR; INTRAVENOUS at 11:35

## 2023-10-30 RX ADMIN — KETOROLAC TROMETHAMINE 30 MG: 30 INJECTION, SOLUTION INTRAMUSCULAR at 04:11

## 2023-10-30 RX ADMIN — TAMSULOSIN HYDROCHLORIDE 0.4 MG: 0.4 CAPSULE ORAL at 06:51

## 2023-10-30 RX ADMIN — VENLAFAXINE HYDROCHLORIDE 150 MG: 150 CAPSULE, EXTENDED RELEASE ORAL at 08:34

## 2023-10-30 RX ADMIN — MEROPENEM 1 G: 1 INJECTION, POWDER, FOR SOLUTION INTRAVENOUS at 04:51

## 2023-10-30 RX ADMIN — MORPHINE SULFATE 2 MG: 2 INJECTION, SOLUTION INTRAMUSCULAR; INTRAVENOUS at 18:31

## 2023-10-30 RX ADMIN — MORPHINE SULFATE 2 MG: 2 INJECTION, SOLUTION INTRAMUSCULAR; INTRAVENOUS at 16:10

## 2023-10-30 RX ADMIN — TRAZODONE HYDROCHLORIDE 50 MG: 50 TABLET ORAL at 20:41

## 2023-10-30 RX ADMIN — PANTOPRAZOLE SODIUM 40 MG: 40 INJECTION, POWDER, FOR SOLUTION INTRAVENOUS at 08:35

## 2023-10-30 RX ADMIN — MEROPENEM 1 G: 1 INJECTION, POWDER, FOR SOLUTION INTRAVENOUS at 14:05

## 2023-10-30 ASSESSMENT — PAIN - FUNCTIONAL ASSESSMENT
PAIN_FUNCTIONAL_ASSESSMENT: 0-10

## 2023-10-30 ASSESSMENT — PAIN SCALES - GENERAL
PAINLEVEL_OUTOF10: 0 - NO PAIN
PAINLEVEL_OUTOF10: 10 - WORST POSSIBLE PAIN
PAINLEVEL_OUTOF10: 0 - NO PAIN
PAINLEVEL_OUTOF10: 5 - MODERATE PAIN
PAINLEVEL_OUTOF10: 10 - WORST POSSIBLE PAIN
PAINLEVEL_OUTOF10: 5 - MODERATE PAIN
PAINLEVEL_OUTOF10: 6
PAINLEVEL_OUTOF10: 10 - WORST POSSIBLE PAIN
PAINLEVEL_OUTOF10: 10 - WORST POSSIBLE PAIN
PAINLEVEL_OUTOF10: 8
PAINLEVEL_OUTOF10: 10 - WORST POSSIBLE PAIN
PAINLEVEL_OUTOF10: 10 - WORST POSSIBLE PAIN

## 2023-10-30 ASSESSMENT — ENCOUNTER SYMPTOMS
CARDIOVASCULAR NEGATIVE: 1
DIAPHORESIS: 0
RESPIRATORY NEGATIVE: 1
CONFUSION: 1
CHILLS: 0
ABDOMINAL PAIN: 1
FEVER: 0
FATIGUE: 0

## 2023-10-30 NOTE — NURSING NOTE
Patient admitted to 11 S room 16, all patient belongings brought to room. Patient family at bedside.

## 2023-10-30 NOTE — PROGRESS NOTES
Primary MD: Roxana Morris PA-C    Reason For Consult  Bilateral lower lobe pneumonia  Acute appendicitis with localized peritonitis    History Of Present Illness  Victorino Lara is a 48 y.o. male              Patient was admitted 10/1/2023     Taken to laparoscopic surgery for acute appendicitis localized peritonitis without perforation     Taken back to surgery 10/14/2023 for small bowel obstruction exploratory laparotomy lysis of adhesions repair of incisional hernia     Hospital course complicated by development of aspiration pneumonia   Has required intubation and mechanical ventilation  growing  Klebsiella pneumonia and MSSA from sputum switched to meropenem wound culture was identified     Klebsiella is resistant to Zosyn consented to third-generation cephalosporin        There is another culture from 10/19/2023 reported as surgical site tissue biopsy growing the same Klebsiella pneumonia and E. coli           CT scan from 10/20/2023 shows bilateral lower lobe consolidations            No fever  extubated  No pressors  Opens eyes follows some commands      Review of Systems   Constitutional:  Negative for chills, diaphoresis, fatigue and fever.   Respiratory: Negative.     Cardiovascular: Negative.    Gastrointestinal:  Positive for abdominal pain.   Psychiatric/Behavioral:  Positive for confusion.         Physical Exam  Constitutional:       Appearance: He is not ill-appearing or diaphoretic.   Cardiovascular:      Heart sounds: Normal heart sounds. No murmur heard.  Pulmonary:      Effort: No respiratory distress.      Breath sounds: No wheezing, rhonchi or rales.   Abdominal:      General: Abdomen is flat. Bowel sounds are normal. There is no distension.      Palpations: Abdomen is soft. There is no mass.      Tenderness: There is no abdominal tenderness. There is no guarding or rebound.   Musculoskeletal:         General: No swelling.      Left lower leg: No edema.   Skin:     Coloration:  "Skin is not jaundiced.      Findings: No erythema.   Neurological:      Mental Status: He is disoriented.          Range of Vitals (last 24 hours)  Heart Rate:  []   Temp:  [36.6 °C (97.9 °F)]   Resp:  [0-37]   BP: (143-168)/(64-99)   SpO2:  [87 %-100 %]     Relevant Results  Results from last 72 hours   Lab Units 10/30/23  0318   WBC AUTO x10*3/uL 13.1*   HEMOGLOBIN g/dL 8.9*   HEMATOCRIT % 27.1*   PLATELETS AUTO x10*3/uL 735*   NEUTROS PCT AUTO % 67.7   LYMPHS PCT AUTO % 21.2   MONOS PCT AUTO % 8.0   EOS PCT AUTO % 1.4       Results from last 72 hours   Lab Units 10/30/23  0318   SODIUM mmol/L 135*   POTASSIUM mmol/L 3.7   CHLORIDE mmol/L 104   CO2 mmol/L 24   BUN mg/dL 12   CREATININE mg/dL 0.69   GLUCOSE mg/dL 123*   CALCIUM mg/dL 8.0*   ANION GAP mmol/L 11   EGFR mL/min/1.73m*2 >90       Results from last 72 hours   Lab Units 10/30/23  0318   ALK PHOS U/L 138*   BILIRUBIN TOTAL mg/dL 0.5   PROTEIN TOTAL g/dL 6.9   ALT U/L 20   AST U/L 17   ALBUMIN g/dL 3.2*       Estimated Creatinine Clearance (by C-G formula based on SCr of 0.69 mg/dL)  Female: 116.6 mL/min  Male: 125 mL/min  The patient&#39;s sex/gender information is inconclusive; review their information before proceeding.  CRP   Date/Time Value Ref Range Status   01/17/2020 10:00 PM 0.19 mg/dL Final     Comment:     REF VALUE  < 1.00     01/12/2020 06:29 AM 0.47 mg/dL Final     Comment:     REF VALUE  < 1.00       Sedimentation Rate   Date/Time Value Ref Range Status   01/17/2020 10:00 PM 5 0 - 15 mm/h Final   01/12/2020 06:29 AM 14 0 - 15 mm/h Final     No results found for: \"HIV1X2\", \"HIVCONF\", \"[id number]\"  No results found for: \"HCVPCRQUANT\"  Cultures  Susceptibility data from last 14 days.  Collected Specimen Info Organism Amoxicillin/Clavulanate Ampicillin Ampicillin/Sulbactam Cefazolin Ceftriaxone Cefuroxime Ciprofloxacin Clindamycin Erythromycin Gentamicin Levofloxacin Oxacillin Piperacillin/Tazobactam   10/19/23 Fluid from Tracheal Aspirate " Methicillin Susceptible Staphylococcus aureus (MSSA)        S S   S      Klebsiella pneumoniae/variicola R R R R S S S   S S  R   10/19/23 Tissue/Biopsy from Surgical Site Infection Klebsiella pneumoniae/variicola R R R R S S S   S S  R     Escherichia coli S R R S   S   S S  S   10/16/23 Fluid from Tracheal Aspirate Klebsiella pneumoniae/variicola R R R R S I S   S S  R     Normal throat sharona                  Collected Specimen Info Organism Tetracycline Trimethoprim/Sulfamethoxazole Vancomycin   10/19/23 Fluid from Tracheal Aspirate Methicillin Susceptible Staphylococcus aureus (MSSA) S S S     Klebsiella pneumoniae/variicola  S    10/19/23 Tissue/Biopsy from Surgical Site Infection Klebsiella pneumoniae/variicola  S      Escherichia coli  S    10/16/23 Fluid from Tracheal Aspirate Klebsiella pneumoniae/variicola  S      Normal throat sharona               Assessment/Plan     Bilateral lower lobe pneumonia  Acute appendicitis with localized peritonitis  Postop wound infection       Growing Klebsiella and MSSA from sputum Klebsiella and E. coli from wound     meropenem

## 2023-10-30 NOTE — PROGRESS NOTES
"Victorino Lara is a 48 y.o. adult on day 18 of admission presenting with Acute appendicitis with localized peritonitis.     Subjective   48-year-old male resting in bed.  Patient complains of pain in his abdomen.  Worse with movement relieved with pain medicine.  NG tube at 45 mls an hour in place.  No acute events overnight.       Objective   Dressing to the patient's abdomen removed for replacement no bowel, fecal matter, or drainage present.    Physical Exam  Vitals and nursing note reviewed.   Constitutional:       Appearance: Normal appearance. He is ill-appearing.   HENT:      Head: Normocephalic and atraumatic.      Nose: Nose normal.      Mouth/Throat:      Mouth: Mucous membranes are moist.   Eyes:      Pupils: Pupils are equal, round, and reactive to light.   Cardiovascular:      Rate and Rhythm: Normal rate and regular rhythm.      Pulses: Normal pulses.      Heart sounds: Normal heart sounds, S1 normal and S2 normal.   Pulmonary:      Effort: Pulmonary effort is normal.      Breath sounds: Normal breath sounds.   Abdominal:      General: Bowel sounds are normal. There is distension.      Tenderness: There is abdominal tenderness.      Comments: Abdomen tympanic to percussion   Musculoskeletal:         General: Normal range of motion.   Skin:     General: Skin is warm and dry.      Capillary Refill: Capillary refill takes less than 2 seconds.   Neurological:      General: No focal deficit present.      Mental Status: He is alert and oriented to person, place, and time.   Psychiatric:         Mood and Affect: Mood normal.         Behavior: Behavior normal. Behavior is cooperative.         Last Recorded Vitals  Blood pressure 148/85, pulse 90, temperature 36.6 °C (97.9 °F), temperature source Temporal, resp. rate 22, height 1.702 m (5' 7.01\"), weight 94.8 kg (208 lb 15.9 oz), SpO2 96 %.  Intake/Output last 3 Shifts:  I/O last 3 completed shifts:  In: 3080.6 (32.5 mL/kg) [I.V.:345 (3.6 mL/kg); NG/GT:2341; IV " Piggyback:394.6]  Out: 2728 (28.8 mL/kg) [Urine:2728 (0.8 mL/kg/hr)]  Weight: 94.8 kg     Relevant Results  Scheduled medications  busPIRone, 15 mg, oral, BID  enoxaparin, 40 mg, subcutaneous, q24h  insulin lispro, 0-10 Units, subcutaneous, q4h  [Held by provider] insulin regular, 0-5 Units, subcutaneous, TID with meals  lurasidone, 40 mg, oral, Nightly  meropenem, 1 g, intravenous, q8h  [Held by provider] pantoprazole, 40 mg, oral, Daily before breakfast  pantoprazole, 40 mg, intravenous, Daily  QUEtiapine, 150 mg, oral, Nightly  simvastatin, 40 mg, oral, Nightly  sodium hypochlorite, , irrigation, BID  tamsulosin, 0.4 mg, oral, Daily before breakfast  traZODone, 50 mg, oral, Nightly  venlafaxine XR, 150 mg, oral, Daily      Continuous medications     PRN medications  PRN medications: acetaminophen, alteplase, dextrose 10 % in water (D10W), dextrose, glucagon, ipratropium-albuteroL, ketorolac, metoprolol, morphine, naloxone, oxygen, simethicone     Results for orders placed or performed during the hospital encounter of 10/10/23 (from the past 24 hour(s))   POCT GLUCOSE   Result Value Ref Range    POCT Glucose 134 (H) 74 - 99 mg/dL   POCT GLUCOSE   Result Value Ref Range    POCT Glucose 112 (H) 74 - 99 mg/dL   POCT GLUCOSE   Result Value Ref Range    POCT Glucose 119 (H) 74 - 99 mg/dL   POCT GLUCOSE   Result Value Ref Range    POCT Glucose 123 (H) 74 - 99 mg/dL   CBC and Auto Differential   Result Value Ref Range    WBC 13.1 (H) 4.4 - 11.3 x10*3/uL    nRBC 0.0 0.0 - 0.0 /100 WBCs    RBC 3.18 (L) 4.00 - 5.90 x10*6/uL    Hemoglobin 8.9 (L) 12.0 - 17.5 g/dL    Hematocrit 27.1 (L) 36.0 - 52.0 %    MCV 85 80 - 100 fL    MCH 28.0 26.0 - 34.0 pg    MCHC 32.8 32.0 - 36.0 g/dL    RDW 12.9 11.5 - 14.5 %    Platelets 735 (H) 150 - 450 x10*3/uL    MPV 9.8 7.5 - 11.5 fL    Neutrophils % 67.7 40.0 - 80.0 %    Immature Granulocytes %, Automated 0.9 0.0 - 0.9 %    Lymphocytes % 21.2 13.0 - 44.0 %    Monocytes % 8.0 2.0 - 10.0 %     Eosinophils % 1.4 0.0 - 6.0 %    Basophils % 0.8 0.0 - 2.0 %    Neutrophils Absolute 8.87 (H) 1.20 - 7.70 x10*3/uL    Immature Granulocytes Absolute, Automated 0.12 0.00 - 0.70 x10*3/uL    Lymphocytes Absolute 2.77 1.20 - 4.80 x10*3/uL    Monocytes Absolute 1.04 (H) 0.10 - 1.00 x10*3/uL    Eosinophils Absolute 0.18 0.00 - 0.70 x10*3/uL    Basophils Absolute 0.10 0.00 - 0.10 x10*3/uL   Comprehensive Metabolic Panel   Result Value Ref Range    Glucose 123 (H) 74 - 99 mg/dL    Sodium 135 (L) 136 - 145 mmol/L    Potassium 3.7 3.5 - 5.3 mmol/L    Chloride 104 98 - 107 mmol/L    Bicarbonate 24 21 - 32 mmol/L    Anion Gap 11 10 - 20 mmol/L    Urea Nitrogen 12 6 - 23 mg/dL    Creatinine 0.69 0.50 - 1.30 mg/dL    eGFR >90 >60 mL/min/1.73m*2    Calcium 8.0 (L) 8.6 - 10.3 mg/dL    Albumin 3.2 (L) 3.4 - 5.0 g/dL    Alkaline Phosphatase 138 (H) 33 - 120 U/L    Total Protein 6.9 6.4 - 8.2 g/dL    AST 17 9 - 39 U/L    Bilirubin, Total 0.5 0.0 - 1.2 mg/dL    ALT 20 7 - 52 U/L   Magnesium   Result Value Ref Range    Magnesium 2.13 1.60 - 2.40 mg/dL   Phosphorus   Result Value Ref Range    Phosphorus 3.0 2.5 - 4.9 mg/dL   POCT GLUCOSE   Result Value Ref Range    POCT Glucose 124 (H) 74 - 99 mg/dL   POCT GLUCOSE   Result Value Ref Range    POCT Glucose 153 (H) 74 - 99 mg/dL    XR chest 1 view    Result Date: 10/29/2023  Interpreted By:  Yousif Ponce, STUDY: XR CHEST 1 VIEW;  10/29/2023 11:21 pm   INDICATION: Signs/Symptoms:NG placement.   COMPARISON: 10/27/2023   ACCESSION NUMBER(S): [id number]   ORDERING CLINICIAN: KRISTINA MONTALVO   FINDINGS:     AP radiograph of the lower chest/upper abdomen: NG/OG tube tip projects over the stomach. Lower chest is unremarkable. Gaseous distention of loops of bowel projected over the upper abdomen. No acute osseous abnormality. No large volume pneumoperitoneum.       1. NG/OG tube tip projects over the stomach.     Signed by: Yousif Ponce 10/29/2023 11:40 PM Dictation workstation:    [id number]        Assessment/Plan     Principal Problem:    Acute appendicitis with localized peritonitis  Active Problems:    Anxiety    Depression    Hyperlipidemia    Diabetes mellitus, type 2 (CMS/HCC)    Appendicitis, acute    Appendicitis    Acute appendicitis, unspecified acute appendicitis type    Small bowel obstruction (CMS/HCC)    Plan:  -NG removal pending patient passing swallow evaluation  -DVT prophylaxis with enoxaparin 40 mg daily  -Pain control  -Discharge planning SNF versus home with home health care  -Appreciate ID recommendations           NIKITA Gay-CNP    Agree with note above except where noted below  Patient is doing significantly better today.  Passed swallow and is tolerating a diet.  Having bowel movements.  Is completely alert and aware to the situation.  Still in the ICU but is awaiting a bed on the floor.  Plan is if patient can tolerate pain meds, back on psych meds, and is tolerating a diet hopefully he will be able to be discharged in next day or 2 either to acute rehab or home with wound care.    Norman Calloway MD  10/30/23  4:10 PM

## 2023-10-30 NOTE — PROGRESS NOTES
Nutrition Follow-up Note    Name: Victorino Lara ROOM: 09/09-A  Nurse:No care team member to display    AGE: 48 y.o.  GENDER: adult MRN: [id number]  Code: Full Code          Objective      Principal Problem:    Acute appendicitis with localized peritonitis  Active Problems:    Anxiety    Depression    Hyperlipidemia    Diabetes mellitus, type 2 (CMS/HCC)    Appendicitis, acute    Appendicitis    Acute appendicitis, unspecified acute appendicitis type    Small bowel obstruction (CMS/[facility])       PMH:   Past Medical History:   Diagnosis Date    Acute appendicitis with localized peritonitis 10/10/2023    Anxiety and depression     Bipolar 1 disorder, depressed (CMS/[facility])     Diabetes mellitus (CMS/[facility])     type II, diagnosed mid 2023    GERD (gastroesophageal reflux disease)     HLD (hyperlipidemia)     JOI (iron deficiency anemia)     Obesity (BMI 30-39.9)     Onychomycosis     Panic disorder     Retention of urine, unspecified     Inability to urinate    Scrotal hematoma     Umbilical hernia        Allergies: Patient has no known allergies.     Dietary Orders (From admission, onward)       Start     Ordered    10/30/23 1051  Adult diet Carb Controlled; 75 gram carb/meal, 45 gram Carb evening snack; Thin 0  Diet effective now        Question Answer Comment   Diet type Carb Controlled    Carb diet selection: 75 gram carb/meal, 45 gram Carb evening snack    Fluid consistency Thin 0        10/30/23 1053                    Relevant Medications: insulin, protonix    Results from last 7 days   Lab Units 10/30/23  0318   GLUCOSE mg/dL 123*   SODIUM mmol/L 135*   POTASSIUM mmol/L 3.7   CHLORIDE mmol/L 104   CO2 mmol/L 24   BUN mg/dL 12   CREATININE mg/dL 0.69   EGFR mL/min/1.73m*2 >90   CALCIUM mg/dL 8.0*   PHOSPHORUS mg/dL 3.0   MAGNESIUM mg/dL 2.13       Results from last 7 days   Lab Units 10/30/23  1123 10/30/23  0823 10/30/23  0323 10/30/23  0043 10/29/23  2029 10/29/23  1623 10/29/23  1105 10/29/23  0351   POCT GLUCOSE mg/dL  "114* 153* 124* 123* 119* 112* 134* 109*       Anthropometrics:  Height: 170.2 cm (5' 7.01\")  Weight: 94.8 kg (208 lb 15.9 oz)  BMI (Calculated): 32.73    Ideal Body Weight: 67.3kg    Current admission last 5 weights:  Weight         10/22/2023  0200 10/24/2023  0400 10/25/2023  0605 10/26/2023  0600 10/28/2023  0600    Weight: 96.1 kg (211 lb 13.8 oz) 96.1 kg (211 lb 13.8 oz) 94.9 kg (209 lb 3.5 oz) 95.5 kg (210 lb 8.6 oz) 94.8 kg (208 lb 15.9 oz)             Previous weight records:  Daily Weight  10/28/23 : 94.8 kg (208 lb 15.9 oz)    Weight history/weight changes: Limited wt histories in EMR. 10/20/22: 100.7 kg; 11/30/22: 99.8 kg; 10/10/23: 94.5 kg; 10/16/23: 93.9 kg ; 10/20/23: 97 kg; 10/25/23: 94.9 kg    Interpretation of weight loss: No significant wt loss. Pt reports steady wt PTA.     Subjective    Nutrition and diet history: Extubated 10/26. TPN discontinued 10/29. NG tube removed and TF discontinued 10/30. SLP recommending regular/thin. Pt talking to significant other on cell phone at time of RD visit, agreeable to nutrition interview. Significant other added to conversation. PTA was eating 3 meals per day. Breakfast would be coffee and peanut butter on toast. Lunch was a sandwich. Dinner varies though always focused on portion sizes of carbs. Significant other reports pt's blood glucose levels were consistently in 500 range, though much better controlled at home, in 120's. Review of lab results shows A1c has improved since 5/2022. At that time, A1c was 12.3. A1c from 10/23/23 at 7.1. Pt reports good appetite currently, lunch arrived just as RD interview was ending. No current indication for oral nutritional supplements.    Energy Intake:  diet just advanced.    Reported GI Symptoms: pt denies, though does report incisional abdominal pain    Last bowel movement documented: 10/30/23    Assessment   Comparative Standards (Nutrient Needs):  Estimated Energy Needs: 2130 kcal (MSJ x 1.2 activity " factor)  Estimated Protein Needs:  g (1.2-2 g/kg IBW)   Estimated Fluid Needs:  (1mL/kcal) or per MD    Nutrition Focused Physical Findings:   Nutrition Focused Physical Findings:   Deferred No    Subcutaneous Fat Loss  Orbital Fat Pads Well nourished (slightly bulging fat pads)  Buccal Fat Pads Well nourished (full, rounded cheeks)  Triceps Well nourished (ample fat tissue)  Ribs Deferred    Muscle Wasting  Temporalis Well nourished (well-defined muscle)  Pectoralis (Clavicular Region) Well nourished (clavicle not visible)  Deltoid/Trapezius Well nourished (rounded appearance at arm, shoulder, neck)  Interosseous Well nourished (muscle bulges)    Skin: incisions (please see nursing/wound notes for further details)  Edema: none (see nursing flowsheets for further details)    Pain Score: 10 - Worst possible pain  Critical-Care Pain Observation Score:  [0-7]      Nutrition Diagnosis:  Patient has Malnutrition Diagnosis: No    Patient has Nutrition Diagnosis: Yes  Diagnosis Status (1): New  Nutrition Diagnosis 1: Altered nutrition related to laboratory values  Related to (1): history of diabetes with A1c 7.1  As Evidenced by (1): need for diabetic diet     Goals:  Adequate energy intake, Adequate fluid intake, Adequate protein intake, Maintains stable weight, Labs WNL, and BG within desirable range ( mg/dl)    Follow up progress towards goals:   Progressing  Additional comments: TPN and TF stopped, now on oral diet      Plan    Intervention:  Coordination of Care: ICU interdisciplinary rounds, significant other on phone (pt called)    Recommendation(s):   Continue current diet  2. Encourage PO intakes  3. Reweigh daily    Education: Significant other denied need for diet education at this time. To re-assess once pt transfers to regular medical floor    Monitoring and Evaluation:   Energy intake, Protein intake, Fluid intake, Labs, and Weights

## 2023-10-30 NOTE — PROGRESS NOTES
Victorino Lara is a 48 y.o. adult on day 18 of admission presenting with acute appendicitis is currently receiving medical stabilization medical ICU is doing a lot better compared to last week.  He is able to sleep better at night.  He is tolerating Latuda and Seroquel titration fairly well.  Having suicidal thoughts and feels like his medications are started to help with mood and anxiety level.  Patient is tolerating medications well.   Labs Reviewed.  Vitals Reviewed.   Nursing Notes Reviewed.   No EPS, TD, vitals stable.      MSE: Patient was alert, oriented to time, place, person and situation. Patient appears well groomed and clad in climate appropriate clothes. Patient is cooperative on approach. Recent and remote memory within normal limits. Memory registration and recall within normal limits. Attention and concentration within normal limits. Speech normal in rate, rhythm and volume. Good eye contact. Thought process Linear. Intact associations. Good fund of knowledge. Mood ok and affect constricted. Patient did not endorse any delusions. Patient denied any auditory visual hallucinations. Patient has denied any active suicidal or homicidal ideations and is future oriented.  Patient has fair insight, fair judgment and good impulse control.     Musculoskeletal: Patient bedridden and gait could not t Be Assessed, no Parkinsonism, no Dystonia, no Akathisia, no TD. Psychomotor activity within normal limits.     Diagnosis: Bipolar 1 depressed    Assessment and Plan:     Patient is not at imminent risk of harm to self or others and does not need inpatient psychiatric care.  Increase Latuda 40 mg daily, Seroquel increased to 150 mg at night  Trazodone decreased to 50 mg at bedtime Concerns of activation likely happening secondary to high dose of trazodone at 300 mg.  Continue with current treatment and medication adjustments. Patient is agreeable with continued medication management and adjustments discussed.  "        Last Recorded Vitals  /85   Pulse 90   Temp 36.6 °C (97.9 °F) (Temporal)   Resp 22   Ht 1.702 m (5' 7.01\")   Wt 94.8 kg (208 lb 15.9 oz)   SpO2 96%   BMI 32.73 kg/m²      Recent Results (from the past 24 hour(s))   POCT GLUCOSE    Collection Time: 10/29/23 11:05 AM   Result Value Ref Range    POCT Glucose 134 (H) 74 - 99 mg/dL   POCT GLUCOSE    Collection Time: 10/29/23  4:23 PM   Result Value Ref Range    POCT Glucose 112 (H) 74 - 99 mg/dL   POCT GLUCOSE    Collection Time: 10/29/23  8:29 PM   Result Value Ref Range    POCT Glucose 119 (H) 74 - 99 mg/dL   POCT GLUCOSE    Collection Time: 10/30/23 12:43 AM   Result Value Ref Range    POCT Glucose 123 (H) 74 - 99 mg/dL   CBC and Auto Differential    Collection Time: 10/30/23  3:18 AM   Result Value Ref Range    WBC 13.1 (H) 4.4 - 11.3 x10*3/uL    nRBC 0.0 0.0 - 0.0 /100 WBCs    RBC 3.18 (L) 4.00 - 5.90 x10*6/uL    Hemoglobin 8.9 (L) 12.0 - 17.5 g/dL    Hematocrit 27.1 (L) 36.0 - 52.0 %    MCV 85 80 - 100 fL    MCH 28.0 26.0 - 34.0 pg    MCHC 32.8 32.0 - 36.0 g/dL    RDW 12.9 11.5 - 14.5 %    Platelets 735 (H) 150 - 450 x10*3/uL    MPV 9.8 7.5 - 11.5 fL    Neutrophils % 67.7 40.0 - 80.0 %    Immature Granulocytes %, Automated 0.9 0.0 - 0.9 %    Lymphocytes % 21.2 13.0 - 44.0 %    Monocytes % 8.0 2.0 - 10.0 %    Eosinophils % 1.4 0.0 - 6.0 %    Basophils % 0.8 0.0 - 2.0 %    Neutrophils Absolute 8.87 (H) 1.20 - 7.70 x10*3/uL    Immature Granulocytes Absolute, Automated 0.12 0.00 - 0.70 x10*3/uL    Lymphocytes Absolute 2.77 1.20 - 4.80 x10*3/uL    Monocytes Absolute 1.04 (H) 0.10 - 1.00 x10*3/uL    Eosinophils Absolute 0.18 0.00 - 0.70 x10*3/uL    Basophils Absolute 0.10 0.00 - 0.10 x10*3/uL   Comprehensive Metabolic Panel    Collection Time: 10/30/23  3:18 AM   Result Value Ref Range    Glucose 123 (H) 74 - 99 mg/dL    Sodium 135 (L) 136 - 145 mmol/L    Potassium 3.7 3.5 - 5.3 mmol/L    Chloride 104 98 - 107 mmol/L    Bicarbonate 24 21 - 32 mmol/L "    Anion Gap 11 10 - 20 mmol/L    Urea Nitrogen 12 6 - 23 mg/dL    Creatinine 0.69 0.50 - 1.30 mg/dL    eGFR >90 >60 mL/min/1.73m*2    Calcium 8.0 (L) 8.6 - 10.3 mg/dL    Albumin 3.2 (L) 3.4 - 5.0 g/dL    Alkaline Phosphatase 138 (H) 33 - 120 U/L    Total Protein 6.9 6.4 - 8.2 g/dL    AST 17 9 - 39 U/L    Bilirubin, Total 0.5 0.0 - 1.2 mg/dL    ALT 20 7 - 52 U/L   Magnesium    Collection Time: 10/30/23  3:18 AM   Result Value Ref Range    Magnesium 2.13 1.60 - 2.40 mg/dL   Phosphorus    Collection Time: 10/30/23  3:18 AM   Result Value Ref Range    Phosphorus 3.0 2.5 - 4.9 mg/dL   POCT GLUCOSE    Collection Time: 10/30/23  3:23 AM   Result Value Ref Range    POCT Glucose 124 (H) 74 - 99 mg/dL   POCT GLUCOSE    Collection Time: 10/30/23  8:23 AM   Result Value Ref Range    POCT Glucose 153 (H) 74 - 99 mg/dL          Current Facility-Administered Medications:     acetaminophen (Tylenol) oral liquid 650 mg, 650 mg, oral, q6h PRN, Yoan Hopper MD, 650 mg at 10/29/23 1511    alteplase (Cathflo Activase) injection 2 mg, 2 mg, intra-catheter, PRN, Amina Ritter, APRN-CNP    busPIRone (Buspar) tablet 15 mg, 15 mg, oral, BID, Norman Calloway MD, 15 mg at 10/30/23 0834    dextrose 10 % in water (D10W) infusion, 0.3 g/kg/hr, intravenous, Once PRN, Norman Calloway MD    dextrose 50 % injection 25 g, 25 g, intravenous, q15 min PRN, Norman Calloway MD    enoxaparin (Lovenox) syringe 40 mg, 40 mg, subcutaneous, q24h, Norman Calloway MD, 40 mg at 10/29/23 1735    glucagon (Glucagen) injection 1 mg, 1 mg, intramuscular, q15 min PRN, Norman Calloway MD    insulin lispro (HumaLOG) injection 0-10 Units, 0-10 Units, subcutaneous, q4h, Norman Calloway MD, 2 Units at 10/30/23 0826    [Held by provider] insulin regular (HumuLIN R) injection 0-5 Units, 0-5 Units, subcutaneous, TID with meals, Norman Calloway MD, 1 Units at 10/14/23 1224    ipratropium-albuteroL (Duo-Neb) 0.5-2.5 mg/3 mL nebulizer solution 3 mL, 3 mL,  nebulization, q4h PRN, Norman Calloway MD    ketorolac (Toradol) injection 30 mg, 30 mg, intravenous, q6h PRN, Yoan Hopper MD, 30 mg at 10/30/23 0411    lurasidone (Latuda) tablet 40 mg, 40 mg, oral, Nightly, Maik Cotton MD    meropenem (Merrem) in sodium chloride 0.9 % 100 mL IV 1 g, 1 g, intravenous, q8h, NIKITA Doyle-CNP, Stopped at 10/30/23 0521    metoprolol tartrate (Lopressor) injection 5 mg, 5 mg, intravenous, q6h PRN, NIKITA Doyle-CNP, 5 mg at 10/25/23 1527    morphine injection 2 mg, 2 mg, intravenous, q4h PRN, Yoan Hopper MD, 2 mg at 10/30/23 0657    naloxone (Narcan) injection 0.2 mg, 0.2 mg, intravenous, q5 min PRN, Norman Calloway MD    oxygen (O2) therapy, , inhalation, Continuous PRN - O2/gases, Yoan Hopper MD, 2 L/min at 10/29/23 0700    [Held by provider] pantoprazole (ProtoNix) EC tablet 40 mg, 40 mg, oral, Daily before breakfast, Norman Calloway MD, 40 mg at 10/12/23 0640    pantoprazole (ProtoNix) injection 40 mg, 40 mg, intravenous, Daily, Norman Calloway MD, 40 mg at 10/30/23 0835    QUEtiapine (SEROquel) tablet 150 mg, 150 mg, oral, Nightly, Maik Cotton MD    simethicone (Mylicon) drops 40 mg, 40 mg, oral, q4h PRN, Abdullahi Solis, NIKITA-CNP, 40 mg at 10/29/23 1800    simvastatin (Zocor) tablet 40 mg, 40 mg, oral, Nightly, Norman Calloway MD, 40 mg at 10/29/23 2050    sodium hypochlorite (Dakin's HALF-Strength) 0.25 % external solution, , irrigation, BID, Norman Calloway MD, 473 mL at 10/30/23 0845    tamsulosin (Flomax) 24 hr capsule 0.4 mg, 0.4 mg, oral, Daily before breakfast, NIKITA Kaye-CNP, 0.4 mg at 10/30/23 0651    traZODone (Desyrel) tablet 50 mg, 50 mg, oral, Nightly, Maik Cotton MD    venlafaxine XR (Effexor-XR) 24 hr capsule 150 mg, 150 mg, oral, Daily, Norman Calloway MD, 150 mg at 10/30/23 0834       Maik Cotton MD

## 2023-10-30 NOTE — PROGRESS NOTES
[facility] Critical Care Medicine       Date:  10/30/2023  Patient:  Victorino Lara  YOB: 1975  MRN:  [id number]   Admit Date:  10/10/2023      History of Present Illness:    Victorino Lara is a 48 y.o. year old adult patient with PMH listed below including prior abdominal hernia repair, initially presented on October 10th with right lower quadrant abdominal pain.  He was found to have acute appendicitis with localized peritonitis without rupture.  He was taken to the OR for laparoscopic appendectomy.  Tissue specimen sent to lab.  Postoperatively patient was tachycardic which was treated with IV fluids and patient also had some hypoxemia.  On the 12th pulmonology was consulted for hypoxia/dyspnea/atelectasis, CT chest showed significant bibasilar atelectasis.  Patient was only requiring nasal cannula.  Yesterday on POD #3 patient developed ileus per KUB.  Supportive measures were continued including NGT, IV fluids and replacing electrolytes.  Symptoms did not improve.  CT abdomen and pelvis was done which was concerning for SBO with suggestion of transition point at the level of the umbilical hernia.  Dr. Lam was contacted by Dr. Mcadams for exploratory laparotomy with possible bowel resection.     Patient underwent exploratory laparotomy, lysis of adhesions and repair of incisional hernia.    Interval ICU Events:    10/14:  #Acute appendicitis with localized peritonitis without perforation or gangrene  -s/p appendectomy 10/10/23, has been on Zosyn     #Post-op ileus versus SBO  #Dehiscence of internal incision  -s/p ex-lap with lysis of adhesions and incisional hernia repair, followed by reexploratory laparotomy for postop internal dehiscence due to retching     #Post-operative respiratory insufficiency  -2/2 copious bilious emesis while trying to extubate, was never actually extubated however had to be reanesthetized and reparalyzed with rocuronium, on mechanical ventilator      #DYLAN  -Present on  admission, secondary to hypovolemia most likely, improving     #DMII   -recently diagnosed     #Bipolar 1 disorder,depression  #Anxiety & Depression  #Panic disorder           Daily Plan:  Except transferred to ICU on continuous cardiac monitor  Continue mechanical ventilation with goal to slowly extubate tomorrow morning  Morning KUB and chest x-ray  Stat CBC with differential, BMP and magnesium  Monitor replace electrolytes, keep testing greater than 4 magnesium greater than 2  Bowel rest  NG to LIWS  Hold oral Protonix and switch to IV until able to have oral intake  Accu-Cheks every 4 hours while n.p.o. with SSI, hypoglycemia protocol  Transfuse PRBCs for hemoglobin less than 7 or with symptomatic anemia  Strict intake and output every hour  Hourly vital signs while in ICU  No prophylactic anticoagulation until okay with surgery  Goal be to transfer back to [facility] tomorrow if patient is stable after extubation and remains HDS  Pain control  Okay to sedate with propofol  Maintain MAP > 65 mmHg for adequate perfusion  Titrate FiO2 to maintain SPO2 greater than 92%  Monitor for infection given possible aspiration of bilious contents  Discontinue LR and switch to normal saline at rate of 125/h  Will initiate fentanyl if patient requires more sedation  Okay to paralyze if needed to prevent patient from retching causing recurrence of surgical dehiscence  ABG with morning labs  Monitor acid-base balance, check lactate  Monitor renal function  Resume psych meds when appropriate    10/29  Assessment/Plan   Principal Problem:    Acute appendicitis with localized peritonitis  Active Problems:    Anxiety    Depression    Hyperlipidemia    Diabetes mellitus, type 2 (CMS/HCC)    Appendicitis, acute    Appendicitis    Acute appendicitis, unspecified acute appendicitis type    Small bowel obstruction (CMS/HCC)     Neuro - agitation, anxiety, encephalopathy, delirium - mental status has been returning to baseline. Weaning precedex  and will likely DC precedex today. Transition towards psych medications. Multimodal analgesia.   Respiratory - pneumonia - continue antibiotics until stop date 11/2. On room air. Continuous pulse oximetry  Cardio - monitor hr and bp.  GI - tolerating tube feeds. Swallow evaluation pending.    - acute urinary retention - hylton placed. Maintain hylton. On flomax  Heme - anemia - no indication for transfusion  Endo - diabetes - sliding scale insulin  MS - debility - physical therapy, may need acute rehab after hospital  Skin - midline wound - packing per general surgery     Ppx - lovenox for vte ppx, ppi for ulcer ppx    10/30:  Patient AAO today. VSS; hypertensive at times.  Currently on room air with SPO2 greater than 95%.  Mild leukocytosis with WBCs 13.1.  Persistent but stable anemia with Hgb of 8.9.  Slightly elevated alk phos at 138.  Electrolytes WNL.  Patient was evaluated by SLP today and approved for regular diet/thin liquids.  Psych following; Medication adjustments made today. Sputum was (+) for klebsiella and MSSA and (+) klebsiella and E.coli in wound - currently being treated with Meropenum. Abd wound packed and with clean dressing (no drainage at time of dressing change this morning).        Medical History:  Past Medical History:   Diagnosis Date    Acute appendicitis with localized peritonitis 10/10/2023    Anxiety and depression     Bipolar 1 disorder, depressed (CMS/[facility])     Diabetes mellitus (CMS/[facility])     type II, diagnosed mid 2023    GERD (gastroesophageal reflux disease)     HLD (hyperlipidemia)     JOI (iron deficiency anemia)     Obesity (BMI 30-39.9)     Onychomycosis     Panic disorder     Retention of urine, unspecified     Inability to urinate    Scrotal hematoma     Umbilical hernia      Past Surgical History:   Procedure Laterality Date    APPENDECTOMY  10/10/2023    COLONOSCOPY  2019    COLONOSCOPY  02/23/2022    CYST REMOVAL  03/27/2014    Hand, excision of cyst    ELBOW SURGERY  Right     ESOPHAGOGASTRODUODENOSCOPY      HERNIA REPAIR      SCROTAL SURGERY  02/2016    Hematoma drainage     Medications Prior to Admission   Medication Sig Dispense Refill Last Dose    busPIRone (Buspar) 30 mg tablet Take by mouth 2 times a day.   10/13/2023    insulin glargine (Lantus U-100 Insulin) 100 unit/mL injection Inject 30 Units under the skin once daily at bedtime. Take as directed per insulin instructions.   10/13/2023    insulin lispro (HumaLOG) 100 unit/mL injection Inject 0.08 mL (8 Units) under the skin 3 times a day with meals. Take as directed per insulin instructions.   10/13/2023    lurasidone (Latuda) 80 mg tablet Take 1 tablet (80 mg) by mouth once daily.   10/13/2023    omeprazole (PriLOSEC) 20 mg DR capsule Take 1 capsule (20 mg) by mouth. Do not crush or chew.   10/13/2023    QUEtiapine (SEROquel) 300 mg tablet Take 1 tablet (300 mg) by mouth 2 times a day.   10/13/2023    simvastatin (Zocor) 40 mg tablet Take 1 tablet (40 mg) by mouth once daily at bedtime.   10/13/2023    tiZANidine (Zanaflex) 4 mg capsule Take 1 capsule (4 mg) by mouth 2 times a day as needed for muscle spasms.   10/13/2023    traZODone (Desyrel) 300 mg tablet Take 1 tablet (300 mg) by mouth once daily at bedtime.   10/13/2023    venlafaxine XR (Effexor-XR) 150 mg 24 hr capsule Take 1 capsule (150 mg) by mouth once daily. Do not crush or chew.   10/13/2023     Patient has no known allergies.  Social History     Tobacco Use    Smoking status: Some Days     Types: Cigarettes    Smokeless tobacco: Never   Vaping Use    Vaping Use: Never used   Substance Use Topics    Alcohol use: Not Currently     Comment: rare social, 2-3 beers    Drug use: Never     Family History   Problem Relation Name Age of Onset    Depression Mother         Hospital Medications:           Current Facility-Administered Medications:     acetaminophen (Tylenol) oral liquid 650 mg, 650 mg, oral, q6h PRN, Yoan Hopper MD, 650 mg at 10/29/23 1511     alteplase (Cathflo Activase) injection 2 mg, 2 mg, intra-catheter, PRN, Amina Ritter APRN-CNP    busPIRone (Buspar) tablet 15 mg, 15 mg, oral, BID, Norman Calloway MD, 15 mg at 10/30/23 0834    dextrose 10 % in water (D10W) infusion, 0.3 g/kg/hr, intravenous, Once PRN, Norman Calloway MD    dextrose 50 % injection 25 g, 25 g, intravenous, q15 min PRN, Norman Calloway MD    enoxaparin (Lovenox) syringe 40 mg, 40 mg, subcutaneous, q24h, Norman Calloway MD, 40 mg at 10/29/23 1735    glucagon (Glucagen) injection 1 mg, 1 mg, intramuscular, q15 min PRN, Norman Calloway MD    insulin lispro (HumaLOG) injection 0-10 Units, 0-10 Units, subcutaneous, q4h, Norman Calloway MD, 2 Units at 10/30/23 0826    [Held by provider] insulin regular (HumuLIN R) injection 0-5 Units, 0-5 Units, subcutaneous, TID with meals, Norman Calloway MD, 1 Units at 10/14/23 1224    ipratropium-albuteroL (Duo-Neb) 0.5-2.5 mg/3 mL nebulizer solution 3 mL, 3 mL, nebulization, q4h PRN, Norman Calloway MD    ketorolac (Toradol) injection 30 mg, 30 mg, intravenous, q6h PRN, Yoan Hopper MD, 30 mg at 10/30/23 1413    lurasidone (Latuda) tablet 40 mg, 40 mg, oral, Nightly, Maik Cotton MD    meropenem (Merrem) in sodium chloride 0.9 % 100 mL IV 1 g, 1 g, intravenous, q8h, PEPITO Doyle, Last Rate: 200 mL/hr at 10/30/23 1405, 1 g at 10/30/23 1405    metoprolol tartrate (Lopressor) injection 5 mg, 5 mg, intravenous, q6h PRN, Dallas M Grothaus, APRN-CNP, 5 mg at 10/25/23 1527    morphine injection 2 mg, 2 mg, intravenous, q4h PRN, Yoan Hopper MD, 2 mg at 10/30/23 1135    naloxone (Narcan) injection 0.2 mg, 0.2 mg, intravenous, q5 min PRN, Norman Calloway MD    oxygen (O2) therapy, , inhalation, Continuous PRN - O2/gases, Yoan Hopper MD, 2 L/min at 10/29/23 0700    [Held by provider] pantoprazole (ProtoNix) EC tablet 40 mg, 40 mg, oral, Daily before breakfast, Norman Calloway MD, 40 mg at 10/12/23 0640    pantoprazole  (ProtoNix) injection 40 mg, 40 mg, intravenous, Daily, Norman Calloway MD, 40 mg at 10/30/23 0835    QUEtiapine (SEROquel) tablet 150 mg, 150 mg, oral, Nightly, Maik Cotton MD    simethicone (Mylicon) drops 40 mg, 40 mg, oral, q4h PRN, PEPITO Kaye, 40 mg at 10/29/23 1800    simvastatin (Zocor) tablet 40 mg, 40 mg, oral, Nightly, Norman Calloway MD, 40 mg at 10/29/23 2050    sodium hypochlorite (Dakin's HALF-Strength) 0.25 % external solution, , irrigation, BID, Norman Calloway MD, 473 mL at 10/30/23 0845    tamsulosin (Flomax) 24 hr capsule 0.4 mg, 0.4 mg, oral, Daily before breakfast, PEPITO Kaye, 0.4 mg at 10/30/23 0651    traZODone (Desyrel) tablet 50 mg, 50 mg, oral, Nightly, Maik Cotton MD    venlafaxine XR (Effexor-XR) 24 hr capsule 150 mg, 150 mg, oral, Daily, Norman Calloway MD, 150 mg at 10/30/23 0834    Physical Exam  Vitals:    10/30/23 1100 10/30/23 1200 10/30/23 1300 10/30/23 1400   BP: 146/65 154/86 (!) 168/99    BP Location:       Patient Position:       Pulse: 88 76 100 84   Resp: (!) 29 (!) 32 10 (!) 31   Temp:       TempSrc:       SpO2: 95% 97% 93% 98%   Weight:       Height:           Physical Exam  Vitals reviewed.   HENT:      Head: Normocephalic.      Nose: Nose normal.      Mouth/Throat:      Mouth: Mucous membranes are moist.   Eyes:      Extraocular Movements: Extraocular movements intact.      Pupils: Pupils are equal, round, and reactive to light.   Cardiovascular:      Rate and Rhythm: Normal rate and regular rhythm.   Pulmonary:      Effort: Pulmonary effort is normal.      Breath sounds: Normal breath sounds.   Abdominal:      General: Bowel sounds are normal.      Palpations: Abdomen is soft.   Musculoskeletal:         General: Swelling present.      Cervical back: Normal range of motion and neck supple.      Right lower leg: Edema present.      Left lower leg: Edema present.   Skin:     General: Skin is warm and dry.   Neurological:       General: No focal deficit present.      Mental Status: He is alert and oriented to person, place, and time.   Psychiatric:         Mood and Affect: Mood normal.         Objective      Intake/Output Summary (Last 24 hours) at 10/30/2023 1417  Last data filed at 10/30/2023 1200  Gross per 24 hour   Intake 1372.6 ml   Output 1615 ml   Net -242.4 ml       No results displayed because visit has over 200 results.               No echocardiogram results found for the past 14 days    XR chest 1 view  Narrative: Interpreted By:  Yousif Ponce,   STUDY:  XR CHEST 1 VIEW;  10/29/2023 11:21 pm      INDICATION:  Signs/Symptoms:NG placement.      COMPARISON:  10/27/2023      ACCESSION NUMBER(S):  [id number]      ORDERING CLINICIAN:  KRISTINA MONTALVO      FINDINGS:          AP radiograph of the lower chest/upper abdomen: NG/OG tube tip  projects over the stomach. Lower chest is unremarkable. Gaseous  distention of loops of bowel projected over the upper abdomen. No  acute osseous abnormality. No large volume pneumoperitoneum.      Impression: 1. NG/OG tube tip projects over the stomach.          Signed by: Yousif Ponce 10/29/2023 11:40 PM  Dictation workstation:   [id number]        Assessment/Plan:    I am currently managing this critically ill patient for: Acute appendicitis with localized peritonitis without rupture s/p appendectomy; s/p ex lap with lysis of adhesions and repair of incisional hernia; ileus; PNA    10/30    #Bipolar 1 disorder,depression  #Anxiety & Depression  #Panic disorder  - Latuda increased to 40 mg daily  - Seroquel increased to 150 mg at bedtime  - Trazodone decreased to 50 mg at bedtime      #Post-operative respiratory insufficiency/PNA  -Meropenem -initiated 10/19 with end date of 11/2  -As needed breathing treatments  - Currently on room air       #Acute appendicitis with localized peritonitis without perforation or gangrene  #SBO  -obstruction from an interloop adhesion and NOT from  the hernia,  -s/p appendectomy 10/10/23  -WTD dressing with packing abdominal incision  - S/p ex lap with lysis of adhesions and repair of incisional hernia 10/14/23  -SLP evaluated and approved for regular diet/thin liquids today  - [place] removed 10/30 (was receiving TF prior to removal)  -PT/OT      #Anemia  - H/H stable  - lovenox for DVTp       #DMII   -recently diagnosed  -accu checks changed to AC/HS today with SSI coverage          Lines:   -CVC and hylton removed today            DVT Prophylaxis: lovenox  GI Prophylaxis: PPI (pt takes omeprazole at home)  Diet: Regular/thin  CVC: removed today, 10/30  [place]: none  Hylton: removed today, 10/30  Restraints: none  Code Status: full        Critical Care Time:  50

## 2023-10-30 NOTE — PROGRESS NOTES
Speech-Language Pathology    Inpatient Speech-Language Pathology Clinical Swallow Evaluation    Patient Name: Victorino Lara  MRN: [id number]  Today's Date: 10/30/2023   Time Calculation  Start Time: 0945  Stop Time: 1010  Time Calculation (min): 25 min         Current Problem:   1. Acute appendicitis, unspecified acute appendicitis type  DISCONTINUED: oxyCODONE (Roxicodone) 5 mg immediate release tablet    DISCONTINUED: oxyCODONE (Roxicodone) 5 mg immediate release tablet      2. Acute appendicitis with localized peritonitis, unspecified whether abscess present, unspecified whether gangrene present, unspecified whether perforation present  Case Request Operating Room: Appendectomy Laparoscopy    Case Request Operating Room: Appendectomy Laparoscopy    Surgical Pathology Exam    Surgical Pathology Exam      3. Acute appendicitis with localized peritonitis, without perforation, abscess, or gangrene  DISCONTINUED: oxyCODONE (Roxicodone) 5 mg immediate release tablet      4. Small bowel obstruction (CMS/HCC)  Case Request Operating Room: Exploration Laparotomy    Case Request Operating Room: Exploration Laparotomy            Recommendations:  Solid Diet Recommendations : Regular (IDDSI Level 7)  Liquid Diet Recommendations: Thin (IDDSI Level 0)  Compensatory Swallowing Strategies: Small bites/sips, Eat/feed slowly, Upright 90 degrees as possible for all oral intake      Assessment:    DYSPHAGIA EVALUATION: (Consistencies attempted: Puree, gram cracker trial, thin liquids, via cup and straw)  Oral Phase: Patient's oral phase of the swallow characterized by minimal oral delay with jimi cracker trial.  Patient was able to tolerate multiple bites of pudding without difficulty.  He was also to be able to take sips of thin liquids via cup and straw without anterior bolus loss.    Pharyngeal Phase: Patient's pharyngeal phase of the swallow characterized by within functional limits elevation of larynx to laryngeal palpation  during the swallow.  No overt signs symptoms aspiration seen with food or liquid trials during clinical swallow evaluation.      DIAGNOSTIC IMPRESSIONS:  Patient presents with a swallow that was within functional limits.  No overt signs symptoms aspiration seen with food or liquid trials.  Recommending diet upgrade to regular with thin liquids.  Also recommending removal of Corpak.  Nurse practitioner for intensivist group.  With recommendation for Corpak removal.  Also recommending to discharge speech therapy orders at this time as patient is safe with regular diet and thin liquids.      Plan:  Solid Consistency: Regular (IDDSI Level 7)  Liquid Consistency: Thin (IDDSI Level 0)  Discussed Risks/Benefits: Yes, Patient, Nursing  Patient/Caregiver Agreeable: Yes      Subjective   Patient was seen this a.m. at bedside.  He was sitting upright at the edge of the bed speaking with the nurse.        Baseline Assessment:  Respiratory Status: Oxygen via nasal cannula  History of Intubation: Yes      Pain:  Pain Assessment: 0-10  Pain Score: 0 - No pain       Oral/Motor Assessment:  Dentition: Adequate/Natural  Oral Motor: Within Functional Limits  Facial Sensation: Within Functional Limits  Facial Symmetry: Within Functional Limits  Labial Agility: Within Functional Limits

## 2023-10-30 NOTE — PROGRESS NOTES
10/30/23 1700   Discharge Planning   Living Arrangements Spouse/significant other   Support Systems Spouse/significant other;Family members   Assistance Needed dressing changes.  ADL's, PT/OT   Type of Residence Private residence   Home or Post Acute Services Post acute facilities (Rehab/SNF/etc)   Type of Post Acute Facility Services Skilled nursing   Patient expects to be discharged to: HOME vs Skilled Rehab     Met with patient and discussed possible need for continued skilled therapy and nursing at time of discharge.  Pt agrees and accepted SNF list to look over with spouse/ significant other.  Pt is agreeable to follow up for pt preference.

## 2023-10-31 LAB
ALBUMIN SERPL BCP-MCNC: 3.3 G/DL (ref 3.4–5)
ALP SERPL-CCNC: 148 U/L (ref 33–120)
ALT SERPL W P-5'-P-CCNC: 19 U/L (ref 7–52)
ANION GAP SERPL CALC-SCNC: 12 MMOL/L (ref 10–20)
AST SERPL W P-5'-P-CCNC: 18 U/L (ref 9–39)
BASOPHILS # BLD AUTO: 0.09 X10*3/UL (ref 0–0.1)
BASOPHILS NFR BLD AUTO: 0.8 %
BILIRUB SERPL-MCNC: 0.5 MG/DL (ref 0–1.2)
BUN SERPL-MCNC: 11 MG/DL (ref 6–23)
CALCIUM SERPL-MCNC: 8.4 MG/DL (ref 8.6–10.3)
CHLORIDE SERPL-SCNC: 103 MMOL/L (ref 98–107)
CO2 SERPL-SCNC: 25 MMOL/L (ref 21–32)
CREAT SERPL-MCNC: 0.73 MG/DL (ref 0.5–1.3)
EOSINOPHIL # BLD AUTO: 0.31 X10*3/UL (ref 0–0.7)
EOSINOPHIL NFR BLD AUTO: 2.6 %
ERYTHROCYTE [DISTWIDTH] IN BLOOD BY AUTOMATED COUNT: 13.4 % (ref 11.5–14.5)
GFR SERPL CREATININE-BSD FRML MDRD: >90 ML/MIN/1.73M*2
GLUCOSE SERPL-MCNC: 116 MG/DL (ref 74–99)
HCT VFR BLD AUTO: 28.2 % (ref 36–52)
HGB BLD-MCNC: 9.4 G/DL (ref 12–17.5)
HOLD SPECIMEN: NORMAL
IMM GRANULOCYTES # BLD AUTO: 0.06 X10*3/UL (ref 0–0.7)
IMM GRANULOCYTES NFR BLD AUTO: 0.5 % (ref 0–0.9)
LYMPHOCYTES # BLD AUTO: 2.29 X10*3/UL (ref 1.2–4.8)
LYMPHOCYTES NFR BLD AUTO: 19.6 %
MAGNESIUM SERPL-MCNC: 2.01 MG/DL (ref 1.6–2.4)
MCH RBC QN AUTO: 28.8 PG (ref 26–34)
MCHC RBC AUTO-ENTMCNC: 33.3 G/DL (ref 32–36)
MCV RBC AUTO: 87 FL (ref 80–100)
MONOCYTES # BLD AUTO: 0.97 X10*3/UL (ref 0.1–1)
MONOCYTES NFR BLD AUTO: 8.3 %
NEUTROPHILS # BLD AUTO: 7.99 X10*3/UL (ref 1.2–7.7)
NEUTROPHILS NFR BLD AUTO: 68.2 %
NRBC BLD-RTO: 0 /100 WBCS (ref 0–0)
PHOSPHATE SERPL-MCNC: 3.2 MG/DL (ref 2.5–4.9)
PLATELET # BLD AUTO: 606 X10*3/UL (ref 150–450)
PMV BLD AUTO: 9.3 FL (ref 7.5–11.5)
POTASSIUM SERPL-SCNC: 4.1 MMOL/L (ref 3.5–5.3)
PROT SERPL-MCNC: 7 G/DL (ref 6.4–8.2)
RBC # BLD AUTO: 3.26 X10*6/UL (ref 4–5.9)
SODIUM SERPL-SCNC: 136 MMOL/L (ref 136–145)
WBC # BLD AUTO: 11.7 X10*3/UL (ref 4.4–11.3)

## 2023-10-31 PROCEDURE — 2500000004 HC RX 250 GENERAL PHARMACY W/ HCPCS (ALT 636 FOR OP/ED): Performed by: NURSE PRACTITIONER

## 2023-10-31 PROCEDURE — 1200000002 HC GENERAL ROOM WITH TELEMETRY DAILY

## 2023-10-31 PROCEDURE — 36415 COLL VENOUS BLD VENIPUNCTURE: CPT

## 2023-10-31 PROCEDURE — 97161 PT EVAL LOW COMPLEX 20 MIN: CPT | Mod: GP

## 2023-10-31 PROCEDURE — 80053 COMPREHEN METABOLIC PANEL: CPT | Performed by: NURSE PRACTITIONER

## 2023-10-31 PROCEDURE — 97165 OT EVAL LOW COMPLEX 30 MIN: CPT | Mod: GO

## 2023-10-31 PROCEDURE — 2500000001 HC RX 250 WO HCPCS SELF ADMINISTERED DRUGS (ALT 637 FOR MEDICARE OP): Performed by: NURSE PRACTITIONER

## 2023-10-31 PROCEDURE — 99232 SBSQ HOSP IP/OBS MODERATE 35: CPT | Performed by: HOSPITALIST

## 2023-10-31 PROCEDURE — 2500000005 HC RX 250 GENERAL PHARMACY W/O HCPCS: Performed by: NURSE PRACTITIONER

## 2023-10-31 PROCEDURE — 36415 COLL VENOUS BLD VENIPUNCTURE: CPT | Performed by: NURSE PRACTITIONER

## 2023-10-31 PROCEDURE — 96372 THER/PROPH/DIAG INJ SC/IM: CPT | Performed by: NURSE PRACTITIONER

## 2023-10-31 PROCEDURE — 99024 POSTOP FOLLOW-UP VISIT: CPT | Performed by: SURGERY

## 2023-10-31 PROCEDURE — 99232 SBSQ HOSP IP/OBS MODERATE 35: CPT | Performed by: PSYCHIATRY & NEUROLOGY

## 2023-10-31 PROCEDURE — 84100 ASSAY OF PHOSPHORUS: CPT | Performed by: NURSE PRACTITIONER

## 2023-10-31 PROCEDURE — 85025 COMPLETE CBC W/AUTO DIFF WBC: CPT

## 2023-10-31 PROCEDURE — 83735 ASSAY OF MAGNESIUM: CPT | Performed by: NURSE PRACTITIONER

## 2023-10-31 RX ADMIN — MORPHINE SULFATE 2 MG: 2 INJECTION, SOLUTION INTRAMUSCULAR; INTRAVENOUS at 14:54

## 2023-10-31 RX ADMIN — Medication 2 L/MIN: at 08:00

## 2023-10-31 RX ADMIN — TRAZODONE HYDROCHLORIDE 50 MG: 50 TABLET ORAL at 20:18

## 2023-10-31 RX ADMIN — BUSPIRONE HYDROCHLORIDE 15 MG: 5 TABLET ORAL at 20:18

## 2023-10-31 RX ADMIN — MEROPENEM 1 G: 1 INJECTION, POWDER, FOR SOLUTION INTRAVENOUS at 05:32

## 2023-10-31 RX ADMIN — ENOXAPARIN SODIUM 40 MG: 40 INJECTION SUBCUTANEOUS at 17:21

## 2023-10-31 RX ADMIN — TAMSULOSIN HYDROCHLORIDE 0.4 MG: 0.4 CAPSULE ORAL at 05:33

## 2023-10-31 RX ADMIN — HYOSCYAMINE SULFATE 473 ML: 16 SOLUTION at 20:20

## 2023-10-31 RX ADMIN — LURASIDONE HYDROCHLORIDE 40 MG: 40 TABLET, FILM COATED ORAL at 20:18

## 2023-10-31 RX ADMIN — MORPHINE SULFATE 2 MG: 2 INJECTION, SOLUTION INTRAMUSCULAR; INTRAVENOUS at 09:47

## 2023-10-31 RX ADMIN — ACETAMINOPHEN 650 MG: 160 SOLUTION ORAL at 17:17

## 2023-10-31 RX ADMIN — QUETIAPINE FUMARATE 150 MG: 100 TABLET, FILM COATED ORAL at 20:17

## 2023-10-31 RX ADMIN — SIMVASTATIN 40 MG: 40 TABLET, FILM COATED ORAL at 20:18

## 2023-10-31 RX ADMIN — VENLAFAXINE HYDROCHLORIDE 150 MG: 150 CAPSULE, EXTENDED RELEASE ORAL at 09:47

## 2023-10-31 RX ADMIN — HYOSCYAMINE SULFATE 473 ML: 16 SOLUTION at 09:47

## 2023-10-31 RX ADMIN — MEROPENEM 1 G: 1 INJECTION, POWDER, FOR SOLUTION INTRAVENOUS at 12:48

## 2023-10-31 RX ADMIN — BUSPIRONE HYDROCHLORIDE 15 MG: 5 TABLET ORAL at 09:47

## 2023-10-31 ASSESSMENT — PAIN SCALES - GENERAL
PAINLEVEL_OUTOF10: 0 - NO PAIN
PAINLEVEL_OUTOF10: 0 - NO PAIN
PAINLEVEL_OUTOF10: 2
PAINLEVEL_OUTOF10: 0 - NO PAIN
PAINLEVEL_OUTOF10: 2
PAINLEVEL_OUTOF10: 0 - NO PAIN
PAINLEVEL_OUTOF10: 8
PAINLEVEL_OUTOF10: 10 - WORST POSSIBLE PAIN
PAINLEVEL_OUTOF10: 0 - NO PAIN
PAINLEVEL_OUTOF10: 7

## 2023-10-31 ASSESSMENT — PAIN - FUNCTIONAL ASSESSMENT
PAIN_FUNCTIONAL_ASSESSMENT: 0-10
PAIN_FUNCTIONAL_ASSESSMENT: 0-10

## 2023-10-31 ASSESSMENT — COGNITIVE AND FUNCTIONAL STATUS - GENERAL
HELP NEEDED FOR BATHING: A LOT
CLIMB 3 TO 5 STEPS WITH RAILING: A LOT
WALKING IN HOSPITAL ROOM: A LITTLE
HELP NEEDED FOR BATHING: A LOT
DAILY ACTIVITIY SCORE: 16
MOVING FROM LYING ON BACK TO SITTING ON SIDE OF FLAT BED WITH BEDRAILS: A LITTLE
DRESSING REGULAR LOWER BODY CLOTHING: A LOT
TOILETING: A LOT
DRESSING REGULAR LOWER BODY CLOTHING: A LOT
TURNING FROM BACK TO SIDE WHILE IN FLAT BAD: A LITTLE
TOILETING: A LOT
PERSONAL GROOMING: A LITTLE
TURNING FROM BACK TO SIDE WHILE IN FLAT BAD: A LITTLE
MOVING TO AND FROM BED TO CHAIR: A LITTLE
PERSONAL GROOMING: A LOT
MOBILITY SCORE: 18
CLIMB 3 TO 5 STEPS WITH RAILING: TOTAL
MOVING TO AND FROM BED TO CHAIR: A LITTLE
MOBILITY SCORE: 16
DRESSING REGULAR UPPER BODY CLOTHING: A LITTLE
STANDING UP FROM CHAIR USING ARMS: A LITTLE
DRESSING REGULAR UPPER BODY CLOTHING: A LOT
STANDING UP FROM CHAIR USING ARMS: A LITTLE
WALKING IN HOSPITAL ROOM: A LITTLE
DAILY ACTIVITIY SCORE: 14

## 2023-10-31 ASSESSMENT — ENCOUNTER SYMPTOMS
CARDIOVASCULAR NEGATIVE: 1
CHILLS: 0
FATIGUE: 0
DIAPHORESIS: 0
ABDOMINAL PAIN: 1
FEVER: 0
RESPIRATORY NEGATIVE: 1
CONFUSION: 1

## 2023-10-31 ASSESSMENT — ACTIVITIES OF DAILY LIVING (ADL)
ADL_ASSISTANCE: INDEPENDENT
ADL_ASSISTANCE: INDEPENDENT
BATHING_ASSISTANCE: MINIMAL

## 2023-10-31 NOTE — PROGRESS NOTES
Victorino Lara is a 48 y.o. adult on day 19 of admission presenting with Acute appendicitis with localized peritonitis.     Subjective   Present 48-year-old male resting comfortably in bed.  Denies pain, nausea, vomiting, cough, shortness of breath, chest pain.  States that he is tolerating his regular diet well.  Instructed on the use of incentive spirometer and demonstrated use.  The patient would like to go to his SNF post hospital but is still deciding on a facility.       Objective   Dressing to the abdomen is intact with a scant amount of serous drainage.  Alert and oriented x3.    Physical Exam  Vitals and nursing note reviewed.   Constitutional:       General: He is not in acute distress.     Appearance: Normal appearance.   HENT:      Head: Normocephalic and atraumatic.      Nose: Nose normal.      Mouth/Throat:      Mouth: Mucous membranes are moist.   Eyes:      Pupils: Pupils are equal, round, and reactive to light.   Cardiovascular:      Rate and Rhythm: Normal rate and regular rhythm.      Pulses: Normal pulses.      Heart sounds: Normal heart sounds, S1 normal and S2 normal.   Pulmonary:      Effort: Pulmonary effort is normal.      Breath sounds: Normal breath sounds.   Abdominal:      General: Bowel sounds are normal. There is distension.      Palpations: Abdomen is soft.      Tenderness: There is no abdominal tenderness. There is no guarding.      Comments: Mild abdominal distention.  Dressing to the abdomen in place with scant amount of serous drainage.   Musculoskeletal:         General: Normal range of motion.   Skin:     General: Skin is warm and dry.      Capillary Refill: Capillary refill takes less than 2 seconds.   Neurological:      General: No focal deficit present.      Mental Status: He is alert and oriented to person, place, and time.   Psychiatric:         Mood and Affect: Mood normal.         Behavior: Behavior normal. Behavior is cooperative.         Last Recorded Vitals  Blood  "pressure 134/75, pulse 88, temperature 36.6 °C (97.9 °F), resp. rate 18, height 1.702 m (5' 7.01\"), weight 94.8 kg (208 lb 15.9 oz), SpO2 96 %.  Intake/Output last 3 Shifts:  I/O last 3 completed shifts:  In: 688.6 (7.3 mL/kg) [NG/GT:494; IV Piggyback:194.6]  Out: 1915 (20.2 mL/kg) [Urine:1915 (0.6 mL/kg/hr)]  Weight: 94.8 kg     Relevant Results  Scheduled medications  busPIRone, 15 mg, oral, BID  enoxaparin, 40 mg, subcutaneous, q24h  [MAR Hold] insulin lispro, 0-5 Units, subcutaneous, TID with meals  lurasidone, 40 mg, oral, Nightly  meropenem, 1 g, intravenous, q8h  QUEtiapine, 150 mg, oral, Nightly  simvastatin, 40 mg, oral, Nightly  sodium hypochlorite, , irrigation, BID  tamsulosin, 0.4 mg, oral, Daily before breakfast  traZODone, 50 mg, oral, Nightly  venlafaxine XR, 150 mg, oral, Daily      Continuous medications     PRN medications  PRN medications: acetaminophen, alteplase, dextrose 10 % in water (D10W), dextrose, glucagon, ipratropium-albuteroL, ketorolac, metoprolol, morphine, oxygen, simethicone     Results for orders placed or performed during the hospital encounter of 10/10/23 (from the past 24 hour(s))   POCT GLUCOSE   Result Value Ref Range    POCT Glucose 114 (H) 74 - 99 mg/dL   POCT GLUCOSE   Result Value Ref Range    POCT Glucose 111 (H) 74 - 99 mg/dL   CBC and Auto Differential   Result Value Ref Range    WBC 11.7 (H) 4.4 - 11.3 x10*3/uL    nRBC 0.0 0.0 - 0.0 /100 WBCs    RBC 3.26 (L) 4.00 - 5.90 x10*6/uL    Hemoglobin 9.4 (L) 12.0 - 17.5 g/dL    Hematocrit 28.2 (L) 36.0 - 52.0 %    MCV 87 80 - 100 fL    MCH 28.8 26.0 - 34.0 pg    MCHC 33.3 32.0 - 36.0 g/dL    RDW 13.4 11.5 - 14.5 %    Platelets 606 (H) 150 - 450 x10*3/uL    MPV 9.3 7.5 - 11.5 fL    Neutrophils % 68.2 40.0 - 80.0 %    Immature Granulocytes %, Automated 0.5 0.0 - 0.9 %    Lymphocytes % 19.6 13.0 - 44.0 %    Monocytes % 8.3 2.0 - 10.0 %    Eosinophils % 2.6 0.0 - 6.0 %    Basophils % 0.8 0.0 - 2.0 %    Neutrophils Absolute 7.99 " (H) 1.20 - 7.70 x10*3/uL    Immature Granulocytes Absolute, Automated 0.06 0.00 - 0.70 x10*3/uL    Lymphocytes Absolute 2.29 1.20 - 4.80 x10*3/uL    Monocytes Absolute 0.97 0.10 - 1.00 x10*3/uL    Eosinophils Absolute 0.31 0.00 - 0.70 x10*3/uL    Basophils Absolute 0.09 0.00 - 0.10 x10*3/uL   Comprehensive Metabolic Panel   Result Value Ref Range    Glucose 116 (H) 74 - 99 mg/dL    Sodium 136 136 - 145 mmol/L    Potassium 4.1 3.5 - 5.3 mmol/L    Chloride 103 98 - 107 mmol/L    Bicarbonate 25 21 - 32 mmol/L    Anion Gap 12 10 - 20 mmol/L    Urea Nitrogen 11 6 - 23 mg/dL    Creatinine 0.73 0.50 - 1.30 mg/dL    eGFR >90 >60 mL/min/1.73m*2    Calcium 8.4 (L) 8.6 - 10.3 mg/dL    Albumin 3.3 (L) 3.4 - 5.0 g/dL    Alkaline Phosphatase 148 (H) 33 - 120 U/L    Total Protein 7.0 6.4 - 8.2 g/dL    AST 18 9 - 39 U/L    Bilirubin, Total 0.5 0.0 - 1.2 mg/dL    ALT 19 7 - 52 U/L   Magnesium   Result Value Ref Range    Magnesium 2.01 1.60 - 2.40 mg/dL   Phosphorus   Result Value Ref Range    Phosphorus 3.2 2.5 - 4.9 mg/dL   SST TOP   Result Value Ref Range    Extra Tube Hold for add-ons.         Assessment/Plan     Principal Problem:    Acute appendicitis with localized peritonitis  Active Problems:    Anxiety    Depression    Hyperlipidemia    Diabetes mellitus, type 2 (CMS/HCC)    Appendicitis, acute    Appendicitis    Acute appendicitis, unspecified acute appendicitis type    Small bowel obstruction (CMS/HCC)    Plan  - Pain control  - DVT prophylaxis with enoxaparin 40 mg subcu daily  -Activity as tolerated  - Carb controlled diet  - Dispo planning to SNF patient is still deciding on facility           NIKITA Gay-CNP    Agree with note above except where noted below.  No acute overnight.  Regular diet.  Having bowel movements.  Denies any pain.  Neurologically intact.  Abdomen soft nondistended nontender.  Wound packed twice daily.  Discussed with patient that he is almost ready for discharge.  Need to follow-up with  infectious disease about what antibiotics to give given his marrow is technically due to and on the second for his multidrug-resistant Klebsiella pneumonia however I do not know what would be an adequate oral antibiotic for him to take for the next couple of days should he be discharged prior to the second.  Plan is for him to either go to an acute rehab where they can do wound care or for him to go home with potential physical therapy and wound care.  If this is able to be arranged she can go as early as today.  I would like to see him in 2 weeks to ensure that there is active and proper healing of his wound and that the patient is doing well.    Norman Calloway MD  10/31/23  9:39 AM

## 2023-10-31 NOTE — PROGRESS NOTES
Victorino Lara is a 48 y.o. adult on day 19 of admission presenting with Acute appendicitis with localized peritonitis.      Subjective   Patient is a 48 y.o. adult admitted on 10/10/2023  8:50 AM with the following indication(s) for ICU care Aspiration pneumonia.      Interval History: He has been extubated and transferred to the floor.  Currently doing quite well although generally weak.  CVP and Proctor are out.  He is getting IV meropenem for Klebsiella in the wound.  He is being treated for a complicated acute appendicitis as well as bibasal pneumonia. Denies any shortness of breath.  Scheduled medications  busPIRone, 15 mg, oral, BID  enoxaparin, 40 mg, subcutaneous, q24h  [MAR Hold] insulin lispro, 0-5 Units, subcutaneous, TID with meals  lurasidone, 40 mg, oral, Nightly  meropenem, 1 g, intravenous, q8h  QUEtiapine, 150 mg, oral, Nightly  simvastatin, 40 mg, oral, Nightly  sodium hypochlorite, , irrigation, BID  tamsulosin, 0.4 mg, oral, Daily before breakfast  traZODone, 50 mg, oral, Nightly  venlafaxine XR, 150 mg, oral, Daily        Continuous medications  PRN medications  PRN medications: acetaminophen, alteplase, dextrose 10 % in water (D10W), dextrose, glucagon, ipratropium-albuteroL, ketorolac, metoprolol, morphine, oxygen, simethicone       Objective   Physical Exam:   General appearance: Alert, awake off the ventilator.  Head: Normocephalic, without obvious abnormality  Eyes:conjunctivae/corneas clear. PERRL  Neck:  supple, symmetrical, trachea midline, left sided internal jugular line pulled out slightly  Lungs:clear to auscultation bilaterally  Chest wall:  no tenderness  Heart: regular rate and rhythm, S1, S2 normal, no murmur, click, rub or gallop  Abdomen: Soft, ND, still erythematouns around wound but is stable, wound is open about 5cm with packing, fascia intact, no more drainage  Male genitalia:  normal  Extremities: 2+ edema  Pulses:2+ and symmetric  Skin: Skin color, texture, turgor normal.  No  rashes or lesions  Neurologic: Awake, agitated, follows commands     Image Results  XR chest 1 view  Narrative: Interpreted By:  Yousif Ponce,   STUDY:  XR CHEST 1 VIEW;  10/29/2023 11:21 pm      INDICATION:  Signs/Symptoms:NG placement.      COMPARISON:  10/27/2023      ACCESSION NUMBER(S):  [id number]      ORDERING CLINICIAN:  KRISTINA MONTALVO      FINDINGS:          AP radiograph of the lower chest/upper abdomen: NG/OG tube tip  projects over the stomach. Lower chest is unremarkable. Gaseous  distention of loops of bowel projected over the upper abdomen. No  acute osseous abnormality. No large volume pneumoperitoneum.      Impression: 1. NG/OG tube tip projects over the stomach.          Signed by: Yousif Ponce 10/29/2023 11:40 PM  Dictation workstation:   [id number]      Assessment/Plan   Acute Respiratory Failure, post op  Aspiration Pneumonia        Principal Problem:    Acute appendicitis with localized peritonitis  Active Problems:    Anxiety    Depression    Hyperlipidemia    Diabetes mellitus, type 2 (CMS/HCC)    Appendicitis, acute    Appendicitis    Acute appendicitis, unspecified acute appendicitis type    Small bowel obstruction (CMS/HCC)     Neuro - Alert, awake and of the ventilator and somewhat confused and agitated  Cardiac - monitor hr and bp  Respiratory - acute hypoxemic respiratory failure - wean mechanical ventilation as able. Breathing trial today. Continue antibiotics for pneumonia  GI - ileus - continue ng to suction. On tpn. Discussed with Dr. Calloway, general surgery - will evaluate for infectious source with ct chest/abd/pelvis with oral and iv contrast.  Gu - hypokalemia - replace.. volume overload - has required some diuresis, will hold for now as bp is lower this morning (102/52)  Heme - anemia, stable, no need for transfusion  ID - continue antibiotics for pneumonia and intraabdominal infection. .  Endo - A1c improved since last year. Continue sliding scale  insulin.  MS - offload pressure points  Ppx - protonix, lovenox     Continue icu care     Pulmonary comments:-Agree with current treatment as ordered by hospitalist service.  Pulmonary will continue to monitor this patient with you.  Mann Ricks MD

## 2023-10-31 NOTE — CARE PLAN
Problem: Fall/Injury  Goal: Not fall by end of shift  Outcome: Progressing  Goal: Be free from injury by end of the shift  Outcome: Progressing  Goal: Verbalize understanding of personal risk factors for fall in the hospital  Outcome: Progressing  Goal: Verbalize understanding of risk factor reduction measures to prevent injury from fall in the home  Outcome: Progressing  Goal: Use assistive devices by end of the shift  Outcome: Progressing  Goal: Pace activities to prevent fatigue by end of the shift  Outcome: Progressing     Problem: Pain  Goal: Takes deep breaths with improved pain control throughout the shift  Outcome: Progressing  Goal: Turns in bed with improved pain control throughout the shift  Outcome: Progressing  Goal: Performs ADL's with improved pain control throughout shift  Outcome: Progressing  Goal: Free from opioid side effects throughout the shift  Outcome: Progressing  Goal: Free from acute confusion related to pain meds throughout the shift  Outcome: Progressing     Problem: Skin  Goal: Participates in plan/prevention/treatment measures  Outcome: Progressing  Flowsheets (Taken 10/31/2023 1854)  Participates in plan/prevention/treatment measures:   Discuss with provider PT/OT consult   Elevate heels   Increase activity/out of bed for meals  Goal: Prevent/manage excess moisture  Outcome: Progressing  Flowsheets (Taken 10/31/2023 1854)  Prevent/manage excess moisture:   Cleanse incontinence/protect with barrier cream   Moisturize dry skin   Follow provider orders for dressing changes   Monitor for/manage infection if present  Goal: Prevent/minimize sheer/friction injuries  Outcome: Progressing  Flowsheets (Taken 10/31/2023 1854)  Prevent/minimize sheer/friction injuries:   Complete micro-shifts as needed if patient unable. Adjust patient position to relieve pressure points, not a full turn   Increase activity/out of bed for meals   Use pull sheet   HOB 30 degrees or less   Turn/reposition every 2  hours/use positioning/transfer devices  Goal: Promote/optimize nutrition  Outcome: Progressing  Flowsheets (Taken 10/31/2023 9624)  Promote/optimize nutrition:   Monitor/record intake including meals   Offer water/supplements/favorite foods   Reassess MST if dietician not consulted   The patient's goals for the shift include Pain management    The clinical goals for the shift include Pain managed at acceptable level throughout this shift.    Over the shift, the patient did make progress toward the following goals.

## 2023-10-31 NOTE — PROGRESS NOTES
Victorino Lara is a 48 y.o. adult on day 16  of admission presenting with Acute appendicitis with localized peritonitis.      Subjective   Patient is a 48 y.o. adult admitted on 10/10/2023  8:50 AM with the following indication(s) for ICU care Aspiration pneumonia.      Interval History: He has been extubated and transferred to the floor.  Currently doing quite well although generally weak.  CVP and Proctor are out.  He is getting IV meropenem for Klebsiella in the wound.  He is being treated for a complicated acute appendicitis as well as bibasal pneumonia. Denies any shortness of breath.  Scheduled medications  busPIRone, 15 mg, oral, BID  enoxaparin, 40 mg, subcutaneous, q24h  [MAR Hold] insulin lispro, 0-5 Units, subcutaneous, TID with meals  lurasidone, 40 mg, oral, Nightly  meropenem, 1 g, intravenous, q8h  QUEtiapine, 150 mg, oral, Nightly  simvastatin, 40 mg, oral, Nightly  sodium hypochlorite, , irrigation, BID  tamsulosin, 0.4 mg, oral, Daily before breakfast  traZODone, 50 mg, oral, Nightly  venlafaxine XR, 150 mg, oral, Daily      Continuous medications     PRN medications  PRN medications: acetaminophen, alteplase, dextrose 10 % in water (D10W), dextrose, glucagon, ipratropium-albuteroL, ketorolac, metoprolol, morphine, oxygen, simethicone       Objective   Physical Exam:   General appearance: Alert, awake off the ventilator.  Head: Normocephalic, without obvious abnormality  Eyes:conjunctivae/corneas clear. PERRL  Neck:  supple, symmetrical, trachea midline, left sided internal jugular line pulled out slightly  Lungs:clear to auscultation bilaterally  Chest wall:  no tenderness  Heart: regular rate and rhythm, S1, S2 normal, no murmur, click, rub or gallop  Abdomen: Soft, ND, still erythematouns around wound but is stable, wound is open about 5cm with packing, fascia intact, no more drainage  Male genitalia:  normal  Extremities: 2+ edema  Pulses:2+ and symmetric  Skin: Skin color, texture, turgor normal.   No rashes or lesions  Neurologic: Awake, agitated, follows commands       Assessment/Plan   Acute Respiratory Failure, post op  Aspiration Pneumonia        Principal Problem:    Acute appendicitis with localized peritonitis  Active Problems:    Anxiety    Depression    Hyperlipidemia    Diabetes mellitus, type 2 (CMS/HCC)    Appendicitis, acute    Appendicitis    Acute appendicitis, unspecified acute appendicitis type    Small bowel obstruction (CMS/HCC)     Neuro - Alert, awake and of the ventilator and somewhat confused and agitated  Cardiac - monitor hr and bp  Respiratory - acute hypoxemic respiratory failure - wean mechanical ventilation as able. Breathing trial today. Continue antibiotics for pneumonia  GI - ileus - continue ng to suction. On tpn. Discussed with Dr. Calloway, general surgery - will evaluate for infectious source with ct chest/abd/pelvis with oral and iv contrast.  Gu - hypokalemia - replace.. volume overload - has required some diuresis, will hold for now as bp is lower this morning (102/52)  Heme - anemia, stable, no need for transfusion  ID - continue antibiotics for pneumonia and intraabdominal infection. .  Endo - A1c improved since last year. Continue sliding scale insulin.  MS - offload pressure points  Ppx - protonix, lovenox     Continue icu care    Pulmonary comments:-Agree with current treatment as ordered by hospitalist service.  Pulmonary will continue to monitor this patient with you.           Mann Ricks MD

## 2023-10-31 NOTE — PROGRESS NOTES
Physical Therapy    Physical Therapy Evaluation    Patient Name: Victorino Lara  MRN: [id number]  Today's Date: 10/31/2023   Time Calculation  Start Time: 1054  Stop Time: 1110  Time Calculation (min): 16 min    Assessment/Plan   PT Assessment  PT Assessment Results: Decreased strength, Decreased endurance, Impaired balance, Decreased mobility  Rehab Prognosis: Good  Evaluation/Treatment Tolerance: Patient limited by fatigue  End of Session Communication: Bedside nurse, Care Coordinator  Assessment Comment: pt with decreased mobility/gait , strength, balance, endurance  pt to benefit from skilled PT during hospital stay to address deficits and improve functional mobility .  End of Session Patient Position: Up in chair, Alarm on  IP OR SWING BED PT PLAN  Inpatient or Swing Bed: Inpatient  PT Plan  Treatment/Interventions: Bed mobility, Transfer training, Gait training, Stair training, Balance training, Therapeutic exercise, Therapeutic activity, Endurance training, Strengthening, Home exercise program  PT Plan: Skilled PT  PT Frequency: 4 times per week  PT Discharge Recommendations: High intensity level of continued care  PT Recommended Transfer Status: Assist x1    Subjective     Current Problem:  Patient Active Problem List   Diagnosis    Acute appendicitis with localized peritonitis    Anxiety    Depression    Hyperlipidemia    Diabetes mellitus, type 2 (CMS/HCC)    Appendicitis, acute    Appendicitis    Acute appendicitis, unspecified acute appendicitis type    Small bowel obstruction (CMS/HCC)     General Visit Information:  General  Reason for Referral: weakness/ impaired mobility  Referred By: OT/PT Phil 10/30  Past Medical History Relevant to Rehab: Depression, anxiety, DM2, GERD, HLD, Bipolar, SBO  Prior to Session Communication: Bedside nurse (cleared to see pt for therapy evals)  Patient Position Received: Bed, 3 rail up, Alarm on (pt has 02 2liters)  General Comment: pt is a 49 yo male came to the hospital  on 10/10  dx with acute appendicits .  pt underwent  lap appendectomy on 10/14 , pt developed a SBO and had to go back into sx for an exploratory lap with lysis of adhesions and hernia repair,  after sx pt had large amounts of bilious emesis and had to go back to sx for repair of dehesence pt then was transferred to ICU and was placed on vent . pt extubated on 10/26 and then transferred to medical floor on 10/30 .   recent test/labs:  HGB 9.4 , CT ABD 10/24:  Bilat PNA and Bilat atelectisis    Home Living:  Home Living  Type of Home: Apartment (9th floor)  Lives With: Significant other  Home Adaptive Equipment: Walker rolling or standard  Home Layout: One level  Home Access: Elevator  Bathroom Shower/Tub: Tub/shower unit  Bathroom Toilet: Handicapped height  Bathroom Equipment: Grab bars in shower, Shower chair with back  Home Living Comments: girl friend helps with iadls    Prior Level of Function:  Prior Function Per Pt/Caregiver Report  Level of [place]: Independent with ADLs and functional transfers, Needs assistance with homemaking  Receives Help From:  (Girl friend)  ADL Assistance: Independent  Homemaking Assistance: Needs assistance  Ambulatory Assistance: Independent (FWW)  Prior Function Comments: mod I with FWW with transfers/ and gait , mod I adls  assisted with iadls doesnt drive no falls    Precautions:  Precautions  Medical Precautions: Fall precautions, Abdominal precautions, Oxygen therapy device and L/min  Post-Surgical Precautions: Abdominal surgery precautions    Vital Signs:  Vital Signs  Heart Rate: 87 (102 with activity)  SpO2: 97 % (98 with activity pt on 2 liters 02)  Objective     Pain:  Pain Assessment  Pain Assessment: 0-10  Pain Score: 0 - No pain    Cognition:  Cognition  Overall Cognitive Status: Within Functional Limits  Orientation Level: Oriented X4    General Assessments:      Activity Tolerance  Endurance: Decreased tolerance for upright activites     Strength  Strength  Comments: BLE 4-/5  Dynamic Sitting Balance  Dynamic Sitting-Comments: fair -  Dynamic Standing Balance  Dynamic Standing-Comments: fair -    Functional Assessments:     Bed Mobility  Bed Mobility: Yes  Bed Mobility 1  Bed Mobility 1: Supine to sitting, Scooting  Level of Assistance 1: Minimum assistance  Bed Mobility Comments 1: increasd time need to get to EOB  Transfers  Transfer: Yes  Transfer 1  Technique 1: Sit to stand, Stand to sit  Transfer Device 1: Walker (FWW)  Transfer Level of Assistance 1: Minimum assistance  Trials/Comments 1: cues to push up form bed and reach back with sitting  Ambulation/Gait Training  Ambulation/Gait Training Performed: Yes  Ambulation/Gait Training 1  Surface 1: Level tile  Device 1: Rolling walker  Assistance 1: Minimum assistance  Quality of Gait 1: Inconsistent stride length  Comments/Distance (ft) 1: 15 ft with FWW with rapid fatigue  Extremity/Trunk Assessments:  RUE   RUE : Within Functional Limits  LUE   LUE: Within Functional Limits  RLE   RLE : Within Functional Limits  LLE   LLE : Within Functional Limits    Outcome Measures:  [facility] Basic Mobility  Turning from your back to your side while in a flat bed without using bedrails: A little  Moving from lying on your back to sitting on the side of a flat bed without using bedrails: A little  Moving to and from bed to chair (including a wheelchair): A little  Standing up from a chair using your arms (e.g. wheelchair or bedside chair): A little  To walk in hospital room: A little  Climbing 3-5 steps with railing: Total  Basic Mobility - Total Score: 16    Goals:  Encounter Problems       Encounter Problems (Active)       PT Problem       Pt will demonstrate mod I  with bed mobility to edge of bed.   (Progressing)       Start:  10/31/23    Expected End:  11/14/23            Pt will demonstrate MOD I  with sit to stand/chair transfers with FWW.   (Progressing)       Start:  10/31/23    Expected End:  11/14/23            Pt will  ambulate 100 feet with FWW SBA .   (Progressing)       Start:  10/31/23    Expected End:  11/14/23            Pt to demo improved BLE strength by being able to complete supine/seated thera ex 2x20 BLEs with 4 or less rest breaks .   (Progressing)       Start:  10/31/23    Expected End:  11/14/23                     Education Documentation  Mobility Training, taught by Yoan Park, PT at 10/31/2023 12:31 PM.  Learner: Patient  Readiness: Acceptance  Method: Explanation  Response: Verbalizes Understanding    Education Comments  No comments found.

## 2023-10-31 NOTE — PROGRESS NOTES
Victorino Lara is a 48 y.o. adult on day 19 of admission who has been seen by psychiatry for acute agitation initially and currently optimizing his psychotropic medications.  Victorino has been doing fairly well.  He did not appear to be in acute distress.  He slept well last night and the quality of his sleep was better than the night before.  He described his mood is stable.  He is tolerating his medications well no side effects from medications.  Add Seroquel 150 mg along with Latuda 40 mg and he used to be on 300 mg of Seroquel and 80 mg of Latuda he is detention titrated up on his dose.  Any further increase at this time is not warranted and he likely needs more time to adjust to these doses before any further increase because he was off of his medications as he was intubated and could not receive these medications.  Any further increase in my assessment may cause more sedation and trouble for him to problem solve his stresses especially his health.  Patient is tolerating medications well.   Labs Reviewed.  Vitals Reviewed.   Nursing Notes Reviewed.   No EPS, TD, vitals stable.      MSE: Patient was alert, oriented to time, place, person and situation. Patient appears well groomed and clad in climate appropriate clothes. Patient is cooperative on approach. Recent and remote memory within normal limits. Memory registration and recall within normal limits. Attention and concentration within normal limits. Speech normal in rate, rhythm and volume. Good eye contact. Thought process Linear. Intact associations. Good fund of knowledge. Mood ok and affect constricted. Patient did not endorse any delusions. Patient denied any auditory visual hallucinations. Patient has denied any active suicidal or homicidal ideations and is future oriented.  Patient has fair insight, fair judgment and good impulse control.     Musculoskeletal: Normal gait, no Parkinsonism, no Dystonia, no Akathisia, no TD. Psychomotor activity within normal  "limits.     Diagnosis: Bipolar 1 disorder    Assessment and Plan:     Patient is not at imminent risk of harm to self or others and does not need inpatient psychiatric care.  Please confirm outpatient psychiatry and therapy appointment and reconsult psychiatry as needed and psychiatry will sign out at this time  Continue with current treatment and medication adjustments. Patient is agreeable with continued medication management and adjustments discussed.         Last Recorded Vitals  /75   Pulse 88   Temp 36.6 °C (97.9 °F)   Resp 18   Ht 1.702 m (5' 7.01\")   Wt 94.8 kg (208 lb 15.9 oz)   SpO2 96%   BMI 32.73 kg/m²      Recent Results (from the past 24 hour(s))   POCT GLUCOSE    Collection Time: 10/30/23  4:25 PM   Result Value Ref Range    POCT Glucose 111 (H) 74 - 99 mg/dL   CBC and Auto Differential    Collection Time: 10/31/23  6:33 AM   Result Value Ref Range    WBC 11.7 (H) 4.4 - 11.3 x10*3/uL    nRBC 0.0 0.0 - 0.0 /100 WBCs    RBC 3.26 (L) 4.00 - 5.90 x10*6/uL    Hemoglobin 9.4 (L) 12.0 - 17.5 g/dL    Hematocrit 28.2 (L) 36.0 - 52.0 %    MCV 87 80 - 100 fL    MCH 28.8 26.0 - 34.0 pg    MCHC 33.3 32.0 - 36.0 g/dL    RDW 13.4 11.5 - 14.5 %    Platelets 606 (H) 150 - 450 x10*3/uL    MPV 9.3 7.5 - 11.5 fL    Neutrophils % 68.2 40.0 - 80.0 %    Immature Granulocytes %, Automated 0.5 0.0 - 0.9 %    Lymphocytes % 19.6 13.0 - 44.0 %    Monocytes % 8.3 2.0 - 10.0 %    Eosinophils % 2.6 0.0 - 6.0 %    Basophils % 0.8 0.0 - 2.0 %    Neutrophils Absolute 7.99 (H) 1.20 - 7.70 x10*3/uL    Immature Granulocytes Absolute, Automated 0.06 0.00 - 0.70 x10*3/uL    Lymphocytes Absolute 2.29 1.20 - 4.80 x10*3/uL    Monocytes Absolute 0.97 0.10 - 1.00 x10*3/uL    Eosinophils Absolute 0.31 0.00 - 0.70 x10*3/uL    Basophils Absolute 0.09 0.00 - 0.10 x10*3/uL   Comprehensive Metabolic Panel    Collection Time: 10/31/23  6:33 AM   Result Value Ref Range    Glucose 116 (H) 74 - 99 mg/dL    Sodium 136 136 - 145 mmol/L    " Potassium 4.1 3.5 - 5.3 mmol/L    Chloride 103 98 - 107 mmol/L    Bicarbonate 25 21 - 32 mmol/L    Anion Gap 12 10 - 20 mmol/L    Urea Nitrogen 11 6 - 23 mg/dL    Creatinine 0.73 0.50 - 1.30 mg/dL    eGFR >90 >60 mL/min/1.73m*2    Calcium 8.4 (L) 8.6 - 10.3 mg/dL    Albumin 3.3 (L) 3.4 - 5.0 g/dL    Alkaline Phosphatase 148 (H) 33 - 120 U/L    Total Protein 7.0 6.4 - 8.2 g/dL    AST 18 9 - 39 U/L    Bilirubin, Total 0.5 0.0 - 1.2 mg/dL    ALT 19 7 - 52 U/L   Magnesium    Collection Time: 10/31/23  6:33 AM   Result Value Ref Range    Magnesium 2.01 1.60 - 2.40 mg/dL   Phosphorus    Collection Time: 10/31/23  6:33 AM   Result Value Ref Range    Phosphorus 3.2 2.5 - 4.9 mg/dL   SST TOP    Collection Time: 10/31/23  6:33 AM   Result Value Ref Range    Extra Tube Hold for add-ons.           Current Facility-Administered Medications:     acetaminophen (Tylenol) oral liquid 650 mg, 650 mg, oral, q6h PRN, PEPITO Beltre, 650 mg at 10/29/23 1511    alteplase (Cathflo Activase) injection 2 mg, 2 mg, intra-catheter, PRN, PEPITO Beltre    busPIRone (Buspar) tablet 15 mg, 15 mg, oral, BID, PEPITO Beltre, 15 mg at 10/31/23 0947    dextrose 10 % in water (D10W) infusion, 0.3 g/kg/hr, intravenous, Once PRN, PEPITO Beltre    dextrose 50 % injection 25 g, 25 g, intravenous, q15 min PRN, PEPITO Beltre    enoxaparin (Lovenox) syringe 40 mg, 40 mg, subcutaneous, q24h, PEPITO Beltre, 40 mg at 10/29/23 1735    glucagon (Glucagen) injection 1 mg, 1 mg, intramuscular, q15 min PRN, PEPITO Beltre    [MAR Hold] insulin lispro (HumaLOG) injection 0-5 Units, 0-5 Units, subcutaneous, TID with meals, PEPITO Beltre    ipratropium-albuteroL (Duo-Neb) 0.5-2.5 mg/3 mL nebulizer solution 3 mL, 3 mL, nebulization, q4h PRN, PEPITO Beltre    ketorolac (Toradol) injection 30 mg, 30 mg, intravenous, q6h PRN, PEPITO Beltre, 30 mg at 10/30/23 1413    lurasidone (Latuda)  tablet 40 mg, 40 mg, oral, Nightly, ESTEPHANIA BeltreCNP, 40 mg at 10/30/23 2131    meropenem (Merrem) in sodium chloride 0.9 % 100 mL IV 1 g, 1 g, intravenous, q8h, ESTEPHANIA BeltreCNP, Stopped at 10/31/23 0602    metoprolol tartrate (Lopressor) injection 5 mg, 5 mg, intravenous, q6h PRN, PEPITO Beltre, 5 mg at 10/25/23 1527    morphine injection 2 mg, 2 mg, intravenous, q4h PRN, ESTEPHANIA BeltreCNP, 2 mg at 10/31/23 0947    oxygen (O2) therapy, , inhalation, Continuous PRN - O2/gases, PEPITO Beltre, 2 L/min at 10/29/23 0700    QUEtiapine (SEROquel) tablet 150 mg, 150 mg, oral, Nightly, NIKITA Beltre-CNP, 150 mg at 10/30/23 2040    simethicone (Mylicon) drops 40 mg, 40 mg, oral, q4h PRN, ESTEPHANIA BeltreCNP, 40 mg at 10/29/23 1800    simvastatin (Zocor) tablet 40 mg, 40 mg, oral, Nightly, NIKITA Beltre-CNP, 40 mg at 10/30/23 2041    sodium hypochlorite (Dakin's HALF-Strength) 0.25 % external solution, , irrigation, BID, ESTEPHANIA BeltreCNP, 473 mL at 10/31/23 0947    tamsulosin (Flomax) 24 hr capsule 0.4 mg, 0.4 mg, oral, Daily before breakfast, PEPITO Beltre, 0.4 mg at 10/31/23 0533    traZODone (Desyrel) tablet 50 mg, 50 mg, oral, Nightly, NIKITA Beltre-CNP, 50 mg at 10/30/23 2041    venlafaxine XR (Effexor-XR) 24 hr capsule 150 mg, 150 mg, oral, Daily, NIKITA Beltre-CNP, 150 mg at 10/31/23 0947       Maik Cotton MD

## 2023-10-31 NOTE — CARE PLAN
The patient's goals for the shift include Pain management    The clinical goals for the shift include having pain at a tolerrable level by end of the shift.

## 2023-10-31 NOTE — PROGRESS NOTES
Primary MD: Roxana Morris PA-C    Reason For Consult  Bilateral lower lobe pneumonia  Acute appendicitis with localized peritonitis    History Of Present Illness  Victorino Lara is a 48 y.o. male              Patient was admitted 10/1/2023     Taken to laparoscopic surgery for acute appendicitis localized peritonitis without perforation     Taken back to surgery 10/14/2023 for small bowel obstruction exploratory laparotomy lysis of adhesions repair of incisional hernia     Hospital course complicated by development of aspiration pneumonia   Has required intubation and mechanical ventilation  growing  Klebsiella pneumonia and MSSA from sputum switched to meropenem wound culture was identified     Klebsiella is resistant to Zosyn consented to third-generation cephalosporin        There is another culture from 10/19/2023 reported as surgical site tissue biopsy growing the same Klebsiella pneumonia and E. coli           CT scan from 10/20/2023 shows bilateral lower lobe consolidations            No fever  extubated  No pressors  Opens eyes follows some commands      Review of Systems   Constitutional:  Negative for chills, diaphoresis, fatigue and fever.   Respiratory: Negative.     Cardiovascular: Negative.    Gastrointestinal:  Positive for abdominal pain.   Psychiatric/Behavioral:  Positive for confusion.         Physical Exam  Constitutional:       Appearance: He is not ill-appearing or diaphoretic.   Cardiovascular:      Heart sounds: Normal heart sounds. No murmur heard.  Pulmonary:      Effort: No respiratory distress.      Breath sounds: No wheezing, rhonchi or rales.   Abdominal:      General: Abdomen is flat. Bowel sounds are normal. There is no distension.      Palpations: Abdomen is soft. There is no mass.      Tenderness: There is no abdominal tenderness. There is no guarding or rebound.   Musculoskeletal:         General: No swelling.      Left lower leg: No edema.   Skin:     Coloration:  "Skin is not jaundiced.      Findings: No erythema.   Neurological:      Mental Status: He is disoriented.          Range of Vitals (last 24 hours)  Heart Rate:  []   Temp:  [36.5 °C (97.7 °F)-37.4 °C (99.3 °F)]   Resp:  [0-33]   BP: (134-181)/(75-89)   SpO2:  [94 %-100 %]     Relevant Results  Results from last 72 hours   Lab Units 10/31/23  0633   WBC AUTO x10*3/uL 11.7*   HEMOGLOBIN g/dL 9.4*   HEMATOCRIT % 28.2*   PLATELETS AUTO x10*3/uL 606*   NEUTROS PCT AUTO % 68.2   LYMPHS PCT AUTO % 19.6   MONOS PCT AUTO % 8.3   EOS PCT AUTO % 2.6       Results from last 72 hours   Lab Units 10/31/23  0633   SODIUM mmol/L 136   POTASSIUM mmol/L 4.1   CHLORIDE mmol/L 103   CO2 mmol/L 25   BUN mg/dL 11   CREATININE mg/dL 0.73   GLUCOSE mg/dL 116*   CALCIUM mg/dL 8.4*   ANION GAP mmol/L 12   EGFR mL/min/1.73m*2 >90       Results from last 72 hours   Lab Units 10/31/23  0633   ALK PHOS U/L 148*   BILIRUBIN TOTAL mg/dL 0.5   PROTEIN TOTAL g/dL 7.0   ALT U/L 19   AST U/L 18   ALBUMIN g/dL 3.3*       Estimated Creatinine Clearance (by C-G formula based on SCr of 0.73 mg/dL)  Female: 110.2 mL/min  Male: 125 mL/min  The patient&#39;s sex/gender information is inconclusive; review their information before proceeding.  CRP   Date/Time Value Ref Range Status   01/17/2020 10:00 PM 0.19 mg/dL Final     Comment:     REF VALUE  < 1.00     01/12/2020 06:29 AM 0.47 mg/dL Final     Comment:     REF VALUE  < 1.00       Sedimentation Rate   Date/Time Value Ref Range Status   01/17/2020 10:00 PM 5 0 - 15 mm/h Final   01/12/2020 06:29 AM 14 0 - 15 mm/h Final     No results found for: \"HIV1X2\", \"HIVCONF\", \"OKYZSQ4HG\"  No results found for: \"HCVPCRQUANT\"  Cultures  Susceptibility data from last 14 days.  Collected Specimen Info Organism Amoxicillin/Clavulanate Ampicillin Ampicillin/Sulbactam Cefazolin Ceftriaxone Cefuroxime Ciprofloxacin Clindamycin Erythromycin Gentamicin Levofloxacin Oxacillin Piperacillin/Tazobactam   10/19/23 Fluid from " Tracheal Aspirate Methicillin Susceptible Staphylococcus aureus (MSSA)        S S   S      Klebsiella pneumoniae/variicola R R R R S S S   S S  R   10/19/23 Tissue/Biopsy from Surgical Site Infection Klebsiella pneumoniae/variicola R R R R S S S   S S  R     Escherichia coli S R R S   S   S S  S     Collected Specimen Info Organism Tetracycline Trimethoprim/Sulfamethoxazole Vancomycin   10/19/23 Fluid from Tracheal Aspirate Methicillin Susceptible Staphylococcus aureus (MSSA) S S S     Klebsiella pneumoniae/variicola  S    10/19/23 Tissue/Biopsy from Surgical Site Infection Klebsiella pneumoniae/variicola  S      Escherichia coli  S             Assessment/Plan     Bilateral lower lobe pneumonia  Acute appendicitis with localized peritonitis  Postop wound infection       Growing Klebsiella and MSSA from sputum Klebsiella and E. coli from wound     meropenem

## 2023-10-31 NOTE — PROGRESS NOTES
10/31/23 1524   Discharge Planning   Living Arrangements Spouse/significant other   Support Systems Spouse/significant other   Assistance Needed therapy   Type of Residence Private residence   Who is requesting discharge planning? Provider   Home or Post Acute Services Post acute facilities (Rehab/SNF/etc)   Type of Post Acute Facility Services Rehab   Patient expects to be discharged to: [facility] acute rehab   Does the patient need discharge transport arranged? Yes   RoundTrip coordination needed? Yes   Has discharge transport been arranged? No     Pt was transferred from critical care to medical floor. Updated therapy eval indicates pt would benefit from acute rehab vs SNF. Spoke with pt & fiancee at bedside who agree to referral to [facility] Acute rehab. Pt accepted & requested acute rehab to start precert.

## 2023-10-31 NOTE — PROGRESS NOTES
Occupational Therapy    Evaluation/Treatment    Patient Name: Victorino Lara  MRN: [id number]  : 1975  Today's Date: 23  Time Calculation  Start Time: 1054  Stop Time: 1111  Time Calculation (min): 17 min       Assessment:  End of Session Communication: Bedside nurse, Care Coordinator  OT Assessment Results: Decreased ADL status, Decreased endurance, Decreased functional mobility    Plan:  Treatment Interventions: ADL retraining, Functional transfer training, Patient/family training, Compensatory technique education  OT Frequency: 2 times per week  OT Discharge Recommendations: High intensity level of continued care  Treatment Interventions: ADL retraining, Functional transfer training, Patient/family training, Compensatory technique education  Subjective     Current Problem:  1. Acute appendicitis, unspecified acute appendicitis type  DISCONTINUED: oxyCODONE (Roxicodone) 5 mg immediate release tablet    DISCONTINUED: oxyCODONE (Roxicodone) 5 mg immediate release tablet      2. Acute appendicitis with localized peritonitis, unspecified whether abscess present, unspecified whether gangrene present, unspecified whether perforation present  Case Request Operating Room: Appendectomy Laparoscopy    Case Request Operating Room: Appendectomy Laparoscopy    Surgical Pathology Exam    Surgical Pathology Exam      3. Acute appendicitis with localized peritonitis, without perforation, abscess, or gangrene  DISCONTINUED: oxyCODONE (Roxicodone) 5 mg immediate release tablet      4. Small bowel obstruction (CMS/HCC)  Case Request Operating Room: Exploration Laparotomy    Case Request Operating Room: Exploration Laparotomy        Past Medical History:   Diagnosis Date    Acute appendicitis with localized peritonitis 10/10/2023    Anxiety and depression     Bipolar 1 disorder, depressed (CMS/HCC)     Diabetes mellitus (CMS/HCC)     type II, diagnosed mid     GERD (gastroesophageal reflux disease)     HLD  (hyperlipidemia)     JOI (iron deficiency anemia)     Obesity (BMI 30-39.9)     Onychomycosis     Panic disorder     Retention of urine, unspecified     Inability to urinate    Scrotal hematoma     Umbilical hernia      Past Surgical History:   Procedure Laterality Date    APPENDECTOMY  10/10/2023    COLONOSCOPY  2019    COLONOSCOPY  02/23/2022    CYST REMOVAL  03/27/2014    Hand, excision of cyst    ELBOW SURGERY Right     ESOPHAGOGASTRODUODENOSCOPY      HERNIA REPAIR      SCROTAL SURGERY  02/2016    Hematoma drainage       General:   OT Received On: 10/31/23  General  Reason for Referral: decline in self care performance  Referred By: OT/PT Phil 10/30  Past Medical History Relevant to Rehab: pt is a 49 yo male came to the hospital on 10/10 dx with acute appendicits . pt underwent lap appendectomy on 10/14 , pt developed a SBO and had to go back into sx for an exploratory lap with lysis of adhesions and hernia repair, after sx pt had large amounts of bilious emesis and had to go back to sx for repair of dehesence pt then was transferred to ICU and was placed on vent . pt extubated on 10/26 and then transferred to medical floor on 10/30 . recent test/labs: HGB 9.4 , CT ABD 10/24: Bilat PNA and Bilat atelectisis. PMH: Depression, anxiety, DM2, GERD, HLD, Bipolar, SBO  Prior to Session Communication: Bedside nurse  Patient Position Received: Bed, 3 rail up, Alarm on  General Comment: Pt pleasant and cooperative; agreeable to OT    Precautions:  Medical Precautions: Fall precautions, Abdominal precautions, Oxygen therapy device and L/min  Post-Surgical Precautions: Abdominal surgery precautions    Vital Signs:  Heart Rate: 87 (102 with activity)  SpO2: 97 % (98 with activity; pt on 2L via nc)    Pain:  Pain Assessment  Pain Assessment: 0-10  Pain Score: 0 - No pain  Objective     Cognition:  Overall Cognitive Status: Within Functional Limits  Orientation Level:  (with cues)  Processing Speed:  (mild delay)     Home  Living:  Type of Home: Apartment  Lives With: Significant other  Home Adaptive Equipment: Walker rolling or standard  Home Layout: One level  Home Access: Elevator  Bathroom Shower/Tub: Tub/shower unit  Bathroom Toilet: Handicapped height  Bathroom Equipment: Grab bars in shower, Shower chair with back  Home Living Comments: girl friend helps with iadls    Prior Function:  Level of [place]: Independent with ADLs and functional transfers, Needs assistance with homemaking  ADL Assistance: Independent  Homemaking Assistance: Needs assistance  Ambulatory Assistance: Independent  Prior Function Comments: mod I with FWW with transfers/ and gait , mod I adls  assisted with iadls doesnt drive no falls    Activities of Daily Living:   Eating Assistance: Independent  Grooming Assistance: Stand by  Bathing Assistance: Minimal (sponge)  UE Dressing Assistance: Stand by  LE Dressing Assistance: Minimal  Toileting Assistance with Device: Minimal  Functional Assistance: Minimal  ADL Comments: Fair reach BLEs; increased time; assessed at FWW level        Activity Tolerance:  Endurance: Decreased tolerance for upright activites         Bed Mobility/Transfers: Bed Mobility 1  Bed Mobility 1: Supine to sitting, Scooting  Level of Assistance 1: Minimum assistance  Bed Mobility Comments 1: increased time; slow and labored  Transfer 1  Transfer From 1: Bed to, Sit to, Stand to, Toilet to  Transfer Device 1: Walker  Transfer Level of Assistance 1: Minimum assistance, Minimal verbal cues, Minimal tactile cues         Balance:  Static Sitting: g  Dynamic Sitting: f+  Static Standing: F  Dynamic Standing: F    Strength:  Strength Comments: BUE AROM WFL; BUE strength WFL; MMT 5/5         Outcome Measures: [facility] Daily Activity  Putting on and taking off regular lower body clothing: A lot  Bathing (including washing, rinsing, drying): A lot  Putting on and taking off regular upper body clothing: A little  Toileting, which includes using  toilet, bedpan or urinal: A lot  Taking care of personal grooming such as brushing teeth: A little  Eating Meals: None  Daily Activity - Total Score: 16    Education Documentation  ADL Training, taught by Brandie Mendoza OT at 10/31/2023  2:29 PM.  Learner: Patient  Readiness: Acceptance  Method: Explanation  Response: Needs Reinforcement    EDUCATION TOPIC  OT General:  Rehab POC  Precautions    OT Fall:  Fall prevention  Proper use of adaptive equipment to increase safety      Goals:  Encounter Problems       Encounter Problems (Active)       OT Goals       LB dress / bathe SBA (Progressing)       Start:  10/31/23    Expected End:  11/02/23            toileting and hygiene SBA (Progressing)       Start:  10/31/23    Expected End:  11/02/23            ADL transfers SBA (Progressing)       Start:  10/31/23    Expected End:  11/02/23            Pt demo SBA with safe functional mobility for household distances for ADL participation.  (Progressing)       Start:  10/31/23    Expected End:  11/02/23            Pt tolerate >10 min functional activity tolerance for ADL/IADL without c/o fatigue; apply ECT/WS techniques without cues.  (Progressing)       Start:  10/31/23    Expected End:  11/02/23

## 2023-10-31 NOTE — PROGRESS NOTES
Music Therapy Note    Victorino Lara was referred by RN    Therapy Session  Referral Type: New referral this admission  Visit Type: Follow-up visit  Session Start Time: 1410  Session End Time: 1452  Intervention Delivery: In-person  Conflict of Service: None  Family Present for Session: None     Pre-assessment  Unable to Assess Reason: Outcomes not applicable  Pain Score: 10 - Worst possible pain  Stress Level (0-10): 0  Anxiety Level (0-10): 0  Mood/Affect: Anxious  Verbalized Emotional State: Acceptance         Treatment/Interventions  Areas of Focus: Pain management  Music Therapy Interventions: Music-facilitated relaxation    Post-assessment  Unable to Assess Reason: Outcomes not applicable  Pain Score: 7  Mood/Affect: Calm  Verbalized Emotional State: Acceptance  Total Session Time (min): 42 minutes    Narrative  Assessment Detail: PT lying in bed. PT shared they were not feeling anxious at this time but were shaking their leg. PT stated this was to try and distract themselves from the pain. PT accepted MT services to help reduce pain and increase relaxation in order to sleep.  Plan: To implement music for relaxation through guitar instrumental to reduce pain perception.  Intervention: MT implemented passive music through loop pedal and gutiar as preferred by PT. MT followed PT's shaking leg as a tempo to then follow entrainment techniques to reduce pain perception.  Evaluation: PT reported a reduction in pain perception, awake and alert post session.  Follow-up: MT to follow up this week.    Education Documentation  Resources, taught by Janki Puente at 10/31/2023 11:50 AM.  Learner: Patient  Readiness: Eager  Method: Demonstration  Response: Demonstrated Understanding    Coping Strategies, taught by Janki Puente at 10/31/2023 11:50 AM.  Learner: Patient  Readiness: Eager  Method: Demonstration  Response: Demonstrated Understanding    Relaxation, taught by Janki Puente at 10/31/2023 11:50  AM.  Learner: Patient  Readiness: Eager  Method: Demonstration  Response: Demonstrated Understanding    Regiments, taught by Janki Puente at 10/31/2023 11:50 AM.  Learner: Patient  Readiness: Eager  Method: Demonstration  Response: Demonstrated Understanding    Integrative Health, taught by Janki Puente at 10/31/2023 11:50 AM.  Learner: Patient  Readiness: Eager  Method: Demonstration  Response: Demonstrated Understanding    Focal Points, taught by Janki Puente at 10/31/2023 11:50 AM.  Learner: Patient  Readiness: Eager  Method: Demonstration  Response: Demonstrated Understanding    Pain Management, taught by Janki Puente at 10/31/2023 11:50 AM.  Learner: Patient  Readiness: Eager  Method: Demonstration  Response: Demonstrated Understanding

## 2023-10-31 NOTE — PROGRESS NOTES
"Victorino Lara is a 48 y.o. adult on day 19 of admission presenting with Bilateral pneumonia and acute appendicitis with localized peritonitis       Subjective : Feeling better today.       Objective     Last Recorded Vitals  /87 (Patient Position: Sitting)   Pulse 104   Temp 36.5 °C (97.7 °F)   Resp 18   Ht 1.702 m (5' 7.01\")   Wt 94.8 kg (208 lb 15.9 oz)   SpO2 97%   BMI 32.73 kg/m²      Intake/Output last 3 Shifts:    Intake/Output Summary (Last 24 hours) at 10/31/2023 1630  Last data filed at 10/31/2023 0435  Gross per 24 hour   Intake --   Output 200 ml   Net -200 ml       Physical Exam   Gen: NAD  HEENT: EOM, MMM  CV: RRR, no murmurs rubs or gallops  Resp: coarse rhonchi b/l   Abdomen: NT, abdominal wounds covered   LE: No edema      Relevant Results  Lab Results   Component Value Date    WBC 11.7 (H) 10/31/2023    HGB 9.4 (L) 10/31/2023    HCT 28.2 (L) 10/31/2023    MCV 87 10/31/2023     (H) 10/31/2023     Lab Results   Component Value Date    GLUCOSE 116 (H) 10/31/2023    CALCIUM 8.4 (L) 10/31/2023     10/31/2023    K 4.1 10/31/2023    CO2 25 10/31/2023     10/31/2023    BUN 11 10/31/2023    CREATININE 0.73 10/31/2023         Assessment/Plan  48 year old male admitted with acute hypoxic respiratory failure secondary to acute appendicitis with localized peritonitis and bilateral gram negative pneumonia. Initially in ICU and now on GMF    -ID and surgery following, s/p exploratory lap on 10/15  -stob abx today, finished treatment  -wean o2 as tolerated  -snf on discharge    Acute blood loss anemia: Secondary to above, monitor      Hx of Depression: seen by psych continue current medications    DVT ppx: on lovenox       David Alonzo MD      "

## 2023-11-01 LAB
ALBUMIN SERPL BCP-MCNC: 3.6 G/DL (ref 3.4–5)
ALP SERPL-CCNC: 163 U/L (ref 33–120)
ALT SERPL W P-5'-P-CCNC: 20 U/L (ref 7–52)
ANION GAP SERPL CALC-SCNC: 14 MMOL/L (ref 10–20)
AST SERPL W P-5'-P-CCNC: 18 U/L (ref 9–39)
BILIRUB SERPL-MCNC: 0.6 MG/DL (ref 0–1.2)
BUN SERPL-MCNC: 13 MG/DL (ref 6–23)
CALCIUM SERPL-MCNC: 8.9 MG/DL (ref 8.6–10.3)
CHLORIDE SERPL-SCNC: 102 MMOL/L (ref 98–107)
CO2 SERPL-SCNC: 22 MMOL/L (ref 21–32)
CREAT SERPL-MCNC: 0.8 MG/DL (ref 0.5–1.3)
ERYTHROCYTE [DISTWIDTH] IN BLOOD BY AUTOMATED COUNT: 13.8 % (ref 11.5–14.5)
GFR SERPL CREATININE-BSD FRML MDRD: 90 ML/MIN/1.73M*2
GLUCOSE BLD MANUAL STRIP-MCNC: 117 MG/DL (ref 74–99)
GLUCOSE BLD MANUAL STRIP-MCNC: 124 MG/DL (ref 74–99)
GLUCOSE BLD MANUAL STRIP-MCNC: 128 MG/DL (ref 74–99)
GLUCOSE BLD MANUAL STRIP-MCNC: 135 MG/DL (ref 74–99)
GLUCOSE SERPL-MCNC: 119 MG/DL (ref 74–99)
HCT VFR BLD AUTO: 30.7 % (ref 36–52)
HGB BLD-MCNC: 10.1 G/DL (ref 12–17.5)
HOLD SPECIMEN: NORMAL
MAGNESIUM SERPL-MCNC: 2.14 MG/DL (ref 1.6–2.4)
MCH RBC QN AUTO: 28.4 PG (ref 26–34)
MCHC RBC AUTO-ENTMCNC: 32.9 G/DL (ref 32–36)
MCV RBC AUTO: 86 FL (ref 80–100)
NRBC BLD-RTO: 0 /100 WBCS (ref 0–0)
PHOSPHATE SERPL-MCNC: 3.2 MG/DL (ref 2.5–4.9)
PLATELET # BLD AUTO: 585 X10*3/UL (ref 150–450)
PMV BLD AUTO: 9.8 FL (ref 7.5–11.5)
POTASSIUM SERPL-SCNC: 3.8 MMOL/L (ref 3.5–5.3)
PROT SERPL-MCNC: 7.8 G/DL (ref 6.4–8.2)
RBC # BLD AUTO: 3.56 X10*6/UL (ref 4–5.9)
SODIUM SERPL-SCNC: 134 MMOL/L (ref 136–145)
WBC # BLD AUTO: 9 X10*3/UL (ref 4.4–11.3)

## 2023-11-01 PROCEDURE — 2500000001 HC RX 250 WO HCPCS SELF ADMINISTERED DRUGS (ALT 637 FOR MEDICARE OP): Performed by: NURSE PRACTITIONER

## 2023-11-01 PROCEDURE — 96372 THER/PROPH/DIAG INJ SC/IM: CPT | Performed by: NURSE PRACTITIONER

## 2023-11-01 PROCEDURE — 83735 ASSAY OF MAGNESIUM: CPT | Performed by: NURSE PRACTITIONER

## 2023-11-01 PROCEDURE — 36415 COLL VENOUS BLD VENIPUNCTURE: CPT | Performed by: NURSE PRACTITIONER

## 2023-11-01 PROCEDURE — 2500000004 HC RX 250 GENERAL PHARMACY W/ HCPCS (ALT 636 FOR OP/ED): Performed by: NURSE PRACTITIONER

## 2023-11-01 PROCEDURE — 85027 COMPLETE CBC AUTOMATED: CPT | Performed by: HOSPITALIST

## 2023-11-01 PROCEDURE — 94640 AIRWAY INHALATION TREATMENT: CPT

## 2023-11-01 PROCEDURE — 82947 ASSAY GLUCOSE BLOOD QUANT: CPT

## 2023-11-01 PROCEDURE — 97116 GAIT TRAINING THERAPY: CPT | Mod: GP,CQ

## 2023-11-01 PROCEDURE — 36415 COLL VENOUS BLD VENIPUNCTURE: CPT | Performed by: HOSPITALIST

## 2023-11-01 PROCEDURE — 99232 SBSQ HOSP IP/OBS MODERATE 35: CPT | Performed by: HOSPITALIST

## 2023-11-01 PROCEDURE — 2500000002 HC RX 250 W HCPCS SELF ADMINISTERED DRUGS (ALT 637 FOR MEDICARE OP, ALT 636 FOR OP/ED): Performed by: NURSE PRACTITIONER

## 2023-11-01 PROCEDURE — 84100 ASSAY OF PHOSPHORUS: CPT | Performed by: NURSE PRACTITIONER

## 2023-11-01 PROCEDURE — 1200000002 HC GENERAL ROOM WITH TELEMETRY DAILY

## 2023-11-01 PROCEDURE — 99024 POSTOP FOLLOW-UP VISIT: CPT | Performed by: SURGERY

## 2023-11-01 PROCEDURE — 80053 COMPREHEN METABOLIC PANEL: CPT | Performed by: NURSE PRACTITIONER

## 2023-11-01 RX ADMIN — TRAZODONE HYDROCHLORIDE 50 MG: 50 TABLET ORAL at 20:20

## 2023-11-01 RX ADMIN — QUETIAPINE FUMARATE 150 MG: 100 TABLET, FILM COATED ORAL at 20:20

## 2023-11-01 RX ADMIN — MORPHINE SULFATE 2 MG: 2 INJECTION, SOLUTION INTRAMUSCULAR; INTRAVENOUS at 22:25

## 2023-11-01 RX ADMIN — BUSPIRONE HYDROCHLORIDE 15 MG: 5 TABLET ORAL at 20:20

## 2023-11-01 RX ADMIN — MORPHINE SULFATE 2 MG: 2 INJECTION, SOLUTION INTRAMUSCULAR; INTRAVENOUS at 09:33

## 2023-11-01 RX ADMIN — ENOXAPARIN SODIUM 40 MG: 40 INJECTION SUBCUTANEOUS at 17:37

## 2023-11-01 RX ADMIN — ACETAMINOPHEN 650 MG: 160 SOLUTION ORAL at 22:29

## 2023-11-01 RX ADMIN — HYOSCYAMINE SULFATE 473 ML: 16 SOLUTION at 20:21

## 2023-11-01 RX ADMIN — SIMVASTATIN 40 MG: 40 TABLET, FILM COATED ORAL at 20:20

## 2023-11-01 RX ADMIN — ACETAMINOPHEN 650 MG: 160 SOLUTION ORAL at 14:43

## 2023-11-01 RX ADMIN — HYOSCYAMINE SULFATE 473 ML: 16 SOLUTION at 09:33

## 2023-11-01 RX ADMIN — LURASIDONE HYDROCHLORIDE 40 MG: 40 TABLET, FILM COATED ORAL at 20:20

## 2023-11-01 RX ADMIN — TAMSULOSIN HYDROCHLORIDE 0.4 MG: 0.4 CAPSULE ORAL at 06:46

## 2023-11-01 RX ADMIN — BUSPIRONE HYDROCHLORIDE 15 MG: 5 TABLET ORAL at 09:33

## 2023-11-01 RX ADMIN — MORPHINE SULFATE 2 MG: 2 INJECTION, SOLUTION INTRAMUSCULAR; INTRAVENOUS at 17:37

## 2023-11-01 RX ADMIN — VENLAFAXINE HYDROCHLORIDE 150 MG: 150 CAPSULE, EXTENDED RELEASE ORAL at 09:33

## 2023-11-01 RX ADMIN — IPRATROPIUM BROMIDE AND ALBUTEROL SULFATE 3 ML: 2.5; .5 SOLUTION RESPIRATORY (INHALATION) at 07:24

## 2023-11-01 ASSESSMENT — PAIN DESCRIPTION - DESCRIPTORS
DESCRIPTORS: SHARP
DESCRIPTORS: SHARP

## 2023-11-01 ASSESSMENT — COGNITIVE AND FUNCTIONAL STATUS - GENERAL
CLIMB 3 TO 5 STEPS WITH RAILING: A LOT
HELP NEEDED FOR BATHING: A LITTLE
TURNING FROM BACK TO SIDE WHILE IN FLAT BAD: A LITTLE
DRESSING REGULAR LOWER BODY CLOTHING: A LITTLE
CLIMB 3 TO 5 STEPS WITH RAILING: A LITTLE
PERSONAL GROOMING: A LITTLE
STANDING UP FROM CHAIR USING ARMS: A LITTLE
MOVING TO AND FROM BED TO CHAIR: A LITTLE
MOVING FROM LYING ON BACK TO SITTING ON SIDE OF FLAT BED WITH BEDRAILS: A LITTLE
DAILY ACTIVITIY SCORE: 19
STANDING UP FROM CHAIR USING ARMS: A LITTLE
TURNING FROM BACK TO SIDE WHILE IN FLAT BAD: A LITTLE
MOVING TO AND FROM BED TO CHAIR: A LITTLE
WALKING IN HOSPITAL ROOM: A LITTLE
MOBILITY SCORE: 18
TURNING FROM BACK TO SIDE WHILE IN FLAT BAD: A LITTLE
DRESSING REGULAR UPPER BODY CLOTHING: A LITTLE
STANDING UP FROM CHAIR USING ARMS: A LITTLE
MOBILITY SCORE: 18
TOILETING: A LITTLE
CLIMB 3 TO 5 STEPS WITH RAILING: A LITTLE
WALKING IN HOSPITAL ROOM: A LITTLE
MOBILITY SCORE: 18
MOVING TO AND FROM BED TO CHAIR: A LITTLE
MOVING FROM LYING ON BACK TO SITTING ON SIDE OF FLAT BED WITH BEDRAILS: A LITTLE
WALKING IN HOSPITAL ROOM: A LITTLE

## 2023-11-01 ASSESSMENT — PAIN SCALES - GENERAL
PAINLEVEL_OUTOF10: 3
PAINLEVEL_OUTOF10: 6
PAINLEVEL_OUTOF10: 0 - NO PAIN
PAINLEVEL_OUTOF10: 3
PAINLEVEL_OUTOF10: 7
PAINLEVEL_OUTOF10: 7
PAINLEVEL_OUTOF10: 10 - WORST POSSIBLE PAIN
PAINLEVEL_OUTOF10: 1
PAINLEVEL_OUTOF10: 10 - WORST POSSIBLE PAIN
PAINLEVEL_OUTOF10: 7

## 2023-11-01 ASSESSMENT — ENCOUNTER SYMPTOMS
DIAPHORESIS: 0
ABDOMINAL PAIN: 1
RESPIRATORY NEGATIVE: 1
FEVER: 0
CARDIOVASCULAR NEGATIVE: 1
CHILLS: 0
CONFUSION: 1
FATIGUE: 0

## 2023-11-01 ASSESSMENT — PAIN - FUNCTIONAL ASSESSMENT
PAIN_FUNCTIONAL_ASSESSMENT: 0-10
PAIN_FUNCTIONAL_ASSESSMENT: 0-10

## 2023-11-01 NOTE — PROGRESS NOTES
"Music Therapy Note    Victorino Lara was referred by RN    Therapy Session  Referral Type: New referral this admission  Visit Type: Follow-up visit  Session Start Time: 1610  Session End Time: 1642  Intervention Delivery: In-person  Number of family members present: 1  Family Present for Session: Spouse/Significant Other  Family Participation: Interactive  Number of staff members present: 1     Pre-assessment  Unable to Assess Reason: Outcomes not assessed  Pain Score: 10 - Worst possible pain  Stress Level (0-10): 0  Anxiety Level (0-10): 8  Mood/Affect: Calm  Verbalized Emotional State: Anxiety         Treatment/Interventions  Areas of Focus: Anxiety reduction, Pain management  Music Therapy Interventions: Music-assisted relaxation and imagery (FABIENNE)    Post-assessment  Unable to Assess Reason: Outcomes not applicable  Pain Score: 7  Stress Level (0-10): 0  Anxiety Level (0-10): 6  Mood/Affect: Calm, Participative, Cooperative  Verbalized Emotional State: Anxiety  Total Session Time (min): 32 minutes    Narrative  Assessment Detail: PT lying in bed with wife at bedside. PT with flat affect and looking towards ceiling or wall. PT shared they were doing, \"OK\" at this time. PT wife shared their current needs and concerns with finding a rehab and how PT is ready to go home.  Plan: To implement previously used techniques to increase relaxation and reduce pain and anxiety.  Intervention: MT implemented passive guitar using the loop pedal along with FABIENNE interventions based on PT's preference for place of relaxation. MT then improvised relaxation script to fill PT needs.  Evaluation: PT reported a reduction in pain and anxiety. PT and wife asked for a copy of the relaxation intervnetion used.  Follow-up: MT to follow up with relaxation recording and to continue services.    Education Documentation  Resources, taught by Janki Puente at 11/1/2023  2:45 PM.  Learner: Significant Other, Patient  Readiness: " Acceptance  Method: Demonstration  Response: Demonstrated Understanding    Coping Strategies, taught by Janki Puente at 11/1/2023  2:45 PM.  Learner: Significant Other, Patient  Readiness: Acceptance  Method: Demonstration  Response: Demonstrated Understanding    Relaxation, taught by Janki Puente at 11/1/2023  2:45 PM.  Learner: Significant Other, Patient  Readiness: Acceptance  Method: Demonstration  Response: Demonstrated Understanding    Regiments, taught by Janki Puente at 11/1/2023  2:45 PM.  Learner: Significant Other, Patient  Readiness: Acceptance  Method: Demonstration  Response: Demonstrated Understanding    Integrative Health, taught by Janki Puente at 11/1/2023  2:45 PM.  Learner: Significant Other, Patient  Readiness: Acceptance  Method: Demonstration  Response: Demonstrated Understanding    Focal Points, taught by Janki Puente at 11/1/2023  2:45 PM.  Learner: Significant Other, Patient  Readiness: Acceptance  Method: Demonstration  Response: Demonstrated Understanding    Pain Management, taught by Janki Puente at 11/1/2023  2:45 PM.  Learner: Significant Other, Patient  Readiness: Acceptance  Method: Demonstration  Response: Demonstrated Understanding

## 2023-11-01 NOTE — CARE PLAN
Problem: Fall/Injury  Goal: Not fall by end of shift  Outcome: Progressing  Goal: Be free from injury by end of the shift  Outcome: Progressing  Goal: Verbalize understanding of personal risk factors for fall in the hospital  Outcome: Progressing  Goal: Verbalize understanding of risk factor reduction measures to prevent injury from fall in the home  Outcome: Progressing  Goal: Use assistive devices by end of the shift  Outcome: Progressing  Goal: Pace activities to prevent fatigue by end of the shift  Outcome: Progressing     Problem: Pain  Goal: Takes deep breaths with improved pain control throughout the shift  Outcome: Progressing  Goal: Turns in bed with improved pain control throughout the shift  Outcome: Progressing  Goal: Performs ADL's with improved pain control throughout shift  Outcome: Progressing  Goal: Free from opioid side effects throughout the shift  Outcome: Progressing  Goal: Free from acute confusion related to pain meds throughout the shift  Outcome: Progressing     Problem: Skin  Goal: Participates in plan/prevention/treatment measures  Outcome: Progressing  Flowsheets (Taken 10/31/2023 1854)  Participates in plan/prevention/treatment measures:   Discuss with provider PT/OT consult   Elevate heels   Increase activity/out of bed for meals  Goal: Prevent/manage excess moisture  Outcome: Progressing  Flowsheets (Taken 10/31/2023 1854)  Prevent/manage excess moisture:   Cleanse incontinence/protect with barrier cream   Moisturize dry skin   Follow provider orders for dressing changes   Monitor for/manage infection if present  Goal: Prevent/minimize sheer/friction injuries  Outcome: Progressing  Flowsheets (Taken 10/31/2023 1854)  Prevent/minimize sheer/friction injuries:   Complete micro-shifts as needed if patient unable. Adjust patient position to relieve pressure points, not a full turn   Increase activity/out of bed for meals   Use pull sheet   HOB 30 degrees or less   Turn/reposition every 2  hours/use positioning/transfer devices  Goal: Promote/optimize nutrition  Outcome: Progressing  Flowsheets (Taken 10/31/2023 4295)  Promote/optimize nutrition:   Monitor/record intake including meals   Offer water/supplements/favorite foods   Reassess MST if dietician not consulted   The patient's goals for the shift include Pain management    The clinical goals for the shift include Pain will be managed throughout this shift.    Over the shift, the patient did make progress toward the following goals.

## 2023-11-01 NOTE — PROGRESS NOTES
Primary MD: Roxana Morris PA-C    Reason For Consult  Bilateral lower lobe pneumonia  Acute appendicitis with localized peritonitis    History Of Present Illness  Victorino Lara is a 48 y.o. male              Patient was admitted 10/1/2023     Taken to laparoscopic surgery for acute appendicitis localized peritonitis without perforation     Taken back to surgery 10/14/2023 for small bowel obstruction exploratory laparotomy lysis of adhesions repair of incisional hernia     Hospital course complicated by development of aspiration pneumonia   Has required intubation and mechanical ventilation  growing  Klebsiella pneumonia and MSSA from sputum switched to meropenem wound culture was identified     Klebsiella is resistant to Zosyn consented to third-generation cephalosporin        There is another culture from 10/19/2023 reported as surgical site tissue biopsy growing the same Klebsiella pneumonia and E. coli           CT scan from 10/20/2023 shows bilateral lower lobe consolidations            No fever  extubated  No pressors  Opens eyes follows some commands      Review of Systems   Constitutional:  Negative for chills, diaphoresis, fatigue and fever.   Respiratory: Negative.     Cardiovascular: Negative.    Gastrointestinal:  Positive for abdominal pain.   Psychiatric/Behavioral:  Positive for confusion.         Physical Exam  Constitutional:       Appearance: He is not ill-appearing or diaphoretic.   Cardiovascular:      Heart sounds: Normal heart sounds. No murmur heard.  Pulmonary:      Effort: No respiratory distress.      Breath sounds: No wheezing, rhonchi or rales.   Abdominal:      General: Abdomen is flat. Bowel sounds are normal. There is no distension.      Palpations: Abdomen is soft. There is no mass.      Tenderness: There is no abdominal tenderness. There is no guarding or rebound.   Musculoskeletal:         General: No swelling.      Left lower leg: No edema.   Skin:     Coloration:  "Skin is not jaundiced.      Findings: No erythema.   Neurological:      Mental Status: He is disoriented.          Range of Vitals (last 24 hours)  Heart Rate:  [86-93]   Temp:  [35.9 °C (96.6 °F)-37.3 °C (99.1 °F)]   Resp:  [16-18]   BP: (112-142)/(56-86)   SpO2:  [90 %-96 %]     Relevant Results  Results from last 72 hours   Lab Units 11/01/23  0643 10/31/23  0633   WBC AUTO x10*3/uL 9.0 11.7*   HEMOGLOBIN g/dL 10.1* 9.4*   HEMATOCRIT % 30.7* 28.2*   PLATELETS AUTO x10*3/uL 585* 606*   NEUTROS PCT AUTO %  --  68.2   LYMPHS PCT AUTO %  --  19.6   MONOS PCT AUTO %  --  8.3   EOS PCT AUTO %  --  2.6       Results from last 72 hours   Lab Units 11/01/23  0643   SODIUM mmol/L 134*   POTASSIUM mmol/L 3.8   CHLORIDE mmol/L 102   CO2 mmol/L 22   BUN mg/dL 13   CREATININE mg/dL 0.80   GLUCOSE mg/dL 119*   CALCIUM mg/dL 8.9   ANION GAP mmol/L 14   EGFR mL/min/1.73m*2 90       Results from last 72 hours   Lab Units 11/01/23  0643   ALK PHOS U/L 163*   BILIRUBIN TOTAL mg/dL 0.6   PROTEIN TOTAL g/dL 7.8   ALT U/L 20   AST U/L 18   ALBUMIN g/dL 3.6       Estimated Creatinine Clearance (by C-G formula based on SCr of 0.8 mg/dL)  Female: 100.6 mL/min  Male: 122.6 mL/min  The patient&#39;s sex/gender information is inconclusive; review their information before proceeding.  CRP   Date/Time Value Ref Range Status   01/17/2020 10:00 PM 0.19 mg/dL Final     Comment:     REF VALUE  < 1.00     01/12/2020 06:29 AM 0.47 mg/dL Final     Comment:     REF VALUE  < 1.00       Sedimentation Rate   Date/Time Value Ref Range Status   01/17/2020 10:00 PM 5 0 - 15 mm/h Final   01/12/2020 06:29 AM 14 0 - 15 mm/h Final     No results found for: \"HIV1X2\", \"HIVCONF\", \"UXHCTC5EB\"  No results found for: \"HCVPCRQUANT\"  Cultures  Susceptibility data from last 14 days.  Collected Specimen Info Organism Amoxicillin/Clavulanate Ampicillin Ampicillin/Sulbactam Cefazolin Ceftriaxone Cefuroxime Ciprofloxacin Clindamycin Erythromycin Gentamicin Levofloxacin " Oxacillin Piperacillin/Tazobactam   10/19/23 Fluid from Tracheal Aspirate Methicillin Susceptible Staphylococcus aureus (MSSA)        S S   S      Klebsiella pneumoniae/variicola R R R R S S S   S S  R   10/19/23 Tissue/Biopsy from Surgical Site Infection Klebsiella pneumoniae/variicola R R R R S S S   S S  R     Escherichia coli S R R S   S   S S  S     Collected Specimen Info Organism Tetracycline Trimethoprim/Sulfamethoxazole Vancomycin   10/19/23 Fluid from Tracheal Aspirate Methicillin Susceptible Staphylococcus aureus (MSSA) S S S     Klebsiella pneumoniae/variicola  S    10/19/23 Tissue/Biopsy from Surgical Site Infection Klebsiella pneumoniae/variicola  S      Escherichia coli  S             Assessment/Plan     Bilateral lower lobe pneumonia  Acute appendicitis with localized peritonitis  Postop wound infection       Growing Klebsiella and MSSA from sputum Klebsiella and E. coli from wound     meropenem completed

## 2023-11-01 NOTE — PROGRESS NOTES
Physical Therapy    Physical Therapy Treatment    Patient Name: Victorino Lara  MRN: [id number]  Today's Date: 11/1/2023  Time Calculation  Start Time: 1010  Stop Time: 1025  Time Calculation (min): 15 min       Assessment/Plan   End of Session Communication: Bedside nurse, PCT/NA/CTA  Assessment Comment: Call-light, phone, and traytable within reach.  End of Session Patient Position: Up in chair, Alarm on (Reclined in the chair with LEs elevated.)  PT Plan  Inpatient/Swing Bed or Outpatient: Inpatient  PT Plan  Treatment/Interventions: Bed mobility, Transfer training, Gait training, Stair training, Balance training, Therapeutic exercise, Therapeutic activity, Endurance training, Strengthening, Home exercise program  PT Plan: Skilled PT  PT Frequency: 4 times per week  PT Discharge Recommendations: High intensity level of continued care  PT Recommended Transfer Status: Assist x1      General Visit Information:   PT  Visit  PT Received On: 11/01/23  General  Family/Caregiver Present: No  Prior to Session Communication: Bedside nurse  Patient Position Received: Bed, 3 rail up, Alarm off, not on at start of session  General Comment: Pleasant and cooperative.    Subjective     Precautions:  Precautions  Medical Precautions: Fall precautions  Post-Surgical Precautions: Abdominal surgery precautions    Vital Signs:  Vital Signs  SpO2:  (97% on RA with activities.)    Objective     Pain:  Pain Assessment  Pain Assessment:  (Denies pain.)    Treatments:           Bed Mobility  Bed Mobility: Yes  Bed Mobility 1  Bed Mobility 1: Supine to sitting  Level of Assistance 1:  (SBA)  Bed Mobility Comments 1: Bed in flat position. Instructed in log-roll technique. Slow transition from supine to side-lying to sitting position. Denies dizziness.  Ambulation/Gait Training  Ambulation/Gait Training Performed: Yes  Ambulation/Gait Training 1  Surface 1: Level tile  Device 1: Rolling walker  Assistance 1:  (SBA)  Comments/Distance (ft) 1:  ~160ft with ww and ~100ft without AD. Slow lizzy. Step through gait pattern. Decreased step height/length B. Patient able to safely maneuver ww with 90/180/360° turns and around obstacles. Trialed amb without AD on level surfaces. Decreased armswing B, but no unsteadiness or LOB with directional changes.  Transfers  Transfer: Yes  Transfer 1  Technique 1: Sit to stand, Stand to sit  Transfer Device 1:  (ww/No AD)  Transfer Level of Assistance 1:  (SBA)  Trials/Comments 1: v/c for safe hand placement and technique. Benefits from elevated surfaces.          Outcome Measures:  [facility] Basic Mobility  Turning from your back to your side while in a flat bed without using bedrails: A little  Moving from lying on your back to sitting on the side of a flat bed without using bedrails: A little  Moving to and from bed to chair (including a wheelchair): A little  Standing up from a chair using your arms (e.g. wheelchair or bedside chair): A little  To walk in hospital room: A little  Climbing 3-5 steps with railing: A little  Basic Mobility - Total Score: 18    Education Documentation  Mobility Training, taught by Kailyn Marie PTA at 11/1/2023  2:02 PM.  Learner: Patient  Readiness: Acceptance  Method: Explanation, Demonstration  Response: Verbalizes Understanding, Needs Reinforcement         Encounter Problems       Encounter Problems (Active)       PT Problem       Pt will demonstrate mod I  with bed mobility to edge of bed.   (Progressing)       Start:  10/31/23    Expected End:  11/14/23            Pt will demonstrate MOD I  with sit to stand/chair transfers with FWW.   (Progressing)       Start:  10/31/23    Expected End:  11/14/23            Pt will ambulate 100 feet with FWW SBA .   (Progressing)       Start:  10/31/23    Expected End:  11/14/23            Pt to demo improved BLE strength by being able to complete supine/seated thera ex 2x20 BLEs with 4 or less rest breaks .   (Progressing)       Start:  10/31/23     Expected End:  11/14/23

## 2023-11-01 NOTE — PROGRESS NOTES
"Victorino Lara is a 48 y.o. adult on day 20 of admission presenting with Bilateral pneumonia and acute appendicitis with localized peritonitis       Subjective : Feeling better today.       Objective     Last Recorded Vitals  /69   Pulse 88   Temp 36.5 °C (97.7 °F)   Resp 16   Ht 1.702 m (5' 7.01\")   Wt 94.8 kg (208 lb 15.9 oz)   SpO2 90%   BMI 32.73 kg/m²      Intake/Output last 3 Shifts:    Intake/Output Summary (Last 24 hours) at 11/1/2023 1443  Last data filed at 11/1/2023 1154  Gross per 24 hour   Intake 250 ml   Output --   Net 250 ml         Physical Exam   Gen: NAD  HEENT: EOM, MMM  CV: RRR, no murmurs rubs or gallops  Resp: coarse rhonchi b/l   Abdomen: NT, abdominal wounds covered   LE: No edema      Relevant Results  Lab Results   Component Value Date    WBC 9.0 11/01/2023    HGB 10.1 (L) 11/01/2023    HCT 30.7 (L) 11/01/2023    MCV 86 11/01/2023     (H) 11/01/2023     Lab Results   Component Value Date    GLUCOSE 119 (H) 11/01/2023    CALCIUM 8.9 11/01/2023     (L) 11/01/2023    K 3.8 11/01/2023    CO2 22 11/01/2023     11/01/2023    BUN 13 11/01/2023    CREATININE 0.80 11/01/2023         Assessment/Plan  48 year old male admitted with acute hypoxic respiratory failure secondary to acute appendicitis with localized peritonitis and bilateral gram negative pneumonia. Initially in ICU and now on GMF    -ID and surgery following, s/p exploratory lap on 10/15, staples to be removed today   -stob abx today, finished treatment  -wean o2 as tolerated  -snf vs HHC on discharge precert pending     Acute blood loss anemia: Secondary to above, monitor      Hx of Depression: seen by psych continue current medications    DVT ppx: on lovenox       David Alonzo MD      "

## 2023-11-01 NOTE — PROGRESS NOTES
11/01/23 1505   Current Planned Discharge Disposition   Current Planned Discharge Disposition Rehab  ([facility])     Pt approved for [facility]. No bed available until 11/2. Notified pt & left message for pt adrianna Gonzalez .

## 2023-11-01 NOTE — PROGRESS NOTES
"Victorino Lara is a 48 y.o. adult on day 20 of admission presenting with Acute appendicitis with localized peritonitis.     Subjective   Patient resting comfortably in bed.  No acute events overnight.       Objective     Physical Exam  HENT:      Head: Normocephalic.      Nose: Nose normal.      Mouth/Throat:      Mouth: Mucous membranes are moist.   Eyes:      Conjunctiva/sclera: Conjunctivae normal.      Pupils: Pupils are equal, round, and reactive to light.   Cardiovascular:      Rate and Rhythm: Normal rate.   Pulmonary:      Effort: Pulmonary effort is normal.   Abdominal:      General: Bowel sounds are normal.      Comments: Dressing dry clean and intact.  Firmness around the left side of incision.   Musculoskeletal:         General: Normal range of motion.      Cervical back: Normal range of motion.   Skin:     General: Skin is warm.   Neurological:      General: No focal deficit present.      Mental Status: He is alert and oriented to person, place, and time.   Psychiatric:         Mood and Affect: Mood normal.         Behavior: Behavior normal.         Judgment: Judgment normal.       Last Recorded Vitals  Blood pressure 126/62, pulse 92, temperature 37.3 °C (99.1 °F), temperature source Temporal, resp. rate 18, height 1.702 m (5' 7.01\"), weight 94.8 kg (208 lb 15.9 oz), SpO2 96 %.  Intake/Output last 3 Shifts:  I/O last 3 completed shifts:  In: - (0 mL/kg)   Out: 200 (2.1 mL/kg) [Urine:200 (0.1 mL/kg/hr)]  Weight: 94.8 kg     Relevant Results  Lab Results   Component Value Date    GLUCOSE 119 (H) 11/01/2023    CALCIUM 8.9 11/01/2023     (L) 11/01/2023    K 3.8 11/01/2023    CO2 22 11/01/2023     11/01/2023    BUN 13 11/01/2023    CREATININE 0.80 11/01/2023      Lab Results   Component Value Date    WBC 9.0 11/01/2023    HGB 10.1 (L) 11/01/2023    HCT 30.7 (L) 11/01/2023    MCV 86 11/01/2023     (H) 11/01/2023        Assessment/Plan   Subjective  Patient resting comfortably in bed.  Patient " denies nausea vomiting.  Patient denies pain.  Patient states last bowel movement was this morning.  Dressing was changed per orders.  Dressing had serosanguineous drainage. Wound was red, moist.  Wound was packed with gauze soaked with Dakin solution.  Then covered with ABD.  Patient tolerated well.    Impression  POD 22 Appendectomy and hernia repair     Plan  Pain control  Nausea control  DVT prophylaxis  Awaiting pre-CERT to [facility]    Further recommendations per Dr. Calloway    Principal Problem:    Acute appendicitis with localized peritonitis  Active Problems:    Anxiety    Depression    Hyperlipidemia    Diabetes mellitus, type 2 (CMS/HCC)    Appendicitis, acute    Appendicitis    Acute appendicitis, unspecified acute appendicitis type    Small bowel obstruction (CMS/HCC)      Alisha Guerra, APRN-CNP    Agree with note above except where noted below    Patient is doing well, white count is finally normalized.  Tolerating diet.  Having bowel movement.  Denies any pain.  Feeling well.    48-year-old male status post lap appendectomy and hernia repair with exploratory laparotomy and repair of hernia.  Hospital stay has been complicated by aspiration pneumonia and wound infection.  He is doing significantly better.    Continue wound care  Okay for [place] diet  Continue home meds  Okay for discharge to facility  Please follow back in clinic in 2 weeks for reassessment of wound care removal of staples.  Continue to follow patient while he is in the hospital.    Norman Calloway MD  1:01 PM  11/01/23

## 2023-11-02 VITALS
WEIGHT: 209 LBS | OXYGEN SATURATION: 94 % | SYSTOLIC BLOOD PRESSURE: 109 MMHG | HEIGHT: 67 IN | BODY MASS INDEX: 32.8 KG/M2 | DIASTOLIC BLOOD PRESSURE: 63 MMHG | RESPIRATION RATE: 18 BRPM | TEMPERATURE: 97.7 F | HEART RATE: 92 BPM

## 2023-11-02 LAB
GLUCOSE BLD MANUAL STRIP-MCNC: 131 MG/DL (ref 74–99)
GLUCOSE BLD MANUAL STRIP-MCNC: 155 MG/DL (ref 74–99)

## 2023-11-02 PROCEDURE — 99222 1ST HOSP IP/OBS MODERATE 55: CPT | Performed by: STUDENT IN AN ORGANIZED HEALTH CARE EDUCATION/TRAINING PROGRAM

## 2023-11-02 PROCEDURE — 82947 ASSAY GLUCOSE BLOOD QUANT: CPT

## 2023-11-02 PROCEDURE — 99024 POSTOP FOLLOW-UP VISIT: CPT | Performed by: SURGERY

## 2023-11-02 PROCEDURE — 2500000001 HC RX 250 WO HCPCS SELF ADMINISTERED DRUGS (ALT 637 FOR MEDICARE OP): Performed by: NURSE PRACTITIONER

## 2023-11-02 PROCEDURE — 2500000004 HC RX 250 GENERAL PHARMACY W/ HCPCS (ALT 636 FOR OP/ED): Performed by: NURSE PRACTITIONER

## 2023-11-02 PROCEDURE — 99239 HOSP IP/OBS DSCHRG MGMT >30: CPT | Performed by: HOSPITALIST

## 2023-11-02 PROCEDURE — 97530 THERAPEUTIC ACTIVITIES: CPT | Mod: GP,CQ

## 2023-11-02 RX ORDER — TRAZODONE HYDROCHLORIDE 50 MG/1
50 TABLET ORAL NIGHTLY
Qty: 30 TABLET | Refills: 0
Start: 2023-11-02 | End: 2023-12-02

## 2023-11-02 RX ORDER — LURASIDONE HYDROCHLORIDE 40 MG/1
40 TABLET, FILM COATED ORAL NIGHTLY
Qty: 30 TABLET | Refills: 0 | Status: SHIPPED | OUTPATIENT
Start: 2023-11-02 | End: 2023-12-02

## 2023-11-02 RX ORDER — TAMSULOSIN HYDROCHLORIDE 0.4 MG/1
0.4 CAPSULE ORAL
Start: 2023-11-03

## 2023-11-02 RX ORDER — BUSPIRONE HYDROCHLORIDE 15 MG/1
15 TABLET ORAL 2 TIMES DAILY
Qty: 60 TABLET | Refills: 0
Start: 2023-11-02 | End: 2023-12-02

## 2023-11-02 RX ORDER — OXYCODONE HYDROCHLORIDE 5 MG/1
5 TABLET ORAL EVERY 6 HOURS PRN
Qty: 15 TABLET | Refills: 0 | Status: SHIPPED | OUTPATIENT
Start: 2023-11-02 | End: 2023-11-06

## 2023-11-02 RX ORDER — VENLAFAXINE HYDROCHLORIDE 150 MG/1
150 CAPSULE, EXTENDED RELEASE ORAL DAILY
Qty: 30 CAPSULE | Refills: 0
Start: 2023-11-02 | End: 2023-12-02

## 2023-11-02 RX ORDER — INSULIN LISPRO 100 [IU]/ML
0-5 INJECTION, SOLUTION INTRAVENOUS; SUBCUTANEOUS
Qty: 4.5 ML | Refills: 0
Start: 2023-11-02 | End: 2024-03-20 | Stop reason: HOSPADM

## 2023-11-02 RX ORDER — QUETIAPINE FUMARATE 150 MG/1
150 TABLET, FILM COATED ORAL NIGHTLY
Start: 2023-11-02 | End: 2023-12-02

## 2023-11-02 RX ORDER — INSULIN LISPRO 100 [IU]/ML
INJECTION, SOLUTION INTRAVENOUS; SUBCUTANEOUS
Qty: 10 ML | Refills: 11 | Status: SHIPPED | OUTPATIENT
Start: 2023-11-02 | End: 2023-11-02 | Stop reason: HOSPADM

## 2023-11-02 RX ADMIN — HYOSCYAMINE SULFATE 473 ML: 16 SOLUTION at 09:34

## 2023-11-02 RX ADMIN — ACETAMINOPHEN 650 MG: 160 SOLUTION ORAL at 14:37

## 2023-11-02 RX ADMIN — MORPHINE SULFATE 2 MG: 2 INJECTION, SOLUTION INTRAMUSCULAR; INTRAVENOUS at 09:33

## 2023-11-02 RX ADMIN — VENLAFAXINE HYDROCHLORIDE 150 MG: 150 CAPSULE, EXTENDED RELEASE ORAL at 09:33

## 2023-11-02 RX ADMIN — ACETAMINOPHEN 650 MG: 160 SOLUTION ORAL at 06:21

## 2023-11-02 RX ADMIN — MORPHINE SULFATE 2 MG: 2 INJECTION, SOLUTION INTRAMUSCULAR; INTRAVENOUS at 03:26

## 2023-11-02 RX ADMIN — BUSPIRONE HYDROCHLORIDE 15 MG: 5 TABLET ORAL at 09:33

## 2023-11-02 RX ADMIN — TAMSULOSIN HYDROCHLORIDE 0.4 MG: 0.4 CAPSULE ORAL at 06:20

## 2023-11-02 ASSESSMENT — COGNITIVE AND FUNCTIONAL STATUS - GENERAL
MOBILITY SCORE: 18
DAILY ACTIVITIY SCORE: 20
CLIMB 3 TO 5 STEPS WITH RAILING: A LITTLE
MOVING TO AND FROM BED TO CHAIR: A LITTLE
WALKING IN HOSPITAL ROOM: A LITTLE
MOVING FROM LYING ON BACK TO SITTING ON SIDE OF FLAT BED WITH BEDRAILS: A LITTLE
TURNING FROM BACK TO SIDE WHILE IN FLAT BAD: A LITTLE
HELP NEEDED FOR BATHING: A LITTLE
PERSONAL GROOMING: A LITTLE
STANDING UP FROM CHAIR USING ARMS: A LITTLE
MOBILITY SCORE: 22
DRESSING REGULAR UPPER BODY CLOTHING: A LITTLE
TURNING FROM BACK TO SIDE WHILE IN FLAT BAD: A LITTLE
DRESSING REGULAR LOWER BODY CLOTHING: A LITTLE
CLIMB 3 TO 5 STEPS WITH RAILING: A LITTLE

## 2023-11-02 ASSESSMENT — PAIN SCALES - GENERAL
PAINLEVEL_OUTOF10: 9
PAINLEVEL_OUTOF10: 3
PAINLEVEL_OUTOF10: 9
PAINLEVEL_OUTOF10: 7

## 2023-11-02 ASSESSMENT — PAIN - FUNCTIONAL ASSESSMENT: PAIN_FUNCTIONAL_ASSESSMENT: 0-10

## 2023-11-02 NOTE — DISCHARGE SUMMARY
Discharge Diagnosis  Acute appendicitis with localized peritonitis      Discharge Meds     Your medication list        START taking these medications        Instructions Last Dose Given Next Dose Due   oxyCODONE 5 mg immediate release tablet  Commonly known as: Roxicodone      Take 1 tablet (5 mg) by mouth every 6 hours if needed for moderate pain (4 - 6) for up to 4 days.       tamsulosin 0.4 mg 24 hr capsule  Commonly known as: Flomax  Start taking on: November 3, 2023      Take 1 capsule (0.4 mg) by mouth once daily in the morning. Take before meals. Do not start before November 3, 2023.              CHANGE how you take these medications        Instructions Last Dose Given Next Dose Due   busPIRone 15 mg tablet  Commonly known as: Buspar  What changed:   medication strength  how much to take      Take 1 tablet (15 mg) by mouth 2 times a day.       insulin lispro 100 unit/mL injection  Commonly known as: HumaLOG  What changed: how much to take      Inject 0-0.05 mL (0-5 Units) under the skin 3 times a day with meals. Take as directed per insulin instructions.       lurasidone 40 mg tablet  Commonly known as: Latuda  What changed:   medication strength  how much to take  when to take this      Take 1 tablet (40 mg) by mouth once daily at bedtime.       QUEtiapine 150 mg tablet  What changed:   medication strength  how much to take  when to take this      Take 150 mg by mouth once daily at bedtime.       traZODone 50 mg tablet  Commonly known as: Desyrel  What changed:   medication strength  how much to take      Take 1 tablet (50 mg) by mouth once daily at bedtime.              CONTINUE taking these medications        Instructions Last Dose Given Next Dose Due   omeprazole 20 mg DR capsule  Commonly known as: PriLOSEC           simvastatin 40 mg tablet  Commonly known as: Zocor           tiZANidine 4 mg capsule  Commonly known as: Zanaflex           venlafaxine  mg 24 hr capsule  Commonly known as:  Effexor-XR      Take 1 capsule (150 mg) by mouth once daily. Do not crush or chew.              STOP taking these medications      Lantus U-100 Insulin 100 unit/mL injection  Generic drug: insulin glargine                  Where to Get Your Medications        These medications were sent to [id number] - [place], [place] - [address] [place] [address]      Phone: [phone]   lurasidone 40 mg tablet       You can get these medications from any pharmacy    Bring a paper prescription for each of these medications  oxyCODONE 5 mg immediate release tablet       Information about where to get these medications is not yet available    Ask your nurse or doctor about these medications  busPIRone 15 mg tablet  insulin lispro 100 unit/mL injection  QUEtiapine 150 mg tablet  tamsulosin 0.4 mg 24 hr capsule  traZODone 50 mg tablet  venlafaxine  mg 24 hr capsule         Test Results Pending At Discharge  Pending Labs       Order Current Status    Extra Tubes Preliminary result    Extra Tubes Preliminary result    Lavender Top Preliminary result    Light Blue Top Preliminary result    Light Blue Top Preliminary result    SST TOP Preliminary result    SST TOP Preliminary result            Hospital Course  48 year old male admitted with acute hypoxic respiratory failure secondary to acute appendicitis with localized peritonitis and bilateral gram negative pneumonia. Initially in ICU and now on GMF     -ID and surgery following, s/p exploratory lap on 10/15, staples removed on 11/1, follow up with surgery in two weeks   -stob abx on 11/1, finished treatment  -wean o2 as tolerated. On RA     Acute blood loss anemia: Secondary to above, monitor      DMII: was on insulin at home held here A1c 7.1. Will just resume sliding scale on discharge for now     Hx of Depression: seen by psych continue current medications    DVT ppx: on lovenox     Greater than 30 minutes of clinical time spent caring for this  patient.        Pertinent Physical Exam At Time of Discharge  Gen: NAD  HEENT: EOM, MMM  CV: RRR, no murmurs rubs or gallops  Resp: coarse rhonchi b/l   Abdomen: NT, abdominal wounds covered   LE: No edema

## 2023-11-02 NOTE — PROGRESS NOTES
"Victorino Lara is a 48 y.o. adult on day 21 of admission presenting with Acute appendicitis with localized peritonitis.     Subjective   Patient resting comfortably in bed.  No acute events overnight.  He has no complaints.       Objective     Physical Exam  Constitutional:       General: He is not in acute distress.  HENT:      Head: Normocephalic and atraumatic.      Mouth/Throat:      Mouth: Mucous membranes are moist.   Eyes:      Conjunctiva/sclera: Conjunctivae normal.   Pulmonary:      Effort: Pulmonary effort is normal. No respiratory distress.   Abdominal:      General: Abdomen is flat.      Palpations: Abdomen is soft.      Comments: Dressing is clean, dry, and intact.   Musculoskeletal:         General: No swelling.   Skin:     General: Skin is warm and dry.      Capillary Refill: Capillary refill takes less than 2 seconds.   Neurological:      Mental Status: He is alert.   Psychiatric:         Mood and Affect: Mood normal.         Behavior: Behavior normal.         Last Recorded Vitals  Blood pressure 109/63, pulse 92, temperature 36.5 °C (97.7 °F), temperature source Temporal, resp. rate 18, height 1.702 m (5' 7.01\"), weight 94.8 kg (208 lb 15.9 oz), SpO2 94 %.  Intake/Output last 3 Shifts:  I/O last 3 completed shifts:  In: 250 (2.6 mL/kg) [P.O.:250]  Out: - (0 mL/kg)   Weight: 94.8 kg     Relevant Results  Lab Results   Component Value Date    WBC 9.0 11/01/2023    HGB 10.1 (L) 11/01/2023    HCT 30.7 (L) 11/01/2023    MCV 86 11/01/2023     (H) 11/01/2023     Lab Results   Component Value Date    GLUCOSE 119 (H) 11/01/2023    CALCIUM 8.9 11/01/2023     (L) 11/01/2023    K 3.8 11/01/2023    CO2 22 11/01/2023     11/01/2023    BUN 13 11/01/2023    CREATININE 0.80 11/01/2023            Assessment/Plan   Subjective  Patient resting comfortably in bed.  He has no complaints today.  Dressing to abdomen is clean, dry, and intact.    Impression  POD 23 appendectomy and hernia " repair    Plan  Pain control  Nausea control  DVT prophylaxis  Continue wound care  Awaiting pre-CERT to [facility]  Please follow-up in clinic with Dr. Calloway in 2 weeks for reassessment of wound care removal of staples  Continue to follow patient while he is in the hospital    Further recommendations per Dr. Calloway    Principal Problem:    Acute appendicitis with localized peritonitis  Active Problems:    Anxiety    Depression    Hyperlipidemia    Diabetes mellitus, type 2 (CMS/HCC)    Appendicitis, acute    Appendicitis    Acute appendicitis, unspecified acute appendicitis type    Small bowel obstruction (CMS/HCC)        Andra Duenas PA-C    Agree with note above except where noted below    No acute events overnight, tolerating pain, having bowel function, tolerating diet    Status post lap appendectomy and hernia repair requiring reexploration complicated by aspiration pneumonia on ileus, doing much better on diet.  Plan for him to go to nursing facility today.  No need for any further antibiotics.  Staples were removed at bedside yesterday.  Please follow-up in 2 weeks in the office for wound check.  Call back sooner if there are any issues.  We will continue to follow his in the hospital.    Norman Calloway MD  9:50 AM  11/02/23

## 2023-11-02 NOTE — PROGRESS NOTES
Physical Therapy    Physical Therapy Treatment    Patient Name: Victorino Lara  MRN: [id number]  Today's Date: 11/2/2023  Time Calculation  Start Time: 1100  Stop Time: 1114  Time Calculation (min): 14 min       Assessment/Plan   End of Session Communication: Bedside nurse, PCT/NA/CTA  Assessment Comment: Call-light, phone, and traytable within reach.  End of Session Patient Position: Bed, 3 rail up, Alarm on (Sitting EOB to pack up belongings for discharge.)  PT Plan  Inpatient/Swing Bed or Outpatient: Inpatient  PT Plan  Treatment/Interventions: Bed mobility, Transfer training, Gait training, Stair training, Balance training, Therapeutic exercise, Therapeutic activity, Endurance training, Strengthening, Home exercise program  PT Plan: Skilled PT  PT Frequency: 4 times per week  PT Discharge Recommendations: High intensity level of continued care  PT Recommended Transfer Status: Assist x1      General Visit Information:   PT  Visit  PT Received On: 11/02/23  General  Family/Caregiver Present: No  Prior to Session Communication: Bedside nurse  Patient Position Received: Bed, 3 rail up, Alarm on  General Comment: Pleasant and cooperative.      Precautions:  Precautions  Medical Precautions: Fall precautions  Post-Surgical Precautions: Abdominal surgery precautions    Vital Signs:  Vital Signs  Heart Rate:  (124bpm after amb.)  SpO2:  (98% after amb on RA.)    Objective     Pain:  Pain Assessment  Pain Assessment: 0-10  Pain Score: 7  Pain Location: Abdomen      Treatments:           Bed Mobility  Bed Mobility: Yes  Bed Mobility 1  Bed Mobility 1: Supine to sitting  Level of Assistance 1: Close supervision  Bed Mobility Comments 1: Instructed patient in logroll technique with bed mobility; abdominal precautions.  Ambulation/Gait Training  Ambulation/Gait Training Performed: Yes  Ambulation/Gait Training 1  Surface 1: Level tile  Device 1: Rolling walker  Assistance 1: Contact guard  Comments/Distance (ft) 1: ~100ft x2.  NBOS. Slow lizzy. Slightly forward flexed posture. Step through gait pattern. Occasional unsteadiness, but self corrected. No LOB with 90/180° turns.  Transfers  Transfer: Yes  Transfer 1  Technique 1: Sit to stand, Stand to sit  Transfer Device 1:  (ww/no AD)  Transfer Level of Assistance 1: Close supervision  Trials/Comments 1: (2x). v/c for safe hand placement and technique. Benefits from elevated surfaces.          Outcome Measures:  [facility] Basic Mobility  Turning from your back to your side while in a flat bed without using bedrails: A little  Moving from lying on your back to sitting on the side of a flat bed without using bedrails: A little  Moving to and from bed to chair (including a wheelchair): A little  Standing up from a chair using your arms (e.g. wheelchair or bedside chair): A little  To walk in hospital room: A little  Climbing 3-5 steps with railing: A little  Basic Mobility - Total Score: 18    Education Documentation  Mobility Training, taught by Kailyn Marie PTA at 11/2/2023  1:23 PM.  Learner: Patient  Readiness: Acceptance  Method: Explanation, Demonstration  Response: Verbalizes Understanding, Needs Reinforcement  Comment: See therapy note.          EDUCATION:     Encounter Problems       Encounter Problems (Active)       PT Problem       Pt will demonstrate mod I  with bed mobility to edge of bed.   (Progressing)       Start:  10/31/23    Expected End:  11/02/23            Pt will demonstrate MOD I  with sit to stand/chair transfers with FWW.   (Progressing)       Start:  10/31/23    Expected End:  11/02/23            Pt will ambulate 100 feet with FWW SBA .   (Progressing)       Start:  10/31/23    Expected End:  11/02/23            Pt to demo improved BLE strength by being able to complete supine/seated thera ex 2x20 BLEs with 4 or less rest breaks .   (Progressing)       Start:  10/31/23    Expected End:  11/02/23

## 2023-11-02 NOTE — CARE PLAN
Problem: Fall/Injury  Goal: Not fall by end of shift  Outcome: Met  Goal: Be free from injury by end of the shift  Outcome: Met  Goal: Verbalize understanding of personal risk factors for fall in the hospital  Outcome: Met  Goal: Verbalize understanding of risk factor reduction measures to prevent injury from fall in the home  Outcome: Met  Goal: Use assistive devices by end of the shift  Outcome: Met  Goal: Pace activities to prevent fatigue by end of the shift  Outcome: Met     Problem: Pain  Goal: Takes deep breaths with improved pain control throughout the shift  Outcome: Met  Goal: Turns in bed with improved pain control throughout the shift  Outcome: Met  Goal: Performs ADL's with improved pain control throughout shift  Outcome: Met  Goal: Free from opioid side effects throughout the shift  Outcome: Met  Goal: Free from acute confusion related to pain meds throughout the shift  Outcome: Met     Problem: Skin  Goal: Participates in plan/prevention/treatment measures  Outcome: Met  Flowsheets (Taken 10/31/2023 2811)  Participates in plan/prevention/treatment measures:   Discuss with provider PT/OT consult   Elevate heels   Increase activity/out of bed for meals  Goal: Prevent/manage excess moisture  Outcome: Met  Flowsheets (Taken 10/31/2023 3420)  Prevent/manage excess moisture:   Cleanse incontinence/protect with barrier cream   Moisturize dry skin   Follow provider orders for dressing changes   Monitor for/manage infection if present  Goal: Prevent/minimize sheer/friction injuries  Outcome: Met  Flowsheets (Taken 10/31/2023 2408)  Prevent/minimize sheer/friction injuries:   Complete micro-shifts as needed if patient unable. Adjust patient position to relieve pressure points, not a full turn   Increase activity/out of bed for meals   Use pull sheet   HOB 30 degrees or less   Turn/reposition every 2 hours/use positioning/transfer devices  Goal: Promote/optimize nutrition  Outcome: Met  Flowsheets (Taken  10/31/2023 1854)  Promote/optimize nutrition:   Monitor/record intake including meals   Offer water/supplements/favorite foods   Reassess MST if dietician not consulted   The patient's goals for the shift include Pain management    The clinical goals for the shift include Pain will be managed throughout this shift.    Over the shift, the patient met his care plan goals and is discharging to [facility]. Report called to nurse at facility.

## 2023-11-02 NOTE — CARE PLAN
The patient's goals for the shift include Pain management    The clinical goals for the shift include Pain will be managed throughout this shift.    Over the shift, the patient did not make progress toward the following goals. Barriers to progression include . Recommendations to address these barriers include .

## 2023-11-02 NOTE — PROGRESS NOTES
Per [facility] they have obtained ins. Precert.  Pt. Will discharge this date at 11:30 am via wheelchair van.    Pt. Aware and in agreement to transfer.

## 2023-11-02 NOTE — PROGRESS NOTES
Occupational Therapy                 Therapy Communication Note    Patient Name: Victorino Lara  MRN: [id number]  Today's Date: 11/2/2023     Discipline: Occupational Therapy    Missed Visit Reason: Missed Visit Reason: Patient refused    Missed Time: Attempt 1340     Comment: Pt in bed upon arrival; states that he already worked with therapy today (PT services) and declining OT at this time; role of OT discussed however pt continues to decline this pm; pt projected discharge this pm; will reattempt as pt willing.

## 2023-11-02 NOTE — PROGRESS NOTES
"Victorino Lara is a 48 y.o. adult on day 20 of admission presenting with Acute appendicitis with localized peritonitis.      Subjective   Feeling better today.               Objective   Last Recorded Vitals  /69   Pulse 88   Temp 36.5 °C (97.7 °F)   Resp 16   Ht 1.702 m (5' 7.01\")   Wt 94.8 kg (208 lb 15.9 oz)   SpO2 90%   BMI 32.73 kg/m²       Intake/Output last 3 Shifts:     Intake/Output Summary (Last 24 hours) at 11/1/2023 1443  Last data filed at 11/1/2023 1154      Gross per 24 hour   Intake 250 ml   Output --   Net 250 ml            Physical Exam   Gen: NAD  HEENT: EOM, MMM  CV: RRR, no murmurs rubs or gallops  Resp: coarse rhonchi b/l   Abdomen: NT, abdominal wounds covered   LE: No edema        Relevant Results        Lab Results   Component Value Date     WBC 9.0 11/01/2023     HGB 10.1 (L) 11/01/2023     HCT 30.7 (L) 11/01/2023     MCV 86 11/01/2023      (H) 11/01/2023            Lab Results   Component Value Date     GLUCOSE 119 (H) 11/01/2023     CALCIUM 8.9 11/01/2023      (L) 11/01/2023     K 3.8 11/01/2023     CO2 22 11/01/2023      11/01/2023     BUN 13 11/01/2023     CREATININE 0.80 11/01/2023                  Assessment/Plan   48 year old male admitted with acute hypoxic respiratory failure secondary to acute appendicitis with localized peritonitis and bilateral gram negative pneumonia. Initially in ICU and now on GMF     -ID and surgery following, s/p exploratory lap on 10/15, staples to be removed today   -stob abx today, finished treatment  -wean o2 as tolerated  -snf vs HHC on discharge precert pending      Acute blood loss anemia: Secondary to above, monitor      Hx of Depression: seen by psych continue current medications    DVT ppx: on lovenox   Mann Ricks MD      "

## 2023-11-06 PROBLEM — R52 PAIN: Status: ACTIVE | Noted: 2023-11-06

## 2023-11-06 PROBLEM — F31.9 BIPOLAR 1 DISORDER (MULTI): Status: ACTIVE | Noted: 2023-11-06

## 2023-11-06 PROBLEM — J96.01 ACUTE RESPIRATORY FAILURE WITH HYPOXIA (MULTI): Status: ACTIVE | Noted: 2023-11-06

## 2023-11-06 PROBLEM — R53.1 GENERALIZED WEAKNESS: Status: ACTIVE | Noted: 2023-11-06

## 2023-11-14 ENCOUNTER — OFFICE VISIT (OUTPATIENT)
Dept: SURGERY | Facility: CLINIC | Age: 48
End: 2023-11-14
Payer: COMMERCIAL

## 2023-11-14 VITALS — BODY MASS INDEX: 30.22 KG/M2 | WEIGHT: 193 LBS

## 2023-11-14 DIAGNOSIS — T81.49XA ABDOMINAL WALL ABSCESS AT SITE OF SURGICAL WOUND: Primary | ICD-10-CM

## 2023-11-14 PROCEDURE — 3051F HG A1C>EQUAL 7.0%<8.0%: CPT | Performed by: SURGERY

## 2023-11-14 PROCEDURE — 99024 POSTOP FOLLOW-UP VISIT: CPT | Performed by: SURGERY

## 2023-11-14 NOTE — PROGRESS NOTES
Subjective   Patient ID: Victorino Lara is a 48 y.o. adult who presents for Hospital Follow-up and Post-op.  HPI    In brief, this is a gentleman who presented to [facility] on 10/10 with acute appendicitis with abdominal wall hernia.  His stay was complicated by dehiscence of fascia which resulted in a reoperation, superficial wound infection, and aspiration pneumonia.  He had a prolonged stay involving ICU but ultimately recovered and went to SNF.  He has since gone home.  He states he is able to tolerate a diet but smaller amounts than before.  He states he is having BM and flatus.  He still has some pain when ambulating but is significantly improved.  He still has his wound which is improved but requires BID dressing changes by his significant other.  He presents today for routine post-op visit    Review of Systems    Objective   Physical Exam  Gen: NAD, Aax3, appears to have lost weight  Abd; Soft, ND, NTTP, most of incision is well healing except for a [id number] defect which has some left lateral fibrinous material but otherwise good granulation and healing with intact fascia    Discussed with patient pathology report which was acute appendicitis without evidence of any other pathology    Assessment/Plan   44 y/o male s/p appendectomy and hernia repair with complicated post-op course.  He appears to be much improved today since he was discharged.    -Cont. Regular diet, recommended increasing protein intake including shakes to assist with healing  -Cont. BID dressing changes. Due to limited resources at home, I set up a referral to wound clinic to assist with wound care  -I would like to see back in 2-4 weeks to ensure good healing    Norman Calloway MD  11/14/23  10:33 AM

## 2023-11-16 ENCOUNTER — APPOINTMENT (OUTPATIENT)
Dept: SURGERY | Facility: CLINIC | Age: 48
End: 2023-11-16
Payer: COMMERCIAL

## 2023-11-20 ENCOUNTER — OFFICE VISIT (OUTPATIENT)
Dept: WOUND CARE | Facility: CLINIC | Age: 48
End: 2023-11-20
Payer: COMMERCIAL

## 2023-11-20 DIAGNOSIS — T81.49XA ABDOMINAL WALL ABSCESS AT SITE OF SURGICAL WOUND: ICD-10-CM

## 2023-11-20 PROCEDURE — 11043 DBRDMT MUSC&/FSCA 1ST 20/<: CPT

## 2023-11-20 PROCEDURE — 99213 OFFICE O/P EST LOW 20 MIN: CPT | Mod: 25

## 2023-11-20 PROCEDURE — 99213 OFFICE O/P EST LOW 20 MIN: CPT | Performed by: PLASTIC SURGERY

## 2023-11-20 PROCEDURE — 3051F HG A1C>EQUAL 7.0%<8.0%: CPT | Performed by: PLASTIC SURGERY

## 2023-11-20 PROCEDURE — 11043 DBRDMT MUSC&/FSCA 1ST 20/<: CPT | Performed by: PLASTIC SURGERY

## 2023-11-27 ENCOUNTER — OFFICE VISIT (OUTPATIENT)
Dept: WOUND CARE | Facility: CLINIC | Age: 48
End: 2023-11-27
Payer: COMMERCIAL

## 2023-11-27 PROCEDURE — 3051F HG A1C>EQUAL 7.0%<8.0%: CPT | Performed by: PLASTIC SURGERY

## 2023-11-27 PROCEDURE — 11043 DBRDMT MUSC&/FSCA 1ST 20/<: CPT | Performed by: PLASTIC SURGERY

## 2023-11-27 PROCEDURE — 11043 DBRDMT MUSC&/FSCA 1ST 20/<: CPT

## 2023-11-28 ENCOUNTER — OFFICE VISIT (OUTPATIENT)
Dept: SURGERY | Facility: CLINIC | Age: 48
End: 2023-11-28
Payer: COMMERCIAL

## 2023-11-28 DIAGNOSIS — T81.49XA ABDOMINAL WALL ABSCESS AT SITE OF SURGICAL WOUND: Primary | ICD-10-CM

## 2023-11-28 PROCEDURE — 3051F HG A1C>EQUAL 7.0%<8.0%: CPT | Performed by: SURGERY

## 2023-11-28 PROCEDURE — 99024 POSTOP FOLLOW-UP VISIT: CPT | Performed by: SURGERY

## 2023-11-28 NOTE — PROGRESS NOTES
Subjective   Patient ID: Victorino Lara is a 48 y.o. adult who presents for Follow-up (2 weeks).  HPI  In brief, this is a gentleman who presented to [facility] on 10/10 with acute appendicitis with abdominal wall hernia.  His stay was complicated by dehiscence of fascia which resulted in a reoperation, superficial wound infection, and aspiration pneumonia.  He had a prolonged stay involving a prolonged ICU stay but ultimately recovered and was discharged to SNF and then home.    Since seen last time in the office, he has some improved healing of his wound.  He also appears to have gained some weight.  Per his significant other, he has been eating more protein, with frequent small meals.  No other complaints at this time      Objective   Physical Exam  Gen: NAD, Aax3  Abd; Soft, ND, NTTP, incision is about 3 cmx 3cm in size with good granulation tissue  Assessment/Plan   46 y/o male s/p appendectomy and hernia repair with complicated post-op course.  He continues to improve  -Cont. With wound care  -Cont. With frequent, small, high protein meals to promote healing  -Per patient and significant other, may need wound vac.  With this in mind, I would not say he is ready to return to work full time  -Recommend to see patient early in 2024 to ensure patient is improving and reevaluate patient for when he can return to work full time    Norman Calloway MD  11/28/23  10:30 AM

## 2023-11-29 ENCOUNTER — APPOINTMENT (OUTPATIENT)
Dept: WOUND CARE | Facility: CLINIC | Age: 48
End: 2023-11-29
Payer: COMMERCIAL

## 2023-12-01 ENCOUNTER — APPOINTMENT (OUTPATIENT)
Dept: WOUND CARE | Facility: CLINIC | Age: 48
End: 2023-12-01
Payer: COMMERCIAL

## 2023-12-07 ENCOUNTER — LAB (OUTPATIENT)
Dept: LAB | Facility: LAB | Age: 48
End: 2023-12-07
Payer: COMMERCIAL

## 2023-12-07 ENCOUNTER — OFFICE VISIT (OUTPATIENT)
Dept: SURGERY | Facility: CLINIC | Age: 48
End: 2023-12-07
Payer: COMMERCIAL

## 2023-12-07 DIAGNOSIS — R10.84 GENERALIZED ABDOMINAL PAIN: ICD-10-CM

## 2023-12-07 DIAGNOSIS — R10.84 GENERALIZED ABDOMINAL PAIN: Primary | ICD-10-CM

## 2023-12-07 DIAGNOSIS — T81.49XA ABDOMINAL WALL ABSCESS AT SITE OF SURGICAL WOUND: ICD-10-CM

## 2023-12-07 LAB
ALBUMIN SERPL BCP-MCNC: 4.5 G/DL (ref 3.4–5)
ALP SERPL-CCNC: 86 U/L (ref 33–120)
ALT SERPL W P-5'-P-CCNC: 12 U/L (ref 7–52)
ANION GAP SERPL CALC-SCNC: 13 MMOL/L (ref 10–20)
AST SERPL W P-5'-P-CCNC: 12 U/L (ref 9–39)
BILIRUB SERPL-MCNC: 0.4 MG/DL (ref 0–1.2)
BUN SERPL-MCNC: 11 MG/DL (ref 6–23)
CALCIUM SERPL-MCNC: 10.1 MG/DL (ref 8.6–10.3)
CHLORIDE SERPL-SCNC: 104 MMOL/L (ref 98–107)
CO2 SERPL-SCNC: 26 MMOL/L (ref 21–32)
CREAT SERPL-MCNC: 1.02 MG/DL (ref 0.5–1.3)
ERYTHROCYTE [DISTWIDTH] IN BLOOD BY AUTOMATED COUNT: 13 % (ref 11.5–14.5)
GFR SERPL CREATININE-BSD FRML MDRD: 68 ML/MIN/1.73M*2
GLUCOSE SERPL-MCNC: 91 MG/DL (ref 74–99)
HCT VFR BLD AUTO: 40.7 % (ref 36–52)
HGB BLD-MCNC: 13.1 G/DL (ref 12–17.5)
MCH RBC QN AUTO: 27.9 PG (ref 26–34)
MCHC RBC AUTO-ENTMCNC: 32.2 G/DL (ref 32–36)
MCV RBC AUTO: 87 FL (ref 80–100)
NRBC BLD-RTO: 0 /100 WBCS (ref 0–0)
PLATELET # BLD AUTO: 436 X10*3/UL (ref 150–450)
POTASSIUM SERPL-SCNC: 4.1 MMOL/L (ref 3.5–5.3)
PROT SERPL-MCNC: 8 G/DL (ref 6.4–8.2)
RBC # BLD AUTO: 4.7 X10*6/UL (ref 4–5.9)
SODIUM SERPL-SCNC: 139 MMOL/L (ref 136–145)
WBC # BLD AUTO: 10 X10*3/UL (ref 4.4–11.3)

## 2023-12-07 PROCEDURE — 36415 COLL VENOUS BLD VENIPUNCTURE: CPT

## 2023-12-07 PROCEDURE — 3051F HG A1C>EQUAL 7.0%<8.0%: CPT | Performed by: SURGERY

## 2023-12-07 PROCEDURE — 85027 COMPLETE CBC AUTOMATED: CPT

## 2023-12-07 PROCEDURE — 99024 POSTOP FOLLOW-UP VISIT: CPT | Performed by: SURGERY

## 2023-12-07 PROCEDURE — 80053 COMPREHEN METABOLIC PANEL: CPT

## 2023-12-07 NOTE — PROGRESS NOTES
Subjective   Patient ID: Victorino Lara is a 48 y.o. adult who presents for Follow-up (Abdominal pain).  n brief, this is a gentleman who presented to [facility] on 10/10 with acute appendicitis with abdominal wall hernia.  His stay was complicated by dehiscence of fascia which resulted in a reoperation, superficial wound infection, and aspiration pneumonia.  He had a prolonged stay involving a prolonged ICU stay but ultimately recovered and was discharged to SNF and then home.     Since seen last time in the office, patient is having some generalized abdominal pain.  This has been going on for about one week, but has worsened over that week. It is worse with movement and eating.  Is having BM and flatus. Having some n/v.  Unsure about having fevers. Given his complicated post-operative course, he presents today with his significant other for further workup and management.    Does claim to have smoked recently although not in last day    Objective   Physical Exam  Gen: Appears in NAD, Aax3  Abd; Soft, ND, TTP globally but non-peritonitic, wound is well healing with granulation tissue on it  Assessment/Plan   44 y/o male s/p appendectomy and hernia repair with complicated post-op course, was improving but now appears to have a one week history of abdominal pain.  Based on his symptoms, the cause is unclear.  Given his complex PMHx, it is possible he has a smoldering abscess, but this would be unusual given this length of time out. The wound appears well healing and doesn't appears to have any issues with it.  No hernias were palpable on exam.  Bowel function also doesn't appears to be consistent with obstruction.  Most likely source of pain would be PUD, however given complex course, I would like to give some labs and a CT abd/pel to rule out abscess or any other smoldering intraabdominal process.  -CBD, CMP  -CT abd/pel with IV contrast  -If these are negative, and symptoms persist, will consider EGD to rule out  PUD    Norman Calloway MD  12/07/23  2:03 PM           Norman Calloway MD 12/07/23 1:57 PM

## 2023-12-08 ENCOUNTER — HOSPITAL ENCOUNTER (OUTPATIENT)
Dept: RADIOLOGY | Facility: HOSPITAL | Age: 48
Discharge: HOME | End: 2023-12-08
Payer: COMMERCIAL

## 2023-12-08 ENCOUNTER — TELEPHONE VISIT (OUTPATIENT)
Dept: SURGERY | Facility: HOSPITAL | Age: 48
End: 2023-12-08

## 2023-12-08 ENCOUNTER — APPOINTMENT (OUTPATIENT)
Dept: SURGERY | Facility: CLINIC | Age: 48
End: 2023-12-08
Payer: COMMERCIAL

## 2023-12-08 DIAGNOSIS — R10.84 GENERALIZED ABDOMINAL PAIN: Primary | ICD-10-CM

## 2023-12-08 DIAGNOSIS — R10.84 GENERALIZED ABDOMINAL PAIN: ICD-10-CM

## 2023-12-08 PROCEDURE — 99024 POSTOP FOLLOW-UP VISIT: CPT | Performed by: SURGERY

## 2023-12-08 PROCEDURE — 74177 CT ABD & PELVIS W/CONTRAST: CPT | Mod: FOREIGN READ | Performed by: RADIOLOGY

## 2023-12-08 PROCEDURE — 2550000001 HC RX 255 CONTRASTS: Performed by: SURGERY

## 2023-12-08 PROCEDURE — 74177 CT ABD & PELVIS W/CONTRAST: CPT

## 2023-12-08 RX ADMIN — IOHEXOL 75 ML: 350 INJECTION, SOLUTION INTRAVENOUS at 07:45

## 2023-12-08 NOTE — PROGRESS NOTES
Called Mr. Lara's significant other, Lisa, on the phone as per his instructions when I saw him last in clinic yesterday.  Informed her that his labs were completely normal.  Also informed him that he CT scan which I personally reviewed did not show any concerning findings other than routine postoperative changes.  She states that Mr. Lara does appear slightly better after drinks electrolytes yesterday.  I informed them that he may have an ulcer, however there is nothing catastrophic or can super concerning that requires any intervention at this time.  I recommended that he stop smoking for now as this may cause a peptic ulcer and also may prevent healing from his midline wound.  I also recommended hydration, stating that the electrolyte solutions that the [employer] would be a good idea.  I also stated that if he continues to have symptoms for him to call the office next week and I would discuss with them about possibly setting up an EGD to confirm that he does not have any peptic ulcer disease.  Mr. Lara significant other understood and agreed to plan.    Norman Calloway MD  12/08/23  10:53 AM

## 2023-12-11 ENCOUNTER — OFFICE VISIT (OUTPATIENT)
Dept: WOUND CARE | Facility: CLINIC | Age: 48
End: 2023-12-11
Payer: COMMERCIAL

## 2023-12-11 ENCOUNTER — PHARMACY VISIT (OUTPATIENT)
Dept: PHARMACY | Facility: CLINIC | Age: 48
End: 2023-12-11

## 2023-12-11 PROCEDURE — 11043 DBRDMT MUSC&/FSCA 1ST 20/<: CPT | Performed by: PLASTIC SURGERY

## 2023-12-11 PROCEDURE — 11043 DBRDMT MUSC&/FSCA 1ST 20/<: CPT

## 2023-12-11 PROCEDURE — RXMED WILLOW AMBULATORY MEDICATION CHARGE

## 2023-12-11 PROCEDURE — 3051F HG A1C>EQUAL 7.0%<8.0%: CPT | Performed by: PLASTIC SURGERY

## 2023-12-11 RX ORDER — SODIUM HYPOCHLORITE 5 MG/ML
SOLUTION TOPICAL
Qty: 473 ML | Refills: 4 | OUTPATIENT
Start: 2023-12-11 | End: 2024-03-20 | Stop reason: HOSPADM

## 2023-12-13 LAB
HOLD SPECIMEN: NORMAL

## 2023-12-22 ENCOUNTER — OFFICE VISIT (OUTPATIENT)
Dept: WOUND CARE | Facility: CLINIC | Age: 48
End: 2023-12-22
Payer: COMMERCIAL

## 2023-12-22 PROCEDURE — 11042 DBRDMT SUBQ TIS 1ST 20SQCM/<: CPT

## 2023-12-22 PROCEDURE — 3051F HG A1C>EQUAL 7.0%<8.0%: CPT | Performed by: SURGERY

## 2023-12-29 ENCOUNTER — APPOINTMENT (OUTPATIENT)
Dept: WOUND CARE | Facility: CLINIC | Age: 48
End: 2023-12-29
Payer: COMMERCIAL

## 2024-01-16 ENCOUNTER — APPOINTMENT (OUTPATIENT)
Dept: SURGERY | Facility: CLINIC | Age: 49
End: 2024-01-16
Payer: COMMERCIAL

## 2024-02-08 ENCOUNTER — HOSPITAL ENCOUNTER (EMERGENCY)
Facility: HOSPITAL | Age: 49
Discharge: HOME | End: 2024-02-08
Attending: EMERGENCY MEDICINE
Payer: COMMERCIAL

## 2024-02-08 ENCOUNTER — APPOINTMENT (OUTPATIENT)
Dept: RADIOLOGY | Facility: HOSPITAL | Age: 49
End: 2024-02-08
Payer: COMMERCIAL

## 2024-02-08 VITALS
HEART RATE: 96 BPM | DIASTOLIC BLOOD PRESSURE: 74 MMHG | BODY MASS INDEX: 28.25 KG/M2 | RESPIRATION RATE: 17 BRPM | HEIGHT: 67 IN | TEMPERATURE: 97.7 F | OXYGEN SATURATION: 98 % | WEIGHT: 180 LBS | SYSTOLIC BLOOD PRESSURE: 125 MMHG

## 2024-02-08 DIAGNOSIS — K56.41 FECAL IMPACTION (MULTI): Primary | ICD-10-CM

## 2024-02-08 LAB
ALBUMIN SERPL BCP-MCNC: 4.6 G/DL (ref 3.4–5)
ALP SERPL-CCNC: 79 U/L (ref 33–120)
ALT SERPL W P-5'-P-CCNC: 15 U/L (ref 7–52)
ANION GAP SERPL CALC-SCNC: 16 MMOL/L (ref 10–20)
AST SERPL W P-5'-P-CCNC: 14 U/L (ref 9–39)
BASOPHILS # BLD AUTO: 0.09 X10*3/UL (ref 0–0.1)
BASOPHILS NFR BLD AUTO: 0.9 %
BILIRUB SERPL-MCNC: 0.5 MG/DL (ref 0–1.2)
BUN SERPL-MCNC: 17 MG/DL (ref 6–23)
CALCIUM SERPL-MCNC: 9.7 MG/DL (ref 8.6–10.3)
CHLORIDE SERPL-SCNC: 98 MMOL/L (ref 98–107)
CO2 SERPL-SCNC: 23 MMOL/L (ref 21–32)
CREAT SERPL-MCNC: 1.23 MG/DL (ref 0.5–1.3)
EGFRCR SERPLBLD CKD-EPI 2021: 54 ML/MIN/1.73M*2
EOSINOPHIL # BLD AUTO: 1.19 X10*3/UL (ref 0–0.7)
EOSINOPHIL NFR BLD AUTO: 12.5 %
ERYTHROCYTE [DISTWIDTH] IN BLOOD BY AUTOMATED COUNT: 13.9 % (ref 11.5–14.5)
GLUCOSE SERPL-MCNC: 184 MG/DL (ref 74–99)
HCT VFR BLD AUTO: 44.3 % (ref 36–52)
HGB BLD-MCNC: 15.3 G/DL (ref 12–17.5)
IMM GRANULOCYTES # BLD AUTO: 0.04 X10*3/UL (ref 0–0.7)
IMM GRANULOCYTES NFR BLD AUTO: 0.4 % (ref 0–0.9)
LACTATE SERPL-SCNC: 1.4 MMOL/L (ref 0.4–2)
LACTATE SERPL-SCNC: 2.1 MMOL/L (ref 0.4–2)
LIPASE SERPL-CCNC: 6 U/L (ref 9–82)
LYMPHOCYTES # BLD AUTO: 3.49 X10*3/UL (ref 1.2–4.8)
LYMPHOCYTES NFR BLD AUTO: 36.7 %
MCH RBC QN AUTO: 28.1 PG (ref 26–34)
MCHC RBC AUTO-ENTMCNC: 34.5 G/DL (ref 32–36)
MCV RBC AUTO: 81 FL (ref 80–100)
MONOCYTES # BLD AUTO: 0.61 X10*3/UL (ref 0.1–1)
MONOCYTES NFR BLD AUTO: 6.4 %
NEUTROPHILS # BLD AUTO: 4.09 X10*3/UL (ref 1.2–7.7)
NEUTROPHILS NFR BLD AUTO: 43.1 %
NRBC BLD-RTO: 0 /100 WBCS (ref 0–0)
PLATELET # BLD AUTO: 390 X10*3/UL (ref 150–450)
POTASSIUM SERPL-SCNC: 3.5 MMOL/L (ref 3.5–5.3)
PROT SERPL-MCNC: 8.3 G/DL (ref 6.4–8.2)
RBC # BLD AUTO: 5.44 X10*6/UL (ref 4–5.9)
SODIUM SERPL-SCNC: 133 MMOL/L (ref 136–145)
WBC # BLD AUTO: 9.5 X10*3/UL (ref 4.4–11.3)

## 2024-02-08 PROCEDURE — 83605 ASSAY OF LACTIC ACID: CPT | Performed by: PHYSICIAN ASSISTANT

## 2024-02-08 PROCEDURE — 96375 TX/PRO/DX INJ NEW DRUG ADDON: CPT | Performed by: EMERGENCY MEDICINE

## 2024-02-08 PROCEDURE — 99284 EMERGENCY DEPT VISIT MOD MDM: CPT | Mod: 25 | Performed by: EMERGENCY MEDICINE

## 2024-02-08 PROCEDURE — 85025 COMPLETE CBC W/AUTO DIFF WBC: CPT | Performed by: PHYSICIAN ASSISTANT

## 2024-02-08 PROCEDURE — 36415 COLL VENOUS BLD VENIPUNCTURE: CPT | Performed by: PHYSICIAN ASSISTANT

## 2024-02-08 PROCEDURE — 74177 CT ABD & PELVIS W/CONTRAST: CPT

## 2024-02-08 PROCEDURE — 83690 ASSAY OF LIPASE: CPT | Performed by: PHYSICIAN ASSISTANT

## 2024-02-08 PROCEDURE — 2500000004 HC RX 250 GENERAL PHARMACY W/ HCPCS (ALT 636 FOR OP/ED): Performed by: PHYSICIAN ASSISTANT

## 2024-02-08 PROCEDURE — 2500000001 HC RX 250 WO HCPCS SELF ADMINISTERED DRUGS (ALT 637 FOR MEDICARE OP): Performed by: PHYSICIAN ASSISTANT

## 2024-02-08 PROCEDURE — 74177 CT ABD & PELVIS W/CONTRAST: CPT | Performed by: RADIOLOGY

## 2024-02-08 PROCEDURE — 80053 COMPREHEN METABOLIC PANEL: CPT | Performed by: PHYSICIAN ASSISTANT

## 2024-02-08 PROCEDURE — 96374 THER/PROPH/DIAG INJ IV PUSH: CPT | Performed by: EMERGENCY MEDICINE

## 2024-02-08 PROCEDURE — 96361 HYDRATE IV INFUSION ADD-ON: CPT | Performed by: EMERGENCY MEDICINE

## 2024-02-08 PROCEDURE — 2550000001 HC RX 255 CONTRASTS: Performed by: PHYSICIAN ASSISTANT

## 2024-02-08 RX ORDER — MORPHINE SULFATE 4 MG/ML
4 INJECTION, SOLUTION INTRAMUSCULAR; INTRAVENOUS ONCE
Status: COMPLETED | OUTPATIENT
Start: 2024-02-08 | End: 2024-02-08

## 2024-02-08 RX ORDER — SYRING-NEEDL,DISP,INSUL,0.3 ML 29 G X1/2"
296 SYRINGE, EMPTY DISPOSABLE MISCELLANEOUS ONCE
Status: COMPLETED | OUTPATIENT
Start: 2024-02-08 | End: 2024-02-08

## 2024-02-08 RX ORDER — ONDANSETRON HYDROCHLORIDE 2 MG/ML
4 INJECTION, SOLUTION INTRAVENOUS ONCE
Status: COMPLETED | OUTPATIENT
Start: 2024-02-08 | End: 2024-02-08

## 2024-02-08 RX ORDER — HYDROMORPHONE HYDROCHLORIDE 1 MG/ML
1 INJECTION, SOLUTION INTRAMUSCULAR; INTRAVENOUS; SUBCUTANEOUS ONCE
Status: COMPLETED | OUTPATIENT
Start: 2024-02-08 | End: 2024-02-08

## 2024-02-08 RX ADMIN — MAGNESIUM CITRATE 296 ML: 1.75 LIQUID ORAL at 12:46

## 2024-02-08 RX ADMIN — IOHEXOL 75 ML: 350 INJECTION, SOLUTION INTRAVENOUS at 11:18

## 2024-02-08 RX ADMIN — MORPHINE SULFATE 4 MG: 4 INJECTION, SOLUTION INTRAMUSCULAR; INTRAVENOUS at 10:07

## 2024-02-08 RX ADMIN — SODIUM CHLORIDE 1000 ML: 9 INJECTION, SOLUTION INTRAVENOUS at 10:06

## 2024-02-08 RX ADMIN — ONDANSETRON 4 MG: 2 INJECTION INTRAMUSCULAR; INTRAVENOUS at 10:07

## 2024-02-08 RX ADMIN — HYDROMORPHONE HYDROCHLORIDE 1 MG: 1 INJECTION, SOLUTION INTRAMUSCULAR; INTRAVENOUS; SUBCUTANEOUS at 12:46

## 2024-02-08 ASSESSMENT — PAIN - FUNCTIONAL ASSESSMENT
PAIN_FUNCTIONAL_ASSESSMENT: 0-10
PAIN_FUNCTIONAL_ASSESSMENT: 0-10

## 2024-02-08 ASSESSMENT — COLUMBIA-SUICIDE SEVERITY RATING SCALE - C-SSRS
1. IN THE PAST MONTH, HAVE YOU WISHED YOU WERE DEAD OR WISHED YOU COULD GO TO SLEEP AND NOT WAKE UP?: NO
6. HAVE YOU EVER DONE ANYTHING, STARTED TO DO ANYTHING, OR PREPARED TO DO ANYTHING TO END YOUR LIFE?: NO
2. HAVE YOU ACTUALLY HAD ANY THOUGHTS OF KILLING YOURSELF?: NO

## 2024-02-08 ASSESSMENT — PAIN SCALES - GENERAL
PAINLEVEL_OUTOF10: 8
PAINLEVEL_OUTOF10: 10 - WORST POSSIBLE PAIN
PAINLEVEL_OUTOF10: 0 - NO PAIN

## 2024-02-08 ASSESSMENT — PAIN DESCRIPTION - LOCATION
LOCATION: ABDOMEN
LOCATION: ABDOMEN

## 2024-02-08 ASSESSMENT — LIFESTYLE VARIABLES
EVER FELT BAD OR GUILTY ABOUT YOUR DRINKING: NO
EVER HAD A DRINK FIRST THING IN THE MORNING TO STEADY YOUR NERVES TO GET RID OF A HANGOVER: NO
HAVE PEOPLE ANNOYED YOU BY CRITICIZING YOUR DRINKING: NO
HAVE YOU EVER FELT YOU SHOULD CUT DOWN ON YOUR DRINKING: NO

## 2024-02-08 ASSESSMENT — PAIN DESCRIPTION - ORIENTATION: ORIENTATION: LOWER

## 2024-02-08 ASSESSMENT — PAIN DESCRIPTION - DESCRIPTORS: DESCRIPTORS: CRAMPING

## 2024-02-08 NOTE — ED TRIAGE NOTES
For the past 5 days pt has had constipation and abd pain and cramping. Pt rates the pain a / denies any N/V at this time.

## 2024-02-08 NOTE — Clinical Note
Victorino Lara was seen and treated in our emergency department on 2/8/2024.  He may return to work on 02/10/2024.       If you have any questions or concerns, please don't hesitate to call.      Ayden Ramires PA-C

## 2024-02-08 NOTE — ED PROVIDER NOTES
HPI   Chief Complaint   Patient presents with    Abdominal Pain     Constipation, rectal pain       A 48-year-old male patient comes in the emergency department today with complaints of unable to have a bowel movement for the last 5 days.  Denies any associated nausea, vomiting, fevers, chills.  Does describe an extensive abdominal surgical history that started late last year.  States he had ileus, small bowel obstruction as well as hernia repairs.  Patient states he has not been passing any gas.  This purpose comes in the emergency department today further evaluation.  Denies any fevers or chills at this present time.                          [place] Coma Scale Score: 15                     Patient History   Past Medical History:   Diagnosis Date    Acute appendicitis with localized peritonitis 10/10/2023    Anxiety and depression     Bipolar 1 disorder, depressed (CMS/[facility])     Diabetes mellitus (CMS/[facility])     type II, diagnosed mid 2023    GERD (gastroesophageal reflux disease)     HLD (hyperlipidemia)     JOI (iron deficiency anemia)     Obesity (BMI 30-39.9)     Onychomycosis     Panic disorder     Retention of urine, unspecified     Inability to urinate    Scrotal hematoma     Umbilical hernia      Past Surgical History:   Procedure Laterality Date    APPENDECTOMY  10/10/2023    COLONOSCOPY  2019    COLONOSCOPY  02/23/2022    CYST REMOVAL  03/27/2014    Hand, excision of cyst    ELBOW SURGERY Right     ESOPHAGOGASTRODUODENOSCOPY      HERNIA REPAIR      SCROTAL SURGERY  02/2016    Hematoma drainage     Family History   Problem Relation Name Age of Onset    Depression Mother       Social History     Tobacco Use    Smoking status: Some Days     Types: Cigarettes    Smokeless tobacco: Never   Vaping Use    Vaping Use: Never used   Substance Use Topics    Alcohol use: Not Currently     Comment: rare social, 2-3 beers    Drug use: Never       Physical Exam   ED Triage Vitals   Temperature Heart Rate Respirations BP    02/08/24 0939 02/08/24 0939 02/08/24 0939 02/08/24 0939   36 °C (96.8 °F) (!) 144 20 107/56      Pulse Ox Temp Source Heart Rate Source Patient Position   02/08/24 0939 02/08/24 0939 02/08/24 1135 --   97 % Temporal Monitor       BP Location FiO2 (%)     02/08/24 0939 --     Right arm        Physical Exam  Constitutional:       General: He is in acute distress (Moderate).      Appearance: Normal appearance. He is ill-appearing. He is not diaphoretic.   HENT:      Head: Normocephalic and atraumatic.      Nose: Nose normal.   Eyes:      Extraocular Movements: Extraocular movements intact.      Conjunctiva/sclera: Conjunctivae normal.      Pupils: Pupils are equal, round, and reactive to light.   Cardiovascular:      Rate and Rhythm: Normal rate and regular rhythm.   Pulmonary:      Effort: Pulmonary effort is normal. No respiratory distress.      Breath sounds: Normal breath sounds. No stridor. No wheezing.   Abdominal:      Palpations: Abdomen is soft.      Tenderness: There is abdominal tenderness (Left-sided abdominal pain). There is no right CVA tenderness, left CVA tenderness or guarding.      Comments: No bowel sounds   Musculoskeletal:         General: Normal range of motion.      Cervical back: Normal range of motion.   Skin:     General: Skin is warm and dry.   Neurological:      General: No focal deficit present.      Mental Status: He is alert and oriented to person, place, and time. Mental status is at baseline.   Psychiatric:         Mood and Affect: Mood normal.         ED Course & MDM   Diagnoses as of 02/08/24 1420   Fecal impaction (CMS/[facility])       Medical Decision Making  A 48-year-old male patient comes in the emergency department today with complaints of unable to have a bowel movement for the last 5 days.  Denies any associated nausea, vomiting, fevers, chills.  Does describe an extensive abdominal surgical history that started late last year.  States he had ileus, small bowel obstruction as well  as hernia repairs.  Patient states he has not been passing any gas.  This purpose comes in the emergency department today further evaluation.  Denies any fevers or chills at this present time.    Laboratory studies, CT of the abdomen pelvis ordered to rule out any electrolyte abnormalities, leukocytosis, acute kidney injury, acute intra-abdominal surgical need.  Rule bowel obstruction, volvulus, ileus, diverticulitis, colitis.  Patient given IV morphine and IV Zofran and IV fluids.    Patient's lactate 1.4 lipase 6.  No acute renal injury.  CBC shows white count 9.5.  No left shift.  CT abdomen pelvis is consistent with fecal impaction.  Discussion a lot of stool.  No localized small bowel obstruction.  Magnesium citrate as well as soapsuds enema ordered for the patient.    Patient was able to have a large bowel movement here in the emergency department.  Feels much better.  Will discharge patient home.  Patient agrees this plan expressed full verbal understanding.  Questions answered.    Historians patient    Diagnosis: Fecal impaction        Labs Reviewed   CBC WITH AUTO DIFFERENTIAL - Abnormal       Result Value    WBC 9.5      nRBC 0.0      RBC 5.44      Hemoglobin 15.3      Hematocrit 44.3      MCV 81      MCH 28.1      MCHC 34.5      RDW 13.9      Platelets 390      Neutrophils % 43.1      Immature Granulocytes %, Automated 0.4      Lymphocytes % 36.7      Monocytes % 6.4      Eosinophils % 12.5      Basophils % 0.9      Neutrophils Absolute 4.09      Immature Granulocytes Absolute, Automated 0.04      Lymphocytes Absolute 3.49      Monocytes Absolute 0.61      Eosinophils Absolute 1.19 (*)     Basophils Absolute 0.09     COMPREHENSIVE METABOLIC PANEL - Abnormal    Glucose 184 (*)     Sodium 133 (*)     Potassium 3.5      Chloride 98      Bicarbonate 23      Anion Gap 16      Urea Nitrogen 17      Creatinine 1.23      eGFR 54 (*)     Calcium 9.7      Albumin 4.6      Alkaline Phosphatase 79      Total Protein  8.3 (*)     AST 14      Bilirubin, Total 0.5      ALT 15     LIPASE - Abnormal    Lipase 6 (*)     Narrative:     Venipuncture immediately after or during the administration of Metamizole may lead to falsely low results. Testing should be performed immediately prior to Metamizole dosing.   LACTATE - Abnormal    Lactate 2.1 (*)     Narrative:     Venipuncture immediately after or during the administration of Metamizole may lead to falsely low results. Testing should be performed immediately  prior to Metamizole dosing.   LACTATE - Normal    Lactate 1.4      Narrative:     Venipuncture immediately after or during the administration of Metamizole may lead to falsely low results. Testing should be performed immediately  prior to Metamizole dosing.        CT abdomen pelvis w IV contrast   Final Result   New moderate diffuse dilation of the colon which contains fecal   residue, fluid and some gas. Foci of mild luminal narrowing in the   distal sigmoid colon and at the rectosigmoid junction may be related   to peristalsis.        Distended stool-filled rectum with small amount of rectal   intraluminal fluid posteriorly. Fecal impaction is a consideration.   Some increased nonspecific soft tissue prominence also evident at the   anorectal junction.        Distal small bowel segments are mildly dilated and predominantly   fluid-filled which could be secondary to the colonic distension and   slowed bowel transit. More proximal segments of small bowel are not   abnormally dilated. No localized small bowel obstruction.        Previous appendectomy. Colonic diverticulosis most prominent in the   descending and sigmoid colon. No focal diverticulitis.        Fatty infiltration of the liver.        MACRO:   None.        Signed by: Kavon Smiley 2/8/2024 11:53 AM   Dictation workstation:   [id number]                Shared SHERI Attestation:    This patient was seen by the advanced practice provider.  I personally saw the patient  and made/approved the management plan and take responsibility for the patient management.    History: 48-year-old male presents with abdominal pain.    Exam: Mildly tachycardic but regular rhythm cardiac exam with clear breath sounds bilaterally.  Abdomen is soft with tenderness to palpation to the left abdomen.  No rebound or guarding.  Neurological exam is grossly intact.    MDM: Bowel obstruction, colitis, diverticulitis    I have seen and examined the patient, agree with the workup, evaluation, medical decision making, management and diagnosis.  The care plan has been discussed.    Freddy Noe MD      Procedure  Procedures     Ayden aRmires PA-C  02/08/24 1424

## 2024-02-19 ENCOUNTER — APPOINTMENT (OUTPATIENT)
Dept: WOUND CARE | Facility: CLINIC | Age: 49
End: 2024-02-19
Payer: COMMERCIAL

## 2024-02-20 ENCOUNTER — APPOINTMENT (OUTPATIENT)
Dept: WOUND CARE | Facility: CLINIC | Age: 49
End: 2024-02-20
Payer: COMMERCIAL

## 2024-03-04 ENCOUNTER — OFFICE VISIT (OUTPATIENT)
Dept: WOUND CARE | Facility: CLINIC | Age: 49
End: 2024-03-04
Payer: COMMERCIAL

## 2024-03-04 PROCEDURE — 99214 OFFICE O/P EST MOD 30 MIN: CPT

## 2024-03-04 PROCEDURE — 99213 OFFICE O/P EST LOW 20 MIN: CPT | Performed by: PLASTIC SURGERY

## 2024-03-12 ENCOUNTER — APPOINTMENT (OUTPATIENT)
Dept: SURGERY | Facility: CLINIC | Age: 49
End: 2024-03-12
Payer: COMMERCIAL

## 2024-03-19 ENCOUNTER — HOSPITAL ENCOUNTER (OUTPATIENT)
Facility: HOSPITAL | Age: 49
Setting detail: OBSERVATION
Discharge: HOME | End: 2024-03-20
Attending: STUDENT IN AN ORGANIZED HEALTH CARE EDUCATION/TRAINING PROGRAM | Admitting: INTERNAL MEDICINE
Payer: COMMERCIAL

## 2024-03-19 ENCOUNTER — APPOINTMENT (OUTPATIENT)
Dept: RADIOLOGY | Facility: HOSPITAL | Age: 49
End: 2024-03-19
Payer: COMMERCIAL

## 2024-03-19 ENCOUNTER — APPOINTMENT (OUTPATIENT)
Dept: CARDIOLOGY | Facility: HOSPITAL | Age: 49
End: 2024-03-19
Payer: COMMERCIAL

## 2024-03-19 DIAGNOSIS — R11.10 VOMITING AND DIARRHEA: Primary | ICD-10-CM

## 2024-03-19 DIAGNOSIS — N17.9 ACUTE KIDNEY INJURY (CMS-HCC): ICD-10-CM

## 2024-03-19 DIAGNOSIS — R19.7 VOMITING AND DIARRHEA: Primary | ICD-10-CM

## 2024-03-19 LAB
ALBUMIN SERPL BCP-MCNC: 4.8 G/DL (ref 3.4–5)
ALP SERPL-CCNC: 87 U/L (ref 33–120)
ALT SERPL W P-5'-P-CCNC: 15 U/L (ref 7–52)
ANION GAP SERPL CALC-SCNC: 18 MMOL/L (ref 10–20)
AST SERPL W P-5'-P-CCNC: 12 U/L (ref 9–39)
BASOPHILS # BLD AUTO: 0.07 X10*3/UL (ref 0–0.1)
BASOPHILS NFR BLD AUTO: 0.6 %
BILIRUB SERPL-MCNC: 0.6 MG/DL (ref 0–1.2)
BUN SERPL-MCNC: 31 MG/DL (ref 6–23)
CALCIUM SERPL-MCNC: 9.3 MG/DL (ref 8.6–10.3)
CARDIAC TROPONIN I PNL SERPL HS: 3 NG/L (ref 0–20)
CARDIAC TROPONIN I PNL SERPL HS: 3 NG/L (ref 0–20)
CHLORIDE SERPL-SCNC: 101 MMOL/L (ref 98–107)
CO2 SERPL-SCNC: 18 MMOL/L (ref 21–32)
CREAT SERPL-MCNC: 2.59 MG/DL (ref 0.5–1.3)
EGFRCR SERPLBLD CKD-EPI 2021: 22 ML/MIN/1.73M*2
EOSINOPHIL # BLD AUTO: 0.22 X10*3/UL (ref 0–0.7)
EOSINOPHIL NFR BLD AUTO: 2 %
ERYTHROCYTE [DISTWIDTH] IN BLOOD BY AUTOMATED COUNT: 14.8 % (ref 11.5–14.5)
FLUAV RNA RESP QL NAA+PROBE: NOT DETECTED
FLUBV RNA RESP QL NAA+PROBE: NOT DETECTED
GLUCOSE BLD MANUAL STRIP-MCNC: 107 MG/DL (ref 74–99)
GLUCOSE SERPL-MCNC: 131 MG/DL (ref 74–99)
HCT VFR BLD AUTO: 45.5 % (ref 36–52)
HGB BLD-MCNC: 15.6 G/DL (ref 12–17.5)
IMM GRANULOCYTES # BLD AUTO: 0.05 X10*3/UL (ref 0–0.7)
IMM GRANULOCYTES NFR BLD AUTO: 0.4 % (ref 0–0.9)
LACTATE SERPL-SCNC: 1.3 MMOL/L (ref 0.4–2)
LACTATE SERPL-SCNC: 2.1 MMOL/L (ref 0.4–2)
LIPASE SERPL-CCNC: 12 U/L (ref 9–82)
LYMPHOCYTES # BLD AUTO: 2.3 X10*3/UL (ref 1.2–4.8)
LYMPHOCYTES NFR BLD AUTO: 20.6 %
MCH RBC QN AUTO: 28.1 PG (ref 26–34)
MCHC RBC AUTO-ENTMCNC: 34.3 G/DL (ref 32–36)
MCV RBC AUTO: 82 FL (ref 80–100)
MONOCYTES # BLD AUTO: 0.89 X10*3/UL (ref 0.1–1)
MONOCYTES NFR BLD AUTO: 8 %
NEUTROPHILS # BLD AUTO: 7.61 X10*3/UL (ref 1.2–7.7)
NEUTROPHILS NFR BLD AUTO: 68.4 %
NRBC BLD-RTO: 0 /100 WBCS (ref 0–0)
PLATELET # BLD AUTO: 498 X10*3/UL (ref 150–450)
POTASSIUM SERPL-SCNC: 3.9 MMOL/L (ref 3.5–5.3)
PROT SERPL-MCNC: 8.5 G/DL (ref 6.4–8.2)
RBC # BLD AUTO: 5.56 X10*6/UL (ref 4–5.9)
RSV RNA RESP QL NAA+PROBE: NOT DETECTED
SARS-COV-2 RNA RESP QL NAA+PROBE: NOT DETECTED
SODIUM SERPL-SCNC: 133 MMOL/L (ref 136–145)
TSH SERPL-ACNC: 1.79 MIU/L (ref 0.44–3.98)
WBC # BLD AUTO: 11.1 X10*3/UL (ref 4.4–11.3)

## 2024-03-19 PROCEDURE — 36415 COLL VENOUS BLD VENIPUNCTURE: CPT

## 2024-03-19 PROCEDURE — 96374 THER/PROPH/DIAG INJ IV PUSH: CPT | Performed by: INTERNAL MEDICINE

## 2024-03-19 PROCEDURE — 71046 X-RAY EXAM CHEST 2 VIEWS: CPT | Performed by: RADIOLOGY

## 2024-03-19 PROCEDURE — G0378 HOSPITAL OBSERVATION PER HR: HCPCS

## 2024-03-19 PROCEDURE — 93005 ELECTROCARDIOGRAM TRACING: CPT

## 2024-03-19 PROCEDURE — 99223 1ST HOSP IP/OBS HIGH 75: CPT | Performed by: INTERNAL MEDICINE

## 2024-03-19 PROCEDURE — 82947 ASSAY GLUCOSE BLOOD QUANT: CPT | Mod: 59

## 2024-03-19 PROCEDURE — 83690 ASSAY OF LIPASE: CPT

## 2024-03-19 PROCEDURE — 2500000004 HC RX 250 GENERAL PHARMACY W/ HCPCS (ALT 636 FOR OP/ED)

## 2024-03-19 PROCEDURE — 99285 EMERGENCY DEPT VISIT HI MDM: CPT | Mod: 25 | Performed by: STUDENT IN AN ORGANIZED HEALTH CARE EDUCATION/TRAINING PROGRAM

## 2024-03-19 PROCEDURE — 83605 ASSAY OF LACTIC ACID: CPT

## 2024-03-19 PROCEDURE — 84484 ASSAY OF TROPONIN QUANT: CPT

## 2024-03-19 PROCEDURE — 87637 SARSCOV2&INF A&B&RSV AMP PRB: CPT

## 2024-03-19 PROCEDURE — 84075 ASSAY ALKALINE PHOSPHATASE: CPT

## 2024-03-19 PROCEDURE — 2500000004 HC RX 250 GENERAL PHARMACY W/ HCPCS (ALT 636 FOR OP/ED): Performed by: INTERNAL MEDICINE

## 2024-03-19 PROCEDURE — 71046 X-RAY EXAM CHEST 2 VIEWS: CPT

## 2024-03-19 PROCEDURE — 74176 CT ABD & PELVIS W/O CONTRAST: CPT | Performed by: RADIOLOGY

## 2024-03-19 PROCEDURE — 84443 ASSAY THYROID STIM HORMONE: CPT | Performed by: INTERNAL MEDICINE

## 2024-03-19 PROCEDURE — 83036 HEMOGLOBIN GLYCOSYLATED A1C: CPT | Mod: ELYLAB | Performed by: INTERNAL MEDICINE

## 2024-03-19 PROCEDURE — 74176 CT ABD & PELVIS W/O CONTRAST: CPT

## 2024-03-19 PROCEDURE — 96375 TX/PRO/DX INJ NEW DRUG ADDON: CPT | Performed by: INTERNAL MEDICINE

## 2024-03-19 PROCEDURE — 85025 COMPLETE CBC W/AUTO DIFF WBC: CPT

## 2024-03-19 RX ORDER — MORPHINE SULFATE 4 MG/ML
4 INJECTION, SOLUTION INTRAMUSCULAR; INTRAVENOUS ONCE
Status: COMPLETED | OUTPATIENT
Start: 2024-03-19 | End: 2024-03-19

## 2024-03-19 RX ORDER — HYDROCODONE BITARTRATE AND ACETAMINOPHEN 5; 325 MG/1; MG/1
1 TABLET ORAL EVERY 6 HOURS PRN
Status: DISCONTINUED | OUTPATIENT
Start: 2024-03-19 | End: 2024-03-20 | Stop reason: HOSPADM

## 2024-03-19 RX ORDER — ONDANSETRON HYDROCHLORIDE 2 MG/ML
4 INJECTION, SOLUTION INTRAVENOUS ONCE
Status: COMPLETED | OUTPATIENT
Start: 2024-03-19 | End: 2024-03-19

## 2024-03-19 RX ORDER — DEXTROSE 50 % IN WATER (D50W) INTRAVENOUS SYRINGE
25
Status: DISCONTINUED | OUTPATIENT
Start: 2024-03-19 | End: 2024-03-20 | Stop reason: HOSPADM

## 2024-03-19 RX ORDER — HYDROMORPHONE HYDROCHLORIDE 1 MG/ML
0.6 INJECTION, SOLUTION INTRAMUSCULAR; INTRAVENOUS; SUBCUTANEOUS ONCE
Status: COMPLETED | OUTPATIENT
Start: 2024-03-19 | End: 2024-03-19

## 2024-03-19 RX ORDER — ACETAMINOPHEN 325 MG/1
650 TABLET ORAL EVERY 4 HOURS PRN
Status: DISCONTINUED | OUTPATIENT
Start: 2024-03-19 | End: 2024-03-20 | Stop reason: HOSPADM

## 2024-03-19 RX ORDER — ACETAMINOPHEN 500 MG
5 TABLET ORAL NIGHTLY PRN
Status: DISCONTINUED | OUTPATIENT
Start: 2024-03-19 | End: 2024-03-20 | Stop reason: HOSPADM

## 2024-03-19 RX ORDER — ONDANSETRON HYDROCHLORIDE 2 MG/ML
4 INJECTION, SOLUTION INTRAVENOUS EVERY 8 HOURS PRN
Status: DISCONTINUED | OUTPATIENT
Start: 2024-03-19 | End: 2024-03-20 | Stop reason: HOSPADM

## 2024-03-19 RX ORDER — INSULIN LISPRO 100 [IU]/ML
0-5 INJECTION, SOLUTION INTRAVENOUS; SUBCUTANEOUS
Status: DISCONTINUED | OUTPATIENT
Start: 2024-03-20 | End: 2024-03-20 | Stop reason: HOSPADM

## 2024-03-19 RX ORDER — SODIUM CHLORIDE, SODIUM LACTATE, POTASSIUM CHLORIDE, CALCIUM CHLORIDE 600; 310; 30; 20 MG/100ML; MG/100ML; MG/100ML; MG/100ML
125 INJECTION, SOLUTION INTRAVENOUS CONTINUOUS
Status: DISCONTINUED | OUTPATIENT
Start: 2024-03-19 | End: 2024-03-20 | Stop reason: HOSPADM

## 2024-03-19 RX ORDER — ONDANSETRON 4 MG/1
4 TABLET, FILM COATED ORAL EVERY 8 HOURS PRN
Status: DISCONTINUED | OUTPATIENT
Start: 2024-03-19 | End: 2024-03-20 | Stop reason: HOSPADM

## 2024-03-19 RX ORDER — DEXTROSE 50 % IN WATER (D50W) INTRAVENOUS SYRINGE
12.5
Status: DISCONTINUED | OUTPATIENT
Start: 2024-03-19 | End: 2024-03-20 | Stop reason: HOSPADM

## 2024-03-19 RX ADMIN — HYDROMORPHONE HYDROCHLORIDE 0.6 MG: 1 INJECTION, SOLUTION INTRAMUSCULAR; INTRAVENOUS; SUBCUTANEOUS at 19:03

## 2024-03-19 RX ADMIN — SODIUM CHLORIDE 1000 ML: 9 INJECTION, SOLUTION INTRAVENOUS at 15:58

## 2024-03-19 RX ADMIN — SODIUM CHLORIDE, POTASSIUM CHLORIDE, SODIUM LACTATE AND CALCIUM CHLORIDE 125 ML/HR: 600; 310; 30; 20 INJECTION, SOLUTION INTRAVENOUS at 23:33

## 2024-03-19 RX ADMIN — MORPHINE SULFATE 4 MG: 4 INJECTION, SOLUTION INTRAMUSCULAR; INTRAVENOUS at 16:22

## 2024-03-19 RX ADMIN — ONDANSETRON 4 MG: 2 INJECTION INTRAMUSCULAR; INTRAVENOUS at 15:59

## 2024-03-19 RX ADMIN — SODIUM CHLORIDE 1000 ML: 9 INJECTION, SOLUTION INTRAVENOUS at 19:03

## 2024-03-19 SDOH — SOCIAL STABILITY: SOCIAL INSECURITY: ARE THERE ANY APPARENT SIGNS OF INJURIES/BEHAVIORS THAT COULD BE RELATED TO ABUSE/NEGLECT?: NO

## 2024-03-19 SDOH — SOCIAL STABILITY: SOCIAL INSECURITY: HAS ANYONE EVER THREATENED TO HURT YOUR FAMILY OR YOUR PETS?: NO

## 2024-03-19 SDOH — SOCIAL STABILITY: SOCIAL INSECURITY: HAVE YOU HAD THOUGHTS OF HARMING ANYONE ELSE?: NO

## 2024-03-19 SDOH — SOCIAL STABILITY: SOCIAL INSECURITY: DO YOU FEEL ANYONE HAS EXPLOITED OR TAKEN ADVANTAGE OF YOU FINANCIALLY OR OF YOUR PERSONAL PROPERTY?: NO

## 2024-03-19 SDOH — SOCIAL STABILITY: SOCIAL INSECURITY: DO YOU FEEL UNSAFE GOING BACK TO THE PLACE WHERE YOU ARE LIVING?: NO

## 2024-03-19 SDOH — SOCIAL STABILITY: SOCIAL INSECURITY: ARE YOU OR HAVE YOU BEEN THREATENED OR ABUSED PHYSICALLY, EMOTIONALLY, OR SEXUALLY BY ANYONE?: NO

## 2024-03-19 SDOH — SOCIAL STABILITY: SOCIAL INSECURITY: DOES ANYONE TRY TO KEEP YOU FROM HAVING/CONTACTING OTHER FRIENDS OR DOING THINGS OUTSIDE YOUR HOME?: NO

## 2024-03-19 ASSESSMENT — PAIN DESCRIPTION - ORIENTATION: ORIENTATION: RIGHT;LEFT;LOWER;UPPER

## 2024-03-19 ASSESSMENT — COGNITIVE AND FUNCTIONAL STATUS - GENERAL
MOBILITY SCORE: 24
DAILY ACTIVITIY SCORE: 24
PATIENT BASELINE BEDBOUND: NO

## 2024-03-19 ASSESSMENT — PAIN DESCRIPTION - FREQUENCY: FREQUENCY: CONSTANT/CONTINUOUS

## 2024-03-19 ASSESSMENT — PAIN - FUNCTIONAL ASSESSMENT
PAIN_FUNCTIONAL_ASSESSMENT: 0-10
PAIN_FUNCTIONAL_ASSESSMENT: 0-10

## 2024-03-19 ASSESSMENT — PAIN DESCRIPTION - LOCATION
LOCATION: ABDOMEN
LOCATION: ABDOMEN

## 2024-03-19 ASSESSMENT — PAIN DESCRIPTION - PROGRESSION: CLINICAL_PROGRESSION: NOT CHANGED

## 2024-03-19 ASSESSMENT — PAIN DESCRIPTION - PAIN TYPE: TYPE: ACUTE PAIN

## 2024-03-19 ASSESSMENT — LIFESTYLE VARIABLES
HOW OFTEN DO YOU HAVE 6 OR MORE DRINKS ON ONE OCCASION: NEVER
HOW MANY STANDARD DRINKS CONTAINING ALCOHOL DO YOU HAVE ON A TYPICAL DAY: PATIENT DOES NOT DRINK
SKIP TO QUESTIONS 9-10: 1
HOW OFTEN DO YOU HAVE A DRINK CONTAINING ALCOHOL: NEVER
AUDIT-C TOTAL SCORE: 0
AUDIT-C TOTAL SCORE: 0

## 2024-03-19 ASSESSMENT — ACTIVITIES OF DAILY LIVING (ADL)
GROOMING: INDEPENDENT
PATIENT'S MEMORY ADEQUATE TO SAFELY COMPLETE DAILY ACTIVITIES?: YES
BATHING: INDEPENDENT
DRESSING YOURSELF: INDEPENDENT
TOILETING: INDEPENDENT
HEARING - LEFT EAR: FUNCTIONAL
FEEDING YOURSELF: INDEPENDENT
HEARING - RIGHT EAR: FUNCTIONAL
JUDGMENT_ADEQUATE_SAFELY_COMPLETE_DAILY_ACTIVITIES: YES
LACK_OF_TRANSPORTATION: NO
ADEQUATE_TO_COMPLETE_ADL: YES
WALKS IN HOME: INDEPENDENT

## 2024-03-19 ASSESSMENT — PAIN SCALES - GENERAL
PAINLEVEL_OUTOF10: 7
PAINLEVEL_OUTOF10: 10 - WORST POSSIBLE PAIN
PAINLEVEL_OUTOF10: 5 - MODERATE PAIN
PAINLEVEL_OUTOF10: 7

## 2024-03-19 ASSESSMENT — PATIENT HEALTH QUESTIONNAIRE - PHQ9
1. LITTLE INTEREST OR PLEASURE IN DOING THINGS: NOT AT ALL
SUM OF ALL RESPONSES TO PHQ9 QUESTIONS 1 & 2: 0
2. FEELING DOWN, DEPRESSED OR HOPELESS: NOT AT ALL

## 2024-03-19 ASSESSMENT — COLUMBIA-SUICIDE SEVERITY RATING SCALE - C-SSRS
2. HAVE YOU ACTUALLY HAD ANY THOUGHTS OF KILLING YOURSELF?: NO
1. IN THE PAST MONTH, HAVE YOU WISHED YOU WERE DEAD OR WISHED YOU COULD GO TO SLEEP AND NOT WAKE UP?: NO
6. HAVE YOU EVER DONE ANYTHING, STARTED TO DO ANYTHING, OR PREPARED TO DO ANYTHING TO END YOUR LIFE?: NO

## 2024-03-19 ASSESSMENT — PAIN DESCRIPTION - ONSET: ONSET: ONGOING

## 2024-03-19 ASSESSMENT — PAIN DESCRIPTION - DESCRIPTORS: DESCRIPTORS: ACHING

## 2024-03-19 NOTE — ED PROVIDER NOTES
HPI   Chief Complaint   Patient presents with    Abdominal Pain     Abdominal pain with vomiting x 3 days.       48-year-old male with past medical history of Bipolar, HLD, DM2, history of appendicitis with appendectomy followed by small bowel obstruction 4 months ago presents the ED today with a chief concern of abdominal pain.  Patient states his symptoms started 2 and half days ago.  Describes the abdominal pain as aching/cramping located diffusely throughout the abdomen.  Denies exacerbating or relieving factors.  He took ibuprofen once for his symptoms.  States that he has had 6 episodes of nonbloody nonbilious vomiting each day as well as 6 episodes of nonbloody diarrhea.  Denies fevers.  Denies dysuria, urinary urgency/frequency, hematuria.  Denies chest pain or shortness of breath.  Denies syncope.  Denies lightheadedness, dizziness, or imbalance.  Denies any recent travel or surgeries.  Denies any cough, sputum, or hemoptysis.  Denies any pain or abnormalities in the genital area.  Denies saddle anesthesia or urinary/fecal incontinence or retention.  Nobody else in the family is experiencing similar symptoms.  No other symptoms or concerns at this time.      History provided by:  Patient   used: No                        Brinkley Coma Scale Score: 15                     Patient History   Past Medical History:   Diagnosis Date    Acute appendicitis with localized peritonitis 10/10/2023    Anxiety and depression     Bipolar 1 disorder, depressed (CMS/[facility])     Diabetes mellitus (CMS/[facility])     type II, diagnosed mid 2023    GERD (gastroesophageal reflux disease)     HLD (hyperlipidemia)     JOI (iron deficiency anemia)     Obesity (BMI 30-39.9)     Onychomycosis     Panic disorder     Retention of urine, unspecified     Inability to urinate    Scrotal hematoma     Umbilical hernia      Past Surgical History:   Procedure Laterality Date    APPENDECTOMY  10/10/2023    COLONOSCOPY  2019     COLONOSCOPY  02/23/2022    CYST REMOVAL  03/27/2014    Hand, excision of cyst    ELBOW SURGERY Right     ESOPHAGOGASTRODUODENOSCOPY      HERNIA REPAIR      SCROTAL SURGERY  02/2016    Hematoma drainage     Family History   Problem Relation Name Age of Onset    Depression Mother       Social History     Tobacco Use    Smoking status: Some Days     Types: Cigarettes    Smokeless tobacco: Never   Vaping Use    Vaping Use: Never used   Substance Use Topics    Alcohol use: Not Currently     Comment: rare social, 2-3 beers    Drug use: Never       Physical Exam   ED Triage Vitals [03/19/24 1500]   Temperature Heart Rate Respirations BP   36.3 °C (97.3 °F) (!) 123 20 108/77      Pulse Ox Temp Source Heart Rate Source Patient Position   97 % Temporal Monitor Sitting      BP Location FiO2 (%)     Right arm --       Physical Exam  Constitutional: Vital signs per nursing notes.  Well developed, well nourished.  No acute distress.    Psychiatric: alert and oriented to person, place, and time; no abnormalities of mood or affect; memory intact  Eyes: PERRL; conjunctivae and lids normal;  ENT: Ears normal externally; face symmetric. voice normal  Neck: neck supple, no meningismus; trachea midline without deviation  Respiratory: normal respiratory effort and excursion; no rales, rhonchi, or wheezes; equal air entry  Cardiovascular: RRR, 2+ radial and dorsalis pedis pulses extremities   Neurological: normal speech; CN II-XII grossly intact, normal motor and sensory function  GI: no distention.  Diffuse abdominal tenderness.  No rebound tenderness or guarding.  No palpable masses.  No pulsatile masses.  No CVA tenderness.  : Deferred  Musculoskeletal: normal gait and station; normal digits and nails; normal to palpation; normal strength/tone; neurovascular status intact.  Skin: normal to inspection; normal to palpation; no rash    ED Course & MDM   ED Course as of 03/19/24 1819 Tue Mar 19, 2024   1555 Ventricular rate 116 bpm.   GA interval 126 ms.  QRS duration 94 ms.  QT/QTc 346/480 ms.  Patient is in sinus tachycardia at a ventricular rate of 116 bpm.  Left axis deviation.  There is good R wave progression.  No right or left bundle-branch block.  No ST elevations or T wave inversions.  Compared with previous EKG in October 18, 2023 grossly unchanged. []   1623     FINDINGS:      The cardiac silhouette is unremarkable. Costophrenic angles are  sharp. Lungs are clear. The trachea is midline. There is no  pneumothorax. No acute osseous abnormality is seen.      IMPRESSION:  1.  No active cardiopulmonary process.          Signed by: Wally Lacy 3/19/2024 4:07 PM  Dictation workstation:   [id number]   []   1742 Initial lactate 2.1.  Repeat lactate 1.3. []   1756 IMPRESSION:  Fluid throughout the colon indicating diarrheal illness.      Colonic diverticulosis.      Hepatic steatosis.      Signed by: Chandrakant Clark 3/19/2024 5:53 PM  Dictation workstation:   [id number]   []   1804 CBC shows no evidence of leukocytosis or anemia.  CMP shows DYLAN with BUN 31, creatinine 2.59, and GFR 22.  Electrolytes grossly within normal limits.  COVID, influenza, RSV negative.  Lipase normal.  Urinalysis pending collection. []   1818 Spoke with Dr. Sam who accepts admission of this patient []      ED Course User Index  [] Owen Monge PA-C         Diagnoses as of 03/19/24 1819   Vomiting and diarrhea   Acute kidney injury (CMS/HCC)       Medical Decision Making  48-year-old male with past medical history of Bipolar, HLD, DM2, history of appendicitis with appendectomy followed by small bowel obstruction 4 months ago presents the ED today with a chief concern of abdominal pain.  Vital signs reassuring.  Patient overall appears well and is nontoxic-appearing.  Was given IV fluids and Zofran in the ED. he was also given IV morphine and Dilaudid in the ED.  He felt better after administration of these medications.  He is neuro intact.  Not  concerned for any AAA at this time.  No signs of mesenteric ischemia.  Less patient for any SBO.  CT scan shows fluid throughout the colon.  Signs and symptoms could be related to viral syndrome.  He does have an acute kidney injury and has received 2 L of normal saline for this.  Other labs are reassuring.  Lactate improving throughout the ED visit.  Would like to admit for acute kidney injury and pain control.  Discussed impression and plan to the patient and family they feel comfortable be given to the observation unit.  Discussed with Dr. Sam who accepts admission of this patient to the observation unit    Differential diagnosis: COVID, viral syndrome, gastroenteritis, SBO, cholecystitis, pancreatitis, mesenteric ischemia, AAA, testicular torsion, epididymitis    Disposition/treatment  1.  Vomiting and diarrhea  2.  Acute kidney injury    Shared decision-making was used patient will be admitted to the observation unit            Procedure  Procedures     Owen Monge PA-C  03/19/24 2379

## 2024-03-19 NOTE — H&P
Medical Group History and Physical    ASSESSMENT & PLAN:     Acute gastroenteritis, likely viral  -p/w acute onset abd pain, n/v, diarrhea  -lipase wnl, lfts wnl  -CT A/P reviewed, showed diarrhea within colon, no colitis, no diverticulitis, no free fluid, no abscess  Plan:  -IVF  -CLD for now, ADAT  -pain, nausea control  -check c. Diff, stool pathogen panel, TSH    DYLAN  -Scr 2.59 on admission, previously normal  -likely prerenal d/t diarrhea, but has also been taking high dose ibuprofen near daily since his surgery last year  Plan:  -IVF, trend BMP  -bladder scan to assess for urinary retention    Hyponatremia  -suspect hypovolemic d/t diarrhea  Plan:  -IVF, trend BMP    DM2  -check a1c  -accuchecks, ssi    Bipolar disorder  -home meds    Vte chemoppx not indicated, low risk, ambulate  Home meds to be entered once reconciled    Jarod Sam MD    HISTORY OF PRESENT ILLNESS:   Chief Complaint: abd pain, n/v, diarrhea    History Of Present Illness:    48M with PMH of DM2, bipolar disorder who is presenting with abd pain, n/v, diarrhea. Has been going on for about 2-3 days. Denies any precipitating event, no abnormal food exposure, recent travel, sick contacts. Diarrhea is watery, clear, no bleeding. Vomiting is also clear. Not having much PO intake. Abd pain is diffuse, generalized, sharp. Feels he has also not urinated as much last few days.     In the ED, afebrile, tachycardic, normotensive, on room air. Labs showed DYLAN, hyponatremia, mild lactate elevation. COVID/flu/RSV neg. CXR unremarkable. CT A/P showed fluid within colon c/w diarrhea, no colitis, no diverticulitis, no free fluid, no abscess.     Review of systems: 10 point review of systems is otherwise negative except as mentioned above.    PAST HISTORIES:     Past Medical History:  He has a past medical history of Acute appendicitis with localized peritonitis (10/10/2023), Anxiety and depression, Bipolar 1 disorder, depressed (CMS/[facility]), Diabetes  "mellitus (CMS/HCC), GERD (gastroesophageal reflux disease), HLD (hyperlipidemia), JOI (iron deficiency anemia), Obesity (BMI 30-39.9), Onychomycosis, Panic disorder, Retention of urine, unspecified, Scrotal hematoma, and Umbilical hernia.    Past Surgical History:  He has a past surgical history that includes Hernia repair; Cyst Removal (03/27/2014); Colonoscopy (2019); Esophagogastroduodenoscopy; Elbow surgery (Right); Scrotal surgery (02/2016); Appendectomy (10/10/2023); and Colonoscopy (02/23/2022).      Social History:  He reports that he has been smoking cigarettes. He has never used smokeless tobacco. He reports that he does not currently use alcohol. He reports that he does not use drugs.    Family History:  Family History   Problem Relation Name Age of Onset    Depression Mother          Allergies:  Patient has no known allergies.    OBJECTIVE:     Last Recorded Vitals:  Vitals:    03/19/24 1500 03/19/24 1627   BP: 108/77 124/87   BP Location: Right arm Right arm   Patient Position: Sitting Lying   Pulse: (!) 123 (!) 113   Resp: 20 16   Temp: 36.3 °C (97.3 °F)    TempSrc: Temporal    SpO2: 97% 94%   Weight: 86.2 kg (190 lb)    Height: 1.702 m (5' 7\")        Last I/O:  No intake/output data recorded.    Physical Exam:  GEN: appears stated age, NAD  CV: RRR, no m/r/g, no LE edema  LUNGS: CTAB, no w/r/c  ABD: prior surgical scar, diffuse abd pain, no r/g/r  MSK; no gross deformities, normal joints  NEURO: A+Ox3, no FND  PSYCH: appropriate mood, affect    Scheduled Medications  HYDROmorphone, 0.6 mg, intravenous, Once  sodium chloride, 1,000 mL, intravenous, Once        PRN Medications      Continuous Medications       Outpatient Medications:  Prior to Admission medications    Medication Sig Start Date End Date Taking? Authorizing Provider   busPIRone (Buspar) 15 mg tablet Take 1 tablet (15 mg) by mouth 2 times a day. 11/2/23 12/2/23  David Alonzo MD   insulin lispro (HumaLOG) 100 unit/mL injection Inject " 0-0.05 mL (0-5 Units) under the skin 3 times a day with meals. Take as directed per insulin instructions. 11/2/23 12/2/23  David Alonzo MD   lurasidone (Latuda) 40 mg tablet Take 1 tablet (40 mg) by mouth once daily at bedtime. 11/2/23 12/2/23  David Alonzo MD   omeprazole (PriLOSEC) 20 mg DR capsule Take 1 capsule (20 mg) by mouth. Do not crush or chew.    Historical Provider, MD   QUEtiapine 150 mg tablet Take 150 mg by mouth once daily at bedtime. 11/2/23 12/2/23  David Alonzo MD   simvastatin (Zocor) 40 mg tablet Take 1 tablet (40 mg) by mouth once daily at bedtime.    Historical Provider, MD   sodium hypochlorite (Dakin's Solution) 0.5 % external solution Use for wet to dry dressing once daily as directed. 12/11/23   Roland J Reyes, MD   tamsulosin (Flomax) 0.4 mg 24 hr capsule Take 1 capsule (0.4 mg) by mouth once daily in the morning. Take before meals. Do not start before November 3, 2023. 11/3/23   David Alonzo MD   tiZANidine (Zanaflex) 4 mg capsule Take 1 capsule (4 mg) by mouth 2 times a day as needed for muscle spasms.    Historical Provider, MD   traZODone (Desyrel) 50 mg tablet Take 1 tablet (50 mg) by mouth once daily at bedtime. 11/2/23 12/2/23  David Alonzo MD   venlafaxine XR (Effexor-XR) 150 mg 24 hr capsule Take 1 capsule (150 mg) by mouth once daily. Do not crush or chew. 11/2/23 12/2/23  David Alonzo MD       LABS AND IMAGING:     Labs:  Results for orders placed or performed during the hospital encounter of 03/19/24 (from the past 24 hour(s))   ECG 12 lead   Result Value Ref Range    Ventricular Rate 116 BPM    Atrial Rate 116 BPM    NH Interval 126 ms    QRS Duration 94 ms    QT Interval 346 ms    QTC Calculation(Bazett) 480 ms    P Axis 31 degrees    R Axis -4 degrees    T Axis 35 degrees    QRS Count 19 beats    Q Onset 209 ms    P Onset 146 ms    P Offset 195 ms    T Offset 382 ms    QTC Fredericia 431 ms   CBC and Auto Differential   Result Value Ref Range    WBC 11.1 4.4 - 11.3  x10*3/uL    nRBC 0.0 0.0 - 0.0 /100 WBCs    RBC 5.56 4.00 - 5.90 x10*6/uL    Hemoglobin 15.6 12.0 - 17.5 g/dL    Hematocrit 45.5 36.0 - 52.0 %    MCV 82 80 - 100 fL    MCH 28.1 26.0 - 34.0 pg    MCHC 34.3 32.0 - 36.0 g/dL    RDW 14.8 (H) 11.5 - 14.5 %    Platelets 498 (H) 150 - 450 x10*3/uL    Neutrophils % 68.4 40.0 - 80.0 %    Immature Granulocytes %, Automated 0.4 0.0 - 0.9 %    Lymphocytes % 20.6 13.0 - 44.0 %    Monocytes % 8.0 2.0 - 10.0 %    Eosinophils % 2.0 0.0 - 6.0 %    Basophils % 0.6 0.0 - 2.0 %    Neutrophils Absolute 7.61 1.20 - 7.70 x10*3/uL    Immature Granulocytes Absolute, Automated 0.05 0.00 - 0.70 x10*3/uL    Lymphocytes Absolute 2.30 1.20 - 4.80 x10*3/uL    Monocytes Absolute 0.89 0.10 - 1.00 x10*3/uL    Eosinophils Absolute 0.22 0.00 - 0.70 x10*3/uL    Basophils Absolute 0.07 0.00 - 0.10 x10*3/uL   Comprehensive metabolic panel   Result Value Ref Range    Glucose 131 (H) 74 - 99 mg/dL    Sodium 133 (L) 136 - 145 mmol/L    Potassium 3.9 3.5 - 5.3 mmol/L    Chloride 101 98 - 107 mmol/L    Bicarbonate 18 (L) 21 - 32 mmol/L    Anion Gap 18 10 - 20 mmol/L    Urea Nitrogen 31 (H) 6 - 23 mg/dL    Creatinine 2.59 (H) 0.50 - 1.30 mg/dL    eGFR 22 (L) >60 mL/min/1.73m*2    Calcium 9.3 8.6 - 10.3 mg/dL    Albumin 4.8 3.4 - 5.0 g/dL    Alkaline Phosphatase 87 33 - 120 U/L    Total Protein 8.5 (H) 6.4 - 8.2 g/dL    AST 12 9 - 39 U/L    Bilirubin, Total 0.6 0.0 - 1.2 mg/dL    ALT 15 7 - 52 U/L   Lipase   Result Value Ref Range    Lipase 12 9 - 82 U/L   Lactate   Result Value Ref Range    Lactate 2.1 (H) 0.4 - 2.0 mmol/L   Troponin I, High Sensitivity, Initial   Result Value Ref Range    Troponin I, High Sensitivity 3 0 - 20 ng/L   Sars-CoV-2 and Influenza A/B PCR   Result Value Ref Range    Flu A Result Not Detected Not Detected    Flu B Result Not Detected Not Detected    Coronavirus 2019, PCR Not Detected Not Detected   RSV PCR   Result Value Ref Range    RSV PCR Not Detected Not Detected   Troponin, High  Sensitivity, 1 Hour   Result Value Ref Range    Troponin I, High Sensitivity 3 0 - 20 ng/L   Lactate   Result Value Ref Range    Lactate 1.3 0.4 - 2.0 mmol/L        Imaging:  CT abdomen pelvis wo IV contrast  Narrative: Interpreted By:  Chandrakant Clark,   STUDY:  CT ABDOMEN PELVIS WO IV CONTRAST;  3/19/2024 5:41 pm      INDICATION:  Signs/Symptoms:abd pain.      COMPARISON:  02/08/2024      ACCESSION NUMBER(S):  [id number]      ORDERING CLINICIAN:  LUCIANO PENALOZA      TECHNIQUE:  CT of the abdomen and pelvis was performed without IV contrast.  Sagittal and coronal reconstructions.      FINDINGS:  Limited evaluation for solid organs and vasculature without  intravenous contrast.      Lower Chest: Streaky bibasilar atelectasis. Coronary artery  calcifications. Mild right-sided gynecomastia.      Liver: Diffuse hepatic hypodensity suggestive of hepatic steatosis.  There is focal fatty sparing along the gallbladder.      Gallbladder and Biliary: Unremarkable.      Pancreas: No abnormality identified in the pancreas.      Spleen: No abnormality identified in the spleen.      Adrenals: No abnormality identified in either adrenal gland.      Urinary: No parenchymal abnormality identified in either kidney. No  hydronephrosis.      Gastrointestinal/Peritoneum: No small or large bowel obstruction in  the visualized abdomen. In the abdomen, there is no extraluminal air.  No significant free fluid. Descending and sigmoid colon  diverticulosis. Fluid throughout the colon indicating diarrheal  illness. Appendix not seen suggesting prior appendectomy. Question of  a small hiatal hernia. Gastric wall thickening favored to be due to  lack of distention.      Vascular: Abdominal aorta is normal in caliber.      Lymphatics: No enlarged lymph nodes by size criteria.      MSK/Body Wall: No aggressive bony lesion identified. Diastasis recti.  Postoperative changes in the anterior abdominal wall with wide neck  incisional ventral  hernia with granulation changes. The appearance is  stable from prior exam.      Impression: Fluid throughout the colon indicating diarrheal illness.      Colonic diverticulosis.      Hepatic steatosis.      Signed by: Chandrakant Clark 3/19/2024 5:53 PM  Dictation workstation:   [id number]  ECG 12 lead  Sinus tachycardia  Otherwise normal ECG  When compared with ECG of 18-OCT-2023 20:58,  No significant change was found  See ED provider note for full interpretation and clinical correlation  XR chest 2 views  Narrative: Interpreted By:  Wally Lacy,   STUDY:  XR CHEST 2 VIEWS;  3/19/2024 3:53 pm      INDICATION:  Signs/Symptoms:epigastric pain.      COMPARISON:  None.      ACCESSION NUMBER(S):  [id number]      ORDERING CLINICIAN:  LUCIANO PENALOZA      FINDINGS:      The cardiac silhouette is unremarkable. Costophrenic angles are  sharp. Lungs are clear. The trachea is midline. There is no  pneumothorax. No acute osseous abnormality is seen.      Impression: 1.  No active cardiopulmonary process.          Signed by: Wally Lacy 3/19/2024 4:07 PM  Dictation workstation:   [id number]

## 2024-03-20 VITALS
SYSTOLIC BLOOD PRESSURE: 115 MMHG | RESPIRATION RATE: 16 BRPM | HEIGHT: 67 IN | DIASTOLIC BLOOD PRESSURE: 75 MMHG | BODY MASS INDEX: 29.82 KG/M2 | OXYGEN SATURATION: 94 % | TEMPERATURE: 97.2 F | WEIGHT: 190 LBS | HEART RATE: 90 BPM

## 2024-03-20 LAB
ANION GAP SERPL CALC-SCNC: 12 MMOL/L (ref 10–20)
ATRIAL RATE: 116 BPM
BUN SERPL-MCNC: 26 MG/DL (ref 6–23)
C DIF TOX TCDA+TCDB STL QL NAA+PROBE: NOT DETECTED
CALCIUM SERPL-MCNC: 8.5 MG/DL (ref 8.6–10.3)
CHLORIDE SERPL-SCNC: 106 MMOL/L (ref 98–107)
CO2 SERPL-SCNC: 22 MMOL/L (ref 21–32)
CREAT SERPL-MCNC: 1.45 MG/DL (ref 0.5–1.3)
EGFRCR SERPLBLD CKD-EPI 2021: 44 ML/MIN/1.73M*2
ERYTHROCYTE [DISTWIDTH] IN BLOOD BY AUTOMATED COUNT: 14.6 % (ref 11.5–14.5)
EST. AVERAGE GLUCOSE BLD GHB EST-MCNC: 140 MG/DL
GLUCOSE BLD MANUAL STRIP-MCNC: 113 MG/DL (ref 74–99)
GLUCOSE BLD MANUAL STRIP-MCNC: 99 MG/DL (ref 74–99)
GLUCOSE SERPL-MCNC: 97 MG/DL (ref 74–99)
HBA1C MFR BLD: 6.5 %
HCT VFR BLD AUTO: 37.5 % (ref 36–52)
HGB BLD-MCNC: 12.8 G/DL (ref 12–17.5)
HOLD SPECIMEN: NORMAL
MAGNESIUM SERPL-MCNC: 1.63 MG/DL (ref 1.6–2.4)
MCH RBC QN AUTO: 28 PG (ref 26–34)
MCHC RBC AUTO-ENTMCNC: 34.1 G/DL (ref 32–36)
MCV RBC AUTO: 82 FL (ref 80–100)
NRBC BLD-RTO: 0 /100 WBCS (ref 0–0)
P AXIS: 31 DEGREES
P OFFSET: 195 MS
P ONSET: 146 MS
PLATELET # BLD AUTO: 389 X10*3/UL (ref 150–450)
POTASSIUM SERPL-SCNC: 3.7 MMOL/L (ref 3.5–5.3)
PR INTERVAL: 126 MS
Q ONSET: 209 MS
QRS COUNT: 19 BEATS
QRS DURATION: 94 MS
QT INTERVAL: 346 MS
QTC CALCULATION(BAZETT): 480 MS
QTC FREDERICIA: 431 MS
R AXIS: -4 DEGREES
RBC # BLD AUTO: 4.57 X10*6/UL (ref 4–5.9)
SODIUM SERPL-SCNC: 136 MMOL/L (ref 136–145)
T AXIS: 35 DEGREES
T OFFSET: 382 MS
VENTRICULAR RATE: 116 BPM
WBC # BLD AUTO: 8.1 X10*3/UL (ref 4.4–11.3)

## 2024-03-20 PROCEDURE — 80048 BASIC METABOLIC PNL TOTAL CA: CPT | Performed by: INTERNAL MEDICINE

## 2024-03-20 PROCEDURE — 87506 IADNA-DNA/RNA PROBE TQ 6-11: CPT | Mod: ELYLAB | Performed by: INTERNAL MEDICINE

## 2024-03-20 PROCEDURE — 2500000001 HC RX 250 WO HCPCS SELF ADMINISTERED DRUGS (ALT 637 FOR MEDICARE OP): Performed by: INTERNAL MEDICINE

## 2024-03-20 PROCEDURE — 85027 COMPLETE CBC AUTOMATED: CPT | Performed by: INTERNAL MEDICINE

## 2024-03-20 PROCEDURE — G0378 HOSPITAL OBSERVATION PER HR: HCPCS

## 2024-03-20 PROCEDURE — 83735 ASSAY OF MAGNESIUM: CPT | Performed by: INTERNAL MEDICINE

## 2024-03-20 PROCEDURE — 36415 COLL VENOUS BLD VENIPUNCTURE: CPT | Performed by: INTERNAL MEDICINE

## 2024-03-20 PROCEDURE — 2500000002 HC RX 250 W HCPCS SELF ADMINISTERED DRUGS (ALT 637 FOR MEDICARE OP, ALT 636 FOR OP/ED): Performed by: INTERNAL MEDICINE

## 2024-03-20 PROCEDURE — 82947 ASSAY GLUCOSE BLOOD QUANT: CPT

## 2024-03-20 PROCEDURE — 99239 HOSP IP/OBS DSCHRG MGMT >30: CPT | Performed by: INTERNAL MEDICINE

## 2024-03-20 PROCEDURE — 87493 C DIFF AMPLIFIED PROBE: CPT | Mod: 59 | Performed by: INTERNAL MEDICINE

## 2024-03-20 RX ORDER — VENLAFAXINE HYDROCHLORIDE 150 MG/1
150 CAPSULE, EXTENDED RELEASE ORAL DAILY
Status: DISCONTINUED | OUTPATIENT
Start: 2024-03-20 | End: 2024-03-20 | Stop reason: HOSPADM

## 2024-03-20 RX ORDER — LURASIDONE HYDROCHLORIDE 40 MG/1
40 TABLET, FILM COATED ORAL NIGHTLY
Status: DISCONTINUED | OUTPATIENT
Start: 2024-03-20 | End: 2024-03-20 | Stop reason: HOSPADM

## 2024-03-20 RX ORDER — BUSPIRONE HYDROCHLORIDE 5 MG/1
15 TABLET ORAL 2 TIMES DAILY
Status: DISCONTINUED | OUTPATIENT
Start: 2024-03-20 | End: 2024-03-20 | Stop reason: HOSPADM

## 2024-03-20 RX ORDER — QUETIAPINE FUMARATE 100 MG/1
150 TABLET, FILM COATED ORAL NIGHTLY
Status: DISCONTINUED | OUTPATIENT
Start: 2024-03-20 | End: 2024-03-20 | Stop reason: HOSPADM

## 2024-03-20 RX ORDER — TRAZODONE HYDROCHLORIDE 50 MG/1
50 TABLET ORAL NIGHTLY
Status: DISCONTINUED | OUTPATIENT
Start: 2024-03-20 | End: 2024-03-20 | Stop reason: HOSPADM

## 2024-03-20 RX ORDER — SIMVASTATIN 40 MG/1
40 TABLET, FILM COATED ORAL NIGHTLY
Status: DISCONTINUED | OUTPATIENT
Start: 2024-03-20 | End: 2024-03-20 | Stop reason: HOSPADM

## 2024-03-20 RX ORDER — PANTOPRAZOLE SODIUM 40 MG/1
40 TABLET, DELAYED RELEASE ORAL
Status: DISCONTINUED | OUTPATIENT
Start: 2024-03-20 | End: 2024-03-20 | Stop reason: HOSPADM

## 2024-03-20 RX ORDER — TAMSULOSIN HYDROCHLORIDE 0.4 MG/1
0.4 CAPSULE ORAL
Status: DISCONTINUED | OUTPATIENT
Start: 2024-03-20 | End: 2024-03-20 | Stop reason: HOSPADM

## 2024-03-20 RX ADMIN — BUSPIRONE HYDROCHLORIDE 15 MG: 5 TABLET ORAL at 09:04

## 2024-03-20 RX ADMIN — HYDROCODONE BITARTRATE AND ACETAMINOPHEN 1 TABLET: 5; 325 TABLET ORAL at 00:02

## 2024-03-20 RX ADMIN — VENLAFAXINE HYDROCHLORIDE 150 MG: 150 CAPSULE, EXTENDED RELEASE ORAL at 09:00

## 2024-03-20 RX ADMIN — PANTOPRAZOLE SODIUM 40 MG: 40 TABLET, DELAYED RELEASE ORAL at 07:15

## 2024-03-20 RX ADMIN — HYDROCODONE BITARTRATE AND ACETAMINOPHEN 1 TABLET: 5; 325 TABLET ORAL at 09:08

## 2024-03-20 RX ADMIN — TAMSULOSIN HYDROCHLORIDE 0.4 MG: 0.4 CAPSULE ORAL at 07:15

## 2024-03-20 ASSESSMENT — COGNITIVE AND FUNCTIONAL STATUS - GENERAL
DAILY ACTIVITIY SCORE: 24
MOBILITY SCORE: 24

## 2024-03-20 ASSESSMENT — PAIN DESCRIPTION - LOCATION: LOCATION: ABDOMEN

## 2024-03-20 ASSESSMENT — PAIN SCALES - GENERAL
PAINLEVEL_OUTOF10: 3
PAINLEVEL_OUTOF10: 7

## 2024-03-20 ASSESSMENT — PAIN - FUNCTIONAL ASSESSMENT
PAIN_FUNCTIONAL_ASSESSMENT: 0-10
PAIN_FUNCTIONAL_ASSESSMENT: 0-10

## 2024-03-20 NOTE — CARE PLAN
The patient's goals for the shift include      The clinical goals for the shift include safe and effective patient mcentered care

## 2024-03-20 NOTE — DISCHARGE SUMMARY
DISCHARGE DIAGNOSIS     Acute gastroenteritis, likely viral  DYLAN  Hyponatremia    HOSPITAL COURSE AND DETAILS     48M with PMH of DM2, bipolar disorder who presented with acute onset mild abd pain, nausea, nbnb emesis, nonbloody watery diarrhea x2-3d.    Acute gastroenteritis, likely viral  -lipase wnl, lfts wnl, TSH wnl  -CT A/P reviewed, showed diarrhea within colon, no colitis, no diverticulitis, no free fluid, no abscess  -c. Diff neg  -symptoms improved with IVF, tachycardia improved, no more abd pain, no n/v, tolerating PO diet, diarrhea slowing down  -stool pathogen pcr panel pending at time of dc, will fu     DYLAN  -Scr 2.59 on admission, previously normal  -likely prerenal d/t diarrhea, but has also been taking high dose ibuprofen near daily since his surgery last year  -Scr improved to 1.45 with ivf  -encourage PO intake of fluid  -avoid NSAIDs, discussed with patient     Hyponatremia  -suspect hypovolemic d/t diarrhea, resolved with IVF     Stable for dc to home. Fu with PCP. Total time spent on discharge services 31 minutes.     DISCHARGE PHYSICAL EXAM     Last Recorded Vitals:  Vitals:    03/19/24 2016 03/19/24 2035 03/19/24 2348 03/20/24 0752   BP: 114/76 129/81 112/86 115/75   BP Location: Right arm      Patient Position: Sitting Lying Lying    Pulse: 99 (!) 101 104 90   Resp: 16 16 18 16   Temp:  35.9 °C (96.6 °F) 36.2 °C (97.2 °F) 36.2 °C (97.2 °F)   TempSrc:       SpO2: 94% 96% 96% 94%   Weight:       Height:           Physical Exam:  GEN: appears stated age, NAD  CV: RRR, no m/r/g, no LE edema  LUNGS: CTAB, no w/r/c  ABD: prior surgical scar, no longer ttp, no r/g/r  MSK; no gross deformities, normal joints  NEURO: A+Ox3, no FND  PSYCH: appropriate mood, affect      PERTINENT LABS AND IMAGING     Results for orders placed or performed during the hospital encounter of 03/19/24 (from the past 96 hour(s))   ECG 12 lead   Result Value Ref Range    Ventricular Rate 116 BPM    Atrial Rate 116 BPM    CO  Interval 126 ms    QRS Duration 94 ms    QT Interval 346 ms    QTC Calculation(Bazett) 480 ms    P Axis 31 degrees    R Axis -4 degrees    T Axis 35 degrees    QRS Count 19 beats    Q Onset 209 ms    P Onset 146 ms    P Offset 195 ms    T Offset 382 ms    QTC Fredericia 431 ms   CBC and Auto Differential   Result Value Ref Range    WBC 11.1 4.4 - 11.3 x10*3/uL    nRBC 0.0 0.0 - 0.0 /100 WBCs    RBC 5.56 4.00 - 5.90 x10*6/uL    Hemoglobin 15.6 12.0 - 17.5 g/dL    Hematocrit 45.5 36.0 - 52.0 %    MCV 82 80 - 100 fL    MCH 28.1 26.0 - 34.0 pg    MCHC 34.3 32.0 - 36.0 g/dL    RDW 14.8 (H) 11.5 - 14.5 %    Platelets 498 (H) 150 - 450 x10*3/uL    Neutrophils % 68.4 40.0 - 80.0 %    Immature Granulocytes %, Automated 0.4 0.0 - 0.9 %    Lymphocytes % 20.6 13.0 - 44.0 %    Monocytes % 8.0 2.0 - 10.0 %    Eosinophils % 2.0 0.0 - 6.0 %    Basophils % 0.6 0.0 - 2.0 %    Neutrophils Absolute 7.61 1.20 - 7.70 x10*3/uL    Immature Granulocytes Absolute, Automated 0.05 0.00 - 0.70 x10*3/uL    Lymphocytes Absolute 2.30 1.20 - 4.80 x10*3/uL    Monocytes Absolute 0.89 0.10 - 1.00 x10*3/uL    Eosinophils Absolute 0.22 0.00 - 0.70 x10*3/uL    Basophils Absolute 0.07 0.00 - 0.10 x10*3/uL   Comprehensive metabolic panel   Result Value Ref Range    Glucose 131 (H) 74 - 99 mg/dL    Sodium 133 (L) 136 - 145 mmol/L    Potassium 3.9 3.5 - 5.3 mmol/L    Chloride 101 98 - 107 mmol/L    Bicarbonate 18 (L) 21 - 32 mmol/L    Anion Gap 18 10 - 20 mmol/L    Urea Nitrogen 31 (H) 6 - 23 mg/dL    Creatinine 2.59 (H) 0.50 - 1.30 mg/dL    eGFR 22 (L) >60 mL/min/1.73m*2    Calcium 9.3 8.6 - 10.3 mg/dL    Albumin 4.8 3.4 - 5.0 g/dL    Alkaline Phosphatase 87 33 - 120 U/L    Total Protein 8.5 (H) 6.4 - 8.2 g/dL    AST 12 9 - 39 U/L    Bilirubin, Total 0.6 0.0 - 1.2 mg/dL    ALT 15 7 - 52 U/L   Lipase   Result Value Ref Range    Lipase 12 9 - 82 U/L   Lactate   Result Value Ref Range    Lactate 2.1 (H) 0.4 - 2.0 mmol/L   Troponin I, High Sensitivity, Initial    Result Value Ref Range    Troponin I, High Sensitivity 3 0 - 20 ng/L   Hemoglobin A1c   Result Value Ref Range    Hemoglobin A1C 6.5 (H) see below %    Estimated Average Glucose 140 Not Established mg/dL   Sars-CoV-2 and Influenza A/B PCR   Result Value Ref Range    Flu A Result Not Detected Not Detected    Flu B Result Not Detected Not Detected    Coronavirus 2019, PCR Not Detected Not Detected   RSV PCR   Result Value Ref Range    RSV PCR Not Detected Not Detected   Troponin, High Sensitivity, 1 Hour   Result Value Ref Range    Troponin I, High Sensitivity 3 0 - 20 ng/L   Lactate   Result Value Ref Range    Lactate 1.3 0.4 - 2.0 mmol/L   TSH with reflex to Free T4 if abnormal   Result Value Ref Range    Thyroid Stimulating Hormone 1.79 0.44 - 3.98 mIU/L   POCT GLUCOSE   Result Value Ref Range    POCT Glucose 107 (H) 74 - 99 mg/dL   POCT GLUCOSE   Result Value Ref Range    POCT Glucose 99 74 - 99 mg/dL   CBC   Result Value Ref Range    WBC 8.1 4.4 - 11.3 x10*3/uL    nRBC 0.0 0.0 - 0.0 /100 WBCs    RBC 4.57 4.00 - 5.90 x10*6/uL    Hemoglobin 12.8 12.0 - 17.5 g/dL    Hematocrit 37.5 36.0 - 52.0 %    MCV 82 80 - 100 fL    MCH 28.0 26.0 - 34.0 pg    MCHC 34.1 32.0 - 36.0 g/dL    RDW 14.6 (H) 11.5 - 14.5 %    Platelets 389 150 - 450 x10*3/uL   SST TOP   Result Value Ref Range    Extra Tube Hold for add-ons.    Magnesium   Result Value Ref Range    Magnesium 1.63 1.60 - 2.40 mg/dL   Basic Metabolic Panel   Result Value Ref Range    Glucose 97 74 - 99 mg/dL    Sodium 136 136 - 145 mmol/L    Potassium 3.7 3.5 - 5.3 mmol/L    Chloride 106 98 - 107 mmol/L    Bicarbonate 22 21 - 32 mmol/L    Anion Gap 12 10 - 20 mmol/L    Urea Nitrogen 26 (H) 6 - 23 mg/dL    Creatinine 1.45 (H) 0.50 - 1.30 mg/dL    eGFR 44 (L) >60 mL/min/1.73m*2    Calcium 8.5 (L) 8.6 - 10.3 mg/dL   C. difficile, PCR    Specimen: Stool   Result Value Ref Range    C. difficile, PCR Not Detected Not Detected        CT abdomen pelvis wo IV contrast   Final  Result   Fluid throughout the colon indicating diarrheal illness.        Colonic diverticulosis.        Hepatic steatosis.        Signed by: Chandrakant Clark 3/19/2024 5:53 PM   Dictation workstation:   [id number]      XR chest 2 views   Final Result   1.  No active cardiopulmonary process.             Signed by: Wally Lacy 3/19/2024 4:07 PM   Dictation workstation:   [id number]          No echocardiogram results found for the past 14 days    DISCHARGE MEDICATIONS        Your medication list        CONTINUE taking these medications        Instructions Last Dose Given Next Dose Due   busPIRone 15 mg tablet  Commonly known as: Buspar      Take 1 tablet (15 mg) by mouth 2 times a day.       lurasidone 40 mg tablet  Commonly known as: Latuda      Take 1 tablet (40 mg) by mouth once daily at bedtime.       omeprazole 20 mg DR capsule  Commonly known as: PriLOSEC           QUEtiapine 150 mg tablet      Take 150 mg by mouth once daily at bedtime.       simvastatin 40 mg tablet  Commonly known as: Zocor           tamsulosin 0.4 mg 24 hr capsule  Commonly known as: Flomax      Take 1 capsule (0.4 mg) by mouth once daily in the morning. Take before meals. Do not start before November 3, 2023.       traZODone 50 mg tablet  Commonly known as: Desyrel      Take 1 tablet (50 mg) by mouth once daily at bedtime.       venlafaxine  mg 24 hr capsule  Commonly known as: Effexor-XR      Take 1 capsule (150 mg) by mouth once daily. Do not crush or chew.                OUTPATIENT FOLLOW-UP     No future appointments.

## 2024-03-21 ENCOUNTER — PATIENT OUTREACH (OUTPATIENT)
Dept: CARE COORDINATION | Facility: CLINIC | Age: 49
End: 2024-03-21
Payer: COMMERCIAL

## 2024-03-21 SDOH — ECONOMIC STABILITY: FOOD INSECURITY
ARE ANY OF YOUR NEEDS URGENT? FOR EXAMPLE, UNCERTAINTY OF WHERE YOU WILL GET YOUR NEXT MEAL OR NOT HAVING THE MEDICATIONS YOU NEED TO TAKE TOMORROW.: NO

## 2024-03-21 SDOH — ECONOMIC STABILITY: GENERAL: WOULD YOU LIKE HELP WITH ANY OF THE FOLLOWING NEEDS?: I DONT NEED HELP WITH ANY OF THESE

## 2024-03-21 NOTE — PROGRESS NOTES
Discharge facility: [facility]  Discharge diagnosis: Acute gastroenteritis, likely viral   Admission date: 3/19/24  Discharge date: 3/20/24  Follow Up Appointment Date: 4/5/24    Outreach call to patient to support a smooth transition of care from recent admission.  Spoke with patient, reviewed discharge medications, discharge instructions, assessed social needs, and provided education on importance of follow-up appointment with provider. Enrolled patient in [employer] chatbot for additional support and education through transition period.  Will continue to monitor through transition period.     Engagement  Call Start Time: 1130 (3/21/2024 12:09 PM)    Medications  Medications reviewed with patient/caregiver?: Yes (3/21/2024 12:09 PM)  Is the patient having any side effects they believe may be caused by any medication additions or changes?: No (3/21/2024 12:09 PM)  Does the patient have all medications ordered at discharge?: Yes (3/21/2024 12:09 PM)  Care Management Interventions: No intervention needed (3/21/2024 12:09 PM)  Prescription Comments: busPIRone 15 mg tablet  Commonly known as: Buspar        Take 1 tablet (15 mg) by mouth 2 times a day.           lurasidone 40 mg tablet  Commonly known as: Latuda        Take 1 tablet (40 mg) by mouth once daily at bedtime.           omeprazole 20 mg DR capsule  Commonly known as: PriLOSEC                 QUEtiapine 150 mg tablet        Take 150 mg by mouth once daily at bedtime.           simvastatin 40 mg tablet  Commonly known as: Zocor                 tamsulosin 0.4 mg 24 hr capsule  Commonly known as: Flomax        Take 1 capsule (0.4 mg) by mouth once daily in the morning. Take before meals. Do not start before November 3, 2023.           traZODone 50 mg tablet  Commonly known as: Desyrel        Take 1 tablet (50 mg) by mouth once daily at bedtime.           venlafaxine  mg 24 hr capsule  Commonly known as: Effexor-XR        Take 1 capsule (150 mg)  by mouth once daily. Do not crush or chew. (3/21/2024 12:09 PM)  Is the patient taking all medications as directed (includes completed medication regime)?: Yes (3/21/2024 12:09 PM)    Appointments  Does the patient have a primary care provider?: Yes (3/21/2024 12:09 PM)  Care Management Interventions: Verified appointment date/time/provider (3/21/2024 12:09 PM)  Has the patient kept scheduled appointments due by today?: Yes (3/21/2024 12:09 PM)  Care Management Interventions: Educated on importance of keeping appointment (3/21/2024 12:09 PM)    Patient Teaching  Does the patient have access to their discharge instructions?: Yes (3/21/2024 12:09 PM)  Care Management Interventions: Reviewed instructions with patient (3/21/2024 12:09 PM)  What is the patient's perception of their health status since discharge?: Improving (3/21/2024 12:09 PM)  Is the patient/caregiver able to teach back the hierarchy of who to call/visit for symptoms/problems? PCP, Specialist, Home Health nurse, Urgent Care, ED, 911: Yes (3/21/2024 12:09 PM)  If the patient is a current smoker, are they able to teach back resources for cessation?: [id number]; Smoking cessation support groups (3/21/2024 12:09 PM)    Wrap Up  Is the patient/caregiver familiar with Advance Care Planning?: Yes (3/21/2024 12:09 PM)  Would the patient like more information on Advance Care Planning?: No (3/21/2024 12:09 PM)  Call End Time: 1212 (3/21/2024 12:09 PM)    Jailene Munoz, SID

## 2024-03-22 LAB

## 2024-04-19 ENCOUNTER — PATIENT OUTREACH (OUTPATIENT)
Dept: CARE COORDINATION | Facility: CLINIC | Age: 49
End: 2024-04-19
Payer: COMMERCIAL

## 2024-07-24 NOTE — PROGRESS NOTES
Foreign Rosas is a 60 y.o. female PRESENTING FOR THYROIDECTOMY total (Bilateral)/PARATHYROIDECTOMY (Right: Neck)  with a history of   -Parathyroid adenoma/Papillary thyroid carcinoma   -HYPERCALCEMIA  -GORDON  -T2DM (A1C 8.4% 6/29/24); AM CBG  -HTN  -OAB  -GORDON  -H/O COVID-19  -PMB  -LUNG NODULES  -CTS  -TREMOR  -CHRONIC PAIN  -OBESITY, BMI 30    BETA-BLOCKER: PROPRANOLOL    Last dose: today  Last Januvia: 2 days ago.      New Orders for Anesthesia: DM PROTOCOL     Patient Active Problem List   Diagnosis    Physiological tremor    Hypercalcemia    Triggering of digit    Severe acute respiratory syndrome coronavirus 2 (SARS-CoV-2) vaccination not indicated    Radial styloid tenosynovitis of left hand    Pain in pelvis    Postmenopausal bleeding    Nuclear senile cataract    Musculoskeletal pain    Multiple pulmonary nodules    Increased frequency of urination    Hypertension    CTS (carpal tunnel syndrome)    Insomnia    Chronic pain of both knees    Elevated PTHrP level    Uncontrolled diabetes mellitus with hyperglycemia, with long-term current use of insulin    Urinary incontinence    Overactive bladder    Parathyroid adenoma    Multiple thyroid nodules    Tenosynovitis of left wrist    Hyperlipidemia LDL goal <70    Dizziness    GORDON (obstructive sleep apnea)    Type 2 diabetes mellitus without complication, with long-term current use of insulin    Screening for breast cancer    Abnormal thyroid biopsy    Hyperkalemia       Past Surgical History:   Procedure Laterality Date    CHOLECYSTECTOMY      HYSTERECTOMY, VAGINAL, LAPAROSCOPY-ASSISTED, WITH SALPINGO-OOPHORECTOY Bilateral 08/22/2017    TUBAL LIGATION         Lab Results   Component Value Date    WBC 7.79 07/23/2024    HGB 13.1 07/23/2024    HCT 42.4 07/23/2024     07/23/2024       CMP  Sodium   Date Value Ref Range Status   07/23/2024 142 136 - 145 mmol/L Final     Potassium   Date Value Ref Range Status   07/23/2024 4.3 3.5 - 5.1 mmol/L Final  Music Therapy Note    Victorino Lara was referred by RN          Pre-assessment  Mood/Affect: Flat/blunted, Confused         Treatment/Interventions  Areas of Focus: Locus of control, Normalization  Music Therapy Interventions: Music-facilitated relaxation    Post-assessment  Unable to Assess Reason: Outcomes not applicable  Mood/Affect: Calm  Verbalized Emotional State: Acceptance    Narrative  Assessment Detail: PT lying in bed with wife at bedside. PT shared they were feeling some confusion getting off of their medications and trying to be oriented to place and current situation. PT expressed excitement for services and accepted.  Plan: To implement preferred music to increase orientation and control of current circumstances.  Intervention: MT implemented passive music listening to improve relaxation and orient PT to environment. PT's wife actively engaged in care and shared how PT relaxes most with white noise and guitar instrumental as a way to sleep.  Evaluation: PT engaged passively and in and out of closing their eyes. PT wife discussing most during interventions. PT expressed positive results from interventions.  Follow-up: MT to f/u next week if applicable.    Education Documentation  Resources, taught by Janki Puente at 10/30/2023  3:53 PM.  Learner: Patient  Readiness: Acceptance  Method: Demonstration  Response: Verbalizes Understanding    Coping Strategies, taught by Janki Puente at 10/30/2023  3:53 PM.  Learner: Patient  Readiness: Acceptance  Method: Demonstration  Response: Verbalizes Understanding    Relaxation, taught by Janki Puente at 10/30/2023  3:53 PM.  Learner: Patient  Readiness: Acceptance  Method: Demonstration  Response: Verbalizes Understanding    Regiments, taught by Janki Puente at 10/30/2023  3:53 PM.  Learner: Patient  Readiness: Acceptance  Method: Demonstration  Response: Verbalizes Understanding    Integrative Health, taught by Janki Puente at  "    Chloride   Date Value Ref Range Status   07/23/2024 104 98 - 107 mmol/L Final     CO2   Date Value Ref Range Status   07/23/2024 28 23 - 31 mmol/L Final     Blood Urea Nitrogen   Date Value Ref Range Status   07/23/2024 11.7 9.8 - 20.1 mg/dL Final     Creatinine   Date Value Ref Range Status   07/23/2024 1.07 (H) 0.55 - 1.02 mg/dL Final     Calcium   Date Value Ref Range Status   07/23/2024 10.6 (H) 8.4 - 10.2 mg/dL Final     Albumin   Date Value Ref Range Status   07/23/2024 4.3 3.4 - 4.8 g/dL Final     Bilirubin Total   Date Value Ref Range Status   07/23/2024 0.2 <=1.5 mg/dL Final     ALP   Date Value Ref Range Status   07/23/2024 81 40 - 150 unit/L Final     AST   Date Value Ref Range Status   07/23/2024 17 5 - 34 unit/L Final     ALT   Date Value Ref Range Status   07/23/2024 22 0 - 55 unit/L Final     eGFR   Date Value Ref Range Status   07/23/2024 59 mL/min/1.73/m2 Final       Lab Results   Component Value Date    INR 1.12 09/24/2018     CXR 7/24/2024: [facility].        ECHO 12/27/23        Current Outpatient Medications   Medication Instructions    amLODIPine (NORVASC) 5 mg, Oral, Daily    arm brace Misc 1 each, Misc.(Non-Drug; Combo Route), Nightly, Left carpal tunnel wrist splint, nightly    gabapentin (NEURONTIN) 300 mg, Oral, Nightly, Patient states she takes once a month    glipiZIDE (GLUCOTROL) 10 mg, Oral, 2 times daily before meals    ibuprofen (ADVIL,MOTRIN) 800 mg, Oral, 3 times daily PRN    insulin detemir U-100, Levemir, 100 unit/mL (3 mL) SubQ InPn pen Inject 40 units sq in AM and 48 units sq in PM.    insulin lispro 100 unit/mL pen Inject 2 to 12 units under the skin per sliding scale after checking blood glucose before meals and at bedtime    losartan (COZAAR) 25 mg, Oral, Daily    metFORMIN (GLUCOPHAGE) 1,000 mg, Oral, 2 times daily with meals    oxybutynin (DITROPAN-XL) 5 mg, Oral, Daily    pen needle, diabetic 32 gauge x 5/32" Ndle Use 1 needle five times daily    pravastatin (PRAVACHOL) 20 " 10/30/2023  3:53 PM.  Learner: Patient  Readiness: Acceptance  Method: Demonstration  Response: Verbalizes Understanding    Focal Points, taught by Janki Puente at 10/30/2023  3:53 PM.  Learner: Patient  Readiness: Acceptance  Method: Demonstration  Response: Verbalizes Understanding    Pain Management, taught by Janki Puente at 10/30/2023  3:53 PM.  Learner: Patient  Readiness: Acceptance  Method: Demonstration  Response: Verbalizes Understanding          Expressive Therapies Note   mg, Oral, Daily    propranoloL (INDERAL) 10 mg, Oral, 2 times daily    sertraline (ZOLOFT) 50 MG tablet Take 1 tablet by mouth at bedtime    SITagliptin phosphate (JANUVIA) 50 mg, Oral, Daily    zolpidem (AMBIEN) 5 mg, Oral, Nightly PRN       Past anesthesia records: NONE    Pre-op Assessment    I have reviewed the Patient Summary Reports.     I have reviewed the Nursing Notes. I have reviewed the NPO Status.   I have reviewed the Medications.     Review of Systems  Anesthesia Hx:  No problems with previous Anesthesia   History of prior surgery of interest to airway management or planning:  Previous anesthesia: General Lap Hysterectomy w BSO:; GB: with general anesthesia.       Airway issues documented on chart review include GETA     Denies Family Hx of Anesthesia complications.    Denies Personal Hx of Anesthesia complications.                    Social:  Non-Smoker, No Alcohol Use       Hematology/Oncology:  Hematology Normal   Oncology Normal                                   EENT/Dental:  EENT/Dental Normal           Cardiovascular:     Denies Pacemaker. Hypertension    Denies CABG/stent.    Denies Angina.     hyperlipidemia    5/6/2017 Stress Echo: EF=  Neg ischemia.   Echo 12/2023: EF=53%. PASP=16. AV and MV: Normal.                      Disorder of Cardiac Conduction, Intraventricular Conduct Defect, Incomplete (RBBB)    Pulmonary:        Sleep Apnea, CPAP Hx CoVID Infxn 2022. No hosp               Renal/:  Chronic Renal Disease, CKD                Hepatic/GI:  Hepatic/GI Normal     Denies GERD.             Musculoskeletal:     Chr B knee pain            Neurological:    Neuromuscular Disease,   Denies Seizures.    CTS. Physiologic tremors                            Endocrine:  Diabetes, poorly controlled, using insulin   Parathyroid adenoma: Ca=10.7.        Dermatological:  Skin Normal    Psych:   anxiety depression                Physical Exam  General: Cooperative, Alert and  Oriented    Airway:  Mallampati: III / III  Mouth Opening: Normal  TM Distance: Normal  Tongue: Normal  Neck ROM: Normal ROM    Dental:  Intact, Dentures, Partial Dentures  Px denies any loose teeth.  Chest/Lungs:  Normal Respiratory Rate    Heart:  Rate: Normal    Abdomen:  Normal, Soft        Anesthesia Plan  Type of Anesthesia, risks & benefits discussed:    Anesthesia Type: Gen ETT  Intra-op Monitoring Plan: Standard ASA Monitors  Post Op Pain Control Plan: multimodal analgesia  Induction:  IV  Airway Plan: Direct, Post-Induction  Informed Consent: Informed consent signed with the Patient and all parties understand the risks and agree with anesthesia plan.  All questions answered. Patient consented to blood products? Yes  ASA Score: 3  Day of Surgery Review of History & Physical: H&P Update referred to the surgeon/provider.I have interviewed and examined the patient. I have reviewed the patient's H&P dated:     Ready For Surgery From Anesthesia Perspective.     .

## 2024-07-31 ENCOUNTER — APPOINTMENT (OUTPATIENT)
Dept: RADIOLOGY | Facility: HOSPITAL | Age: 49
End: 2024-07-31
Payer: COMMERCIAL

## 2024-07-31 ENCOUNTER — HOSPITAL ENCOUNTER (EMERGENCY)
Facility: HOSPITAL | Age: 49
Discharge: HOME | End: 2024-07-31
Attending: EMERGENCY MEDICINE
Payer: COMMERCIAL

## 2024-07-31 ENCOUNTER — APPOINTMENT (OUTPATIENT)
Dept: CARDIOLOGY | Facility: HOSPITAL | Age: 49
End: 2024-07-31
Payer: COMMERCIAL

## 2024-07-31 VITALS
TEMPERATURE: 96.8 F | RESPIRATION RATE: 18 BRPM | BODY MASS INDEX: 32.96 KG/M2 | WEIGHT: 210 LBS | DIASTOLIC BLOOD PRESSURE: 92 MMHG | SYSTOLIC BLOOD PRESSURE: 135 MMHG | HEART RATE: 102 BPM | OXYGEN SATURATION: 96 % | HEIGHT: 67 IN

## 2024-07-31 DIAGNOSIS — R11.2 NAUSEA AND VOMITING, UNSPECIFIED VOMITING TYPE: ICD-10-CM

## 2024-07-31 DIAGNOSIS — E86.0 DEHYDRATION: ICD-10-CM

## 2024-07-31 DIAGNOSIS — R10.84 ABDOMINAL PAIN, GENERALIZED: Primary | ICD-10-CM

## 2024-07-31 DIAGNOSIS — K76.9 LIVER LESION: ICD-10-CM

## 2024-07-31 DIAGNOSIS — K52.9 ENTEROCOLITIS: ICD-10-CM

## 2024-07-31 LAB
ALBUMIN SERPL BCP-MCNC: 4.5 G/DL (ref 3.4–5)
ALP SERPL-CCNC: 85 U/L (ref 33–120)
ALT SERPL W P-5'-P-CCNC: 13 U/L (ref 7–52)
ANION GAP SERPL CALC-SCNC: 16 MMOL/L (ref 10–20)
APPEARANCE UR: CLEAR
AST SERPL W P-5'-P-CCNC: 16 U/L (ref 9–39)
BASOPHILS # BLD AUTO: 0.1 X10*3/UL (ref 0–0.1)
BASOPHILS NFR BLD AUTO: 0.9 %
BILIRUB SERPL-MCNC: 0.6 MG/DL (ref 0–1.2)
BILIRUB UR STRIP.AUTO-MCNC: NEGATIVE MG/DL
BUN SERPL-MCNC: 15 MG/DL (ref 6–23)
CALCIUM SERPL-MCNC: 9.5 MG/DL (ref 8.6–10.3)
CARDIAC TROPONIN I PNL SERPL HS: 3 NG/L (ref 0–20)
CHLORIDE SERPL-SCNC: 106 MMOL/L (ref 98–107)
CO2 SERPL-SCNC: 16 MMOL/L (ref 21–32)
COLOR UR: YELLOW
CREAT SERPL-MCNC: 1.08 MG/DL (ref 0.5–1.3)
EGFRCR SERPLBLD CKD-EPI 2021: 63 ML/MIN/1.73M*2
EOSINOPHIL # BLD AUTO: 0.51 X10*3/UL (ref 0–0.7)
EOSINOPHIL NFR BLD AUTO: 4.7 %
ERYTHROCYTE [DISTWIDTH] IN BLOOD BY AUTOMATED COUNT: 13.6 % (ref 11.5–14.5)
GLUCOSE SERPL-MCNC: 137 MG/DL (ref 74–99)
GLUCOSE UR STRIP.AUTO-MCNC: NEGATIVE MG/DL
HCT VFR BLD AUTO: 47.9 % (ref 36–52)
HGB BLD-MCNC: 16.5 G/DL (ref 12–17.5)
IMM GRANULOCYTES # BLD AUTO: 0.05 X10*3/UL (ref 0–0.7)
IMM GRANULOCYTES NFR BLD AUTO: 0.5 % (ref 0–0.9)
KETONES UR STRIP.AUTO-MCNC: NEGATIVE MG/DL
LACTATE SERPL-SCNC: 1.9 MMOL/L (ref 0.4–2)
LEUKOCYTE ESTERASE UR QL STRIP.AUTO: NEGATIVE
LIPASE SERPL-CCNC: 11 U/L (ref 9–82)
LYMPHOCYTES # BLD AUTO: 3.17 X10*3/UL (ref 1.2–4.8)
LYMPHOCYTES NFR BLD AUTO: 29.4 %
MAGNESIUM SERPL-MCNC: 1.82 MG/DL (ref 1.6–2.4)
MCH RBC QN AUTO: 29.2 PG (ref 26–34)
MCHC RBC AUTO-ENTMCNC: 34.4 G/DL (ref 32–36)
MCV RBC AUTO: 85 FL (ref 80–100)
MONOCYTES # BLD AUTO: 0.81 X10*3/UL (ref 0.1–1)
MONOCYTES NFR BLD AUTO: 7.5 %
NEUTROPHILS # BLD AUTO: 6.14 X10*3/UL (ref 1.2–7.7)
NEUTROPHILS NFR BLD AUTO: 57 %
NITRITE UR QL STRIP.AUTO: NEGATIVE
NRBC BLD-RTO: 0 /100 WBCS (ref 0–0)
PH UR STRIP.AUTO: 5.5 [PH]
PLATELET # BLD AUTO: 361 X10*3/UL (ref 150–450)
POTASSIUM SERPL-SCNC: 4.2 MMOL/L (ref 3.5–5.3)
PROT SERPL-MCNC: 8.3 G/DL (ref 6.4–8.2)
PROT UR STRIP.AUTO-MCNC: NEGATIVE MG/DL
RBC # BLD AUTO: 5.65 X10*6/UL (ref 4–5.9)
RBC # UR STRIP.AUTO: NEGATIVE /UL
SARS-COV-2 RNA RESP QL NAA+PROBE: NOT DETECTED
SODIUM SERPL-SCNC: 134 MMOL/L (ref 136–145)
SP GR UR STRIP.AUTO: 1.01
UROBILINOGEN UR STRIP.AUTO-MCNC: 0.2 MG/DL
WBC # BLD AUTO: 10.8 X10*3/UL (ref 4.4–11.3)

## 2024-07-31 PROCEDURE — 83690 ASSAY OF LIPASE: CPT | Performed by: EMERGENCY MEDICINE

## 2024-07-31 PROCEDURE — C9113 INJ PANTOPRAZOLE SODIUM, VIA: HCPCS | Performed by: EMERGENCY MEDICINE

## 2024-07-31 PROCEDURE — 96375 TX/PRO/DX INJ NEW DRUG ADDON: CPT | Mod: 59

## 2024-07-31 PROCEDURE — 96361 HYDRATE IV INFUSION ADD-ON: CPT | Mod: 59

## 2024-07-31 PROCEDURE — 74177 CT ABD & PELVIS W/CONTRAST: CPT

## 2024-07-31 PROCEDURE — 93005 ELECTROCARDIOGRAM TRACING: CPT

## 2024-07-31 PROCEDURE — 36415 COLL VENOUS BLD VENIPUNCTURE: CPT | Performed by: EMERGENCY MEDICINE

## 2024-07-31 PROCEDURE — 2500000004 HC RX 250 GENERAL PHARMACY W/ HCPCS (ALT 636 FOR OP/ED): Performed by: EMERGENCY MEDICINE

## 2024-07-31 PROCEDURE — 80053 COMPREHEN METABOLIC PANEL: CPT | Performed by: EMERGENCY MEDICINE

## 2024-07-31 PROCEDURE — 99285 EMERGENCY DEPT VISIT HI MDM: CPT | Mod: 25

## 2024-07-31 PROCEDURE — 87635 SARS-COV-2 COVID-19 AMP PRB: CPT | Performed by: EMERGENCY MEDICINE

## 2024-07-31 PROCEDURE — 81003 URINALYSIS AUTO W/O SCOPE: CPT | Performed by: EMERGENCY MEDICINE

## 2024-07-31 PROCEDURE — 83605 ASSAY OF LACTIC ACID: CPT | Performed by: EMERGENCY MEDICINE

## 2024-07-31 PROCEDURE — 85025 COMPLETE CBC W/AUTO DIFF WBC: CPT | Performed by: EMERGENCY MEDICINE

## 2024-07-31 PROCEDURE — 74177 CT ABD & PELVIS W/CONTRAST: CPT | Mod: FOREIGN READ | Performed by: RADIOLOGY

## 2024-07-31 PROCEDURE — 96374 THER/PROPH/DIAG INJ IV PUSH: CPT | Mod: 59

## 2024-07-31 PROCEDURE — 2550000001 HC RX 255 CONTRASTS: Performed by: EMERGENCY MEDICINE

## 2024-07-31 PROCEDURE — 83735 ASSAY OF MAGNESIUM: CPT | Performed by: EMERGENCY MEDICINE

## 2024-07-31 PROCEDURE — 84484 ASSAY OF TROPONIN QUANT: CPT | Performed by: EMERGENCY MEDICINE

## 2024-07-31 RX ORDER — SUCRALFATE 1 G/1
1 TABLET ORAL
Qty: 28 TABLET | Refills: 0 | Status: SHIPPED | OUTPATIENT
Start: 2024-07-31 | End: 2024-08-07

## 2024-07-31 RX ORDER — MORPHINE SULFATE 4 MG/ML
4 INJECTION, SOLUTION INTRAMUSCULAR; INTRAVENOUS ONCE
Status: COMPLETED | OUTPATIENT
Start: 2024-07-31 | End: 2024-07-31

## 2024-07-31 RX ORDER — ONDANSETRON HYDROCHLORIDE 2 MG/ML
4 INJECTION, SOLUTION INTRAVENOUS ONCE
Status: COMPLETED | OUTPATIENT
Start: 2024-07-31 | End: 2024-07-31

## 2024-07-31 RX ORDER — ONDANSETRON 4 MG/1
4 TABLET, ORALLY DISINTEGRATING ORAL EVERY 8 HOURS PRN
Qty: 15 TABLET | Refills: 0 | Status: SHIPPED | OUTPATIENT
Start: 2024-07-31 | End: 2024-08-05

## 2024-07-31 RX ORDER — PANTOPRAZOLE SODIUM 40 MG/10ML
40 INJECTION, POWDER, LYOPHILIZED, FOR SOLUTION INTRAVENOUS ONCE
Status: COMPLETED | OUTPATIENT
Start: 2024-07-31 | End: 2024-07-31

## 2024-07-31 RX ORDER — DICYCLOMINE HYDROCHLORIDE 20 MG/1
20 TABLET ORAL 2 TIMES DAILY PRN
Qty: 14 TABLET | Refills: 0 | Status: SHIPPED | OUTPATIENT
Start: 2024-07-31 | End: 2024-08-07

## 2024-07-31 ASSESSMENT — LIFESTYLE VARIABLES
TOTAL SCORE: 0
HAVE YOU EVER FELT YOU SHOULD CUT DOWN ON YOUR DRINKING: NO
EVER HAD A DRINK FIRST THING IN THE MORNING TO STEADY YOUR NERVES TO GET RID OF A HANGOVER: NO
HAVE PEOPLE ANNOYED YOU BY CRITICIZING YOUR DRINKING: NO
EVER FELT BAD OR GUILTY ABOUT YOUR DRINKING: NO

## 2024-07-31 ASSESSMENT — PAIN SCALES - GENERAL
PAINLEVEL_OUTOF10: 3
PAINLEVEL_OUTOF10: 7
PAINLEVEL_OUTOF10: 7
PAINLEVEL_OUTOF10: 3

## 2024-07-31 ASSESSMENT — PAIN DESCRIPTION - LOCATION: LOCATION: ABDOMEN

## 2024-07-31 ASSESSMENT — PAIN DESCRIPTION - PROGRESSION: CLINICAL_PROGRESSION: NOT CHANGED

## 2024-07-31 ASSESSMENT — PAIN - FUNCTIONAL ASSESSMENT
PAIN_FUNCTIONAL_ASSESSMENT: 0-10

## 2024-07-31 NOTE — DISCHARGE INSTRUCTIONS
Follow-up with your primary care doctor.  Return to the emergency department if symptoms worsen or change.  Take medications as prescribed.  You had an incidental finding of liver lesion on imaging today and this should be monitored by your primary care doctor.  They may elect to perform further imaging such as ultrasound.

## 2024-07-31 NOTE — ED PROVIDER NOTES
48-year-old male presents emergency department with chief complaint of abdominal pain, nausea, vomiting, and diarrhea.  He reports his symptoms have been present for the past 3 days.  He denies any fevers, coughing, or congestion.  No chest pain or difficulty breathing.  Denies dysuria or genital symptoms.  No constipation.  Denies black or bloody stools.  Denies any significant hematemesis.  Patient reports history of previous bowel obstruction and complicated appendectomy surgery.  Denies any injury or trauma to the abdomen Chart review shows significant past medical history of anxiety, depression, hyperlipidemia, diabetes, bipolar disorder, acute kidney injury, small bowel obstruction, acute appendicitis.  Patient also has surgical history of colonoscopy, EGD, scrotal surgery, and hernia repair.  He has history of occasional tobacco use no illicit drug use or regular alcohol use reported.  Patient is not on any anticoagulants.      History provided by:  Patient   used: No           ------------------------------------------------------------------------------------------------------------------------------------------    VS: As documented in the triage note and EMR flowsheet from this visit were reviewed.    Review of Systems  Constitutional: no fever, chills reports malaise  Eyes: no redness, discharge, pain  HENT: no sore throat, nose bleeds, congestion, rhinorrhea   Cardiovascular: no chest pain, leg edema, palpitations  Respiratory: no shortness of breath, cough, wheezing  GI: Admits to nausea, vomiting, diarrhea, and abdominal pain no constipation, BRBPR, melena  : no dysuria, frequency, hematuria, genital symptoms  Musculoskeletal: no neck pain, stiffness,  no joint deformity, swelling  Skin: no rash, erythema, wounds  Neurological: no headache, dizziness, lightheadedness, weakness, numbness, or tingling  Psychiatric: no suicidal thoughts, confusion, agitation  Metabolic: no polyuria  or polydipsia  Hematologic: no increased bleeding or bruising  Immunology: No immunocompromise state    [facility]  Nursing notes reviewed and confirmed by me.  Chart review performed including medications, allergies, and medical, surgical, and family history  Visit Vitals  BP (!) 135/92 (BP Location: Right arm, Patient Position: Sitting)   Pulse (!) 102   Temp 36 °C (96.8 °F) (Temporal)   Resp 18     Physical Exam  Vitals and nursing note reviewed.   Constitutional:       General: He is not in acute distress.     Appearance: Normal appearance. He is not ill-appearing.   HENT:      Head: Normocephalic and atraumatic.      Right Ear: External ear normal.      Left Ear: External ear normal.      Nose: Nose normal. No congestion or rhinorrhea.      Mouth/Throat:      Mouth: Mucous membranes are moist.      Pharynx: No oropharyngeal exudate or posterior oropharyngeal erythema.   Eyes:      Extraocular Movements: Extraocular movements intact.      Conjunctiva/sclera: Conjunctivae normal.      Pupils: Pupils are equal, round, and reactive to light.   Cardiovascular:      Rate and Rhythm: Regular rhythm. Tachycardia present.      Pulses: Normal pulses.      Heart sounds: Normal heart sounds.   Pulmonary:      Effort: Pulmonary effort is normal. No respiratory distress.      Breath sounds: Normal breath sounds. No stridor. No wheezing, rhonchi or rales.   Abdominal:      General: There is no distension.      Palpations: Abdomen is soft.      Tenderness: There is abdominal tenderness (Diffusely tender to palpation difficult to localize). There is no guarding or rebound.      Hernia: A hernia (Umbilical hernia appreciated) is present.   Genitourinary:     Comments: Deferred  Musculoskeletal:         General: No swelling or deformity. Normal range of motion.      Cervical back: Normal range of motion and neck supple. No rigidity.      Right lower leg: No edema.      Left lower leg: No edema.      Comments: No calf tenderness     Skin:     General: Skin is warm and dry.      Capillary Refill: Capillary refill takes less than 2 seconds.      Coloration: Skin is not jaundiced.      Findings: No rash.   Neurological:      General: No focal deficit present.      Mental Status: He is alert and oriented to person, place, and time.      Sensory: No sensory deficit.      Motor: No weakness.   Psychiatric:         Mood and Affect: Mood normal.         Behavior: Behavior normal.        Past Medical History:   Diagnosis Date    Acute appendicitis with localized peritonitis 10/10/2023    Anxiety and depression     Bipolar 1 disorder, depressed (Multi)     Diabetes mellitus (Multi)     type II, diagnosed mid 2023    GERD (gastroesophageal reflux disease)     HLD (hyperlipidemia)     JOI (iron deficiency anemia)     Obesity (BMI 30-39.9)     Onychomycosis     Panic disorder     Retention of urine, unspecified     Inability to urinate    Scrotal hematoma     Umbilical hernia       Past Surgical History:   Procedure Laterality Date    APPENDECTOMY  10/10/2023    COLONOSCOPY  2019    COLONOSCOPY  02/23/2022    CYST REMOVAL  03/27/2014    Hand, excision of cyst    ELBOW SURGERY Right     ESOPHAGOGASTRODUODENOSCOPY      HERNIA REPAIR      SCROTAL SURGERY  02/2016    Hematoma drainage      Social History     Socioeconomic History    Marital status: Single   Tobacco Use    Smoking status: Some Days     Types: Cigarettes    Smokeless tobacco: Never   Vaping Use    Vaping status: Never Used   Substance and Sexual Activity    Alcohol use: Not Currently     Comment: rare social, 2-3 beers    Drug use: Never     Social Determinants of Health     Financial Resource Strain: Low Risk  (3/19/2024)    Overall Financial Resource Strain (CARDIA)     Difficulty of Paying Living Expenses: Not very hard   Transportation Needs: No Transportation Needs (3/19/2024)    PRAPARE - Transportation     Lack of Transportation (Medical): No     Lack of Transportation (Non-Medical): No    Housing Stability: Low Risk  (3/19/2024)    Housing Stability Vital Sign     Unable to Pay for Housing in the Last Year: No     Number of Places Lived in the Last Year: 1     Unstable Housing in the Last Year: No      ------------------------------------------------------------------------------------------------------------------------------------------  CT abdomen pelvis w IV contrast   Final Result   There is a moderate amount of fluid within large and small bowel loops   suggests possibility of enterocolitis.   Mild thickening of the distal esophagus with the possibility of   esophagitis raised.   10 mm subtle low density focus in the periphery of the right hepatic   lobe.  These may represent focal fatty infiltration.  Further   evaluation with ultrasound could be obtained.   Signed by Laith Mae MD         Labs Reviewed   COMPREHENSIVE METABOLIC PANEL - Abnormal       Result Value    Glucose 137 (*)     Sodium 134 (*)     Potassium 4.2      Chloride 106      Bicarbonate 16 (*)     Anion Gap 16      Urea Nitrogen 15      Creatinine 1.08      eGFR 63      Calcium 9.5      Albumin 4.5      Alkaline Phosphatase 85      Total Protein 8.3 (*)     AST 16      Bilirubin, Total 0.6      ALT 13     MAGNESIUM - Normal    Magnesium 1.82     LACTATE - Normal    Lactate 1.9      Narrative:     Venipuncture immediately after or during the administration of Metamizole may lead to falsely low results. Testing should be performed immediately  prior to Metamizole dosing.   LIPASE - Normal    Lipase 11      Narrative:     Venipuncture immediately after or during the administration of Metamizole may lead to falsely low results. Testing should be performed immediately prior to Metamizole dosing.   SARS-COV-2 PCR - Normal    Coronavirus 2019, PCR Not Detected      Narrative:     This assay has received FDA Emergency Use Authorization (EUA) and is only authorized for the duration of time that circumstances exist to justify the  authorization of the emergency use of in vitro diagnostic tests for the detection of SARS-CoV-2 virus and/or diagnosis of COVID-19 infection under section 564(b)(1) of the Act, 21 U.S.C. 360bbb-3(b)(1). This assay is an in vitro diagnostic nucleic acid amplification test for the qualitative detection of SARS-CoV-2 from nasopharyngeal specimens and has been validated for use at [facility]. Negative results do not preclude COVID-19 infections and should not be used as the sole basis for diagnosis, treatment, or other management decisions.     TROPONIN I, HIGH SENSITIVITY - Normal    Troponin I, High Sensitivity 3      Narrative:     Less than 99th percentile of normal range cutoff-  Female and children under 18 years old <14 ng/L; Male <21 ng/L: Negative  Repeat testing should be performed if clinically indicated.     Female and children under 18 years old 14-50 ng/L; Male 21-50 ng/L:  Consistent with possible cardiac damage and possible increased clinical   risk. Serial measurements may help to assess extent of myocardial damage.     >50 ng/L: Consistent with cardiac damage, increased clinical risk and  myocardial infarction. Serial measurements may help assess extent of   myocardial damage.      NOTE: Children less than 1 year old may have higher baseline troponin   levels and results should be interpreted in conjunction with the overall   clinical context.     NOTE: Troponin I testing is performed using a different   testing methodology at [facility] than at [facility]. Direct result comparisons should only   be made within the same method.   URINALYSIS WITH REFLEX CULTURE AND MICROSCOPIC - Normal    Color, Urine Yellow      Appearance, Urine Clear      Specific Gravity, Urine 1.015      pH, Urine 5.5      Protein, Urine NEGATIVE      Glucose, Urine NEGATIVE      Blood, Urine NEGATIVE      Ketones, Urine NEGATIVE      Bilirubin, Urine NEGATIVE      Urobilinogen,  Urine 0.2      Nitrite, Urine NEGATIVE      Leukocyte Esterase, Urine NEGATIVE     CBC WITH AUTO DIFFERENTIAL    WBC 10.8      nRBC 0.0      RBC 5.65      Hemoglobin 16.5      Hematocrit 47.9      MCV 85      MCH 29.2      MCHC 34.4      RDW 13.6      Platelets 361      Neutrophils % 57.0      Immature Granulocytes %, Automated 0.5      Lymphocytes % 29.4      Monocytes % 7.5      Eosinophils % 4.7      Basophils % 0.9      Neutrophils Absolute 6.14      Immature Granulocytes Absolute, Automated 0.05      Lymphocytes Absolute 3.17      Monocytes Absolute 0.81      Eosinophils Absolute 0.51      Basophils Absolute 0.10     URINALYSIS WITH REFLEX CULTURE AND MICROSCOPIC    Narrative:     The following orders were created for panel order Urinalysis with Reflex Culture and Microscopic.  Procedure                               Abnormality         Status                     ---------                               -----------         ------                     Urinalysis with Reflex C...[[id number]]  Normal              Final result               Extra Urine Gray Tube[[id number]]                            In process                   Please view results for these tests on the individual orders.   EXTRA URINE GRAY TUBE        Medical Decision Making  EKG interpreted by ED physician: Sinus tachycardia rate of 106.  VA, QRS, QTc intervals all within normal limits.  No significant ST elevations or depressions.  No significant Q waves.  Good R wave progression.  Normal axis.    48-year-old male presents emergency department with complaints of abdominal pain, nausea, vomiting, and diarrhea.  On my exam patient is afebrile nontoxic.  His abdomen is diffusely tender patient.  A thorough workup is obtained given his presenting symptoms.  UA does not show findings of infection.  COVID testing negative.  CBC shows no significant leukocytosis or anemia.  Patient does not have findings of sepsis.  Cardiac enzyme and EKG show no acute  ACS or significant arrhythmia.  Lipase within normal range I do not suspect pancreatitis.  Lactate is within normal range I do not suspect ischemic cause of his symptoms.  CMP does show findings of mild dehydration and patient is given IV fluids.  CT abdomen pelvis shows findings consistent with enterocolitis and possible esophagitis.  In addition, patient has incidental finding of hepatic lesion which she is advised of.  Advised him of need for follow-up regarding this.  He is treated symptomatically here in the emergency department with morphine, Zofran, and IV fluids.  Patient also given Protonix.  On reevaluation patient reports he is feeling much better and would like to go home.  I discussed with him his findings and recommended that he receive the rest of his IV fluids due to his elevated heart rate.  Patient states that he does not want to wait for his fluids and is feeling better and would like to go home.  He does acknowledge that his heart rate is slightly fast.  I suspect this could be due to dehydration.  Heart rate has been downtrending since IV fluids.  Patient is given symptomatic treatment for home Bentyl, Zofran, and Carafate.  Advised on reasons to return to the emergency department.  I also recommended that patient follow-up with his primary care doctor.  Patient is comfortable returning home.       Diagnoses as of 07/31/24 2224   Abdominal pain, generalized   Enterocolitis   Nausea and vomiting, unspecified vomiting type   Liver lesion   Dehydration      1. Abdominal pain, generalized  dicyclomine (Bentyl) 20 mg tablet      2. Enterocolitis  sucralfate (Carafate) 1 gram tablet      3. Nausea and vomiting, unspecified vomiting type  ondansetron ODT (Zofran-ODT) 4 mg disintegrating tablet      4. Liver lesion        5. Dehydration           Procedures     This note was dictated using dragon software and may contain errors related to dictation interpretation errors.      Isaac Tsai, DO  07/31/24  2226

## 2024-08-01 LAB
ATRIAL RATE: 106 BPM
HOLD SPECIMEN: NORMAL
P AXIS: 19 DEGREES
P OFFSET: 201 MS
P ONSET: 146 MS
PR INTERVAL: 128 MS
Q ONSET: 210 MS
QRS COUNT: 18 BEATS
QRS DURATION: 96 MS
QT INTERVAL: 352 MS
QTC CALCULATION(BAZETT): 467 MS
QTC FREDERICIA: 425 MS
R AXIS: -7 DEGREES
T AXIS: 17 DEGREES
T OFFSET: 386 MS
VENTRICULAR RATE: 106 BPM

## 2025-02-28 ENCOUNTER — APPOINTMENT (OUTPATIENT)
Dept: CARDIOLOGY | Facility: HOSPITAL | Age: 50
End: 2025-02-28
Payer: COMMERCIAL

## 2025-02-28 ENCOUNTER — HOSPITAL ENCOUNTER (EMERGENCY)
Facility: HOSPITAL | Age: 50
Discharge: HOME | End: 2025-02-28
Attending: STUDENT IN AN ORGANIZED HEALTH CARE EDUCATION/TRAINING PROGRAM
Payer: COMMERCIAL

## 2025-02-28 ENCOUNTER — APPOINTMENT (OUTPATIENT)
Dept: RADIOLOGY | Facility: HOSPITAL | Age: 50
End: 2025-02-28
Payer: COMMERCIAL

## 2025-02-28 VITALS
SYSTOLIC BLOOD PRESSURE: 129 MMHG | DIASTOLIC BLOOD PRESSURE: 78 MMHG | HEART RATE: 88 BPM | RESPIRATION RATE: 18 BRPM | OXYGEN SATURATION: 96 % | WEIGHT: 190 LBS | BODY MASS INDEX: 29.82 KG/M2 | HEIGHT: 67 IN | TEMPERATURE: 97.2 F

## 2025-02-28 DIAGNOSIS — R05.1 ACUTE COUGH: ICD-10-CM

## 2025-02-28 DIAGNOSIS — R68.89 FLU-LIKE SYMPTOMS: Primary | ICD-10-CM

## 2025-02-28 LAB
ALBUMIN SERPL BCP-MCNC: 4.3 G/DL (ref 3.4–5)
ALP SERPL-CCNC: 74 U/L (ref 33–120)
ALT SERPL W P-5'-P-CCNC: 17 U/L (ref 7–52)
ANION GAP SERPL CALC-SCNC: 13 MMOL/L (ref 10–20)
AST SERPL W P-5'-P-CCNC: 14 U/L (ref 9–39)
ATRIAL RATE: 101 BPM
BASOPHILS # BLD AUTO: 0.07 X10*3/UL (ref 0–0.1)
BASOPHILS NFR BLD AUTO: 1 %
BILIRUB SERPL-MCNC: 0.4 MG/DL (ref 0–1.2)
BNP SERPL-MCNC: 44 PG/ML (ref 0–99)
BUN SERPL-MCNC: 9 MG/DL (ref 6–23)
CALCIUM SERPL-MCNC: 9.3 MG/DL (ref 8.6–10.3)
CARDIAC TROPONIN I PNL SERPL HS: <3 NG/L (ref 0–20)
CHLORIDE SERPL-SCNC: 108 MMOL/L (ref 98–107)
CO2 SERPL-SCNC: 21 MMOL/L (ref 21–32)
CREAT SERPL-MCNC: 0.91 MG/DL (ref 0.5–1.3)
EGFRCR SERPLBLD CKD-EPI 2021: 77 ML/MIN/1.73M*2
EOSINOPHIL # BLD AUTO: 0.58 X10*3/UL (ref 0–0.7)
EOSINOPHIL NFR BLD AUTO: 8.5 %
ERYTHROCYTE [DISTWIDTH] IN BLOOD BY AUTOMATED COUNT: 13.4 % (ref 11.5–14.5)
FLUAV RNA RESP QL NAA+PROBE: NOT DETECTED
FLUBV RNA RESP QL NAA+PROBE: NOT DETECTED
GLUCOSE SERPL-MCNC: 140 MG/DL (ref 74–99)
HCT VFR BLD AUTO: 43.4 % (ref 36–52)
HGB BLD-MCNC: 14.9 G/DL (ref 12–17.5)
IMM GRANULOCYTES # BLD AUTO: 0.03 X10*3/UL (ref 0–0.7)
IMM GRANULOCYTES NFR BLD AUTO: 0.4 % (ref 0–0.9)
LIPASE SERPL-CCNC: 20 U/L (ref 9–82)
LYMPHOCYTES # BLD AUTO: 1.96 X10*3/UL (ref 1.2–4.8)
LYMPHOCYTES NFR BLD AUTO: 28.7 %
MCH RBC QN AUTO: 28.4 PG (ref 26–34)
MCHC RBC AUTO-ENTMCNC: 34.3 G/DL (ref 32–36)
MCV RBC AUTO: 83 FL (ref 80–100)
MONOCYTES # BLD AUTO: 0.54 X10*3/UL (ref 0.1–1)
MONOCYTES NFR BLD AUTO: 7.9 %
NEUTROPHILS # BLD AUTO: 3.64 X10*3/UL (ref 1.2–7.7)
NEUTROPHILS NFR BLD AUTO: 53.5 %
NRBC BLD-RTO: 0 /100 WBCS (ref 0–0)
P AXIS: 30 DEGREES
P OFFSET: 214 MS
P ONSET: 163 MS
PLATELET # BLD AUTO: 316 X10*3/UL (ref 150–450)
POTASSIUM SERPL-SCNC: 3.5 MMOL/L (ref 3.5–5.3)
PR INTERVAL: 124 MS
PROT SERPL-MCNC: 7.4 G/DL (ref 6.4–8.2)
Q ONSET: 225 MS
QRS COUNT: 17 BEATS
QRS DURATION: 92 MS
QT INTERVAL: 346 MS
QTC CALCULATION(BAZETT): 448 MS
QTC FREDERICIA: 411 MS
R AXIS: 55 DEGREES
RBC # BLD AUTO: 5.25 X10*6/UL (ref 4–5.9)
SARS-COV-2 RNA RESP QL NAA+PROBE: NOT DETECTED
SODIUM SERPL-SCNC: 138 MMOL/L (ref 136–145)
T AXIS: 22 DEGREES
T OFFSET: 398 MS
VENTRICULAR RATE: 101 BPM
WBC # BLD AUTO: 6.8 X10*3/UL (ref 4.4–11.3)

## 2025-02-28 PROCEDURE — 84484 ASSAY OF TROPONIN QUANT: CPT | Performed by: STUDENT IN AN ORGANIZED HEALTH CARE EDUCATION/TRAINING PROGRAM

## 2025-02-28 PROCEDURE — 99285 EMERGENCY DEPT VISIT HI MDM: CPT | Mod: 25 | Performed by: STUDENT IN AN ORGANIZED HEALTH CARE EDUCATION/TRAINING PROGRAM

## 2025-02-28 PROCEDURE — 36415 COLL VENOUS BLD VENIPUNCTURE: CPT | Performed by: STUDENT IN AN ORGANIZED HEALTH CARE EDUCATION/TRAINING PROGRAM

## 2025-02-28 PROCEDURE — 80053 COMPREHEN METABOLIC PANEL: CPT | Performed by: STUDENT IN AN ORGANIZED HEALTH CARE EDUCATION/TRAINING PROGRAM

## 2025-02-28 PROCEDURE — 83880 ASSAY OF NATRIURETIC PEPTIDE: CPT | Performed by: STUDENT IN AN ORGANIZED HEALTH CARE EDUCATION/TRAINING PROGRAM

## 2025-02-28 PROCEDURE — 85025 COMPLETE CBC W/AUTO DIFF WBC: CPT | Performed by: STUDENT IN AN ORGANIZED HEALTH CARE EDUCATION/TRAINING PROGRAM

## 2025-02-28 PROCEDURE — 71046 X-RAY EXAM CHEST 2 VIEWS: CPT | Mod: FOREIGN READ | Performed by: RADIOLOGY

## 2025-02-28 PROCEDURE — 96374 THER/PROPH/DIAG INJ IV PUSH: CPT

## 2025-02-28 PROCEDURE — 87636 SARSCOV2 & INF A&B AMP PRB: CPT | Performed by: STUDENT IN AN ORGANIZED HEALTH CARE EDUCATION/TRAINING PROGRAM

## 2025-02-28 PROCEDURE — 93005 ELECTROCARDIOGRAM TRACING: CPT

## 2025-02-28 PROCEDURE — 83690 ASSAY OF LIPASE: CPT | Performed by: STUDENT IN AN ORGANIZED HEALTH CARE EDUCATION/TRAINING PROGRAM

## 2025-02-28 PROCEDURE — 2500000004 HC RX 250 GENERAL PHARMACY W/ HCPCS (ALT 636 FOR OP/ED): Performed by: STUDENT IN AN ORGANIZED HEALTH CARE EDUCATION/TRAINING PROGRAM

## 2025-02-28 PROCEDURE — 96361 HYDRATE IV INFUSION ADD-ON: CPT

## 2025-02-28 PROCEDURE — 71046 X-RAY EXAM CHEST 2 VIEWS: CPT

## 2025-02-28 RX ORDER — HYDROCODONE BITARTRATE AND HOMATROPINE METHYLBROMIDE ORAL SOLUTION 5; 1.5 MG/5ML; MG/5ML
5 LIQUID ORAL EVERY 6 HOURS PRN
Qty: 40 ML | Refills: 0 | Status: SHIPPED | OUTPATIENT
Start: 2025-02-28 | End: 2025-03-02

## 2025-02-28 RX ORDER — KETOROLAC TROMETHAMINE 30 MG/ML
15 INJECTION, SOLUTION INTRAMUSCULAR; INTRAVENOUS ONCE
Status: COMPLETED | OUTPATIENT
Start: 2025-02-28 | End: 2025-02-28

## 2025-02-28 RX ADMIN — SODIUM CHLORIDE 1000 ML: 9 INJECTION, SOLUTION INTRAVENOUS at 15:34

## 2025-02-28 RX ADMIN — KETOROLAC TROMETHAMINE 15 MG: 30 INJECTION, SOLUTION INTRAMUSCULAR at 15:35

## 2025-02-28 ASSESSMENT — PAIN DESCRIPTION - DESCRIPTORS
DESCRIPTORS: ACHING
DESCRIPTORS: ACHING;SHARP

## 2025-02-28 ASSESSMENT — PAIN - FUNCTIONAL ASSESSMENT
PAIN_FUNCTIONAL_ASSESSMENT: 0-10
PAIN_FUNCTIONAL_ASSESSMENT: 0-10

## 2025-02-28 ASSESSMENT — PAIN DESCRIPTION - PAIN TYPE: TYPE: ACUTE PAIN

## 2025-02-28 ASSESSMENT — PAIN SCALES - GENERAL
PAINLEVEL_OUTOF10: 8
PAINLEVEL_OUTOF10: 0 - NO PAIN
PAINLEVEL_OUTOF10: 8

## 2025-02-28 ASSESSMENT — PAIN DESCRIPTION - LOCATION: LOCATION: HEAD

## 2025-02-28 NOTE — ED PROVIDER NOTES
HPI   Chief Complaint   Patient presents with    Flu Symptoms     Cough, body aches, congestion for 8 days        Patient a 49-year-old male with history of anxiety, depression, hyperlipidemia, diabetes, bipolar disorder who presents to the emergency department for complaints of cough, shortness of breath, chest tightness, dyspnea on exertion that has been ongoing for the past 8 days.  Patient states has been taking Tylenol and Motrin without relief of his symptoms.  He denies any sick contacts.  Patient denies any fevers or chills.  He does state he is been having an occasional productive cough of green sputum but denies any blood.  He does endorse diarrhea but denies any nausea or vomiting.      History provided by:  Patient and medical records          Patient History   Past Medical History:   Diagnosis Date    Acute appendicitis with localized peritonitis 10/10/2023    Anxiety and depression     Bipolar 1 disorder, depressed (Multi)     Diabetes mellitus (Multi)     type II, diagnosed mid 2023    GERD (gastroesophageal reflux disease)     HLD (hyperlipidemia)     OJI (iron deficiency anemia)     Obesity (BMI 30-39.9)     Onychomycosis     Panic disorder     Retention of urine, unspecified     Inability to urinate    Scrotal hematoma     Umbilical hernia      Past Surgical History:   Procedure Laterality Date    APPENDECTOMY  10/10/2023    COLONOSCOPY  2019    COLONOSCOPY  02/23/2022    CYST REMOVAL  03/27/2014    Hand, excision of cyst    ELBOW SURGERY Right     ESOPHAGOGASTRODUODENOSCOPY      HERNIA REPAIR      SCROTAL SURGERY  02/2016    Hematoma drainage     Family History   Problem Relation Name Age of Onset    Depression Mother       Social History     Tobacco Use    Smoking status: Some Days     Types: Cigarettes    Smokeless tobacco: Never   Vaping Use    Vaping status: Never Used   Substance Use Topics    Alcohol use: Not Currently     Comment: rare social, 2-3 beers    Drug use: Never       Physical  Exam   ED Triage Vitals [02/28/25 1424]   Temperature Heart Rate Respirations BP   36.8 °C (98.2 °F) (!) 102 17 133/68      Pulse Ox Temp Source Heart Rate Source Patient Position   96 % Temporal Monitor --      BP Location FiO2 (%)     -- --       Physical Exam  Vitals and nursing note reviewed.   Constitutional:       General: He is not in acute distress.     Appearance: Normal appearance. He is not ill-appearing, toxic-appearing or diaphoretic.   HENT:      Head: Normocephalic and atraumatic.      Nose: Nose normal.      Mouth/Throat:      Mouth: Mucous membranes are moist.      Pharynx: No oropharyngeal exudate or posterior oropharyngeal erythema.   Eyes:      General: No scleral icterus.     Extraocular Movements: Extraocular movements intact.      Pupils: Pupils are equal, round, and reactive to light.   Cardiovascular:      Rate and Rhythm: Regular rhythm. Tachycardia present.      Pulses: Normal pulses.      Heart sounds: Normal heart sounds. No murmur heard.     No friction rub. No gallop.   Pulmonary:      Effort: Pulmonary effort is normal. No respiratory distress.      Breath sounds: Normal breath sounds. No stridor. No wheezing, rhonchi or rales.   Chest:      Chest wall: No tenderness.   Abdominal:      General: Abdomen is flat. There is no distension.      Palpations: Abdomen is soft. There is no mass.      Tenderness: There is no abdominal tenderness. There is no guarding.      Hernia: No hernia is present.   Musculoskeletal:         General: No swelling, tenderness, deformity or signs of injury. Normal range of motion.      Cervical back: Normal range of motion and neck supple. No rigidity.   Skin:     General: Skin is warm and dry.      Capillary Refill: Capillary refill takes less than 2 seconds.      Coloration: Skin is not jaundiced or pale.      Findings: No bruising, erythema, lesion or rash.   Neurological:      General: No focal deficit present.      Mental Status: He is alert and oriented  to person, place, and time. Mental status is at baseline.   Psychiatric:         Mood and Affect: Mood normal.         Behavior: Behavior normal.           ED Course & MDM   Diagnoses as of 02/28/25 1618   Flu-like symptoms   Acute cough                 No data recorded     [place] Coma Scale Score: 15 (02/28/25 1542 : Caesar Ventura LPN)                           Medical Decision Making  Patient a 49-year-old male presenting to the emergency department with complaints of cough, chest tightness, body aches, diarrhea, dyspnea on exertion ongoing for the past 8 days.  Labs, chest x-ray, EKG ordered for further evaluation.  IV fluids and IV Toradol ordered for symptom control.    Patient's heart rate down trended with IV fluids.  Troponin was negative.  BP was negative.  CBC and CMP closely unremarkable.  Lipase was normal.  COVID influenza were negative.  Chest x-ray with no acute cardiopulmonary processes.  Patient was advised of all of his laboratory and imaging findings and was given a prescription for Hycodan syrup for his cough and advised to follow-up with his primary care doctor.  Return precautions were given.  All questions were answered.  Patient appreciative care and agreeable to discharge.    Amount and/or Complexity of Data Reviewed  Labs: ordered. Decision-making details documented in ED Course.  Radiology: ordered. Decision-making details documented in ED Course.  ECG/medicine tests: independent interpretation performed.     Details: Sinus rhythm with ventricular rate of 100 bpm.  QRS interval 94 ms.  QTc 448 ms.  Normal axis.  No acute ischemic injury pattern appreciated.      Labs Reviewed   COMPREHENSIVE METABOLIC PANEL - Abnormal       Result Value    Glucose 140 (*)     Sodium 138      Potassium 3.5      Chloride 108 (*)     Bicarbonate 21      Anion Gap 13      Urea Nitrogen 9      Creatinine 0.91      eGFR 77      Calcium 9.3      Albumin 4.3      Alkaline Phosphatase 74      Total Protein  7.4      AST 14      Bilirubin, Total 0.4      ALT 17     LIPASE - Normal    Lipase 20      Narrative:     Venipuncture immediately after or during the administration of Metamizole may lead to falsely low results. Testing should be performed immediately prior to Metamizole dosing.   TROPONIN I, HIGH SENSITIVITY - Normal    Troponin I, High Sensitivity <3      Narrative:     Less than 99th percentile of normal range cutoff-  Female and children under 18 years old <14 ng/L; Male <21 ng/L: Negative  Repeat testing should be performed if clinically indicated.     Female and children under 18 years old 14-50 ng/L; Male 21-50 ng/L:  Consistent with possible cardiac damage and possible increased clinical   risk. Serial measurements may help to assess extent of myocardial damage.     >50 ng/L: Consistent with cardiac damage, increased clinical risk and  myocardial infarction. Serial measurements may help assess extent of   myocardial damage.      NOTE: Children less than 1 year old may have higher baseline troponin   levels and results should be interpreted in conjunction with the overall   clinical context.     NOTE: Troponin I testing is performed using a different   testing methodology at [facility] than at other   [facility]. Direct result comparisons should only   be made within the same method.   SARS-COV-2 AND INFLUENZA A/B PCR - Normal    Flu A Result Not Detected      Flu B Result Not Detected      Coronavirus 2019, PCR Not Detected      Narrative:     This assay is an FDA-cleared, in vitro diagnostic nucleic acid amplification test for the qualitative detection and differentiation of SARS CoV-2/ Influenza A/B from nasopharyngeal specimens collected from individuals with signs and symptoms of respiratory tract infections, and has been validated for use at [facility]. Negative results do not preclude COVID-19/ Influenza A/B infections and should not be used as the sole  basis for diagnosis, treatment, or other management decisions. Testing for SARS CoV-2 is recommended only for patients who meet current clinical and/or epidemiological criteria defined by federal, state, or local public health directives.   B-TYPE NATRIURETIC PEPTIDE - Normal    BNP 44      Narrative:        <100 pg/mL - Heart failure unlikely  100-299 pg/mL - Intermediate probability of acute heart                  failure exacerbation. Correlate with clinical                  context and patient history.    >=300 pg/mL - Heart Failure likely. Correlate with clinical                  context and patient history.    BNP testing is performed using different testing methodology at [facility] than at other [facility]. Direct result comparisons should only be made within the same method.      CBC WITH AUTO DIFFERENTIAL    WBC 6.8      nRBC 0.0      RBC 5.25      Hemoglobin 14.9      Hematocrit 43.4      MCV 83      MCH 28.4      MCHC 34.3      RDW 13.4      Platelets 316      Neutrophils % 53.5      Immature Granulocytes %, Automated 0.4      Lymphocytes % 28.7      Monocytes % 7.9      Eosinophils % 8.5      Basophils % 1.0      Neutrophils Absolute 3.64      Immature Granulocytes Absolute, Automated 0.03      Lymphocytes Absolute 1.96      Monocytes Absolute 0.54      Eosinophils Absolute 0.58      Basophils Absolute 0.07       XR chest 2 views   Final Result   No acute cardiopulmonary abnormality.   Signed by Scotty Soriano MD            Procedure  Procedures     Migel Coffman DO  02/28/25 0131

## 2025-02-28 NOTE — DISCHARGE INSTRUCTIONS
Please follow up with your Primary Care Provider (PCP) within the next 2-3 days regarding your ED visit.  Seek immediate medical attention if you develop: worsening chest pain, new chest pain, nausea, vomiting, weakness, numbness, tingling, excessive sweating, shortness of breath, difficulty breathing, loss of motion in your arms or legs, or any new or worsening symptoms.  These documents have a lot of useful information! Please read them, so you know what to expect, what you can do for yourself at home, and also to know concerning signs warrant a return to the Emergency Department for additional evaluation.  You are welcome back any time. Thank you for entrusting your care to us, I hope we made your visit as pleasant as possible. Wishing you well!    Dr. Coffman

## (undated) DEVICE — PROBE, KOVEN VASCULAR, SINGLE USE

## (undated) DEVICE — PUMP, STRYKERFLOW 2 & HANDPIECE W/10FT. IRRIGATION TUBING

## (undated) DEVICE — RETRIEVAL SYSTEM, MONARCH, 10MM DISP ENDOSCOPIC

## (undated) DEVICE — TRAY, SKIN SCRUB, WET PREP, WITH 4 COMPARMENT

## (undated) DEVICE — WARMER, DUAL SCOPE

## (undated) DEVICE — CLIP, ENDO APPLIER LIGAMAX 5MM

## (undated) DEVICE — DRAPE, FLUID WARMING

## (undated) DEVICE — SUTURE, VICRYL, 3-0, 27 IN, SH

## (undated) DEVICE — SPONGE, LAP, XRAY DECT, 18IN X 18IN, W/MASTER DMT, STERILE

## (undated) DEVICE — ACCESS SYS, KII SHIELDED BLADED, Z-THREAD, 5X100CM

## (undated) DEVICE — NEEDLE, SAFETY, 25 GA X 1.5 IN

## (undated) DEVICE — Device

## (undated) DEVICE — SHEAR, W/UNIPOLAR CAUTERY, ENDOSHEAR, 5 MM

## (undated) DEVICE — STAPLER, ECHELON FLEX GST, POWERED PLUS, 60MM X 34CM, STANDARD

## (undated) DEVICE — DRESSING, ADHESIVE, ISLAND, TELFA, 4 X 10 IN

## (undated) DEVICE — SUTURE, PDS, 0, 18 IN, LIGATING LOOP, VIOLET

## (undated) DEVICE — SYRINGE, TOOMEY, IRRIGATION, 70ML

## (undated) DEVICE — STAPLER, SKIN, PLUS, REGULAR, 35

## (undated) DEVICE — STAPLER, SKIN, PLUS, WIDE, 35

## (undated) DEVICE — SUTURE, VICRYL, 0, 27 IN, UR-6, VIOLET

## (undated) DEVICE — STAPLER, LINEAR ENDO RELOAD, 60MM, BLUE, DISP

## (undated) DEVICE — STRIP, SKIN CLOSURE, COMPOUND BENZION TINCTURE 0.6ML

## (undated) DEVICE — SUTURE, SILK, 3-0, 18 IN SH/CR, BLACK

## (undated) DEVICE — APPLICATOR, CHLORAPREP, W/ORANGE TINT, 26ML

## (undated) DEVICE — TRAY, SURESTEP, SILICONE DRAINAGE BAG, STATLOCK, 16FR

## (undated) DEVICE — SOLUTION KIT, ANTIFOG, 6CC, LF

## (undated) DEVICE — DRAPE, SHEET, XL

## (undated) DEVICE — SOLUTION, IRRIGATION, USP, SODIUM CHLORIDE 0.9%, 3000 ML

## (undated) DEVICE — PROTECTOR, NERVE, ULNAR, PINK

## (undated) DEVICE — LIGASURE, SEALER/DIVIDER MARYLAND JAW, 5MM

## (undated) DEVICE — STRAP, VELCRO, BODY, 4 X 60IN, NS

## (undated) DEVICE — SUTURE, VICRYL, 3-0, 27 IN, CT-1, UNDYED

## (undated) DEVICE — GOWN, SURGICAL, ROYAL SILK, LG, STERILE

## (undated) DEVICE — TROCAR SYSTEM, BALLOON, KII GELPORT, 12 X 100MM

## (undated) DEVICE — GLOVE, SURGICAL, PROTEXIS PI BLUE W/NEUTHERA, 7.0, PF, LF

## (undated) DEVICE — SYSTEM, TROCAR LAP, 5X100MM, SHIELD BLADED, KII ADVANCED FIX ABDOMINAL

## (undated) DEVICE — GOWN, SURGICAL, ROYAL SILK, XL, STERILE

## (undated) DEVICE — GLOVE, SURGICAL, PROTEXIS PI , 6.5, PF, LF

## (undated) DEVICE — TOWEL PACK 10-PK

## (undated) DEVICE — SUTURE, VICRYL 3-0, TAPER POINT, SH-1 VIOLET 8-18 INCH

## (undated) DEVICE — SUTURE, PDS II, 1 CTX VIL MONO, VIOLET

## (undated) DEVICE — BOWL, BASIN, 32 OZ, STERILE

## (undated) DEVICE — ELECTRODE, ELECTROSURGICAL, BLADE, INSULATED, ENT/IMA, STERILE

## (undated) DEVICE — STRAP, ARM BOARD, 32 X 1.5

## (undated) DEVICE — BINDER, ABDOMINAL, 3 PANEL, 9 X 30-45 IN, SM/MED

## (undated) DEVICE — GLOVE, SURGICAL, PROTEXIS PI , 7.0, PF, LF

## (undated) DEVICE — SUTURE, MONOCRYL, 4-0, 27 IN, PS-2, UNDYED

## (undated) DEVICE — GLOVE, SURGICAL, PROTEXIS PI , 6.0, PF, LF

## (undated) DEVICE — KIT, SKIN STAPLE, REMOVER

## (undated) DEVICE — DRAPE, SHEET, LAPAROTOMY, W/ISO-BAC, W/ARMBOARD COVERS, 98 X 122 IN, DISPOSABLE, LF, STERILE

## (undated) DEVICE — STRIP, SKIN CLOSURE, STERI STRIP, REINFORCED, 0.5 X 4 IN